# Patient Record
Sex: MALE | Race: BLACK OR AFRICAN AMERICAN | NOT HISPANIC OR LATINO | Employment: FULL TIME | URBAN - METROPOLITAN AREA
[De-identification: names, ages, dates, MRNs, and addresses within clinical notes are randomized per-mention and may not be internally consistent; named-entity substitution may affect disease eponyms.]

---

## 2017-01-10 ENCOUNTER — HOSPITAL ENCOUNTER (OUTPATIENT)
Dept: RADIOLOGY | Facility: OTHER | Age: 52
Discharge: HOME | End: 2017-01-10
Attending: NEUROLOGICAL SURGERY
Payer: COMMERCIAL

## 2017-01-10 DIAGNOSIS — M54.2 NECK PAIN: ICD-10-CM

## 2017-01-10 DIAGNOSIS — M47.812 CERVICAL SPONDYLOSIS WITHOUT MYELOPATHY: ICD-10-CM

## 2017-01-10 DIAGNOSIS — M54.12 BRACHIAL NEURITIS: ICD-10-CM

## 2017-01-10 DIAGNOSIS — M43.10 ACQUIRED SPONDYLOLISTHESIS: ICD-10-CM

## 2017-01-10 DIAGNOSIS — M50.30 DEGENERATION OF CERVICAL INTERVERTEBRAL DISC: ICD-10-CM

## 2017-01-10 DIAGNOSIS — M79.602 LEFT ARM PAIN: ICD-10-CM

## 2017-01-10 PROCEDURE — 72141 MRI NECK SPINE W/O DYE: CPT | Mod: TC

## 2017-01-10 PROCEDURE — 72141 MRI NECK SPINE W/O DYE: CPT | Mod: 26,,, | Performed by: RADIOLOGY

## 2017-01-12 ENCOUNTER — TELEPHONE (OUTPATIENT)
Dept: SPINE | Facility: CLINIC | Age: 52
End: 2017-01-12

## 2017-01-12 NOTE — TELEPHONE ENCOUNTER
----- Message from Payton Whitt sent at 1/12/2017  3:02 PM CST -----  Contact: Self  Good afternoon,     Pt would like a call back regarding rescheduling appt on 1/17/17.    Pt can be reached at [phone]    Thank you!

## 2017-01-13 ENCOUNTER — TELEPHONE (OUTPATIENT)
Dept: HEPATOLOGY | Facility: CLINIC | Age: 52
End: 2017-01-13

## 2017-01-13 NOTE — TELEPHONE ENCOUNTER
----- Message from Payton Whitt sent at 1/12/2017  3:03 PM CST -----  Contact: Self  Good afternoon,     Pt would like a call back regarding rescheduling appt on 01/24/17    Pt can be reached at [phone]    Thank you!

## 2017-01-16 ENCOUNTER — TELEPHONE (OUTPATIENT)
Dept: HEPATOLOGY | Facility: CLINIC | Age: 52
End: 2017-01-16

## 2017-01-16 NOTE — TELEPHONE ENCOUNTER
----- Message from Bertha Cuevas sent at 1/13/2017  5:40 PM CST -----  Contact: Pt can be reached at [phone]  Pt is calling to speak to the nurse concerning possibly requesting to reschedule ultrasound early morning.    Sorry nothing available.    Pt would like to discuss other reschedule options  Thank you!

## 2017-01-17 ENCOUNTER — HOSPITAL ENCOUNTER (OUTPATIENT)
Dept: RADIOLOGY | Facility: HOSPITAL | Age: 52
Discharge: HOME | End: 2017-01-17
Attending: NURSE PRACTITIONER
Payer: COMMERCIAL

## 2017-01-17 DIAGNOSIS — B18.1 CHRONIC HEPATITIS B: ICD-10-CM

## 2017-01-17 PROCEDURE — 76700 US EXAM ABDOM COMPLETE: CPT | Mod: 26,,, | Performed by: RADIOLOGY

## 2017-01-17 PROCEDURE — 76700 US EXAM ABDOM COMPLETE: CPT | Mod: TC

## 2017-01-18 ENCOUNTER — OFFICE VISIT (OUTPATIENT)
Dept: SPINE | Facility: CLINIC | Age: 52
End: 2017-01-18
Payer: COMMERCIAL

## 2017-01-18 VITALS
WEIGHT: 189 LBS | DIASTOLIC BLOOD PRESSURE: 72 MMHG | BODY MASS INDEX: 27.99 KG/M2 | SYSTOLIC BLOOD PRESSURE: 163 MMHG | HEART RATE: 67 BPM | HEIGHT: 69 IN

## 2017-01-18 DIAGNOSIS — M54.2 NECK PAIN: Primary | ICD-10-CM

## 2017-01-18 DIAGNOSIS — M54.12 BRACHIAL NEURITIS: ICD-10-CM

## 2017-01-18 DIAGNOSIS — M47.812 CERVICAL SPONDYLOSIS WITHOUT MYELOPATHY: ICD-10-CM

## 2017-01-18 DIAGNOSIS — M25.522 LEFT ELBOW PAIN: ICD-10-CM

## 2017-01-18 DIAGNOSIS — M48.02 CERVICAL STENOSIS OF SPINAL CANAL: ICD-10-CM

## 2017-01-18 DIAGNOSIS — M50.30 DEGENERATION OF CERVICAL INTERVERTEBRAL DISC: ICD-10-CM

## 2017-01-18 DIAGNOSIS — M43.10 ACQUIRED SPONDYLOLISTHESIS: ICD-10-CM

## 2017-01-18 PROCEDURE — 1160F RVW MEDS BY RX/DR IN RCRD: CPT | Mod: S$GLB,,, | Performed by: PHYSICIAN ASSISTANT

## 2017-01-18 PROCEDURE — 3078F DIAST BP <80 MM HG: CPT | Mod: S$GLB,,, | Performed by: PHYSICIAN ASSISTANT

## 2017-01-18 PROCEDURE — 3077F SYST BP >= 140 MM HG: CPT | Mod: S$GLB,,, | Performed by: PHYSICIAN ASSISTANT

## 2017-01-18 PROCEDURE — 99999 PR PBB SHADOW E&M-EST. PATIENT-LVL IV: CPT | Mod: PBBFAC,,, | Performed by: PHYSICIAN ASSISTANT

## 2017-01-18 PROCEDURE — 99214 OFFICE O/P EST MOD 30 MIN: CPT | Mod: S$GLB,,, | Performed by: PHYSICIAN ASSISTANT

## 2017-01-18 NOTE — MR AVS SNAPSHOT
[facility]  [address]  [address]  [place] [address]  Phone: [phone]  Fax: [phone]                  Ramiro Oneil   2017 7:30 AM   Office Visit    Description:  Male : 1965   Provider:  Marianne Villareal PA-C   Department:  [facility]           Reason for Visit     Neck Pain                To Do List           Future Appointments        Provider Department Dept Phone    2017 3:00 PM Aracelis Frye NP [facility] Hep[facility] [phone]      Goals (5 Years of Data)     None      [facility]     [facility] -  Assistance  Registered nurses in the [facility] provide clinical advisement, health education, appointment booking, and other advisory services.  Call for this free service at [phone].             Medications           Message regarding Medications     Verify the changes and/or additions to your medication regime listed below are the same as discussed with your clinician today.  If any of these changes or additions are incorrect, please notify your healthcare provider.             Verify that the below list of medications is an accurate representation of the medications you are currently taking.  If none reported, the list may be blank. If incorrect, please contact your healthcare provider. Carry this list with you in case of emergency.           Current Medications     aspirin (ECOTRIN) 81 MG EC tablet Take 1 tablet (81 mg total) by mouth once daily.    atorvastatin (LIPITOR) 10 MG tablet Take 1 tablet (10 mg total) by mouth once daily.    clopidogrel (PLAVIX) 75 mg tablet TAKE 1 TABLET BY MOUTH EVERY DAY    cyclobenzaprine (FLEXERIL) 10 MG tablet Take 10 mg by mouth 3 (three) times daily as needed.    econazole nitrate 1 % cream     fluticasone (FLONASE) 50 mcg/actuation nasal spray 2 sprays by Each Nare route once daily.    gabapentin (NEURONTIN) 300 MG capsule Take 1 capsule (300 mg  "total) by mouth as directed. 1 po q hs x 3 days, then 1 po bid x 3 days, then 1 po tid.    hydrocortisone-pramoxine (PROCTOFOAM-HS) rectal foam Place 1 applicator rectally 2 (two) times daily.    methocarbamol (ROBAXIN) 750 MG Tab     nystatin-triamcinolone (MYCOLOG II) cream APPLY TOPICALLY 2 (TWO) TIMES DAILY.    tenofovir (VIREAD) 300 mg Tab TAKE 1 TABLET BY MOUTH    ONCE DAILY           Clinical Reference Information           Vital Signs - Last Recorded  Most recent update: 1/18/2017  8:11 AM by Palak Verde MA    BP Pulse Ht Wt BMI    (!) 163/72 67 5' 9" (1.753 m) 85.7 kg (189 lb) 27.91 kg/m2      Blood Pressure          Most Recent Value    BP  (!)  163/72      Allergies as of 1/18/2017     No Known Allergies      Immunizations Administered on Date of Encounter - 1/18/2017     None      [facility] Sign-Up     Activating your [facility] account is as easy as 1-2-3!     1) Visit [email or web address], select Sign Up Now, enter this activation code and your date of birth, then select Next.  [email or web address]  Expires: 1/20/2017  4:44 PM      2) Create a username and password to use when you visit [facility] in the future and select a security question in case you lose your password and select Next.    3) Enter your e-mail address and click Sign Up!    Additional Information  If you have questions, please e-mail [email or web address] or call [phone] to talk to our [facility] staff. Remember, [facility] is NOT to be used for urgent needs. For medical emergencies, dial 911.         "

## 2017-01-25 ENCOUNTER — OFFICE VISIT (OUTPATIENT)
Dept: HEPATOLOGY | Facility: CLINIC | Age: 52
End: 2017-01-25
Payer: COMMERCIAL

## 2017-01-25 VITALS
HEIGHT: 69 IN | TEMPERATURE: 96 F | SYSTOLIC BLOOD PRESSURE: 140 MMHG | BODY MASS INDEX: 27.36 KG/M2 | HEART RATE: 60 BPM | RESPIRATION RATE: 20 BRPM | DIASTOLIC BLOOD PRESSURE: 82 MMHG | OXYGEN SATURATION: 99 % | WEIGHT: 184.75 LBS

## 2017-01-25 DIAGNOSIS — I10 HYPERTENSION, ESSENTIAL: ICD-10-CM

## 2017-01-25 DIAGNOSIS — K76.0 NAFLD (NONALCOHOLIC FATTY LIVER DISEASE): ICD-10-CM

## 2017-01-25 DIAGNOSIS — E78.5 HYPERLIPIDEMIA, UNSPECIFIED HYPERLIPIDEMIA TYPE: ICD-10-CM

## 2017-01-25 DIAGNOSIS — B18.1 CHRONIC HEPATITIS B: Primary | ICD-10-CM

## 2017-01-25 DIAGNOSIS — E66.3 OVERWEIGHT: ICD-10-CM

## 2017-01-25 PROCEDURE — 3079F DIAST BP 80-89 MM HG: CPT | Mod: S$GLB,,, | Performed by: NURSE PRACTITIONER

## 2017-01-25 PROCEDURE — 99999 PR PBB SHADOW E&M-EST. PATIENT-LVL IV: CPT | Mod: PBBFAC,,, | Performed by: NURSE PRACTITIONER

## 2017-01-25 PROCEDURE — 3077F SYST BP >= 140 MM HG: CPT | Mod: S$GLB,,, | Performed by: NURSE PRACTITIONER

## 2017-01-25 PROCEDURE — 1159F MED LIST DOCD IN RCRD: CPT | Mod: S$GLB,,, | Performed by: NURSE PRACTITIONER

## 2017-01-25 PROCEDURE — 99213 OFFICE O/P EST LOW 20 MIN: CPT | Mod: S$GLB,,, | Performed by: NURSE PRACTITIONER
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
## 2017-02-20 ENCOUNTER — TELEPHONE (OUTPATIENT)
Dept: SPINE | Facility: CLINIC | Age: 52
End: 2017-02-20

## 2017-02-22 DIAGNOSIS — M54.12 BRACHIAL NEURITIS: ICD-10-CM

## 2017-02-22 DIAGNOSIS — M43.10 ACQUIRED SPONDYLOLISTHESIS: ICD-10-CM

## 2017-02-22 DIAGNOSIS — M79.602 LEFT ARM PAIN: ICD-10-CM

## 2017-02-22 DIAGNOSIS — M47.812 CERVICAL SPONDYLOSIS WITHOUT MYELOPATHY: ICD-10-CM

## 2017-02-22 DIAGNOSIS — M54.2 NECK PAIN: ICD-10-CM

## 2017-02-22 DIAGNOSIS — M50.30 DEGENERATION OF CERVICAL INTERVERTEBRAL DISC: ICD-10-CM

## 2017-02-22 RX ORDER — GABAPENTIN 300 MG/1
300 CAPSULE ORAL 3 TIMES DAILY
Qty: 90 CAPSULE | Refills: 2 | Status: SHIPPED | OUTPATIENT
Start: 2017-02-22 | End: 2018-11-14

## 2017-03-29 RX ORDER — CLOPIDOGREL BISULFATE 75 MG/1
TABLET ORAL
Qty: 30 TABLET | Refills: 4 | Status: SHIPPED | OUTPATIENT
Start: 2017-03-29 | End: 2018-10-19 | Stop reason: SDUPTHER

## 2017-03-29 RX ORDER — NYSTATIN AND TRIAMCINOLONE ACETONIDE 100000; 1 [USP'U]/G; MG/G
CREAM TOPICAL 2 TIMES DAILY
Qty: 30 G | Refills: 0 | Status: SHIPPED | OUTPATIENT
Start: 2017-03-29 | End: 2018-12-03 | Stop reason: SDUPTHER

## 2017-03-29 NOTE — TELEPHONE ENCOUNTER
----- Message from Paz Prado sent at 3/29/2017 12:01 PM CDT -----  Contact: [phone] (Phone)  RX request - refill or new RX.  Is this a refill or new RX:  refill  RX name and strength: nystatin-triamcinolone (MYCOLOG II) cream  Directions:   Is this a 30 day or 90 day RX:    Pharmacy name and phone #:  [phone] (Phone)  [phone] (Fax)    Comments:

## 2017-06-27 ENCOUNTER — TELEPHONE (OUTPATIENT)
Dept: HEPATOLOGY | Facility: CLINIC | Age: 52
End: 2017-06-27

## 2017-06-27 NOTE — TELEPHONE ENCOUNTER
----- Message from Fely Perry sent at 6/26/2017  2:01 PM CDT -----  Patient calling to schedule his appt with analia for  & US with visit. Please call  [phone]

## 2017-07-14 ENCOUNTER — TELEPHONE (OUTPATIENT)
Dept: HEPATOLOGY | Facility: CLINIC | Age: 52
End: 2017-07-14

## 2017-07-14 DIAGNOSIS — K76.0 NAFLD (NONALCOHOLIC FATTY LIVER DISEASE): Primary | ICD-10-CM

## 2017-07-14 NOTE — TELEPHONE ENCOUNTER
----- Message from Payton Whitt sent at 2017 10:48 AM CDT -----  Contact: Self  Good morning,     Pt would like a call back regarding rescheduling appt on 17. Pt would like to go on 17 (order will  by then)    Pt can be reached at [phone].    Thank you!

## 2017-07-25 ENCOUNTER — TELEPHONE (OUTPATIENT)
Dept: RADIOLOGY | Facility: HOSPITAL | Age: 52
End: 2017-07-25

## 2017-07-26 ENCOUNTER — LAB VISIT (OUTPATIENT)
Dept: LAB | Facility: HOSPITAL | Age: 52
End: 2017-07-26
Attending: NURSE PRACTITIONER
Payer: COMMERCIAL

## 2017-07-26 ENCOUNTER — HOSPITAL ENCOUNTER (OUTPATIENT)
Dept: RADIOLOGY | Facility: HOSPITAL | Age: 52
Discharge: HOME | End: 2017-07-26
Attending: NURSE PRACTITIONER
Payer: COMMERCIAL

## 2017-07-26 DIAGNOSIS — B18.1 CHRONIC HEPATITIS B: ICD-10-CM

## 2017-07-26 DIAGNOSIS — K76.0 NAFLD (NONALCOHOLIC FATTY LIVER DISEASE): ICD-10-CM

## 2017-07-26 LAB
AFP SERPL-MCNC: 2.9 NG/ML
ALBUMIN SERPL BCP-MCNC: 3.6 G/DL
ALP SERPL-CCNC: 57 U/L
ALT SERPL W/O P-5'-P-CCNC: 31 U/L
ANION GAP SERPL CALC-SCNC: 7 MMOL/L
AST SERPL-CCNC: 24 U/L
BASOPHILS # BLD AUTO: 0.09 K/UL
BASOPHILS NFR BLD: 1.6 %
BILIRUB SERPL-MCNC: 0.6 MG/DL
BUN SERPL-MCNC: 14 MG/DL
CALCIUM SERPL-MCNC: 8.7 MG/DL
CHLORIDE SERPL-SCNC: 107 MMOL/L
CO2 SERPL-SCNC: 25 MMOL/L
CREAT SERPL-MCNC: 1 MG/DL
DIFFERENTIAL METHOD: ABNORMAL
EOSINOPHIL # BLD AUTO: 0.1 K/UL
EOSINOPHIL NFR BLD: 1.9 %
ERYTHROCYTE [DISTWIDTH] IN BLOOD BY AUTOMATED COUNT: 13.9 %
EST. GFR  (AFRICAN AMERICAN): >60 ML/MIN/1.73 M^2
EST. GFR  (NON AFRICAN AMERICAN): >60 ML/MIN/1.73 M^2
GLUCOSE SERPL-MCNC: 112 MG/DL
HCT VFR BLD AUTO: 46 %
HGB BLD-MCNC: 14.9 G/DL
INR PPP: 1
LYMPHOCYTES # BLD AUTO: 2.4 K/UL
LYMPHOCYTES NFR BLD: 43.2 %
MCH RBC QN AUTO: 26.8 PG
MCHC RBC AUTO-ENTMCNC: 32.4 G/DL
MCV RBC AUTO: 83 FL
MONOCYTES # BLD AUTO: 0.4 K/UL
MONOCYTES NFR BLD: 7.1 %
NEUTROPHILS # BLD AUTO: 2.6 K/UL
NEUTROPHILS NFR BLD: 46.2 %
PLATELET # BLD AUTO: 249 K/UL
PMV BLD AUTO: 9.6 FL
POTASSIUM SERPL-SCNC: 3.7 MMOL/L
PROT SERPL-MCNC: 7.3 G/DL
PROTHROMBIN TIME: 10.6 SEC
RBC # BLD AUTO: 5.57 M/UL
SODIUM SERPL-SCNC: 139 MMOL/L
WBC # BLD AUTO: 5.65 K/UL

## 2017-07-26 PROCEDURE — 76700 US EXAM ABDOM COMPLETE: CPT | Mod: TC,PO

## 2017-07-26 PROCEDURE — 36415 COLL VENOUS BLD VENIPUNCTURE: CPT

## 2017-07-26 PROCEDURE — 87350 HEPATITIS BE AG IA: CPT

## 2017-07-26 PROCEDURE — 80053 COMPREHEN METABOLIC PANEL: CPT

## 2017-07-26 PROCEDURE — 76700 US EXAM ABDOM COMPLETE: CPT | Mod: 26,,, | Performed by: RADIOLOGY

## 2017-07-26 PROCEDURE — 85610 PROTHROMBIN TIME: CPT | Mod: PO

## 2017-07-26 PROCEDURE — 87340 HEPATITIS B SURFACE AG IA: CPT

## 2017-07-26 PROCEDURE — 86706 HEP B SURFACE ANTIBODY: CPT

## 2017-07-26 PROCEDURE — 87517 HEPATITIS B DNA QUANT: CPT

## 2017-07-26 PROCEDURE — 86707 HEPATITIS BE ANTIBODY: CPT

## 2017-07-26 PROCEDURE — 85025 COMPLETE CBC W/AUTO DIFF WBC: CPT

## 2017-07-26 PROCEDURE — 82105 ALPHA-FETOPROTEIN SERUM: CPT

## 2017-07-27 LAB
HBV SURFACE AB SER-ACNC: NEGATIVE M[IU]/ML
HBV SURFACE AG SERPL QL IA: POSITIVE

## 2017-07-28 LAB
HBV E AB SER QL: ABNORMAL
HBV E AG SPEC QL: NORMAL
HEPATITIS B VIRAL DNA - QUANTITATIVE: <10 IU/ML
HEPATITIS B VIRUS DNA: DETECTED
LOG HBV IU/ML: <1 LOG (10) IU/ML

## 2017-08-01 ENCOUNTER — OFFICE VISIT (OUTPATIENT)
Dept: HEPATOLOGY | Facility: CLINIC | Age: 52
End: 2017-08-01
Payer: COMMERCIAL

## 2017-08-01 VITALS
HEART RATE: 67 BPM | DIASTOLIC BLOOD PRESSURE: 76 MMHG | WEIGHT: 189.81 LBS | OXYGEN SATURATION: 98 % | RESPIRATION RATE: 18 BRPM | SYSTOLIC BLOOD PRESSURE: 167 MMHG | BODY MASS INDEX: 28.11 KG/M2 | HEIGHT: 69 IN | TEMPERATURE: 97 F

## 2017-08-01 DIAGNOSIS — I10 HYPERTENSION, ESSENTIAL: ICD-10-CM

## 2017-08-01 DIAGNOSIS — E78.5 HYPERLIPIDEMIA, UNSPECIFIED HYPERLIPIDEMIA TYPE: ICD-10-CM

## 2017-08-01 DIAGNOSIS — K76.0 NAFLD (NONALCOHOLIC FATTY LIVER DISEASE): ICD-10-CM

## 2017-08-01 DIAGNOSIS — B18.1 CHRONIC HEPATITIS B: Primary | ICD-10-CM

## 2017-08-01 DIAGNOSIS — E66.3 OVERWEIGHT: ICD-10-CM

## 2017-08-01 PROCEDURE — 99999 PR PBB SHADOW E&M-EST. PATIENT-LVL IV: CPT | Mod: PBBFAC,,, | Performed by: NURSE PRACTITIONER

## 2017-08-01 PROCEDURE — 99213 OFFICE O/P EST LOW 20 MIN: CPT | Mod: S$GLB,,, | Performed by: NURSE PRACTITIONER

## 2017-08-01 RX ORDER — TENOFOVIR DISOPROXIL FUMARATE 300 MG/1
TABLET, FILM COATED ORAL
Qty: 30 TABLET | Refills: 11 | Status: SHIPPED | OUTPATIENT
Start: 2017-08-01 | End: 2018-10-19 | Stop reason: SDUPTHER

## 2017-08-01 NOTE — PROGRESS NOTES
[facility] Established Patient Visit    Reason for Visit:  F/u hepatitis B    PCP: Yves Serrano    HPI:  This is a 51 y.o. male here for f/u of E Ag (-), E Ab (+) chronic hepatitis B. He started Viread on 2/25/14. His transaminases remain normal. His HBV DNA remains undetectable. He is doing well and denies any complaints today. His spleen is normal size, and plts are also normal. Fibrosure showed F1-F2, only mild fibrosis. Elastography scan = F0-F1. Doppler u/s showed no evidence of portal HTN. No masses on recent u/s. AFP remains normal. His synthetic liver functioning is normal.     He denies any symptoms of advanced chronic liver disease including jaundice, dark urine, hematemesis, melena, slowed mentation, abdominal distention.     ROS:   GENERAL: Denies fever, chills, weight loss/gain, fatigue  HEENT: Denies headaches, dizziness, vision/hearing changes  CARDIOVASCULAR: Denies chest pain, palpitations, or edema  RESPIRATORY: Denies dyspnea, cough  GI: Denies abdominal pain, rectal bleeding, nausea, vomiting. No change in bowel pattern or color  : Denies dysuria, hematuria   SKIN: Denies rash, itching   NEURO: Denies confusion, memory loss, or mood changes  PSYCH: Denies depression or anxiety  HEME/LYMPH: Denies easy bruising or bleeding      PMHX:  has a past medical history of Anticoagulant long-term use; Dermatitis; Hepatitis B; Hypertension; and NAFLD (nonalcoholic fatty liver disease).    PSHX:  has a past surgical history that includes Shoulder surgery; Colonoscopy; PTA/stenting of distal right SFA ; stents; and Carpal tunnel release.    The patient's social and family histories were reviewed by me and updated in the appropriate section of the electronic medical record.    Review of patient's allergies indicates:  No Known Allergies    Medications reviewed in Epic      Objective Findings:    PHYSICAL EXAM:   Friendly Black or  male, in no acute distress; alert and  "oriented to person, place and time  VITALS:   BP (!) 167/76 (BP Location: Left arm, Patient Position: Sitting)   Pulse 67   Temp 97.1 °F (36.2 °C) (Oral)   Resp 18   Ht 5' 9" (1.753 m)   Wt 86.1 kg (189 lb 13.1 oz)   SpO2 98%   BMI 28.03 kg/m²    HENT: Normocephalic, without obvious abnormality. Oral mucosa pink and moist. Dentition good.  EYES: Sclerae anicteric.   CARDIOVASCULAR: Regular rate and rhythm. No murmurs.  RESPIRATORY: Normal respiratory effort. BBS CTA. No wheezes or crackles.  GI: Soft, non-tender, non-distended. No hepatosplenomegaly. No masses palpable. No ascites.  EXTREMITIES:  No clubbing, cyanosis or edema.  SKIN: Warm and dry. No jaundice. No rashes noted to exposed skin. No telangectasias noted. No palmar erythema.  NEURO:  Normal gate. No asterixis.  PSYCH:  Memory intact. Thought and speech pattern appropriate. Behavior normal. No depression or anxiety noted.      Labs:  Lab Results   Component Value Date    WBC 5.65 07/26/2017    HGB 14.9 07/26/2017    HCT 46.0 07/26/2017     07/26/2017    CHOL 180 08/03/2016    TRIG 156 (H) 08/03/2016    HDL 32 (L) 08/03/2016     07/26/2017    K 3.7 07/26/2017    CREATININE 1.0 07/26/2017    ALT 31 07/26/2017    AST 24 07/26/2017    ALKPHOS 57 07/26/2017    BILITOT 0.6 07/26/2017    ALBUMIN 3.6 07/26/2017    INR 1.0 07/26/2017    AFP 2.9 07/26/2017         ASSESSMENT:  51 y.o. Black or  male with:  1.  Chronic hepatitis B, E antigen (-), E antibody (+)   -- started on Viread 2/25/14, HBV DNA remains undetectable  -- transaminases minimally elevated prior, suspect due to NAFLD, now normalized with weight loss  -- HCC screening- up to date, next due 1/2018  -- (+) immunity to hepatitis A   -- wife and children are finished vaccinations   2. Fatty liver   -- risk factor includes overweight, HTN, HLD       EDUCATION:   -- Avoid alcohol. Avoid raw seafood.   -- Discussed transmission of HBV, including sexual transmission. " Avoid sharing razors/toothbrushes, etc.   -- Natural history of HBV including progression to cirrhosis reviewed.   -- We discussed the increased risk of hepatocellular carcinoma due to active hepatitis B infection. Continued screening every six months with ultrasound and AFP is recommended.   -- Discussed potential outcomes of cirrhosis, including liver cancer, liver failure, liver transplant, death.   -- Limit acetaminophen to 2000mg daily.  -- Advised immunization of all household members.  -- Discussed importance of compliance with medication regimen and risk of viral mutation if treatment is stopped prematurely.      We discussed the manifestations of NAFLD. At this time there is no FDA approved therapy for NAFLD.   The patient and I discussed the importance of diet, exercise, and weight loss for management of NAFLD. We discussed a low fat, low carb/low sugar, high fiber diet, and a goal of 30 minutes of exercise 5 times per week.   Pt is aware that fatty liver can progress to steatohepatitis and possibly to cirrhosis, putting one at increased risk for liver cancer, liver failure, and death.      Reassured pt that he has no findings suggestive of advanced fibrosis at this time. Continue Viread.    PLAN:  1. Continue Viread, refilled  2. Continue efforts at weight loss  3. Low fat, low cholesterol, low carb, high fiber diet  4. Exercise, 5 days a week, 30 min a day  5. Recommend good control of cholesterol, blood pressure, blood sugar levels  6. HCC screening Q 6 months with AFP and abd. U/S, next due 1/2018  7. Follow up in 1/2018 with labs and u/s 1 week prior to appt        Thank you for allowing me to participate in the care of VIRGIE Good

## 2017-11-16 ENCOUNTER — CLINICAL SUPPORT (OUTPATIENT)
Dept: OTHER | Facility: CLINIC | Age: 52
End: 2017-11-16
Payer: COMMERCIAL

## 2017-11-16 VITALS
SYSTOLIC BLOOD PRESSURE: 136 MMHG | BODY MASS INDEX: 27.43 KG/M2 | HEIGHT: 68 IN | WEIGHT: 181 LBS | DIASTOLIC BLOOD PRESSURE: 82 MMHG

## 2017-11-16 DIAGNOSIS — Z00.8 HEALTH EXAMINATION IN POPULATION SURVEYS: Primary | ICD-10-CM

## 2017-11-16 LAB
GLUCOSE SERPL-MCNC: 88 MG/DL (ref 60–140)
POC CHOLESTEROL, HDL: 30 MG/DL (ref 40–?)
POC CHOLESTEROL, LDL: 108 MG/DL (ref ?–160)
POC CHOLESTEROL, TOTAL: 154 MG/DL (ref ?–240)
POC GLUCOSE FASTING: ABNORMAL MG/DL (ref 60–110)
POC TOTAL CHOLESTEROL / HDL RATIO: 5.13 (ref ?–6)
POC TRIGLYCERIDES: 74 MG/DL (ref ?–160)

## 2017-11-16 PROCEDURE — 99401 PREV MED CNSL INDIV APPRX 15: CPT | Mod: S$GLB,,, | Performed by: INTERNAL MEDICINE

## 2017-11-16 PROCEDURE — 82947 ASSAY GLUCOSE BLOOD QUANT: CPT | Mod: QW,S$GLB,, | Performed by: INTERNAL MEDICINE

## 2017-11-16 PROCEDURE — 80061 LIPID PANEL: CPT | Mod: QW,S$GLB,, | Performed by: INTERNAL MEDICINE

## 2017-11-28 ENCOUNTER — TELEPHONE (OUTPATIENT)
Dept: INTERNAL MEDICINE | Facility: CLINIC | Age: 52
End: 2017-11-28

## 2017-11-28 DIAGNOSIS — I10 HYPERTENSION, ESSENTIAL: ICD-10-CM

## 2017-11-28 DIAGNOSIS — Z12.5 PROSTATE CANCER SCREENING: ICD-10-CM

## 2017-11-28 DIAGNOSIS — E78.5 HYPERLIPIDEMIA, UNSPECIFIED HYPERLIPIDEMIA TYPE: ICD-10-CM

## 2017-11-28 DIAGNOSIS — Z00.00 ANNUAL PHYSICAL EXAM: Primary | ICD-10-CM

## 2017-11-28 NOTE — TELEPHONE ENCOUNTER
----- Message from Rissa Soares sent at 11/28/2017 11:40 AM CST -----  Doctor appointment and lab have been scheduled.  Please link lab orders to the lab appointment.  Date of doctor appointment:  12/21  Physical or EP:  Physical  Date of lab appointment:  12/16  Comments:

## 2017-12-13 ENCOUNTER — TELEPHONE (OUTPATIENT)
Dept: INTERNAL MEDICINE | Facility: CLINIC | Age: 52
End: 2017-12-13

## 2017-12-13 NOTE — TELEPHONE ENCOUNTER
----- Message from Helio Hyde sent at 12/13/2017  2:30 PM CST -----  Contact: self [phone]  Patient is calling to discuss medication he need to take for stomach pains . Please advise, Thanks

## 2017-12-13 NOTE — TELEPHONE ENCOUNTER
For mild cough and cold symptoms, over the counter medicines that help include:  Flonase nasal spray for congestion;  Robitussin DM for cough;  Tylenol is ok with blood thinner for feverishness or aches;  Allegra or Zyrtec for nasal discharge    Warning signs include:  Worsening cough or shortness of breath;  High fever;  Lengthy time to recovery more than 5-7 days

## 2017-12-13 NOTE — TELEPHONE ENCOUNTER
Please advise, patient complain of lower abdominal pain, diarrhea X3days, and leg weakness. Patient states he is on blood thinners so he wasn't sure if should take anything OTC. Offered patient a UC appt, patient states he is in [place]. Patient would like to know if there is anything he can take?

## 2017-12-14 NOTE — TELEPHONE ENCOUNTER
Pt is complaining lower abdominal pain and diarrhea. UC apt offered pt declined. Anything OTC for diarrhea?

## 2017-12-16 ENCOUNTER — LAB VISIT (OUTPATIENT)
Dept: LAB | Facility: HOSPITAL | Age: 52
End: 2017-12-16
Attending: FAMILY MEDICINE
Payer: COMMERCIAL

## 2017-12-16 DIAGNOSIS — Z12.5 PROSTATE CANCER SCREENING: ICD-10-CM

## 2017-12-16 DIAGNOSIS — Z00.00 ANNUAL PHYSICAL EXAM: ICD-10-CM

## 2017-12-16 DIAGNOSIS — I10 HYPERTENSION, ESSENTIAL: ICD-10-CM

## 2017-12-16 DIAGNOSIS — E78.5 HYPERLIPIDEMIA, UNSPECIFIED HYPERLIPIDEMIA TYPE: ICD-10-CM

## 2017-12-16 LAB
ALBUMIN SERPL BCP-MCNC: 3.5 G/DL
ALP SERPL-CCNC: 62 U/L
ALT SERPL W/O P-5'-P-CCNC: 28 U/L
ANION GAP SERPL CALC-SCNC: 6 MMOL/L
AST SERPL-CCNC: 25 U/L
BILIRUB SERPL-MCNC: 0.8 MG/DL
BUN SERPL-MCNC: 13 MG/DL
CALCIUM SERPL-MCNC: 9 MG/DL
CHLORIDE SERPL-SCNC: 107 MMOL/L
CHOLEST SERPL-MCNC: 107 MG/DL
CHOLEST/HDLC SERPL: 3.8 {RATIO}
CO2 SERPL-SCNC: 29 MMOL/L
COMPLEXED PSA SERPL-MCNC: 1.1 NG/ML
CREAT SERPL-MCNC: 0.9 MG/DL
EST. GFR  (AFRICAN AMERICAN): >60 ML/MIN/1.73 M^2
EST. GFR  (NON AFRICAN AMERICAN): >60 ML/MIN/1.73 M^2
GLUCOSE SERPL-MCNC: 102 MG/DL
HDLC SERPL-MCNC: 28 MG/DL
HDLC SERPL: 26.2 %
LDLC SERPL CALC-MCNC: 69.6 MG/DL
NONHDLC SERPL-MCNC: 79 MG/DL
POTASSIUM SERPL-SCNC: 4.3 MMOL/L
PROT SERPL-MCNC: 7.4 G/DL
SODIUM SERPL-SCNC: 142 MMOL/L
TRIGL SERPL-MCNC: 47 MG/DL

## 2017-12-16 PROCEDURE — 84153 ASSAY OF PSA TOTAL: CPT

## 2017-12-16 PROCEDURE — 36415 COLL VENOUS BLD VENIPUNCTURE: CPT

## 2017-12-16 PROCEDURE — 80061 LIPID PANEL: CPT

## 2017-12-16 PROCEDURE — 80053 COMPREHEN METABOLIC PANEL: CPT

## 2017-12-21 ENCOUNTER — TELEPHONE (OUTPATIENT)
Dept: HEPATOLOGY | Facility: CLINIC | Age: 52
End: 2017-12-21

## 2017-12-21 ENCOUNTER — OFFICE VISIT (OUTPATIENT)
Dept: CARDIOLOGY | Facility: CLINIC | Age: 52
End: 2017-12-21
Payer: COMMERCIAL

## 2017-12-21 ENCOUNTER — OFFICE VISIT (OUTPATIENT)
Dept: INTERNAL MEDICINE | Facility: CLINIC | Age: 52
End: 2017-12-21
Attending: FAMILY MEDICINE
Payer: COMMERCIAL

## 2017-12-21 ENCOUNTER — IMMUNIZATION (OUTPATIENT)
Dept: INTERNAL MEDICINE | Facility: CLINIC | Age: 52
End: 2017-12-21
Payer: COMMERCIAL

## 2017-12-21 VITALS
WEIGHT: 182.56 LBS | HEIGHT: 69 IN | SYSTOLIC BLOOD PRESSURE: 145 MMHG | DIASTOLIC BLOOD PRESSURE: 77 MMHG | HEART RATE: 67 BPM | BODY MASS INDEX: 27.04 KG/M2

## 2017-12-21 VITALS
BODY MASS INDEX: 26.66 KG/M2 | HEIGHT: 69 IN | WEIGHT: 180 LBS | DIASTOLIC BLOOD PRESSURE: 80 MMHG | SYSTOLIC BLOOD PRESSURE: 124 MMHG

## 2017-12-21 DIAGNOSIS — I73.9 PAD (PERIPHERAL ARTERY DISEASE): ICD-10-CM

## 2017-12-21 DIAGNOSIS — E78.5 HYPERLIPIDEMIA, UNSPECIFIED HYPERLIPIDEMIA TYPE: ICD-10-CM

## 2017-12-21 DIAGNOSIS — M77.11 LATERAL EPICONDYLITIS OF RIGHT ELBOW: ICD-10-CM

## 2017-12-21 DIAGNOSIS — Z95.9 S/P ARTERIAL STENT: Primary | Chronic | ICD-10-CM

## 2017-12-21 DIAGNOSIS — Z00.00 ANNUAL PHYSICAL EXAM: Primary | ICD-10-CM

## 2017-12-21 DIAGNOSIS — B18.1 CHRONIC HEPATITIS B: ICD-10-CM

## 2017-12-21 DIAGNOSIS — Z95.9 S/P ARTERIAL STENT: Chronic | ICD-10-CM

## 2017-12-21 DIAGNOSIS — K76.0 NAFLD (NONALCOHOLIC FATTY LIVER DISEASE): ICD-10-CM

## 2017-12-21 DIAGNOSIS — Z12.11 COLON CANCER SCREENING: ICD-10-CM

## 2017-12-21 DIAGNOSIS — K63.5 POLYP OF COLON, UNSPECIFIED PART OF COLON, UNSPECIFIED TYPE: ICD-10-CM

## 2017-12-21 PROCEDURE — 99396 PREV VISIT EST AGE 40-64: CPT | Mod: S$GLB,,, | Performed by: FAMILY MEDICINE

## 2017-12-21 PROCEDURE — 99999 PR PBB SHADOW E&M-EST. PATIENT-LVL IV: CPT | Mod: PBBFAC,,, | Performed by: INTERNAL MEDICINE

## 2017-12-21 PROCEDURE — 90686 IIV4 VACC NO PRSV 0.5 ML IM: CPT | Mod: S$GLB,,, | Performed by: FAMILY MEDICINE

## 2017-12-21 PROCEDURE — 99999 PR PBB SHADOW E&M-EST. PATIENT-LVL III: CPT | Mod: PBBFAC,,, | Performed by: FAMILY MEDICINE

## 2017-12-21 PROCEDURE — 99213 OFFICE O/P EST LOW 20 MIN: CPT | Mod: S$GLB,,, | Performed by: INTERNAL MEDICINE

## 2017-12-21 PROCEDURE — 90471 IMMUNIZATION ADMIN: CPT | Mod: S$GLB,,, | Performed by: FAMILY MEDICINE

## 2017-12-21 NOTE — TELEPHONE ENCOUNTER
----- Message from Tiara Champion sent at 12/21/2017  2:16 PM CST -----  Contact: pt  Calling to get labs, US and OV for Bean please call him @ # [phone].

## 2017-12-21 NOTE — PATIENT INSTRUCTIONS
Flu shot today    Understanding Lateral Epicondylitis    Tendons are strong bands of tissue that connect muscles to bones. Lateral epicondylitis affects the tendons that connect muscles in the forearm to the lateral epicondyle. This is the bony knob on the outer side of the elbow. The condition occurs if the extensor tendons of the wrist become red and swollen (irritated). This can cause pain in the elbow, forearm, and wrist. Because the condition is sometimes caused by playing tennis, it is also known as tennis elbow.  How to say it  LA-tuhr-henry [id number]-LY-[id number]   What causes lateral epicondylitis?  The condition most often occurs because of overuse. This can be from any activity that repeatedly puts stress on the forearm extensor muscles or tendons and wrist. For instance, playing tennis, lifting weights, cutting meat, painting, and typing can all cause the condition. Wear and tear of the tendons from aging or an injury to the tendons can also cause the condition.  Symptoms of lateral epicondylitis  The most common symptom is pain. You may feel it on the outer side of the elbow and down the back of the forearm. It may be worse when moving or using the elbow, forearm, or wrist. You may also feel pain when gripping or lifting things.  Treatment for lateral epicondylitis  Treatments may include:  · Resting the elbow, forearm, and wrist. Youll need to avoid movements that can make your symptoms worse. You also may need to avoid certain sports and types of work for a time. This helps relieve symptoms and prevent further damage to the tendons.  · Changing the action that caused the problem. For instance, if the tendons were damaged from playing tennis, it may help to change your playing technique or use different equipment. This helps prevent further damage to the tendons.  · Using cold packs. Putting an ice pack on the injured area can help reduce pain and swelling.  · Taking pain medicines. Taking prescription  or over-the-counter pain medicines may help reduce pain and swelling.    · Wearing a brace. This helps reduce strain on the muscles and tendons in the forearm, which may relieve symptoms. It is very important to wear the brace properly.  · Doing exercises and physical therapy. These help improve strength and range of motion in the elbow, forearm, and wrist.  · Getting shots of medicine into the injured area. These may help relieve symptoms for a time.  · Having surgery. This may be an option if other treatments fail to relieve symptoms. In many cases, the surgeon removes the damaged tissue.  Possible complications of lateral epicondylitis  If the tendons involved dont heal properly, symptoms may return or get worse. To help prevent this, follow your treatment plan as directed.  When to call your healthcare provider  Call your healthcare provider right away if you have any of these:  · Fever of 100.4°F (38°C) or higher, or as directed  · Redness, swelling, or warmth in the elbow or forearm that gets worse  · Symptoms that dont get better with treatment, or get worse  · New symptoms   Date Last Reviewed: 3/10/2016  © 4180-6119 [employer]. [address] [place] [address]. All rights reserved. This information is not intended as a substitute for professional medical care. Always follow your healthcare professional's instructions.        Wrist Extension (Strength)     This exercise is written for your right elbow. Switch sides for your left elbow.  1. Sit in a chair. Hold a hand weight in your right hand. Your healthcare provider will tell you what size of hand weight to use.  2. Lean your forearm on your thigh or a table. Hang your wrist off the end of your knee or edge of the table, palm down.  3. Keep your forearm in place and bend your wrist upward. Lift the hand weight as high as you can.  4. Slowly lower the hand weight back down.  5. Repeat 5 times, or as instructed.  Date Last Reviewed:  3/10/2016  © 5905-4006 [employer]. [address] [place] [address]. All rights reserved. This information is not intended as a substitute for professional medical care. Always follow your healthcare professional's instructions.        Wrist Flexion (Flexibility)    6. Stand or sit and hold your arms straight out in front of you.  7. Dangle your right hand at the wrist. Gently press down on your right hand with your left hand. Feel the stretch in your right wrist and the top of your hand.  8. Hold for 30 to 60 seconds, then relax.  9. Switch arms and repeat 2 times.   Date Last Reviewed: 3/10/2016  © 6417-8578 [employer]. [address] [place] [address]. All rights reserved. This information is not intended as a substitute for professional medical care. Always follow your healthcare professional's instructions.        Wrist Flexion (Strength)     This exercise is written for your right elbow. Switch sides for your left elbow.  10. Sit in a chair next to a table. Rest your right forearm on the table. Hang your right wrist off the edge of the table, palm up. Hold a hand weight in your right hand. Your healthcare provider will tell you what size of hand weight to use.  11. Keep your forearm in place and bend your wrist upward to lift the weight. Hold for 5 seconds.  12. Slowly lower the hand weight back down.  13. Repeat 5 times, or as instructed.  Date Last Reviewed: 3/10/2016  © 3019-2720 [employer]. [address] [place] [address]. All rights reserved. This information is not intended as a substitute for professional medical care. Always follow your healthcare professional's instructions.

## 2017-12-21 NOTE — PROGRESS NOTES
Subjective:       Patient ID: Ramiro Oneil is a 52 y.o. male.    Chief Complaint: Annual Exam    Established patient for an annual wellness check/physical exam. No specific complaints, please see ROS.        Review of Systems   Constitutional: Negative for chills, fatigue and fever.   HENT: Negative for congestion and trouble swallowing.    Eyes: Negative for redness.   Respiratory: Negative for cough, chest tightness and shortness of breath.    Cardiovascular: Negative for chest pain, palpitations and leg swelling.   Gastrointestinal: Negative for abdominal pain and blood in stool.   Genitourinary: Negative for hematuria.   Musculoskeletal: Positive for arthralgias, joint swelling and myalgias. Negative for back pain, gait problem and neck pain.   Skin: Negative for color change and rash.   Neurological: Negative for tremors, speech difficulty, weakness, numbness and headaches.   Hematological: Negative for adenopathy. Does not bruise/bleed easily.   Psychiatric/Behavioral: Negative for behavioral problems, confusion and sleep disturbance. The patient is not nervous/anxious.        Objective:      Physical Exam   Constitutional: He is oriented to person, place, and time. He appears well-developed and well-nourished. No distress.   Eyes: No scleral icterus.   Neck: Normal range of motion. Neck supple. No JVD present. Carotid bruit is not present. No tracheal deviation present. No thyromegaly present.   Cardiovascular: Normal rate, regular rhythm, normal heart sounds and intact distal pulses.  Exam reveals no gallop and no friction rub.    No murmur heard.  Pulmonary/Chest: Effort normal and breath sounds normal. No respiratory distress. He has no wheezes. He has no rales.   Abdominal: Soft. Bowel sounds are normal. He exhibits no distension and no mass. There is no tenderness. There is no rebound and no guarding.   Musculoskeletal: He exhibits no edema.        Right elbow: Tenderness found. Lateral epicondyle  tenderness noted.        Right lower leg: He exhibits no edema.        Left lower leg: He exhibits no edema.   Lymphadenopathy:     He has no cervical adenopathy.   Neurological: He is alert and oriented to person, place, and time. He displays no tremor. No cranial nerve deficit. Coordination and gait normal.   Skin: Skin is warm and dry. No rash noted. He is not diaphoretic. No erythema.   Psychiatric: He has a normal mood and affect. His behavior is normal. Judgment and thought content normal.   Nursing note and vitals reviewed.      Assessment:       1. Annual physical exam    2. Chronic hepatitis B    3. Hyperlipidemia, unspecified hyperlipidemia type    4. NAFLD (nonalcoholic fatty liver disease)    5. PAD (peripheral artery disease)    6. S/P arterial stent    7. Lateral epicondylitis of right elbow    8. Colon cancer screening    9. Polyp of colon, unspecified part of colon, unspecified type        Plan:   Ramiro was seen today for annual exam.    Diagnoses and all orders for this visit:    Annual physical exam    Chronic hepatitis B    Hyperlipidemia, unspecified hyperlipidemia type    NAFLD (nonalcoholic fatty liver disease)    PAD (peripheral artery disease)    S/P arterial stent    Lateral epicondylitis of right elbow    Colon cancer screening  -     Case request GI: COLONOSCOPY    Polyp of colon, unspecified part of colon, unspecified type  -     Case request GI: COLONOSCOPY      See meds, orders, follow up, routing and instructions sections of encounter.  A 52-year-old established male patient for annual physical examination.  No new   complaints with the exception of right elbow pain.    His laboratory consults and imaging were reviewed.  He has no urgent needs at   this time.  Continue current medications, suggested a tennis elbow splint and   stretching.      BERNARDO/HN  dd: 12/21/2017 10:05:00 (CST)  td: 12/22/2017 00:32:07 (CST)  Doc ID   #[id number]  Job ID #[id number]    CC:

## 2017-12-21 NOTE — PROGRESS NOTES
Cardiology Clinic Note  Reason for Visit: follow up for PAD    HPI:     Mr Oneil is a 51yo M with HTN, PAD, HLD, chronic hepatitis B on Viread since 2/25/14, who presents for annual follow up for PAD.    He was previously followed for PAD by Dr Flores, last seen on 12/6/16. He reports that he continues to do well. Prior to PTAS/stent in legs, he had pain in his bilateral calves with exertion. Currently, he does not have any pain in his legs. He reports that his legs get tired with exertion, but no pain. 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 He is very active at work with frequent walking, but he does not have a structured exercise routine.    He reports that he has been working on his diet, and he lost 5-10lb over the past few months. He is able to mow his large lawn without ischemic symptoms. He occasionally has noncardiac chest pain that feels like gas, lasts for seconds, and is worse with food. His BP is elevated on check in clinic, but was 124/80 at PCP earlier today.    ROS:    Constitution: Negative for fever or chills. Negative for weight loss or gain.   HENT: Negative for sore throat or headaches. Negative for rhinorrhea.  Eyes: Negative for blurred or double vision.   Cardiovascular: See above  Pulmonary: Negative for SOB. Negative for cough.   Gastrointestinal: Negative for abdominal pain. Negative for nausea/ vomiting. Negative for diarrhea.   : Negative for dysuria.   Skin: Negative for rashes.  Neurological: Negative for focal weakness or sensory changes.  Psychological: Negative for depression or anxiety.  PMH:     Past Medical History:   Diagnosis Date    Anticoagulant long-term use     Dermatitis     Hepatitis B     Hypertension     NAFLD (nonalcoholic fatty liver disease)      Past Surgical History:   Procedure Laterality Date    CARPAL TUNNEL RELEASE      COLONOSCOPY       PTA/stenting of distal right SFA       SHOULDER SURGERY      stents       Allergies:   Review of patient's allergies indicates:  No Known Allergies  Medications:     Current Outpatient Prescriptions on File Prior to Visit   Medication Sig Dispense Refill    aspirin (ECOTRIN) 81 MG EC tablet Take 1 tablet (81 mg total) by mouth once daily. 30 tablet 11    atorvastatin (LIPITOR) 10 MG tablet Take 1 tablet (10 mg total) by mouth once daily. 90 tablet 3    clopidogrel (PLAVIX) 75 mg tablet TAKE 1 TABLET BY MOUTH EVERY DAY 30 tablet 4    cyclobenzaprine (FLEXERIL) 10 MG tablet Take 10 mg by mouth 3 (three) times daily as needed.  0    econazole nitrate 1 % cream       fluticasone (FLONASE) 50 mcg/actuation nasal spray 2 sprays by Each Nare route once daily. 1 Bottle 5    gabapentin (NEURONTIN) 300 MG capsule Take 1 capsule (300 mg total) by mouth 3 (three) times daily. 90 capsule 2    hydrocortisone-pramoxine (PROCTOFOAM-HS) rectal foam Place 1 applicator rectally 2 (two) times daily. 10 g 1    methocarbamol (ROBAXIN) 750 MG Tab       nystatin-triamcinolone (MYCOLOG II) cream Apply topically 2 (two) times daily. 30 g 0    tenofovir (VIREAD) 300 mg Tab TAKE 1 TABLET BY MOUTH    ONCE DAILY 30 tablet 11     No current facility-administered medications on file prior to visit.      Social History:     Social History   Substance Use Topics    Smoking status: Never Smoker    Smokeless tobacco: Never Used    Alcohol use No      Comment: social drinker     Family History:     Family History   Problem Relation Age of Onset    Diabetes Mother     Hypertension Mother     Cancer Father      lung    Diabetes Sister     Cancer Sister      breast    Hypertension Sister     Cancer Brother      prostate    Cancer Paternal Uncle     Diabetes Paternal Uncle     Stroke Sister     Liver disease Neg Hx      Physical Exam:   BP (!) 145/77 (BP Location: Left arm, Patient Position: Sitting, BP Method: Medium  "(Automatic))   Pulse 67   Ht 5' 9" (1.753 m)   Wt 82.8 kg (182 lb 8.7 oz)   BMI 26.96 kg/m²      Constitutional: No apparent distress, conversant  HEENT: Sclera anicteric, extraocular movements intact  Neck: No jugular venous distension, no carotid bruits  CV: Regular rate and rhythm, no murmurs rubs or gallops, normal S1/S2  Pulm: Clear to auscultation bilaterally, no wheezes, rales, or ronchi  GI: Abdomen soft, nontender, nondistended, normoactive bowel sounds  Extremities: No lower extremity edema, 2+ DP/PT palpable pulses bilaterally  Skin: No ecchymosis, erythema, or ulcers  Psych: Alert and oriented to person place location, appropriate affect  Neuro: No focal deficits    Labs:     Lab Results   Component Value Date     12/16/2017    K 4.3 12/16/2017     12/16/2017    CO2 29 12/16/2017    BUN 13 12/16/2017    CREATININE 0.9 12/16/2017    ANIONGAP 6 (L) 12/16/2017     Lab Results   Component Value Date    AST 25 12/16/2017    ALT 28 12/16/2017    ALKPHOS 62 12/16/2017    BILITOT 0.8 12/16/2017    BILIDIR 0.3 01/15/2014    ALBUMIN 3.5 12/16/2017     Lab Results   Component Value Date    CALCIUM 9.0 12/16/2017     No results found for: BNP, BNPTRIAGEBLO Lab Results   Component Value Date    WBC 5.65 07/26/2017    HGB 14.9 07/26/2017    HCT 46.0 07/26/2017     07/26/2017    GRAN 2.6 07/26/2017    GRAN 46.2 07/26/2017     Lab Results   Component Value Date    INR 1.0 07/26/2017     Lab Results   Component Value Date    CHOL 107 (L) 12/16/2017    CHOL 154 11/16/2017    HDL 28 (L) 12/16/2017    LDLCALC 69.6 12/16/2017    TRIG 47 12/16/2017     Lab Results   Component Value Date    HGBA1C 6.4 (H) 07/12/2011     Lab Results   Component Value Date    TSH 0.910 07/12/2011          Imaging:     Carotid US 8/12/16  There is 20 - 39% right Internal Carotid stenosis.  There is 0 - 19% left Internal Carotid stenosis.    KARMA 8/12/16  1. There is > 50 % right mid SFA stenosis just proximal to the mid SFA " stent.  2. Normal KARMA. L - 0.97, R 0.91  3. SFAm stent velocity origin 118 cm/sec, proximal 151 cm/sec, mid 129 cm/sec, distal 156 cm/sec. The right KARMA is .91.     KARMA 7/22/15  1.  Patent right mid SFA stent.  2.  No hemodynamically significant left lower extremity arterial disease.  3.  SFAm stent velocity origin 111 cm/sec, proximal 171 cm/sec, mid 130 cm/sec, distal 135 cm/sec. The right KARMA is .92     LEORA 7/25/14    1 - Normal left ventricular systolic function (EF 60-65%).     2 - Normal left ventricular diastolic function.     3 - Normal right ventricular systolic function.   No evidence of stress induced myocardial ischemia. Sensitivity is impaired due to failure to reach target heart rate.     [facility] 8/13/13  DIAGNOSTIC:  - Left Superficial Femoral Artery: The mid left SFA has a 90% stenosis.  - Left Profunda Femoral Artery: The left PFA has luminal irregularities.    INTERVENTION:       Mid Left SFA:  The lesion was successfully intervened. Post-stenosis of 0%. The vessel was accessed natively.  The following items were used: [place] 5.0 X 40, 75cm, [place] 6.0 X 40 and 75cm.     EF   Date Value Ref Range Status   07/25/2014 65         EKG: None    Assessment:     Mr Oneil is a 51yo M with HTN, PAD, HLD, chronic hepatitis B on Viread since 2/25/14, who presents for annual follow up for PAD.    Peripheral artery disease s/p PCI  Hypertension  Hyperlipidemia  Hepatits B     Plan:     Peripheral artery disease s/p PCI  - on plavix 75mg daily, atorvastatin 10mg daily  - discontinue ASA (no indication for DAPT with most recent intervention 4 years ago)     Hypertension - not on antihypertensives  Hyperlipidemia - on atorvastatin, as above; lipid panel 12/16/17 with , TG 47, HDL 28, LDL 69  Hepatits B - on antiviral therapy of tenofovir    Signed:  Shayne Yeager MD  Cardiology Fellow, PGY-6  Pager: [phone]  12/21/2017 7:47 AM    Follow-up:   Future Appointments  Date Time Provider Department Center    12/21/2017 8:20 AM Yves Castellon MD [facility] IM [place] PCW   12/21/2017 10:40 AM Juan Yeager MD [facility] CARDIO [place]   1/9/2018 2:30 PM Herb Rich MD [facility] SPORTS [place]   1/10/2018 7:00 AM [facility] US1 [facility] U[facility]   1/10/2018 9:45 AM LABORATORY, [facility] LAB [facility]

## 2017-12-22 ENCOUNTER — TELEPHONE (OUTPATIENT)
Dept: INTERNAL MEDICINE | Facility: CLINIC | Age: 52
End: 2017-12-22

## 2017-12-22 NOTE — TELEPHONE ENCOUNTER
----- Message from Troy Hardin sent at 12/21/2017  4:09 PM CST -----  Contact: self   Patient state insurance company shows he's taken for bp and diabetes medication which is not true.    Please advise

## 2018-01-09 ENCOUNTER — HOSPITAL ENCOUNTER (OUTPATIENT)
Dept: RADIOLOGY | Facility: OTHER | Age: 53
Discharge: HOME | End: 2018-01-09
Attending: NURSE PRACTITIONER
Payer: COMMERCIAL

## 2018-01-09 ENCOUNTER — TELEPHONE (OUTPATIENT)
Dept: ENDOSCOPY | Facility: HOSPITAL | Age: 53
End: 2018-01-09

## 2018-01-09 DIAGNOSIS — B18.1 CHRONIC HEPATITIS B: ICD-10-CM

## 2018-01-09 PROCEDURE — 76700 US EXAM ABDOM COMPLETE: CPT | Mod: TC

## 2018-01-09 PROCEDURE — 76700 US EXAM ABDOM COMPLETE: CPT | Mod: 26,,, | Performed by: RADIOLOGY

## 2018-01-17 ENCOUNTER — TELEPHONE (OUTPATIENT)
Dept: HEPATOLOGY | Facility: CLINIC | Age: 53
End: 2018-01-17

## 2018-01-17 NOTE — TELEPHONE ENCOUNTER
Called pt to review labs and u/s since we expect weather to be bad tomorrow. Advised labs are stable and u/s showed no masses. Everything looks fine. We will have him f/u in 7/2018 with u/s and labs again. He is feeling fine. Denies jaundice, dark urine, hematemesis, melena, slowed mentation, abdominal distention.      Please enter recall for f/u in 7/2018 with u/s and labs one week before appt. appt tomorrow cancelled.

## 2018-01-18 ENCOUNTER — TELEPHONE (OUTPATIENT)
Dept: HEPATOLOGY | Facility: CLINIC | Age: 53
End: 2018-01-18

## 2018-01-18 NOTE — TELEPHONE ENCOUNTER
----- Message from Leigha Gill sent at 1/18/2018  8:54 AM CST -----  Contact: Pt   Pt states medication tenofovir (VIREAD) 300 mg Tab is $108 for eight pills, he would like Aracelis to call him back regarding this matter.     If no answer, please leave VM he will be at work. He also said thanks Aracelis for all the help.     Please call

## 2018-01-19 ENCOUNTER — TELEPHONE (OUTPATIENT)
Dept: HEPATOLOGY | Facility: CLINIC | Age: 53
End: 2018-01-19

## 2018-01-19 NOTE — TELEPHONE ENCOUNTER
----- Message from Urmila Nicholas sent at 1/19/2018  9:14 AM CST -----  Contact: Pt  Soumya    Pt is returning your call from yesterday evening    Pt contact number [phone]  Thanks

## 2018-08-03 ENCOUNTER — TELEPHONE (OUTPATIENT)
Dept: ENDOSCOPY | Facility: HOSPITAL | Age: 53
End: 2018-08-03

## 2018-10-19 DIAGNOSIS — B18.1 CHRONIC HEPATITIS B: ICD-10-CM

## 2018-10-19 NOTE — TELEPHONE ENCOUNTER
----- Message from Etta Perez sent at 10/19/2018  2:47 PM CDT -----  Contact: Patient  Rx Refill/Request     Is this a Refill or New Rx: New    Rx Name and Strength: tenofovir (VIREAD) 300 mg Tab    Preferred Pharmacy with phone number: [facility] PH: [phone] Ext.[phone]    Communication Preference: [phone]    Additional Information: Pt would also like to schedule an appt

## 2018-10-19 NOTE — TELEPHONE ENCOUNTER
Patient called. 6 month labs, U/S and Office Visit scheduled.  Labs and U/S on Sat 10/27/18. Office Visit 11/14/18 at 8 am with Aracelis Frye NP.  Appt letters placed in the mail.  Requesting refill for the tenofovir (Viread).  Call placed to [facility] [phone] , Ext [phone] waited over 15 min for the Pharmacist to . Will send to the Provider to send to [facility].

## 2018-10-22 ENCOUNTER — TELEPHONE (OUTPATIENT)
Dept: HEPATOLOGY | Facility: CLINIC | Age: 53
End: 2018-10-22

## 2018-10-22 RX ORDER — CLOPIDOGREL BISULFATE 75 MG/1
TABLET ORAL
Qty: 30 TABLET | Refills: 3 | Status: SHIPPED | OUTPATIENT
Start: 2018-10-22 | End: 2019-01-11 | Stop reason: SDUPTHER

## 2018-10-22 RX ORDER — TENOFOVIR DISOPROXIL FUMARATE 300 MG/1
300 TABLET, FILM COATED ORAL DAILY
Qty: 30 TABLET | Refills: 0 | Status: SHIPPED | OUTPATIENT
Start: 2018-10-22 | End: 2018-11-14 | Stop reason: SDUPTHER

## 2018-10-22 NOTE — TELEPHONE ENCOUNTER
----- Message from Aracelis Frye NP sent at 10/21/2018  2:04 PM CDT -----  Pt over due for f/u. Requesting refill of Viread. Please call him to schedule appts for lab, u/s, and f/u. Let me know once he's scheduled and I will send in refill.     Thanks,  Aracelis

## 2018-10-22 NOTE — TELEPHONE ENCOUNTER
MA attempted to call patient to schedule his labs, US and follow up visit he is unable to reached left him VM to please give us a callback.

## 2018-10-27 ENCOUNTER — HOSPITAL ENCOUNTER (OUTPATIENT)
Dept: RADIOLOGY | Facility: HOSPITAL | Age: 53
Discharge: HOME | End: 2018-10-27
Attending: NURSE PRACTITIONER
Payer: COMMERCIAL

## 2018-10-27 DIAGNOSIS — B18.1 CHRONIC HEPATITIS B: ICD-10-CM

## 2018-10-27 PROCEDURE — 76700 US EXAM ABDOM COMPLETE: CPT | Mod: 26,,, | Performed by: INTERNAL MEDICINE

## 2018-10-27 PROCEDURE — 76700 US EXAM ABDOM COMPLETE: CPT | Mod: TC

## 2018-11-14 ENCOUNTER — PROCEDURE VISIT (OUTPATIENT)
Dept: HEPATOLOGY | Facility: CLINIC | Age: 53
End: 2018-11-14
Attending: NURSE PRACTITIONER
Payer: COMMERCIAL

## 2018-11-14 ENCOUNTER — TELEPHONE (OUTPATIENT)
Dept: HEPATOLOGY | Facility: CLINIC | Age: 53
End: 2018-11-14

## 2018-11-14 ENCOUNTER — TELEPHONE (OUTPATIENT)
Dept: PHARMACY | Facility: CLINIC | Age: 53
End: 2018-11-14

## 2018-11-14 ENCOUNTER — OFFICE VISIT (OUTPATIENT)
Dept: HEPATOLOGY | Facility: CLINIC | Age: 53
End: 2018-11-14
Payer: COMMERCIAL

## 2018-11-14 VITALS
HEIGHT: 69 IN | WEIGHT: 193.56 LBS | HEART RATE: 64 BPM | DIASTOLIC BLOOD PRESSURE: 86 MMHG | SYSTOLIC BLOOD PRESSURE: 160 MMHG | RESPIRATION RATE: 18 BRPM | TEMPERATURE: 99 F | OXYGEN SATURATION: 98 % | BODY MASS INDEX: 28.67 KG/M2

## 2018-11-14 DIAGNOSIS — B18.1 CHRONIC HEPATITIS B: Primary | ICD-10-CM

## 2018-11-14 DIAGNOSIS — E78.5 HYPERLIPIDEMIA, UNSPECIFIED HYPERLIPIDEMIA TYPE: ICD-10-CM

## 2018-11-14 DIAGNOSIS — B18.1 CHRONIC HEPATITIS B: ICD-10-CM

## 2018-11-14 DIAGNOSIS — K76.0 NAFLD (NONALCOHOLIC FATTY LIVER DISEASE): ICD-10-CM

## 2018-11-14 PROCEDURE — 99214 OFFICE O/P EST MOD 30 MIN: CPT | Mod: S$GLB,,, | Performed by: NURSE PRACTITIONER

## 2018-11-14 PROCEDURE — 99999 PR PBB SHADOW E&M-EST. PATIENT-LVL IV: CPT | Mod: PBBFAC,,, | Performed by: NURSE PRACTITIONER

## 2018-11-14 PROCEDURE — 91200 LIVER ELASTOGRAPHY: CPT | Mod: S$GLB,,, | Performed by: NURSE PRACTITIONER

## 2018-11-14 RX ORDER — TENOFOVIR DISOPROXIL FUMARATE 300 MG/1
300 TABLET, FILM COATED ORAL DAILY
Qty: 30 TABLET | Refills: 0 | Status: SHIPPED | OUTPATIENT
Start: 2018-11-14 | End: 2018-11-15 | Stop reason: CLARIF

## 2018-11-14 NOTE — PROCEDURES
Fibroscan Procedure     Name: Ramiro Oneil  Date of Procedure : 2018   :: Aracelis Frye NP  Diagnosis: NAFLD    Probe: M    Fibroscan readin.4 KPa    Fibrosis:F2     CAP readin dB/m    Steatosis: :S3

## 2018-11-14 NOTE — PROGRESS NOTES
[facility] Established Patient Visit    Reason for Visit:  F/u hepatitis B    PCP: Yves Serrano    HPI:  This is a 53 y.o. male here for f/u of E Ag (-), E Ab (+) chronic hepatitis B. He was last seen 8/2017. He started Viread on 2/25/14. His transaminases remain normal. His HBV DNA had been undetectable but was 13 IU/mL on last labs. He has missed some doses of his Viread because he was running low. He is doing well and denies any complaints today. His spleen is normal size, and plts are also normal. Fibrosure 7/2014 showed F1-F2, only mild fibrosis. Elastography scan 1/2016 = F0-F1. No masses on recent u/s. AFP remains normal. His synthetic liver functioning is normal.     He denies any symptoms of advanced chronic liver disease including jaundice, dark urine, hematemesis, melena, slowed mentation, abdominal distention. He has gained a few lbs.     ROS:   GENERAL: Denies fever, chills, (+) weight gain, no fatigue  HEENT: Denies headaches, dizziness, vision/hearing changes  CARDIOVASCULAR: Denies chest pain, palpitations, or edema  RESPIRATORY: Denies dyspnea, cough  GI: Denies abdominal pain, rectal bleeding, nausea, vomiting. No change in bowel pattern or color  : Denies dysuria, hematuria   SKIN: Denies rash, itching   NEURO: Denies confusion, memory loss, or mood changes  PSYCH: Denies depression or anxiety  HEME/LYMPH: Denies easy bruising or bleeding      PMHX:  has a past medical history of Anticoagulant long-term use, Dermatitis, Hepatitis B, Hypertension, and NAFLD (nonalcoholic fatty liver disease).    PSHX:  has a past surgical history that includes Shoulder surgery; Colonoscopy; PTA/stenting of distal right SFA ; stents; Carpal tunnel release; COLONOSCOPY (N/A, 2/10/2014); AORTOGRAM, WITH RUNOFF (N/A, 8/13/2013); AORTOGRAM, WITH RUNOFF (Bilateral, 6/14/2013); and INJECTION, JOINT (Left, 5/23/2013).    The patient's social and family histories were reviewed by me and updated in the  "appropriate section of the electronic medical record.    Review of patient's allergies indicates:  No Known Allergies    Current Outpatient Medications on File Prior to Visit   Medication Sig Dispense Refill    atorvastatin (LIPITOR) 10 MG tablet Take 1 tablet (10 mg total) by mouth once daily. 90 tablet 3    clopidogrel (PLAVIX) 75 mg tablet TAKE 1 TABLET BY MOUTH EVERY DAY 30 tablet 3    econazole nitrate 1 % cream       fluticasone (FLONASE) 50 mcg/actuation nasal spray 2 sprays by Each Nare route once daily. 1 Bottle 5    nystatin-triamcinolone (MYCOLOG II) cream Apply topically 2 (two) times daily. 30 g 0    tenofovir (VIREAD) 300 mg Tab Take 1 tablet (300 mg total) by mouth once daily. TAKE 1 TABLET BY MOUTH    ONCE DAILY 30 tablet 0    [DISCONTINUED] gabapentin (NEURONTIN) 300 MG capsule Take 1 capsule (300 mg total) by mouth 3 (three) times daily. 90 capsule 2     No current facility-administered medications on file prior to visit.           Objective Findings:    PHYSICAL EXAM:   Friendly Black or  male, in no acute distress; alert and oriented to person, place and time  VITALS:   BP (!) 160/86 (BP Location: Left arm, Patient Position: Sitting)   Pulse 64   Temp 98.6 °F (37 °C) (Oral)   Resp 18   Ht 5' 9" (1.753 m)   Wt 87.8 kg (193 lb 9 oz)   SpO2 98%   BMI 28.58 kg/m²    HENT: Normocephalic, without obvious abnormality. Oral mucosa pink and moist. Dentition good.  EYES: Sclerae anicteric.   CARDIOVASCULAR: Regular rate and rhythm. No murmurs.  RESPIRATORY: Normal respiratory effort. BBS CTA. No wheezes or crackles.  GI: Soft, non-tender, non-distended. No hepatosplenomegaly. No masses palpable. No ascites.  EXTREMITIES:  No clubbing, cyanosis or edema.  SKIN: Warm and dry. No jaundice. No rashes noted to exposed skin. No telangectasias noted. No palmar erythema.  NEURO:  Normal gate. No asterixis.  PSYCH:  Memory intact. Thought and speech pattern appropriate. Behavior " normal. No depression or anxiety noted.      Labs:  Lab Results   Component Value Date    WBC 6.10 10/27/2018    HGB 15.9 10/27/2018    HCT 50.4 10/27/2018     10/27/2018    CHOL 107 (L) 12/16/2017    TRIG 47 12/16/2017    HDL 28 (L) 12/16/2017     10/27/2018    K 3.9 10/27/2018    CREATININE 1.0 10/27/2018    ALT 46 (H) 10/27/2018    AST 32 10/27/2018    ALKPHOS 62 10/27/2018    BILITOT 0.8 10/27/2018    ALBUMIN 4.0 10/27/2018    INR 1.1 10/27/2018    AFP 2.5 10/27/2018         ASSESSMENT:  53 y.o. Black or  male with:  1.  Chronic hepatitis B, E antigen (-), E antibody (+)   -- started on Viread 2/25/14, HBV DNA was undetectable but now 13 IU/mL d/t missed doses  -- transaminases normal to minimally elevated, suspect due to NAFLD, normalized with weight loss in past  -- HCC screening- up to date, next due 4/2019  -- (+) immunity to hepatitis A   2. Fatty liver   -- risk factor includes overweight, HTN, HLD   -- Fibrosure 7/2014 = F1-F2  -- u/s elastography scan 1/2016 = F0-F1, no fibrosis    EDUCATION:   -- Avoid alcohol. Avoid raw seafood.   -- Discussed transmission of HBV, including sexual transmission. Avoid sharing razors/toothbrushes, etc.   -- Natural history of HBV including progression to cirrhosis reviewed.   -- We discussed the increased risk of hepatocellular carcinoma due to active hepatitis B infection. Continued screening every six months with ultrasound and AFP is recommended.   -- Discussed potential outcomes of cirrhosis, including liver cancer, liver failure, liver transplant, death.   -- Limit acetaminophen to 2000mg daily.  -- Advised immunization of all household members.  -- Discussed importance of compliance with medication regimen and risk of viral mutation or flare if treatment is stopped prematurely.      We discussed the manifestations of NAFLD. At this time there is no FDA approved therapy for NAFLD.   The patient and I discussed the importance of diet,  exercise, and weight loss for management of NAFLD. We discussed a low fat, low carb/low sugar, high fiber diet, and a goal of 30 minutes of exercise 5 times per week.   Pt is aware that fatty liver can progress to steatohepatitis and possibly to cirrhosis, putting one at increased risk for liver cancer, liver failure, and death.          PLAN:  1. Will switch to Vemlidy, Rx sent to specialty pharmacy to work on assistance/authorization  2. Fibroscan to reassess fibrosis  3. Continue efforts at weight loss  4. Low fat, low cholesterol, low carb, high fiber diet  5. Exercise, 5 days a week, 30 min a day  6. Recommend good control of cholesterol, blood pressure, blood sugar levels  7. HCC screening Q 6 months with AFP and abd. U/S, next due 4/2019  8. Follow up in 4/2019 with labs and u/s 1 week prior to appt         Thank you for allowing me to participate in the care of Ramiro Thad Frye, NP-C    Addendum: Fibroscan today = kPa 8.4, F2, moderate fibrosis. CAP = 291 - S3 steatosis (>65%). Discussed results with pt. Will monitor with repeat Fibroscan in 1 year. Already on hep B treatment. NAFLD can cause cirrhosis as well despite hep B being controlled. Will repeat Fibroscan in 1 year for monitoring.

## 2018-11-14 NOTE — TELEPHONE ENCOUNTER
"----- Message from Rissa Campos sent at 11/14/2018  3:33 PM CST -----  Regarding: Vemlidy Transfer  Hi Dr. Frye and Staff,    Patient has 2 doses remaining. Please send to [facility] as soon as possible.     Vemlidy does not require a prior authorization.     Insurance requires the patient to fill specialty medications through [facility]. Directions are below.     The patient has been provided with the necessary information to schedule a delivery.          To complete this in EPIC  1. The original order MUST be discontinued and re-typed as a new prescription with the updated pharmacy listed.     2. I have added [facility] to the patient's preferred pharmacies.      3. Uncheck the box that says [facility] AND Check box with [facility] (pharmacy)    #do not  click "reorder" -- as it will continue to route the rx to [facility] even if the pharmacy is changed.     Please opt the patient out of [facility] when the BPA is fired.     Thank You,  Rissa Campos, [facility]  Patient Care Advocate   [facility]   Direct ext: [phone]   Fax: [phone]   [email or web address]    "

## 2018-12-03 ENCOUNTER — OFFICE VISIT (OUTPATIENT)
Dept: INTERNAL MEDICINE | Facility: CLINIC | Age: 53
End: 2018-12-03
Attending: FAMILY MEDICINE
Payer: COMMERCIAL

## 2018-12-03 VITALS
BODY MASS INDEX: 28.88 KG/M2 | WEIGHT: 190.56 LBS | TEMPERATURE: 98 F | DIASTOLIC BLOOD PRESSURE: 78 MMHG | HEIGHT: 68 IN | SYSTOLIC BLOOD PRESSURE: 128 MMHG | OXYGEN SATURATION: 97 % | HEART RATE: 77 BPM

## 2018-12-03 DIAGNOSIS — K63.5 POLYP OF COLON, UNSPECIFIED PART OF COLON, UNSPECIFIED TYPE: ICD-10-CM

## 2018-12-03 DIAGNOSIS — Z12.5 PROSTATE CANCER SCREENING: ICD-10-CM

## 2018-12-03 DIAGNOSIS — Z12.11 COLON CANCER SCREENING: ICD-10-CM

## 2018-12-03 DIAGNOSIS — K76.0 NAFLD (NONALCOHOLIC FATTY LIVER DISEASE): ICD-10-CM

## 2018-12-03 DIAGNOSIS — Z95.9 S/P ARTERIAL STENT: Chronic | ICD-10-CM

## 2018-12-03 DIAGNOSIS — E78.5 HYPERLIPIDEMIA, UNSPECIFIED HYPERLIPIDEMIA TYPE: ICD-10-CM

## 2018-12-03 DIAGNOSIS — B18.1 CHRONIC HEPATITIS B: ICD-10-CM

## 2018-12-03 DIAGNOSIS — I73.9 PAD (PERIPHERAL ARTERY DISEASE): ICD-10-CM

## 2018-12-03 DIAGNOSIS — Z00.00 ANNUAL PHYSICAL EXAM: Primary | ICD-10-CM

## 2018-12-03 PROCEDURE — 99999 PR PBB SHADOW E&M-EST. PATIENT-LVL IV: CPT | Mod: PBBFAC,,, | Performed by: FAMILY MEDICINE

## 2018-12-03 PROCEDURE — 99396 PREV VISIT EST AGE 40-64: CPT | Mod: S$GLB,,, | Performed by: FAMILY MEDICINE

## 2018-12-03 RX ORDER — FLUTICASONE PROPIONATE 50 MCG
2 SPRAY, SUSPENSION (ML) NASAL DAILY
Qty: 1 BOTTLE | Refills: 5 | Status: SHIPPED | OUTPATIENT
Start: 2018-12-03 | End: 2024-03-11

## 2018-12-03 RX ORDER — NYSTATIN AND TRIAMCINOLONE ACETONIDE 100000; 1 [USP'U]/G; MG/G
CREAM TOPICAL 2 TIMES DAILY
Qty: 30 G | Refills: 0 | Status: SHIPPED | OUTPATIENT
Start: 2018-12-03 | End: 2020-04-21 | Stop reason: SDUPTHER

## 2018-12-03 RX ORDER — ATORVASTATIN CALCIUM 10 MG/1
10 TABLET, FILM COATED ORAL DAILY
Qty: 90 TABLET | Refills: 3 | Status: SHIPPED | OUTPATIENT
Start: 2018-12-03 | End: 2018-12-21

## 2018-12-03 NOTE — PATIENT INSTRUCTIONS
mucinex - over the counter expectorant      If not contacted in a couple weeks - Colonoscopy Scheduling Number - [id number]

## 2018-12-03 NOTE — PROGRESS NOTES
Subjective:       Patient ID: Ramiro Oneil is a 53 y.o. male.    Chief Complaint: Follow-up    Established patient for an annual wellness check/physical exam and also chronic disease management. Specific complaints - see dictation and please see ROS.  P, S, Fm, Soc Hx's; Meds, allergies reviewed and reconciled.  Health maintenance file reviewed and addressed items due.          Review of Systems   Constitutional: Positive for fatigue. Negative for chills, fever and unexpected weight change.   HENT: Positive for congestion. Negative for trouble swallowing.    Eyes: Negative for redness and visual disturbance.   Respiratory: Negative for cough, chest tightness and shortness of breath.    Cardiovascular: Negative for chest pain, palpitations and leg swelling.   Gastrointestinal: Negative for abdominal pain and blood in stool.   Genitourinary: Negative for difficulty urinating and hematuria.   Musculoskeletal: Negative for arthralgias, back pain, gait problem, joint swelling, myalgias and neck pain.   Skin: Positive for wound. Negative for color change and rash.   Neurological: Negative for tremors, speech difficulty, weakness, numbness and headaches.   Hematological: Negative for adenopathy. Does not bruise/bleed easily.   Psychiatric/Behavioral: Negative for behavioral problems, confusion and sleep disturbance. The patient is not nervous/anxious.        Objective:      Physical Exam   Constitutional: He is oriented to person, place, and time. He appears well-developed and well-nourished.   Eyes: No scleral icterus.   Neck: Normal range of motion. Neck supple. No JVD present. Carotid bruit is not present. No tracheal deviation present. No thyromegaly present.   Cardiovascular: Normal rate, regular rhythm, normal heart sounds and intact distal pulses. Exam reveals no gallop and no friction rub.   No murmur heard.  Pulmonary/Chest: Effort normal and breath sounds normal. No respiratory distress. He has no wheezes. He  has no rales.   Abdominal: Soft. He exhibits no distension and no mass. There is no tenderness. There is no rebound and no guarding.   Musculoskeletal: He exhibits no edema.   Lymphadenopathy:     He has no cervical adenopathy.   Neurological: He is alert and oriented to person, place, and time. He displays no tremor. No cranial nerve deficit. Coordination and gait normal.   Skin: Skin is warm and dry. No rash noted. He is not diaphoretic. No erythema.   Psychiatric: He has a normal mood and affect. His behavior is normal. Judgment and thought content normal.   Nursing note and vitals reviewed.      Assessment:       1. Annual physical exam    2. Hyperlipidemia, unspecified hyperlipidemia type    3. PAD (peripheral artery disease)    4. S/P arterial stent    5. Chronic hepatitis B    6. NAFLD (nonalcoholic fatty liver disease)    7. Prostate cancer screening    8. Polyp of colon, unspecified part of colon, unspecified type    9. Colon cancer screening        Plan:   Ramiro was seen today for follow-up.    Diagnoses and all orders for this visit:    Annual physical exam    Hyperlipidemia, unspecified hyperlipidemia type  -     Lipid panel; Standing  -     atorvastatin (LIPITOR) 10 MG tablet; Take 1 tablet (10 mg total) by mouth once daily.    PAD (peripheral artery disease)  -     Ambulatory Referral to Vascular Medicine  -     atorvastatin (LIPITOR) 10 MG tablet; Take 1 tablet (10 mg total) by mouth once daily.    S/P arterial stent    Chronic hepatitis B    NAFLD (nonalcoholic fatty liver disease)    Prostate cancer screening  -     PSA, Screening; Standing    Polyp of colon, unspecified part of colon, unspecified type  -     Case request GI: COLONOSCOPY    Colon cancer screening  -     Case request GI: COLONOSCOPY    Other orders  -     fluticasone (FLONASE) 50 mcg/actuation nasal spray; 2 sprays (100 mcg total) by Each Nare route once daily.  -     nystatin-triamcinolone (MYCOLOG II) cream; Apply topically 2  (two) times daily.      See meds, orders, follow up, routing and instructions sections of encounter.  A 53-year-old gentleman for annual physical examination, no complaints.  He has   some congestion, but no cough.  He also would like a refill of his Mycolog   cream.    He is having no worsening claudicated symptoms at this time and no chest pain,   dyspnea, diaphoresis, syncope, near syncope or palpitations.      BERNARDO/MELODY  dd: 12/03/2018 16:18:56 (CST)  td: 12/04/2018 11:47:37 (CST)  Doc ID   #[id number]  Job ID #[id number]    CC:

## 2018-12-17 NOTE — PROGRESS NOTES
Cardiology Clinic Note  Reason for Visit: follow up for PAD    HPI:     Mr Oneil is a 51yo M with HTN, PAD s/p stent to bilateral SFA, HLD, chronic hepatitis B on Viread since 2/25/14, who presents for annual follow up for PAD.    He reports that he has been doing well. He continues to be active working at a [employer]. He does not regularly exercise outside of work. He reports that he does have right > left calf pain with walking or doing strenuous activities, such as mowing his lawn. He rests and stretches for a few minutes then is able to continue.     He also reports having swelling in his legs after a 9hr bus ride to watch the [employer] play the [employer] in [place], [place]. After sleeping, the swelling had resolved. He denies pains in his legs or shortness of breath.     ROS:    Constitution: Negative for fever or chills. Negative for weight loss or gain.   HENT: Negative for sore throat or headaches. Negative for rhinorrhea.  Eyes: Negative for blurred or double vision.   Cardiovascular: See above  Pulmonary: Negative for SOB. Negative for cough.   Gastrointestinal: Negative for abdominal pain. Negative for nausea/ vomiting. Negative for diarrhea.   : Negative for dysuria.   Skin: Negative for rashes.  Neurological: Negative for focal weakness or sensory changes.  Psychological: Negative for depression or anxiety.  PMH:     Past Medical History:   Diagnosis Date    Anticoagulant long-term use     Dermatitis     Hepatitis B     Hypertension     NAFLD (nonalcoholic fatty liver disease)      Past Surgical History:   Procedure Laterality Date    AORTOGRAM, WITH RUNOFF N/A 8/13/2013    Performed [facility] Rodger Blake [facility] at [facility]    AORTOGRAM, WITH RUNOFF Bilateral 6/14/2013    Performed by [facility]ger Blake [facility] at [facility] CATH [facility]    CARPAL TUNNEL RELEASE      COLONOSCOPY      COLONOSCOPY N/A 2/10/2014    Performed by Brent Bowden MD at [facility] ENDO (4TH FLR)    INJECTION, JOINT Left 5/23/2013     "Performed by [facility]andra Lomas [facility] at [facility] PAIN MGT    PTA/stenting of distal right SFA       SHOULDER SURGERY      stents       Allergies:   Review of patient's allergies indicates:  No Known Allergies  Medications:     Current Outpatient Medications on File Prior to Visit   Medication Sig Dispense Refill    atorvastatin (LIPITOR) 10 MG tablet Take 1 tablet (10 mg total) by mouth once daily. 90 tablet 3    clopidogrel (PLAVIX) 75 mg tablet TAKE 1 TABLET BY MOUTH EVERY DAY 30 tablet 3    econazole nitrate 1 % cream       fluticasone (FLONASE) 50 mcg/actuation nasal spray 2 sprays (100 mcg total) by Each Nare route once daily. 1 Bottle 5    nystatin-triamcinolone (MYCOLOG II) cream Apply topically 2 (two) times daily. 30 g 0    tenofovir alafenamide fumarate (VEMLIDY) 25 mg Tab Take 25 mg by mouth once daily. 30 tablet 11    [DISCONTINUED] tenofovir (VIREAD) 300 mg Tab Take 1 tablet (300 mg total) by mouth once daily. TAKE 1 TABLET BY MOUTH    ONCE DAILY 30 tablet 0     No current facility-administered medications on file prior to visit.      Social History:     Social History     Tobacco Use    Smoking status: Never Smoker    Smokeless tobacco: Never Used   Substance Use Topics    Alcohol use: No     Alcohol/week: 7.2 oz     Types: 12 Cans of beer per week     Comment: social drinker     Family History:     Family History   Problem Relation Age of Onset    Diabetes Mother     Hypertension Mother     Cancer Father         lung    Diabetes Sister     Cancer Sister         breast    Hypertension Sister     Cancer Brother         prostate    Cancer Paternal Uncle     Diabetes Paternal Uncle     Stroke Sister     Liver disease Neg Hx      Physical Exam:   BP (!) 128/59 (BP Location: Left arm, Patient Position: Sitting, BP Method: X-Large (Automatic))   Pulse 73   Ht 5' 9" (1.753 m)   Wt 89.7 kg (197 lb 12 oz)   BMI 29.20 kg/m²      Constitutional: No apparent distress, conversant  HEENT: Sclera " anicteric, extraocular movements intact  Neck: No jugular venous distension, no carotid bruits  CV: Regular rate and rhythm, no murmurs rubs or gallops, normal S1/S2  Pulm: Clear to auscultation bilaterally, no wheezes, rales, or ronchi  GI: Abdomen soft, nontender, nondistended, normoactive bowel sounds  Extremities: No lower extremity edema, 2+ pulse in L PT, 1+ pulse in R PT  Skin: No ecchymosis, erythema, or ulcers  Psych: Alert and oriented to person place location, appropriate affect  Neuro: No focal deficits    Labs:     Lab Results   Component Value Date     10/27/2018    K 3.9 10/27/2018     10/27/2018    CO2 24 10/27/2018    BUN 13 10/27/2018    CREATININE 1.0 10/27/2018    ANIONGAP 9 10/27/2018     Lab Results   Component Value Date    AST 32 10/27/2018    ALT 46 (H) 10/27/2018    ALKPHOS 62 10/27/2018    BILITOT 0.8 10/27/2018    BILIDIR 0.3 01/15/2014    ALBUMIN 4.0 10/27/2018     Lab Results   Component Value Date    CALCIUM 9.3 10/27/2018     No results found for: BNP, BNPTRIAGEBLO Lab Results   Component Value Date    WBC 6.10 10/27/2018    HGB 15.9 10/27/2018    HCT 50.4 10/27/2018     10/27/2018    GRAN 2.8 10/27/2018    GRAN 46.1 10/27/2018     Lab Results   Component Value Date    INR 1.1 10/27/2018     Lab Results   Component Value Date    CHOL 153 12/03/2018    CHOL 154 11/16/2017    HDL 27 (L) 12/03/2018    LDLCALC 113.4 12/03/2018    TRIG 63 12/03/2018     Lab Results   Component Value Date    HGBA1C 6.4 (H) 07/12/2011     Lab Results   Component Value Date    TSH 0.910 07/12/2011          Imaging:     Echo  None    Stress testing  LEORA 7/25/14    1 - Normal left ventricular systolic function (EF 60-65%).     2 - Normal left ventricular diastolic function.     3 - Normal right ventricular systolic function.   No evidence of stress induced myocardial ischemia. Sensitivity is impaired due to failure to reach target heart rate.     Cath  [facility] 8/13/13  DIAGNOSTIC:  - Left  Superficial Femoral Artery: The mid left SFA has a 90% stenosis.  - Left Profunda Femoral Artery: The left PFA has luminal irregularities.    INTERVENTION:       Mid Left SFA:  The lesion was successfully intervened. Post-stenosis of 0%. The vessel was accessed natively.  The following items were used: [place] 5.0 X 40, 75cm, [place] 6.0 X 40 and 75cm.     Other  Carotid US 16  There is 20 - 39% right Internal Carotid stenosis.  There is 0 - 19% left Internal Carotid stenosis.    KARMA 16  1. There is > 50 % right mid SFA stenosis just proximal to the mid SFA stent.  2. Normal KARMA. L - 0.97, R 0.91  3. SFAm stent velocity origin 118 cm/sec, proximal 151 cm/sec, mid 129 cm/sec, distal 156 cm/sec. The right KARMA is .91.     KARMA 7/22/15  1.  Patent right mid SFA stent.  2.  No hemodynamically significant left lower extremity arterial disease.  3.  SFAm stent velocity origin 111 cm/sec, proximal 171 cm/sec, mid 130 cm/sec, distal 135 cm/sec. The right KARMA is .92     EK14  Normal sinus rhythm  Normal ECG    Assessment:     Peripheral artery disease s/p PCI  Hypertension  Hyperlipidemia  Venous insufficiency  Hepatits B     Plan:     Peripheral artery disease s/p PCI  - encouraged regular aerobic exercise  - obtain arterial US of lower extremities  - on plavix 75mg daily  - discussed starting pletal; however, he wishes to try increasing his exercise for several months prior to another medication  - increase to atorvastatin 20mg daily (slowly up-titrating due to chronic hepatitis)  - check lipids and LFTs in 3 months     Venous insufficiency  - encouraged leg elevation while sitting, regular ambulation, and low salt diet    Hypertension   - BP at goal while not on antihypertensives    Hyperlipidemia   - on atorvastatin, as above  - lipid panel 2018 with , TG 63, HDL 27,   - discussed heart healthy diet and regular aerobic exercise    Hepatits B - on antiviral therapy of  tenofovir    Signed:  Shayne Yeager MD  Cardiology    12/21/2018 10:35 AM    Follow-up:     Future Appointments   Date Time Provider Department Center   12/21/2018  9:30 AM Juan Yeager III, MD [facility] CARDIO [facility]

## 2018-12-21 ENCOUNTER — OFFICE VISIT (OUTPATIENT)
Dept: CARDIOLOGY | Facility: CLINIC | Age: 53
End: 2018-12-21
Payer: COMMERCIAL

## 2018-12-21 VITALS
HEIGHT: 69 IN | DIASTOLIC BLOOD PRESSURE: 59 MMHG | HEART RATE: 73 BPM | BODY MASS INDEX: 29.29 KG/M2 | SYSTOLIC BLOOD PRESSURE: 128 MMHG | WEIGHT: 197.75 LBS

## 2018-12-21 DIAGNOSIS — Z95.9 S/P ARTERIAL STENT: Primary | Chronic | ICD-10-CM

## 2018-12-21 DIAGNOSIS — I73.9 PAD (PERIPHERAL ARTERY DISEASE): ICD-10-CM

## 2018-12-21 DIAGNOSIS — E78.5 HYPERLIPIDEMIA, UNSPECIFIED HYPERLIPIDEMIA TYPE: ICD-10-CM

## 2018-12-21 PROCEDURE — 99213 OFFICE O/P EST LOW 20 MIN: CPT | Mod: S$GLB,,, | Performed by: INTERNAL MEDICINE

## 2018-12-21 PROCEDURE — 99999 PR PBB SHADOW E&M-EST. PATIENT-LVL III: CPT | Mod: PBBFAC,,, | Performed by: INTERNAL MEDICINE

## 2018-12-21 RX ORDER — ATORVASTATIN CALCIUM 20 MG/1
20 TABLET, FILM COATED ORAL DAILY
Qty: 90 TABLET | Refills: 3 | Status: SHIPPED | OUTPATIENT
Start: 2018-12-21 | End: 2019-07-25

## 2019-01-11 RX ORDER — CLOPIDOGREL BISULFATE 75 MG/1
75 TABLET ORAL DAILY
Qty: 30 TABLET | Refills: 3 | Status: SHIPPED | OUTPATIENT
Start: 2019-01-11 | End: 2019-09-19 | Stop reason: SDUPTHER

## 2019-02-20 ENCOUNTER — TELEPHONE (OUTPATIENT)
Dept: HEPATOLOGY | Facility: CLINIC | Age: 54
End: 2019-02-20

## 2019-02-20 DIAGNOSIS — B18.1 CHRONIC HEPATITIS B: ICD-10-CM

## 2019-02-20 NOTE — TELEPHONE ENCOUNTER
----- Message from Maggy Zuñiga, PharmINDIRA sent at 2/20/2019  7:51 AM CST -----  Regarding: Vemlichidi Hsu,    Looks like Mr. Oneil had an insurance change. Could you send the Vemlidy prescription to [facility] and we'll fully work up his new insurance.     Thanks,  Maggy

## 2019-02-22 ENCOUNTER — TELEPHONE (OUTPATIENT)
Dept: PHARMACY | Facility: CLINIC | Age: 54
End: 2019-02-22

## 2019-02-22 NOTE — TELEPHONE ENCOUNTER
Documentation Only:    Prior Authorization for Vemlidy IS NOT required    Patient co-pay: $106.65    Patient Assistance IS required.    Patient has been enrolled in Vemlidy copay card program which will reduce patient copay to $0. Copay card info is listed below.    Vemlidy Copay Card  RxBIN: [id number]  RxPCN: [id number]  RxGRP: [id number]  ID: [id number]    Patient is RTS until 02/28 due to 02/06 fill at [facility] -- will notify [facility] so we can recapture patient at refill.    AKF 12:42pm

## 2019-03-04 ENCOUNTER — TELEPHONE (OUTPATIENT)
Dept: PHARMACY | Facility: CLINIC | Age: 54
End: 2019-03-04

## 2019-03-07 ENCOUNTER — TELEPHONE (OUTPATIENT)
Dept: INTERNAL MEDICINE | Facility: CLINIC | Age: 54
End: 2019-03-07

## 2019-03-07 NOTE — TELEPHONE ENCOUNTER
Please advise -     For mild cough and cold symptoms, over the counter medicines that help include:    Flonase nasal spray for congestion;  Robitussin DM for cough;  Ibuprofen or tylenol for feverishness or aches;  Allegra or Zyrtec for nasal discharge.  Please note some patients cannot take ibuprofen due to kidney disease, gastrointestinal problem or other drug interactions.    Warning signs include:  Worsening cough or shortness of breath;  High fever;  Lengthy time to recovery more than 5-7 days    Schedule urgent care if not improving or for other concerns.    Thank you.

## 2019-03-07 NOTE — TELEPHONE ENCOUNTER
----- Message from Ismael Bueno sent at 3/7/2019  2:55 PM CST -----  Contact: Patient [phone]  Patient calling stating is having a sore throat, wants to know what can be taken also to discus options for patient.    [id number] - [place] - [address] AT AT [facility] [phone] (Phone) [phone] (Fax    Please call an advise  Thank you

## 2019-03-08 ENCOUNTER — OFFICE VISIT (OUTPATIENT)
Dept: INTERNAL MEDICINE | Facility: CLINIC | Age: 54
End: 2019-03-08
Payer: COMMERCIAL

## 2019-03-08 VITALS
BODY MASS INDEX: 28.28 KG/M2 | HEART RATE: 91 BPM | HEIGHT: 69 IN | SYSTOLIC BLOOD PRESSURE: 164 MMHG | DIASTOLIC BLOOD PRESSURE: 92 MMHG | WEIGHT: 190.94 LBS

## 2019-03-08 DIAGNOSIS — J02.9 PHARYNGITIS, UNSPECIFIED ETIOLOGY: Primary | ICD-10-CM

## 2019-03-08 DIAGNOSIS — R59.0 CERVICAL LYMPHADENOPATHY: ICD-10-CM

## 2019-03-08 PROCEDURE — 96372 THER/PROPH/DIAG INJ SC/IM: CPT | Mod: 59,S$GLB,, | Performed by: INTERNAL MEDICINE

## 2019-03-08 PROCEDURE — 3080F PR MOST RECENT DIASTOLIC BLOOD PRESSURE >= 90 MM HG: ICD-10-PCS | Mod: CPTII,S$GLB,, | Performed by: INTERNAL MEDICINE

## 2019-03-08 PROCEDURE — 99999 PR PBB SHADOW E&M-EST. PATIENT-LVL III: CPT | Mod: PBBFAC,,, | Performed by: INTERNAL MEDICINE

## 2019-03-08 PROCEDURE — 96372 PR INJECTION,THERAP/PROPH/DIAG2ST, IM OR SUBCUT: ICD-10-PCS | Mod: 59,S$GLB,, | Performed by: INTERNAL MEDICINE

## 2019-03-08 PROCEDURE — 3080F DIAST BP >= 90 MM HG: CPT | Mod: CPTII,S$GLB,, | Performed by: INTERNAL MEDICINE

## 2019-03-08 PROCEDURE — 3008F PR BODY MASS INDEX (BMI) DOCUMENTED: ICD-10-PCS | Mod: CPTII,S$GLB,, | Performed by: INTERNAL MEDICINE

## 2019-03-08 PROCEDURE — 3077F SYST BP >= 140 MM HG: CPT | Mod: CPTII,S$GLB,, | Performed by: INTERNAL MEDICINE

## 2019-03-08 PROCEDURE — 3077F PR MOST RECENT SYSTOLIC BLOOD PRESSURE >= 140 MM HG: ICD-10-PCS | Mod: CPTII,S$GLB,, | Performed by: INTERNAL MEDICINE

## 2019-03-08 PROCEDURE — 99213 PR OFFICE/OUTPT VISIT, EST, LEVL III, 20-29 MIN: ICD-10-PCS | Mod: 25,S$GLB,, | Performed by: INTERNAL MEDICINE

## 2019-03-08 PROCEDURE — 3008F BODY MASS INDEX DOCD: CPT | Mod: CPTII,S$GLB,, | Performed by: INTERNAL MEDICINE

## 2019-03-08 PROCEDURE — 99213 OFFICE O/P EST LOW 20 MIN: CPT | Mod: 25,S$GLB,, | Performed by: INTERNAL MEDICINE

## 2019-03-08 PROCEDURE — 99999 PR PBB SHADOW E&M-EST. PATIENT-LVL III: ICD-10-PCS | Mod: PBBFAC,,, | Performed by: INTERNAL MEDICINE

## 2019-03-08 RX ORDER — AMOXICILLIN 875 MG/1
875 TABLET, FILM COATED ORAL EVERY 12 HOURS
Qty: 20 TABLET | Refills: 0 | Status: SHIPPED | OUTPATIENT
Start: 2019-03-08 | End: 2019-06-24

## 2019-03-08 RX ORDER — METHYLPREDNISOLONE 4 MG/1
TABLET ORAL
Qty: 1 PACKAGE | Refills: 0 | Status: SHIPPED | OUTPATIENT
Start: 2019-03-08 | End: 2019-03-29

## 2019-03-09 NOTE — PROGRESS NOTES
Subjective:       Patient ID: Ramiro Oneil is a 53 y.o. male.    Chief Complaint: Sore Throat    Complains of bad sore throat and swelling in neck.   Has felt feverish but has not taken temp      Review of Systems   Constitutional: Negative for activity change, appetite change and fever.   HENT: Negative for congestion, postnasal drip and sore throat.    Respiratory: Negative for cough, shortness of breath and wheezing.    Cardiovascular: Negative for chest pain and palpitations.   Gastrointestinal: Negative for abdominal pain, blood in stool, constipation, diarrhea, nausea and vomiting.   Genitourinary: Negative for decreased urine volume, difficulty urinating, flank pain and frequency.   Musculoskeletal: Negative for arthralgias.   Neurological: Negative for dizziness, weakness and headaches.       Objective:      Physical Exam   Constitutional: He is oriented to person, place, and time. He appears well-developed and well-nourished. No distress.   HENT:   Head: Normocephalic and atraumatic.   Right Ear: External ear normal.   Left Ear: External ear normal.   Mouth/Throat: Oropharyngeal exudate, posterior oropharyngeal edema and posterior oropharyngeal erythema present.   Eyes: Conjunctivae and EOM are normal. Pupils are equal, round, and reactive to light.   Neck: Normal range of motion. Neck supple. No thyromegaly present.   Cardiovascular: Normal rate and regular rhythm.   Pulmonary/Chest: Effort normal and breath sounds normal.   Abdominal: Soft. Bowel sounds are normal. He exhibits no mass. There is no tenderness. There is no rebound and no guarding.   Musculoskeletal: Normal range of motion.   Lymphadenopathy:     He has cervical adenopathy.        Right cervical: Superficial cervical adenopathy present.        Left cervical: Superficial cervical adenopathy present.   Neurological: He is alert and oriented to person, place, and time. He has normal reflexes. He displays normal reflexes. No cranial nerve  deficit. He exhibits normal muscle tone. Coordination normal.   Skin: Skin is warm and dry.       Assessment:       No diagnosis found.    Plan:   There are no diagnoses linked to this encounter.

## 2019-03-12 NOTE — TELEPHONE ENCOUNTER
Vemlidy refill and f/u attempted (1st fill at [facility]). No answer. LVM for call back.   - Previous getting Vemlidy for [facility] but with insurance change is no longer locked in and can fill with [facility].

## 2019-03-18 ENCOUNTER — TELEPHONE (OUTPATIENT)
Dept: PHARMACY | Facility: CLINIC | Age: 54
End: 2019-03-18

## 2019-03-18 NOTE — TELEPHONE ENCOUNTER
Patient declines full consult at time.  Patient reports he has been tolerating Vemlidy well with no missed doses.  He denies any side effects at this time. Patient aware to call [facility] if he has any questions/concerns as well as to ask for pharmacist at refill call.  Please attempt follow up in 3 months.

## 2019-04-08 ENCOUNTER — TELEPHONE (OUTPATIENT)
Dept: PHARMACY | Facility: CLINIC | Age: 54
End: 2019-04-08

## 2019-04-15 ENCOUNTER — TELEPHONE (OUTPATIENT)
Dept: HEPATOLOGY | Facility: CLINIC | Age: 54
End: 2019-04-15

## 2019-04-15 DIAGNOSIS — B18.1 CHRONIC HEPATITIS B: Primary | ICD-10-CM

## 2019-04-17 DIAGNOSIS — B18.1 CHRONIC HEPATITIS B: Primary | ICD-10-CM

## 2019-04-29 ENCOUNTER — TELEPHONE (OUTPATIENT)
Dept: PHARMACY | Facility: CLINIC | Age: 54
End: 2019-04-29

## 2019-05-01 ENCOUNTER — TELEPHONE (OUTPATIENT)
Dept: PHARMACY | Facility: CLINIC | Age: 54
End: 2019-05-01

## 2019-05-03 NOTE — TELEPHONE ENCOUNTER
Patient called to refill Vemlidy.  Confirmed patient has 4 doses left.  The patient will  the medication on 5/6.    Copay $0.00 in 004.  Patient confirmed no new medications, health conditions, or allergies.  Declined [facility] . ROLY

## 2019-05-18 ENCOUNTER — LAB VISIT (OUTPATIENT)
Dept: LAB | Facility: HOSPITAL | Age: 54
End: 2019-05-18
Attending: INTERNAL MEDICINE
Payer: COMMERCIAL

## 2019-05-18 DIAGNOSIS — I73.9 PAD (PERIPHERAL ARTERY DISEASE): ICD-10-CM

## 2019-05-18 DIAGNOSIS — E78.5 HYPERLIPIDEMIA, UNSPECIFIED HYPERLIPIDEMIA TYPE: ICD-10-CM

## 2019-05-18 LAB
ALBUMIN SERPL BCP-MCNC: 3.8 G/DL (ref 3.5–5.2)
ALP SERPL-CCNC: 55 U/L (ref 55–135)
ALT SERPL W/O P-5'-P-CCNC: 39 U/L (ref 10–44)
ANION GAP SERPL CALC-SCNC: 7 MMOL/L (ref 8–16)
AST SERPL-CCNC: 25 U/L (ref 10–40)
BILIRUB SERPL-MCNC: 1.2 MG/DL (ref 0.1–1)
BUN SERPL-MCNC: 14 MG/DL (ref 6–20)
CALCIUM SERPL-MCNC: 9.2 MG/DL (ref 8.7–10.5)
CHLORIDE SERPL-SCNC: 107 MMOL/L (ref 95–110)
CHOLEST SERPL-MCNC: 160 MG/DL (ref 120–199)
CHOLEST/HDLC SERPL: 4.7 {RATIO} (ref 2–5)
CO2 SERPL-SCNC: 27 MMOL/L (ref 23–29)
CREAT SERPL-MCNC: 1 MG/DL (ref 0.5–1.4)
EST. GFR  (AFRICAN AMERICAN): >60 ML/MIN/1.73 M^2
EST. GFR  (NON AFRICAN AMERICAN): >60 ML/MIN/1.73 M^2
GLUCOSE SERPL-MCNC: 104 MG/DL (ref 70–110)
HDLC SERPL-MCNC: 34 MG/DL (ref 40–75)
HDLC SERPL: 21.3 % (ref 20–50)
LDLC SERPL CALC-MCNC: 109.4 MG/DL (ref 63–159)
NONHDLC SERPL-MCNC: 126 MG/DL
POTASSIUM SERPL-SCNC: 3.8 MMOL/L (ref 3.5–5.1)
PROT SERPL-MCNC: 7.5 G/DL (ref 6–8.4)
SODIUM SERPL-SCNC: 141 MMOL/L (ref 136–145)
TRIGL SERPL-MCNC: 83 MG/DL (ref 30–150)

## 2019-05-18 PROCEDURE — 36415 COLL VENOUS BLD VENIPUNCTURE: CPT

## 2019-05-18 PROCEDURE — 80053 COMPREHEN METABOLIC PANEL: CPT

## 2019-05-18 PROCEDURE — 80061 LIPID PANEL: CPT

## 2019-05-28 ENCOUNTER — TELEPHONE (OUTPATIENT)
Dept: PHARMACY | Facility: CLINIC | Age: 54
End: 2019-05-28

## 2019-06-05 ENCOUNTER — HOSPITAL ENCOUNTER (OUTPATIENT)
Dept: RADIOLOGY | Facility: HOSPITAL | Age: 54
Discharge: HOME | End: 2019-06-05
Attending: NURSE PRACTITIONER
Payer: COMMERCIAL

## 2019-06-05 DIAGNOSIS — B18.1 CHRONIC HEPATITIS B: ICD-10-CM

## 2019-06-05 PROCEDURE — 76700 US EXAM ABDOM COMPLETE: CPT | Mod: TC

## 2019-06-24 ENCOUNTER — OFFICE VISIT (OUTPATIENT)
Dept: ORTHOPEDICS | Facility: CLINIC | Age: 54
End: 2019-06-24
Payer: COMMERCIAL

## 2019-06-24 ENCOUNTER — HOSPITAL ENCOUNTER (OUTPATIENT)
Dept: RADIOLOGY | Facility: HOSPITAL | Age: 54
Discharge: HOME | End: 2019-06-24
Attending: PHYSICIAN ASSISTANT
Payer: COMMERCIAL

## 2019-06-24 VITALS — WEIGHT: 190 LBS | HEIGHT: 69 IN | BODY MASS INDEX: 28.14 KG/M2

## 2019-06-24 DIAGNOSIS — M25.569 KNEE PAIN, UNSPECIFIED CHRONICITY, UNSPECIFIED LATERALITY: Primary | ICD-10-CM

## 2019-06-24 DIAGNOSIS — Z79.01 CURRENT USE OF LONG TERM ANTICOAGULATION: ICD-10-CM

## 2019-06-24 DIAGNOSIS — M17.11 PRIMARY OSTEOARTHRITIS OF RIGHT KNEE: Primary | ICD-10-CM

## 2019-06-24 DIAGNOSIS — M25.569 KNEE PAIN, UNSPECIFIED CHRONICITY, UNSPECIFIED LATERALITY: ICD-10-CM

## 2019-06-24 DIAGNOSIS — M25.561 ACUTE PAIN OF RIGHT KNEE: ICD-10-CM

## 2019-06-24 PROCEDURE — 73564 PR  X-RAY KNEE 4+ VIEW: ICD-10-PCS | Mod: 26,LT,, | Performed by: RADIOLOGY

## 2019-06-24 PROCEDURE — 73564 X-RAY EXAM KNEE 4 OR MORE: CPT | Mod: TC,50

## 2019-06-24 PROCEDURE — 99999 PR PBB SHADOW E&M-EST. PATIENT-LVL III: ICD-10-PCS | Mod: PBBFAC,,, | Performed by: PHYSICIAN ASSISTANT

## 2019-06-24 PROCEDURE — 99203 OFFICE O/P NEW LOW 30 MIN: CPT | Mod: S$GLB,,, | Performed by: PHYSICIAN ASSISTANT

## 2019-06-24 PROCEDURE — 73564 X-RAY EXAM KNEE 4 OR MORE: CPT | Mod: 26,LT,, | Performed by: RADIOLOGY

## 2019-06-24 PROCEDURE — 99203 PR OFFICE/OUTPT VISIT, NEW, LEVL III, 30-44 MIN: ICD-10-PCS | Mod: S$GLB,,, | Performed by: PHYSICIAN ASSISTANT

## 2019-06-24 PROCEDURE — 73564 X-RAY EXAM KNEE 4 OR MORE: CPT | Mod: 26,RT,, | Performed by: RADIOLOGY

## 2019-06-24 PROCEDURE — 99999 PR PBB SHADOW E&M-EST. PATIENT-LVL III: CPT | Mod: PBBFAC,,, | Performed by: PHYSICIAN ASSISTANT

## 2019-06-24 PROCEDURE — 3008F BODY MASS INDEX DOCD: CPT | Mod: CPTII,S$GLB,, | Performed by: PHYSICIAN ASSISTANT

## 2019-06-24 PROCEDURE — 3008F PR BODY MASS INDEX (BMI) DOCUMENTED: ICD-10-PCS | Mod: CPTII,S$GLB,, | Performed by: PHYSICIAN ASSISTANT

## 2019-06-24 RX ORDER — DICLOFENAC SODIUM 10 MG/G
2 GEL TOPICAL 3 TIMES DAILY PRN
Qty: 2 TUBE | Refills: 6 | Status: SHIPPED | OUTPATIENT
Start: 2019-06-24 | End: 2022-08-29

## 2019-06-24 NOTE — PROGRESS NOTES
Subjective:      Patient ID: Ramiro Oneil is a 53 y.o. male.    Chief Complaint: Pain of the Left Knee and Pain of the Right Knee      HPI: Ramiro Oneil  is a 53 y.o. male who c/o Pain of the Left Knee and Pain of the Right Knee   for duration of 1 week.  He ran a race on the [employer] about 3 weeks ago.  He had some soreness after that.  While working in his garden last week, he had increase in symptoms in the right knee. The left knee is doing okay.  Pain level 7/10 in severity.  Quality is a constant ache.  Alleviating factors include rest.  Aggravating factors deep flexion and prolonged weight-bearing.    Past Medical History:   Diagnosis Date    Anticoagulant long-term use     Dermatitis     Hepatitis B     Hypertension     NAFLD (nonalcoholic fatty liver disease)      Past Surgical History:   Procedure Laterality Date    AORTOGRAM, WITH RUNOFF N/A 8/13/2013    [facility]formed by [facility] at [facility] CATH LAB    AORTOGRAM, WITH RUNOFF Bilateral 6/14/2013    Performed by [facility] at [facility] CATH LAB    CARPAL TUNNEL RELEASE      COLONOSCOPY      COLONOSCOPY N/A 2/10/2014    Performed by Brent Bowden MD at [facility] ENDO (4TH FLR)    INJECTION, JOINT Left 5/23/2013    Per[facility] at [facility] PAIN MGT    PTA/stenting of distal right SFA       SHOULDER SURGERY      stents       Family History   Problem Relation Age of Onset    Diabetes Mother     Hypertension Mother     Cancer Father         lung    Diabetes Sister     Cancer Sister         breast    Hypertension Sister     Cancer Brother         prostate    Cancer Paternal Uncle     Diabetes Paternal Uncle     Stroke Sister     Liver disease Neg Hx      Social History     Socioeconomic History    Marital status:      Spouse name: Roro    Number of children: 2    Years of education: Not on file    Highest education level: Not on file   Occupational History    Occupation: Machine       Employer: [employer]   Social Needs    Financial resource strain: Not on file    Food insecurity:     Worry: Not on file     Inability: Not on file    Transportation needs:     Medical: Not on file     Non-medical: Not on file   Tobacco Use    Smoking status: Never Smoker    Smokeless tobacco: Never Used   Substance and Sexual Activity    Alcohol use: No     Alcohol/week: 7.2 oz     Types: 12 Cans of beer per week     Comment: social drinker    Drug use: No    Sexual activity: Yes   Lifestyle    Physical activity:     Days per week: Not on file     Minutes per session: Not on file    Stress: Not on file   Relationships    Social connections:     Talks on phone: Not on file     Gets together: Not on file     Attends Baptism service: Not on file     Active member of club or organization: Not on file     Attends meetings of clubs or organizations: Not on file     Relationship status: Not on file   Other Topics Concern    Not on file   Social History Narrative     [employer], , two children.        Medication List with Changes/Refills   New Medications    DICLOFENAC SODIUM (VOLTAREN) 1 % GEL    Apply 2 g topically 3 (three) times daily as needed.   Current Medications    ATORVASTATIN (LIPITOR) 20 MG TABLET    Take 1 tablet (20 mg total) by mouth once daily.    CLOPIDOGREL (PLAVIX) 75 MG TABLET    Take 1 tablet (75 mg total) by mouth once daily.    ECONAZOLE NITRATE 1 % CREAM        FLUTICASONE (FLONASE) 50 MCG/ACTUATION NASAL SPRAY    2 sprays (100 mcg total) by Each Nare route once daily.    NYSTATIN-TRIAMCINOLONE (MYCOLOG II) CREAM    Apply topically 2 (two) times daily.    TENOFOVIR ALAFENAMIDE (VEMLIDY) 25 MG TAB    Take 1 tablet (25 mg total) by mouth once daily.   Discontinued Medications    AMOXICILLIN (AMOXIL) 875 MG TABLET    Take 1 tablet (875 mg total) by mouth every 12 (twelve) hours.     Review of patient's allergies indicates:  No Known  Allergies    Review of Systems   Constitution: Negative for fever.   Cardiovascular: Negative for chest pain.   Respiratory: Negative for cough and shortness of breath.    Skin: Negative for rash.   Musculoskeletal: Positive for joint pain. Negative for joint swelling and stiffness.   Gastrointestinal: Negative for heartburn.   Neurological: Negative for headaches and numbness.         Objective:        General    Nursing note and vitals reviewed.  Constitutional: He is oriented to person, place, and time. He appears well-developed and well-nourished.   HENT:   Head: Normocephalic and atraumatic.   Eyes: EOM are normal.   Cardiovascular: Normal rate and regular rhythm.    Pulmonary/Chest: Effort normal.   Abdominal: Soft.   Neurological: He is alert and oriented to person, place, and time.   Psychiatric: He has a normal mood and affect. His behavior is normal.           Right Knee Exam     Inspection   Erythema: absent  Swelling: absent  Effusion: absent  Deformity: absent  Bruising: absent    Tenderness   The patient is tender to palpation of the medial joint line and condyle.    Range of Motion   Extension: normal   Flexion: normal     Tests   Meniscus   Amber:  Medial - negative Lateral - negative  Ligament Examination Lachman: normal (-1 to 2mm) PCL-Posterior Drawer: normal (0 to 2mm)     MCL - Valgus: normal (0 to 2mm)  LCL - Varus: normal  Patella   Patellar apprehension: negative  Passive Patellar Tilt: neutral  Patellar Grind: negative    Other   Meniscal Cyst: absent  Popliteal (Baker's) Cyst: absent  Sensation: normal    Left Knee Exam     Inspection   Erythema: absent  Swelling: absent  Effusion: absent  Deformity: absent  Bruising: absent    Tenderness   The patient is experiencing no tenderness.     Range of Motion   Extension: normal   Flexion: normal     Tests   Meniscus   Amber:  Medial - negative Lateral - negative  Stability Lachman: normal (-1 to 2mm) PCL-Posterior Drawer: normal (0 to  2mm)  MCL - Valgus: normal (0 to 2mm)  LCL - Varus: normal (0 to 2mm)  Patella   Patellar apprehension: negative  Passive Patellar Tilt: neutral  Patellar Grind: negative    Other   Meniscal Cyst: absent  Popliteal (Baker's) Cyst: absent  Sensation: normal    Right Hip Exam     Tests   Elizabeth: negative  Left Hip Exam     Tests   Elizabeth: negative          Muscle Strength   Right Lower Extremity   Quadriceps:  5/5   Hamstrin/5   Left Lower Extremity   Quadriceps:  5/5   Hamstrin/5     Vascular Exam       Edema  Right Lower Leg: absent  Left Lower Leg: absent              Xray images and report were reviewed today.  I agree with the radiologist's interpretation.    X-ray Knee Ortho Bilateral with Flexion  Narrative: EXAMINATION:  XR KNEE ORTHO BILAT WITH FLEXION    CLINICAL HISTORY:  Pain in unspecified knee    TECHNIQUE:  AP standing of both knees, PA flexion standing views of both knees, and Merchant views of both knees were performed.  Lateral views of both knees were also performed.    COMPARISON:  None    FINDINGS:  Right knee: There is a small suprapatellar joint effusion present.  No acute fractures or dislocations visualized.  Small tricompartmental marginal osteophytes are present with mild joint space loss medially.    Left knee:  There is no radiographic evidence of acute osseous, articular, or soft tissue abnormality. Small tricompartmental marginal osteophytes are present with slight joint space narrowing medially.  Impression: As above.    Electronically signed by: Yves Hernandez MD  Date:    2019  Time:    16:06        Assessment:       Encounter Diagnoses   Name Primary?    Primary osteoarthritis of right knee Yes    Acute pain of right knee     Current use of long term anticoagulation           Plan:       Ramiro was seen today for pain and pain.    Diagnoses and all orders for this visit:    Primary osteoarthritis of right knee  -     diclofenac sodium (VOLTAREN) 1 % Gel; Apply 2 g  topically 3 (three) times daily as needed.    Acute pain of right knee  -     diclofenac sodium (VOLTAREN) 1 % Gel; Apply 2 g topically 3 (three) times daily as needed.    Current use of long term anticoagulation  -     diclofenac sodium (VOLTAREN) 1 % Gel; Apply 2 g topically 3 (three) times daily as needed.        Ramiro Oneil is a new pt who comes in today for the above problems.  He has mild arthritis in the right knee. He has left-sided knee pain.  At this point, recommend optimizing conservative treatment. I have given him a home exercise program.  He is not a candidate for oral anti-inflammatories due to his history of long-term anticoagulation.  I have submitted a request for Voltaren gel instead.  We will also get him fitted with a neoprene hinged knee brace.  I will see him back in the office in 1 month.  If he is not doing any better by then, we may consider injections. He verbalizes understanding and agrees.    Follow up in about 1 month (around 7/24/2019).          The patient understands, chooses and consents to this plan and accepts all   the risks which include but are not limited to the risks mentioned above.     Disclaimer: This note was prepared using a voice recognition system and is likely to have sound alike errors within the text.

## 2019-06-26 ENCOUNTER — OFFICE VISIT (OUTPATIENT)
Dept: HEPATOLOGY | Facility: CLINIC | Age: 54
End: 2019-06-26
Payer: COMMERCIAL

## 2019-06-26 VITALS
HEIGHT: 69 IN | HEART RATE: 67 BPM | OXYGEN SATURATION: 96 % | BODY MASS INDEX: 28.44 KG/M2 | SYSTOLIC BLOOD PRESSURE: 167 MMHG | DIASTOLIC BLOOD PRESSURE: 88 MMHG | WEIGHT: 192 LBS

## 2019-06-26 DIAGNOSIS — E78.5 HYPERLIPIDEMIA, UNSPECIFIED HYPERLIPIDEMIA TYPE: ICD-10-CM

## 2019-06-26 DIAGNOSIS — B18.1 CHRONIC HEPATITIS B: Primary | ICD-10-CM

## 2019-06-26 DIAGNOSIS — E66.3 OVERWEIGHT: ICD-10-CM

## 2019-06-26 DIAGNOSIS — K76.0 NAFLD (NONALCOHOLIC FATTY LIVER DISEASE): ICD-10-CM

## 2019-06-26 PROCEDURE — 3008F BODY MASS INDEX DOCD: CPT | Mod: CPTII,S$GLB,, | Performed by: NURSE PRACTITIONER

## 2019-06-26 PROCEDURE — 3008F PR BODY MASS INDEX (BMI) DOCUMENTED: ICD-10-PCS | Mod: CPTII,S$GLB,, | Performed by: NURSE PRACTITIONER

## 2019-06-26 PROCEDURE — 99214 PR OFFICE/OUTPT VISIT, EST, LEVL IV, 30-39 MIN: ICD-10-PCS | Mod: S$GLB,,, | Performed by: NURSE PRACTITIONER

## 2019-06-26 PROCEDURE — 3077F SYST BP >= 140 MM HG: CPT | Mod: CPTII,S$GLB,, | Performed by: NURSE PRACTITIONER

## 2019-06-26 PROCEDURE — 99999 PR PBB SHADOW E&M-EST. PATIENT-LVL IV: ICD-10-PCS | Mod: PBBFAC,,, | Performed by: NURSE PRACTITIONER

## 2019-06-26 PROCEDURE — 99214 OFFICE O/P EST MOD 30 MIN: CPT | Mod: S$GLB,,, | Performed by: NURSE PRACTITIONER

## 2019-06-26 PROCEDURE — 99999 PR PBB SHADOW E&M-EST. PATIENT-LVL IV: CPT | Mod: PBBFAC,,, | Performed by: NURSE PRACTITIONER

## 2019-06-26 PROCEDURE — 3079F PR MOST RECENT DIASTOLIC BLOOD PRESSURE 80-89 MM HG: ICD-10-PCS | Mod: CPTII,S$GLB,, | Performed by: NURSE PRACTITIONER

## 2019-06-26 PROCEDURE — 3079F DIAST BP 80-89 MM HG: CPT | Mod: CPTII,S$GLB,, | Performed by: NURSE PRACTITIONER

## 2019-06-26 PROCEDURE — 3077F PR MOST RECENT SYSTOLIC BLOOD PRESSURE >= 140 MM HG: ICD-10-PCS | Mod: CPTII,S$GLB,, | Performed by: NURSE PRACTITIONER

## 2019-07-03 ENCOUNTER — TELEPHONE (OUTPATIENT)
Dept: PHARMACY | Facility: CLINIC | Age: 54
End: 2019-07-03

## 2019-07-25 ENCOUNTER — OFFICE VISIT (OUTPATIENT)
Dept: CARDIOLOGY | Facility: CLINIC | Age: 54
End: 2019-07-25
Payer: COMMERCIAL

## 2019-07-25 VITALS
SYSTOLIC BLOOD PRESSURE: 142 MMHG | HEIGHT: 69 IN | DIASTOLIC BLOOD PRESSURE: 70 MMHG | HEART RATE: 77 BPM | WEIGHT: 197.31 LBS | BODY MASS INDEX: 29.22 KG/M2

## 2019-07-25 DIAGNOSIS — E78.5 HYPERLIPIDEMIA, UNSPECIFIED HYPERLIPIDEMIA TYPE: Primary | ICD-10-CM

## 2019-07-25 DIAGNOSIS — I73.9 PAD (PERIPHERAL ARTERY DISEASE): ICD-10-CM

## 2019-07-25 PROCEDURE — 3078F PR MOST RECENT DIASTOLIC BLOOD PRESSURE < 80 MM HG: ICD-10-PCS | Mod: CPTII,S$GLB,, | Performed by: INTERNAL MEDICINE

## 2019-07-25 PROCEDURE — 99999 PR PBB SHADOW E&M-EST. PATIENT-LVL III: ICD-10-PCS | Mod: PBBFAC,,, | Performed by: INTERNAL MEDICINE

## 2019-07-25 PROCEDURE — 3008F PR BODY MASS INDEX (BMI) DOCUMENTED: ICD-10-PCS | Mod: CPTII,S$GLB,, | Performed by: INTERNAL MEDICINE

## 2019-07-25 PROCEDURE — 3077F SYST BP >= 140 MM HG: CPT | Mod: CPTII,S$GLB,, | Performed by: INTERNAL MEDICINE

## 2019-07-25 PROCEDURE — 3077F PR MOST RECENT SYSTOLIC BLOOD PRESSURE >= 140 MM HG: ICD-10-PCS | Mod: CPTII,S$GLB,, | Performed by: INTERNAL MEDICINE

## 2019-07-25 PROCEDURE — 99999 PR PBB SHADOW E&M-EST. PATIENT-LVL III: CPT | Mod: PBBFAC,,, | Performed by: INTERNAL MEDICINE

## 2019-07-25 PROCEDURE — 99214 PR OFFICE/OUTPT VISIT, EST, LEVL IV, 30-39 MIN: ICD-10-PCS | Mod: S$GLB,,, | Performed by: INTERNAL MEDICINE

## 2019-07-25 PROCEDURE — 3008F BODY MASS INDEX DOCD: CPT | Mod: CPTII,S$GLB,, | Performed by: INTERNAL MEDICINE

## 2019-07-25 PROCEDURE — 3078F DIAST BP <80 MM HG: CPT | Mod: CPTII,S$GLB,, | Performed by: INTERNAL MEDICINE

## 2019-07-25 PROCEDURE — 99214 OFFICE O/P EST MOD 30 MIN: CPT | Mod: S$GLB,,, | Performed by: INTERNAL MEDICINE

## 2019-07-25 RX ORDER — ATORVASTATIN CALCIUM 40 MG/1
40 TABLET, FILM COATED ORAL DAILY
Qty: 90 TABLET | Refills: 3 | Status: SHIPPED | OUTPATIENT
Start: 2019-07-25 | End: 2019-08-30 | Stop reason: SDUPTHER

## 2019-07-31 ENCOUNTER — OFFICE VISIT (OUTPATIENT)
Dept: INTERNAL MEDICINE | Facility: CLINIC | Age: 54
End: 2019-07-31
Attending: FAMILY MEDICINE
Payer: COMMERCIAL

## 2019-07-31 VITALS
OXYGEN SATURATION: 97 % | TEMPERATURE: 98 F | HEART RATE: 67 BPM | HEIGHT: 69 IN | WEIGHT: 196.31 LBS | DIASTOLIC BLOOD PRESSURE: 80 MMHG | SYSTOLIC BLOOD PRESSURE: 150 MMHG | BODY MASS INDEX: 29.07 KG/M2

## 2019-07-31 DIAGNOSIS — I73.9 PAD (PERIPHERAL ARTERY DISEASE): ICD-10-CM

## 2019-07-31 DIAGNOSIS — M50.30 DDD (DEGENERATIVE DISC DISEASE), CERVICAL: Primary | ICD-10-CM

## 2019-07-31 DIAGNOSIS — M54.2 NECK PAIN: ICD-10-CM

## 2019-07-31 DIAGNOSIS — I10 HYPERTENSION, ESSENTIAL: ICD-10-CM

## 2019-07-31 DIAGNOSIS — Z95.9 S/P ARTERIAL STENT: Chronic | ICD-10-CM

## 2019-07-31 PROCEDURE — 3079F DIAST BP 80-89 MM HG: CPT | Mod: CPTII,S$GLB,, | Performed by: FAMILY MEDICINE

## 2019-07-31 PROCEDURE — 3079F PR MOST RECENT DIASTOLIC BLOOD PRESSURE 80-89 MM HG: ICD-10-PCS | Mod: CPTII,S$GLB,, | Performed by: FAMILY MEDICINE

## 2019-07-31 PROCEDURE — 3008F PR BODY MASS INDEX (BMI) DOCUMENTED: ICD-10-PCS | Mod: CPTII,S$GLB,, | Performed by: FAMILY MEDICINE

## 2019-07-31 PROCEDURE — 3077F PR MOST RECENT SYSTOLIC BLOOD PRESSURE >= 140 MM HG: ICD-10-PCS | Mod: CPTII,S$GLB,, | Performed by: FAMILY MEDICINE

## 2019-07-31 PROCEDURE — 3077F SYST BP >= 140 MM HG: CPT | Mod: CPTII,S$GLB,, | Performed by: FAMILY MEDICINE

## 2019-07-31 PROCEDURE — 99214 PR OFFICE/OUTPT VISIT, EST, LEVL IV, 30-39 MIN: ICD-10-PCS | Mod: S$GLB,,, | Performed by: FAMILY MEDICINE

## 2019-07-31 PROCEDURE — 3008F BODY MASS INDEX DOCD: CPT | Mod: CPTII,S$GLB,, | Performed by: FAMILY MEDICINE

## 2019-07-31 PROCEDURE — 99214 OFFICE O/P EST MOD 30 MIN: CPT | Mod: S$GLB,,, | Performed by: FAMILY MEDICINE

## 2019-07-31 PROCEDURE — 99999 PR PBB SHADOW E&M-EST. PATIENT-LVL III: ICD-10-PCS | Mod: PBBFAC,,, | Performed by: FAMILY MEDICINE

## 2019-07-31 PROCEDURE — 99999 PR PBB SHADOW E&M-EST. PATIENT-LVL III: CPT | Mod: PBBFAC,,, | Performed by: FAMILY MEDICINE

## 2019-07-31 RX ORDER — HYDROCHLOROTHIAZIDE 25 MG/1
25 TABLET ORAL DAILY
Qty: 30 TABLET | Refills: 5 | Status: SHIPPED | OUTPATIENT
Start: 2019-07-31 | End: 2020-12-01 | Stop reason: SDUPTHER

## 2019-07-31 RX ORDER — CYCLOBENZAPRINE HCL 5 MG
5 TABLET ORAL 3 TIMES DAILY PRN
Qty: 45 TABLET | Refills: 0 | Status: SHIPPED | OUTPATIENT
Start: 2019-07-31 | End: 2019-08-30 | Stop reason: SDUPTHER

## 2019-07-31 NOTE — PROGRESS NOTES
Subjective:       Patient ID: Ramiro Oneil is a 53 y.o. male.    Chief Complaint: Headache and Neck Pain (6/10)    HPI  Review of Systems   Constitutional: Negative for chills, fatigue, fever and unexpected weight change.   HENT: Negative for congestion and trouble swallowing.    Eyes: Negative for redness and visual disturbance.   Respiratory: Negative for cough, chest tightness and shortness of breath.    Cardiovascular: Negative for chest pain, palpitations and leg swelling.   Gastrointestinal: Negative for abdominal pain and blood in stool.   Genitourinary: Negative for difficulty urinating and hematuria.   Musculoskeletal: Positive for arthralgias, gait problem, neck pain and neck stiffness. Negative for back pain, joint swelling and myalgias.   Skin: Negative for color change and rash.   Neurological: Negative for tremors, speech difficulty, weakness, numbness and headaches.   Hematological: Negative for adenopathy. Does not bruise/bleed easily.   Psychiatric/Behavioral: Negative for behavioral problems, confusion and sleep disturbance. The patient is not nervous/anxious.        Objective:      Physical Exam   Constitutional: He is oriented to person, place, and time. He appears well-developed and well-nourished. No distress.   Neck: Trachea normal, normal range of motion and full passive range of motion without pain. Neck supple. No thyroid mass present.   Pulmonary/Chest: Effort normal.   Musculoskeletal: He exhibits no edema.        Right shoulder: He exhibits normal range of motion and no tenderness.        Left shoulder: He exhibits normal range of motion and no tenderness.        Cervical back: He exhibits normal range of motion and no tenderness.        Right hand: Normal sensation noted. Decreased sensation is not present in the ulnar distribution, is not present in the medial distribution and is not present in the radial distribution. Normal strength noted. He exhibits no finger abduction, no  thumb/finger opposition and no wrist extension trouble.        Left hand: Normal sensation noted. Decreased sensation is not present in the ulnar distribution, is not present in the medial redistribution and is not present in the radial distribution. Normal strength noted. He exhibits no finger abduction, no thumb/finger opposition and no wrist extension trouble.   Lymphadenopathy:     He has no cervical adenopathy.   Neurological: He is alert and oriented to person, place, and time. He has normal strength. No cranial nerve deficit or sensory deficit. Coordination and gait normal.   Reflex Scores:       Tricep reflexes are 0 on the right side and 0 on the left side.       Bicep reflexes are 0 on the right side and 0 on the left side.       Brachioradialis reflexes are 0 on the right side and 0 on the left side.  Skin: Skin is warm and dry. No rash noted.   Psychiatric: He has a normal mood and affect. His behavior is normal. Judgment and thought content normal.   Nursing note and vitals reviewed.      Assessment:       1. DDD (degenerative disc disease), cervical    2. Neck pain    3. Hypertension, essential    4. PAD (peripheral artery disease)    5. S/P arterial stent        Plan:     Medication List with Changes/Refills   New Medications    CYCLOBENZAPRINE (FLEXERIL) 5 MG TABLET    Take 1 tablet (5 mg total) by mouth 3 (three) times daily as needed for Muscle spasms.   Current Medications    ATORVASTATIN (LIPITOR) 40 MG TABLET    Take 1 tablet (40 mg total) by mouth once daily.    CLOPIDOGREL (PLAVIX) 75 MG TABLET    Take 1 tablet (75 mg total) by mouth once daily.    DICLOFENAC SODIUM (VOLTAREN) 1 % GEL    Apply 2 grams topically 3 (three) times daily as needed.    ECONAZOLE NITRATE 1 % CREAM        FLUTICASONE (FLONASE) 50 MCG/ACTUATION NASAL SPRAY    2 sprays (100 mcg total) by Each Nare route once daily.    NYSTATIN-TRIAMCINOLONE (MYCOLOG II) CREAM    Apply topically 2 (two) times daily.    TENOFOVIR  ALAFENAMIDE (VEMLIDY) 25 MG TAB    Take 1 tablet (25 mg total) by mouth once daily.   Changed and/or Refilled Medications    Modified Medication Previous Medication    HYDROCHLOROTHIAZIDE (HYDRODIURIL) 25 MG TABLET hydrochlorothiazide (HYDRODIURIL) 25 MG tablet       Take 1 tablet (25 mg total) by mouth once daily.    Take 1 tablet (25 mg total) by mouth once daily.     Ramiro was seen today for headache and neck pain.    Diagnoses and all orders for this visit:    DDD (degenerative disc disease), cervical    Neck pain    Hypertension, essential    PAD (peripheral artery disease)    S/P arterial stent    Other orders  -     hydroCHLOROthiazide (HYDRODIURIL) 25 MG tablet; Take 1 tablet (25 mg total) by mouth once daily.  -     cyclobenzaprine (FLEXERIL) 5 MG tablet; Take 1 tablet (5 mg total) by mouth 3 (three) times daily as needed for Muscle spasms.      See meds, orders, follow up, routing and instructions sections of encounter.  A 53-year-old established male patient.  He is in for knee pain right, foot pain   bilaterally, foot swelling, difficulty standing on his feet all day and   predominantly neck pain.  He reports no distal pain radiation in the upper   extremities.  No numbness, tingling or weakness.  Symptoms for a few weeks.  He   [facility] and   cervical x-[facility], [facility].    Examination was nonfocal today.  He had appropriate shoulder and neck range of   motion.  No focal neurologic deficits noted.  He had mild indentation   bilaterally in lower extremity.  No other acute findings at this time.    Blood pressures in his last two visits were elevated.    PLAN:  1.  Resume HCTZ, one-month blood pressure followup.  2.  Follow up with Orthopedics at patient discretion.  3.  Flexeril with sedation warnings.  4.  Letter for ergonomic footwear.      BERNARDO/HN  dd: 07/31/2019 16:34:31 (CDT)  td: 08/01/2019 08:48:41 (CDT)   Doc ID   #[id number]  Job ID #[id number]    CC:

## 2019-08-09 ENCOUNTER — TELEPHONE (OUTPATIENT)
Dept: PHARMACY | Facility: CLINIC | Age: 54
End: 2019-08-09

## 2019-08-22 ENCOUNTER — TELEPHONE (OUTPATIENT)
Dept: PHARMACY | Facility: CLINIC | Age: 54
End: 2019-08-22

## 2019-08-27 NOTE — TELEPHONE ENCOUNTER
Vemlidy  refill confirmed. Patient will pick-up Vemlidy refill on  from [facility]. $0.00 copay- 004. Confirmed 2 patient identifiers - name and .     Patient has 10 doses of Vemlidy remaining and takes it first thing in the morning with breakfast daily.  Patient reports they are not having any side effects so far. No missed doses, new medications - hydrochlorothiazide and flexeril ( no drug interactions noted) , no new allergies or health conditions reported at this time. All questions answered and addressed to patients satisfaction. Reinforced importance of taking medication at the same time everyday with food, and staying hydrated. Patient verbalized understanding.

## 2019-08-30 ENCOUNTER — OFFICE VISIT (OUTPATIENT)
Dept: INTERNAL MEDICINE | Facility: CLINIC | Age: 54
End: 2019-08-30
Attending: FAMILY MEDICINE
Payer: COMMERCIAL

## 2019-08-30 VITALS
WEIGHT: 192.88 LBS | BODY MASS INDEX: 28.57 KG/M2 | TEMPERATURE: 98 F | HEIGHT: 69 IN | HEART RATE: 66 BPM | OXYGEN SATURATION: 96 % | SYSTOLIC BLOOD PRESSURE: 130 MMHG | DIASTOLIC BLOOD PRESSURE: 80 MMHG

## 2019-08-30 DIAGNOSIS — I10 HYPERTENSION, ESSENTIAL: Primary | ICD-10-CM

## 2019-08-30 DIAGNOSIS — Z00.00 ANNUAL PHYSICAL EXAM: ICD-10-CM

## 2019-08-30 DIAGNOSIS — I73.9 PAD (PERIPHERAL ARTERY DISEASE): ICD-10-CM

## 2019-08-30 DIAGNOSIS — E78.5 HYPERLIPIDEMIA, UNSPECIFIED HYPERLIPIDEMIA TYPE: ICD-10-CM

## 2019-08-30 DIAGNOSIS — Z12.5 PROSTATE CANCER SCREENING: ICD-10-CM

## 2019-08-30 PROCEDURE — 3079F DIAST BP 80-89 MM HG: CPT | Mod: CPTII,S$GLB,, | Performed by: FAMILY MEDICINE

## 2019-08-30 PROCEDURE — 99213 PR OFFICE/OUTPT VISIT, EST, LEVL III, 20-29 MIN: ICD-10-PCS | Mod: S$GLB,,, | Performed by: FAMILY MEDICINE

## 2019-08-30 PROCEDURE — 3075F SYST BP GE 130 - 139MM HG: CPT | Mod: CPTII,S$GLB,, | Performed by: FAMILY MEDICINE

## 2019-08-30 PROCEDURE — 3079F PR MOST RECENT DIASTOLIC BLOOD PRESSURE 80-89 MM HG: ICD-10-PCS | Mod: CPTII,S$GLB,, | Performed by: FAMILY MEDICINE

## 2019-08-30 PROCEDURE — 99213 OFFICE O/P EST LOW 20 MIN: CPT | Mod: S$GLB,,, | Performed by: FAMILY MEDICINE

## 2019-08-30 PROCEDURE — 99999 PR PBB SHADOW E&M-EST. PATIENT-LVL III: CPT | Mod: PBBFAC,,, | Performed by: FAMILY MEDICINE

## 2019-08-30 PROCEDURE — 3008F BODY MASS INDEX DOCD: CPT | Mod: CPTII,S$GLB,, | Performed by: FAMILY MEDICINE

## 2019-08-30 PROCEDURE — 3075F PR MOST RECENT SYSTOLIC BLOOD PRESS GE 130-139MM HG: ICD-10-PCS | Mod: CPTII,S$GLB,, | Performed by: FAMILY MEDICINE

## 2019-08-30 PROCEDURE — 3008F PR BODY MASS INDEX (BMI) DOCUMENTED: ICD-10-PCS | Mod: CPTII,S$GLB,, | Performed by: FAMILY MEDICINE

## 2019-08-30 PROCEDURE — 99999 PR PBB SHADOW E&M-EST. PATIENT-LVL III: ICD-10-PCS | Mod: PBBFAC,,, | Performed by: FAMILY MEDICINE

## 2019-08-30 RX ORDER — ATORVASTATIN CALCIUM 40 MG/1
40 TABLET, FILM COATED ORAL DAILY
Qty: 90 TABLET | Refills: 3 | Status: SHIPPED | OUTPATIENT
Start: 2019-08-30 | End: 2020-07-14

## 2019-08-30 RX ORDER — CYCLOBENZAPRINE HCL 5 MG
5 TABLET ORAL 3 TIMES DAILY PRN
Qty: 45 TABLET | Refills: 0 | Status: SHIPPED | OUTPATIENT
Start: 2019-08-30 | End: 2021-01-05 | Stop reason: SDUPTHER

## 2019-08-30 NOTE — PROGRESS NOTES
Subjective:       Patient ID: Ramiro Oneil is a 54 y.o. male.    Chief Complaint: Follow-up    HPI  Review of Systems   Constitutional: Negative for chills, fatigue, fever and unexpected weight change.   HENT: Negative for congestion and trouble swallowing.    Eyes: Negative for redness and visual disturbance.   Respiratory: Negative for cough, chest tightness and shortness of breath.    Cardiovascular: Negative for chest pain, palpitations and leg swelling.   Gastrointestinal: Negative for abdominal pain and blood in stool.   Genitourinary: Negative for difficulty urinating and hematuria.   Musculoskeletal: Positive for neck pain and neck stiffness. Negative for arthralgias, back pain, gait problem, joint swelling and myalgias.   Skin: Negative for color change and rash.   Neurological: Negative for tremors, speech difficulty, weakness, numbness and headaches.   Hematological: Negative for adenopathy. Does not bruise/bleed easily.   Psychiatric/Behavioral: Negative for behavioral problems, confusion and sleep disturbance. The patient is not nervous/anxious.        Objective:      Physical Exam   Constitutional: He is oriented to person, place, and time. He appears well-developed and well-nourished. No distress.   Neck: Neck supple.   Pulmonary/Chest: Effort normal.   Musculoskeletal: He exhibits no edema.        Right lower leg: He exhibits no edema.        Left lower leg: He exhibits no edema.   Neurological: He is alert and oriented to person, place, and time.   Skin: Skin is warm and dry. No rash noted.   Psychiatric: He has a normal mood and affect. His behavior is normal. Judgment and thought content normal.   Nursing note and vitals reviewed.      Assessment:       1. Hypertension, essential    2. Hyperlipidemia, unspecified hyperlipidemia type    3. Prostate cancer screening    4. Annual physical exam        Plan:     Medication List with Changes/Refills   Current Medications    ATORVASTATIN (LIPITOR) 40  MG TABLET    Take 1 tablet (40 mg total) by mouth once daily.    CLOPIDOGREL (PLAVIX) 75 MG TABLET    Take 1 tablet (75 mg total) by mouth once daily.    DICLOFENAC SODIUM (VOLTAREN) 1 % GEL    Apply 2 grams topically 3 (three) times daily as needed.    ECONAZOLE NITRATE 1 % CREAM        FLUTICASONE (FLONASE) 50 MCG/ACTUATION NASAL SPRAY    2 sprays (100 mcg total) by Each Nare route once daily.    HYDROCHLOROTHIAZIDE (HYDRODIURIL) 25 MG TABLET    Take 1 tablet (25 mg total) by mouth once daily.    NYSTATIN-TRIAMCINOLONE (MYCOLOG II) CREAM    Apply topically 2 (two) times daily.    TENOFOVIR ALAFENAMIDE (VEMLIDY) 25 MG TAB    Take 1 tablet (25 mg total) by mouth once daily.   Changed and/or Refilled Medications    Modified Medication Previous Medication    CYCLOBENZAPRINE (FLEXERIL) 5 MG TABLET cyclobenzaprine (FLEXERIL) 5 MG tablet       Take 1 tablet (5 mg total) by mouth 3 (three) times daily as needed for Muscle spasms.    Take 1 tablet (5 mg total) by mouth 3 (three) times daily as needed for Muscle spasms.     Ramiro was seen today for follow-up.    Diagnoses and all orders for this visit:    Hypertension, essential    Hyperlipidemia, unspecified hyperlipidemia type    Prostate cancer screening  -     PSA, Screening; Future    Annual physical exam  -     PSA, Screening; Future    Other orders  -     cyclobenzaprine (FLEXERIL) 5 MG tablet; Take 1 tablet (5 mg total) by mouth 3 (three) times daily as needed for Muscle spasms.      See meds, orders, follow up, routing and instructions sections of encounter.  A 54-year-old patient, blood pressure followup.  No particular complaints.  He   had not made his followup spine appointment.  He did get some relief from his   Flexeril.  He states compliance with his blood pressure medication.  He will be   due to see me in a couple of months for a physical examination.  Please continue   current medications.      BERNARDO/IN  dd: 08/30/2019 17:26:50 (CDT)  td: 08/31/2019  14:12:07 (CDT)  Doc ID   #[id number]  Job ID #[id number]    CC:

## 2019-09-13 ENCOUNTER — CLINICAL SUPPORT (OUTPATIENT)
Dept: CARDIOLOGY | Facility: CLINIC | Age: 54
End: 2019-09-13
Attending: INTERNAL MEDICINE
Payer: COMMERCIAL

## 2019-09-13 DIAGNOSIS — I73.9 PAD (PERIPHERAL ARTERY DISEASE): ICD-10-CM

## 2019-09-13 DIAGNOSIS — E78.5 HYPERLIPIDEMIA, UNSPECIFIED HYPERLIPIDEMIA TYPE: ICD-10-CM

## 2019-09-13 LAB
LEFT ANT TIBIAL SYS PSV: 70 CM/S
LEFT CFA PSV: 96 CM/S
LEFT EXTERNAL ILIAC PSV: 158 CM/S
LEFT PERONEAL SYS PSV: 49 CM/S
LEFT POPLITEAL PSV: 86 CM/S
LEFT POST TIBIAL SYS PSV: 72 CM/S
LEFT PROFUNDA SYS PSV: 129 CM/S
LEFT SUPER FEMORAL DIST SYS PSV: 73 CM/S
LEFT SUPER FEMORAL MID SYS PSV: 577 CM/S
LEFT SUPER FEMORAL OSTIAL SYS PSV: 103 CM/S
LEFT SUPER FEMORAL PROX SYS PSV: 67 CM/S
LEFT TIB/PER TRUNK SYS PSV: 91 CM/S
OHS CV LEFT LOWER EXTREMITY ABI (NO CALC): 0.91
OHS CV LOWER EXTREMITY STENT MEASUREMENTS - RIGHT - MSFA S1 - DIST: 166
OHS CV LOWER EXTREMITY STENT MEASUREMENTS - RIGHT - MSFA S1 - MID: 178
OHS CV LOWER EXTREMITY STENT MEASUREMENTS - RIGHT - MSFA S1 - OST: 180
OHS CV LOWER EXTREMITY STENT MEASUREMENTS - RIGHT - MSFA S1 - PROX: 177
OHS CV RIGHT ABI LOWER EXTREMITY (NO CALC): 0.99
RIGHT ANT TIBIAL SYS PSV: 61 CM/S
RIGHT CFA PSV: 134 CM/S
RIGHT EXTERNAL ILLIAC PSV: 124 CM/S
RIGHT PERONEAL SYS PSV: 29 CM/S
RIGHT POPLITEAL PSV: 180 CM/S
RIGHT POST TIBIAL SYS PSV: 108 CM/S
RIGHT PROFUNDA SYS PSV: 164 CM/S
RIGHT SUPER FEMORAL DIST SYS PSV: 138 CM/S
RIGHT SUPER FEMORAL OSTIAL SYS PSV: 120 CM/S
RIGHT SUPER FEMORAL PROX SYS PSV: 96 CM/S
RIGHT TIB/PER TRUNK SYS PSV: 117 CM/S

## 2019-09-13 PROCEDURE — 93925 LOWER EXTREMITY STUDY: CPT | Mod: S$GLB,,, | Performed by: INTERNAL MEDICINE

## 2019-09-13 PROCEDURE — 93925 CV US DOPPLER ARTERIAL LEGS BILATERAL (CUPID ONLY): ICD-10-PCS | Mod: S$GLB,,, | Performed by: INTERNAL MEDICINE

## 2019-09-19 ENCOUNTER — TELEPHONE (OUTPATIENT)
Dept: PHARMACY | Facility: CLINIC | Age: 54
End: 2019-09-19

## 2019-09-19 ENCOUNTER — TELEPHONE (OUTPATIENT)
Dept: CARDIOLOGY | Facility: CLINIC | Age: 54
End: 2019-09-19

## 2019-09-19 RX ORDER — CLOPIDOGREL BISULFATE 75 MG/1
75 TABLET ORAL DAILY
Qty: 90 TABLET | Refills: 3 | Status: SHIPPED | OUTPATIENT
Start: 2019-09-19 | End: 2020-09-28

## 2019-09-19 NOTE — TELEPHONE ENCOUNTER
Discussed results of arterial US with Mr Oneil. He is still able to walk >1 mile before leg tightness occurs. Discussed medical management vs invasive, but will continue medical management given this is not affecting his lifestyle significantly. He will notify me of any change.

## 2019-10-31 ENCOUNTER — TELEPHONE (OUTPATIENT)
Dept: PHARMACY | Facility: CLINIC | Age: 54
End: 2019-10-31

## 2019-11-12 NOTE — TELEPHONE ENCOUNTER
Vemlidy refill attempted. No answer. LVM for call back. Will f/u tomorrow (11/12).   - [facility] got in touch with patient's spouse. LM with her for patient to call [facility] for refill and shipment set up of Vemlidy.

## 2019-11-12 NOTE — TELEPHONE ENCOUNTER
Patient returned refill call to [facility]. Stated that he had 8 doses on-hand. He will pickup refill at [facility] on Friday 11/15. $0.00 copay at 004.     Ariel Plaza, PharmD  Clinical Pharmacist  [facility]  P: [phone]

## 2019-11-18 ENCOUNTER — PATIENT OUTREACH (OUTPATIENT)
Dept: ADMINISTRATIVE | Facility: HOSPITAL | Age: 54
End: 2019-11-18

## 2019-11-18 NOTE — LETTER
November 18, 2019    Ramiro Oneil  [address]  [place] [address]             [facility]  Yves Serrano MD   [address]  Phone: [phone]  Fax: [phone]   Dear Mr. Oneil:     I am contacting you because you are currently due for colon cancer screening. As you may know, your primary care provider has placed an order for a colonoscopy. However, you have not yet scheduled the procedure. Please call [phone] to schedule.        Thank you for choosing [facility]!    GARY Wang  Clinical Care Coordinator

## 2019-11-20 ENCOUNTER — TELEPHONE (OUTPATIENT)
Dept: INTERNAL MEDICINE | Facility: CLINIC | Age: 54
End: 2019-11-20

## 2019-11-20 DIAGNOSIS — K63.5 POLYP OF COLON, UNSPECIFIED PART OF COLON, UNSPECIFIED TYPE: ICD-10-CM

## 2019-11-20 DIAGNOSIS — Z12.11 COLON CANCER SCREENING: Primary | ICD-10-CM

## 2019-11-20 NOTE — TELEPHONE ENCOUNTER
----- Message from She Rice sent at 11/20/2019  3:17 PM CST -----  Contact: Patient [phone]  Requesting to speak with you regarding a colonoscopy.    Please call and advise.    Thank You

## 2019-11-21 ENCOUNTER — TELEPHONE (OUTPATIENT)
Dept: ENDOSCOPY | Facility: HOSPITAL | Age: 54
End: 2019-11-21

## 2019-11-21 NOTE — TELEPHONE ENCOUNTER
Endoscopy Scheduling Questionnaire:    1. Have you been admitted overnight to the hospital in the past 3 months? no  2. Have you had a cardiac stent placed in the past 12 months? no  3. Have you had a stroke or heart attack in the past 6 months? no  4. Have you had any chest pain in the past 3 months? no      If so, have you been evaluated by your PCP or Cardiologist? no  5. Do you take prescription weight loss medication? no  6. Have you been diagnosed with Diverticulitis within the past 3 months? no  7. Are you on dialysis? no  8. Are you diabetic? no Do you have an insulin pump? no  9. Do you have any other health issues that you feel might limit your ability to safely have the procedure and/or sedation? no  10. Is the patient over 81 yo? no        If so, has the patient been seen by their PCP or GI in the last 6 months? N/A       -I have reviewed the last colonoscopy for recommendations regarding surveillance and bowel prep  Yes  -I have reviewed the patient's medications and allergies. He is on high risk medication, Plavix. A clearance request was sent to Dr. Juan Yeager  -I have verified the pharmacy information. The prep being used is Suprep. The patient's prep instructions were sent via mail..    Date Endoscopy Scheduled: He decided to schedule in [place] due to not having a ride in [place].

## 2019-11-29 ENCOUNTER — LAB VISIT (OUTPATIENT)
Dept: LAB | Facility: HOSPITAL | Age: 54
End: 2019-11-29
Attending: FAMILY MEDICINE
Payer: COMMERCIAL

## 2019-11-29 DIAGNOSIS — Z12.5 PROSTATE CANCER SCREENING: ICD-10-CM

## 2019-11-29 DIAGNOSIS — Z00.00 ANNUAL PHYSICAL EXAM: ICD-10-CM

## 2019-11-29 LAB — COMPLEXED PSA SERPL-MCNC: 1.3 NG/ML (ref 0–4)

## 2019-11-29 PROCEDURE — 36415 COLL VENOUS BLD VENIPUNCTURE: CPT

## 2019-11-29 PROCEDURE — 84153 ASSAY OF PSA TOTAL: CPT

## 2019-12-03 ENCOUNTER — TELEPHONE (OUTPATIENT)
Dept: RADIOLOGY | Facility: HOSPITAL | Age: 54
End: 2019-12-03

## 2019-12-09 ENCOUNTER — TELEPHONE (OUTPATIENT)
Dept: PHARMACY | Facility: CLINIC | Age: 54
End: 2019-12-09

## 2019-12-09 DIAGNOSIS — K76.0 NAFLD (NONALCOHOLIC FATTY LIVER DISEASE): Primary | ICD-10-CM

## 2019-12-09 DIAGNOSIS — B18.1 CHRONIC HEPATITIS B: ICD-10-CM

## 2019-12-10 ENCOUNTER — TELEPHONE (OUTPATIENT)
Dept: RADIOLOGY | Facility: HOSPITAL | Age: 54
End: 2019-12-10

## 2019-12-11 ENCOUNTER — HOSPITAL ENCOUNTER (OUTPATIENT)
Dept: RADIOLOGY | Facility: HOSPITAL | Age: 54
Discharge: HOME | End: 2019-12-11
Attending: NURSE PRACTITIONER
Payer: COMMERCIAL

## 2019-12-11 ENCOUNTER — PATIENT OUTREACH (OUTPATIENT)
Dept: ADMINISTRATIVE | Facility: OTHER | Age: 54
End: 2019-12-11

## 2019-12-11 DIAGNOSIS — K76.0 NAFLD (NONALCOHOLIC FATTY LIVER DISEASE): ICD-10-CM

## 2019-12-11 DIAGNOSIS — B18.1 CHRONIC HEPATITIS B: ICD-10-CM

## 2019-12-11 PROCEDURE — 76700 US ABDOMEN COMPLETE: ICD-10-PCS | Mod: 26,,, | Performed by: RADIOLOGY

## 2019-12-11 PROCEDURE — 76700 US EXAM ABDOM COMPLETE: CPT | Mod: TC

## 2019-12-11 PROCEDURE — 76700 US EXAM ABDOM COMPLETE: CPT | Mod: 26,,, | Performed by: RADIOLOGY

## 2019-12-12 ENCOUNTER — OFFICE VISIT (OUTPATIENT)
Dept: HEPATOLOGY | Facility: CLINIC | Age: 54
End: 2019-12-12
Payer: COMMERCIAL

## 2019-12-12 VITALS
WEIGHT: 198.44 LBS | DIASTOLIC BLOOD PRESSURE: 80 MMHG | SYSTOLIC BLOOD PRESSURE: 142 MMHG | BODY MASS INDEX: 29.39 KG/M2 | HEIGHT: 69 IN | OXYGEN SATURATION: 98 % | RESPIRATION RATE: 18 BRPM | HEART RATE: 81 BPM

## 2019-12-12 DIAGNOSIS — K76.0 NAFLD (NONALCOHOLIC FATTY LIVER DISEASE): ICD-10-CM

## 2019-12-12 DIAGNOSIS — E78.5 HYPERLIPIDEMIA, UNSPECIFIED HYPERLIPIDEMIA TYPE: ICD-10-CM

## 2019-12-12 DIAGNOSIS — B18.1 CHRONIC HEPATITIS B: Primary | ICD-10-CM

## 2019-12-12 DIAGNOSIS — I10 HYPERTENSION, ESSENTIAL: ICD-10-CM

## 2019-12-12 DIAGNOSIS — K74.00 LIVER FIBROSIS: ICD-10-CM

## 2019-12-12 PROCEDURE — 99214 PR OFFICE/OUTPT VISIT, EST, LEVL IV, 30-39 MIN: ICD-10-PCS | Mod: S$GLB,,, | Performed by: NURSE PRACTITIONER

## 2019-12-12 PROCEDURE — 3079F PR MOST RECENT DIASTOLIC BLOOD PRESSURE 80-89 MM HG: ICD-10-PCS | Mod: CPTII,S$GLB,, | Performed by: NURSE PRACTITIONER

## 2019-12-12 PROCEDURE — 3008F PR BODY MASS INDEX (BMI) DOCUMENTED: ICD-10-PCS | Mod: CPTII,S$GLB,, | Performed by: NURSE PRACTITIONER

## 2019-12-12 PROCEDURE — 3079F DIAST BP 80-89 MM HG: CPT | Mod: CPTII,S$GLB,, | Performed by: NURSE PRACTITIONER

## 2019-12-12 PROCEDURE — 3077F SYST BP >= 140 MM HG: CPT | Mod: CPTII,S$GLB,, | Performed by: NURSE PRACTITIONER

## 2019-12-12 PROCEDURE — 99214 OFFICE O/P EST MOD 30 MIN: CPT | Mod: S$GLB,,, | Performed by: NURSE PRACTITIONER

## 2019-12-12 PROCEDURE — 3008F BODY MASS INDEX DOCD: CPT | Mod: CPTII,S$GLB,, | Performed by: NURSE PRACTITIONER

## 2019-12-12 PROCEDURE — 99999 PR PBB SHADOW E&M-EST. PATIENT-LVL IV: ICD-10-PCS | Mod: PBBFAC,,, | Performed by: NURSE PRACTITIONER

## 2019-12-12 PROCEDURE — 99999 PR PBB SHADOW E&M-EST. PATIENT-LVL IV: CPT | Mod: PBBFAC,,, | Performed by: NURSE PRACTITIONER

## 2019-12-12 PROCEDURE — 3077F PR MOST RECENT SYSTOLIC BLOOD PRESSURE >= 140 MM HG: ICD-10-PCS | Mod: CPTII,S$GLB,, | Performed by: NURSE PRACTITIONER

## 2019-12-12 NOTE — PROGRESS NOTES
[facility] Established Patient Visit    Reason for Visit:  Chronic Hep B    PCP: Yves Serrano    HPI:  This is a 54 y.o. male with PMH noted below, here for follow up of chronic Hepatitis B      He started Viread on 2/25/14,was switched to Vemlidy 11/2018.    Also + NAFLD. Risk factors for NAFLD include overweight, HTN, HLD    Liver fibrosis staging:  - Fibrosure 7/2014 - F1-F2, only mild fibrosis  - Elastography scan 1/2016 = F0-F1  - Fibroscan 11/2018 - F2, S3      Interval HPI: Presents today alone. Hep B labs in 6/2019 showed DNA <10, + sAg. eAB +. Waiting on labs from 12/2019 to result  Is drinking beer intermittently     AST, ALT, platelets, synthetic liver functioning - WNL    Lab Results   Component Value Date    ALT 40 12/11/2019    AST 30 12/11/2019    ALKPHOS 63 12/11/2019    BILITOT 0.9 12/11/2019    ALBUMIN 3.8 12/11/2019    INR 1.0 12/11/2019     12/11/2019       HCC screening:  -- AFP WNL 12/2019, next due 6/2020  -- US without lesions 12/2019, next due 6/2020  HIV testing and Hep C testing negative    Previous serologic w/u negative for Juan's,  hemochromatosis, autoimmune etiology, and viral hepatitis C.     Previous EGD : not indicated currently     Denies family history of liver disease . +alcohol consumption   Social History     Substance and Sexual Activity   Alcohol Use Yes    Comment: 2 beers per week      +Immunity to Hep A    Denies jaundice, dark urine, abdominal distention, hematemesis, melena, slowed mentation. No abnormal skin rashes. No generalized joint or muscle pain.     PMHX:  has a past medical history of Anticoagulant long-term use, Dermatitis, Hepatitis B, Hypertension, and NAFLD (nonalcoholic fatty liver disease).    PSHX:  has a past surgical history that includes Shoulder surgery; Colonoscopy; PTA/stenting of distal right SFA ; stents; and Carpal tunnel release.    The patient's social and family histories were reviewed by me and updated in the  appropriate section of the electronic medical record.    Review of patient's allergies indicates:  No Known Allergies    Current Outpatient Medications on File Prior to Visit   Medication Sig Dispense Refill    atorvastatin (LIPITOR) 40 MG tablet Take 1 tablet (40 mg total) by mouth once daily. 90 tablet 3    clopidogrel (PLAVIX) 75 mg tablet Take 1 tablet (75 mg total) by mouth once daily. 90 tablet 3    cyclobenzaprine (FLEXERIL) 5 MG tablet Take 1 tablet (5 mg total) by mouth 3 (three) times daily as needed for Muscle spasms. 45 tablet 0    diclofenac sodium (VOLTAREN) 1 % Gel Apply 2 grams topically 3 (three) times daily as needed. 2 Tube 6    econazole nitrate 1 % cream       fluticasone (FLONASE) 50 mcg/actuation nasal spray 2 sprays (100 mcg total) by Each Nare route once daily. 1 Bottle 5    hydroCHLOROthiazide (HYDRODIURIL) 25 MG tablet Take 1 tablet (25 mg total) by mouth once daily. 30 tablet 5    nystatin-triamcinolone (MYCOLOG II) cream Apply topically 2 (two) times daily. 30 g 0    tenofovir alafenamide (VEMLIDY) 25 mg Tab Take 1 tablet (25 mg total) by mouth once daily. 30 tablet 11    [DISCONTINUED] tenofovir (VIREAD) 300 mg Tab Take 1 tablet (300 mg total) by mouth once daily. TAKE 1 TABLET BY MOUTH    ONCE DAILY 30 tablet 0     No current facility-administered medications on file prior to visit.        SOCIAL HISTORY:   Social History     Tobacco Use   Smoking Status Never Smoker   Smokeless Tobacco Never Used       Social History     Substance and Sexual Activity   Alcohol Use Yes    Comment: 2 beers per week       Social History     Substance and Sexual Activity   Drug Use No       ROS:   GENERAL: Denies fever, chills, weight loss/gain, fatigue  HEENT: Denies headaches, dizziness, vision/hearing changes  CARDIOVASCULAR: Denies chest pain, palpitations, or edema  RESPIRATORY: Denies dyspnea, cough  GI: Denies abdominal pain, rectal bleeding, nausea, vomiting. No change in bowel pattern  "or color  : Denies dysuria, hematuria   SKIN: Denies rash, itching   NEURO: Denies confusion, memory loss, or mood changes  PSYCH: Denies depression or anxiety  HEME/LYMPH: Denies easy bruising or bleeding    Objective Findings:    PHYSICAL EXAM:   Friendly Black or  male, in no acute distress; alert and oriented to person, place and time  VITALS: BP (!) 142/80 (BP Location: Left arm, Patient Position: Sitting, BP Method: Medium (Automatic))   Pulse 81   Resp 18   Ht 5' 9" (1.753 m)   Wt 90 kg (198 lb 6.6 oz)   SpO2 98%   BMI 29.30 kg/m²   HENT: Normocephalic, without obvious abnormality. Oral mucosa pink and moist. Dentition good.  EYES: Sclerae anicteric. No conjunctival pallor.   NECK: Supple. No masses or cervical adenopathy.  CARDIOVASCULAR: Regular rate and rhythm. No murmurs.  RESPIRATORY: Normal respiratory effort. BBS CTA. No wheezes or crackles.  GI: Soft, non-tender, non-distended. No hepatosplenomegaly. No masses palpable. No ascites.  EXTREMITIES:  No clubbing, cyanosis or edema.  SKIN: Warm and dry. No jaundice. No rashes noted to exposed skin. No telangectasias noted. No palmar erythema.  NEURO:  Normal gait. No asterixis.  PSYCH:  Memory intact. Thought and speech pattern appropriate. Behavior normal. No depression or anxiety noted.    DIAGNOSTIC STUDIES:    ABD. U/S-    Done 12/2019  FINDINGS:  Pancreas: The visualized portions of pancreas appear normal.    Aorta: No aneurysm.    Liver: 16 cm, Diffusely increased parenchymal echogenicity consistent with steatosis. No focal lesions.    Gallbladder: Multiple gallstones present without wall thickening or pericholecystic fluid.  Negative sonographic Rodriguez sign.    Biliary system: 3 mm common bile duct.  No intrahepatic ductal dilatation.    Inferior vena cava: Normal in appearance.    Right kidney: 10.4 cm. No hydronephrosis.    Left kidney: 10.5 cm. No hydronephrosis.    Spleen: 7.3 cm.  Normal in size with homogeneous " echotexture.    Miscellaneous: No ascites.      Impression       Cholelithiasis without cholecystitis or biliary dilatation.    Hepatic steatosis suspected.       LIVER BIOPSY-    FIBROSCAN - done 2018  Fibroscan readin.4 KPa   Fibrosis:F2    CAP readin dB/m   Steatosis: :S3     EDUCATION:    -- Avoid alcohol. Avoid raw seafood.   -- Discussed transmission of HBV, including sexual transmission. Avoid sharing razors/toothbrushes, etc.   -- Discussed importance of Hep B screening for all current and previous sexual partners, as well as complete Hep B vaccination for all current sexual partners. For sexual partners who have not completed the Hep B vaccine series, barrier sexual protection is recommended to prevent spread of the virus.  -- Natural history of HBV including progression to cirrhosis reviewed.   -- We discussed the increased risk of hepatocellular carcinoma due to active hepatitis B infection. Continued screening every six months with ultrasound and AFP is recommended.   -- Discussed potential outcomes of cirrhosis, including liver cancer, liver failure, liver transplant, death.   -- Limit acetaminophen to 2000mg daily.  -- Advised immunization of all household members.  -- Discussed importance of compliance with medication regimen and risk of viral mutation if treatment is stopped prematurely.       ASSESSMENT & PLAN:  54 y.o. Black or  male with:  1. Chronic hepatitis B, E Ab pos   -- On Vemlidy 25 mg daily , refilled today   -- transaminases essentially WNL  -- awaiting repeat Hep B DNA lab  -- synthetic liver function WNL  -- immunity to Hep A per labs   -- Fibroscan in 2018 F2, will repeat with RTC  -- screening for Hep C and HIV negative  -- HCC screening Q 6 months with AFP and abd. U/S - both next due 2020  -- Last EGD : no indication from Hep B standpoint currently     2. Liver fibrosis per fibroscn  -- F2 on fibroscan in 2018, will repeat with RTC in July     3.  NAFLD  -- transaminase WNL  -- US showed fatty liver  -- synthetic liver function WNL   -- risk factors for FLD include overweight, HTN, HLD    4. Alcohol use  - recommended alcohol abstinence given chronic Hep B    Social History     Substance and Sexual Activity   Alcohol Use Yes    Comment: 2 beers per week       Follow up in about 7 months (around 7/1/2020). with labs, US 1 week before, fibroscan same day       Thank you for allowing me to participate in the care of Ramiro JonesDELLA MadridC    CC'ed note to:   Yves Serrano MD

## 2019-12-12 NOTE — PATIENT INSTRUCTIONS
1. Fibroscan today to look for fat or scar tissue in the liver - will do with next visit in July   2.  Follow up in 6 months with labs and US - recommend doing labs and US at [facility] in [place] at least a week before the visit so I have all of the results     -- Avoid alcohol. Avoid raw seafood.   -- Hepatitis B is spread through blood and bodily fluids. Do not share  razors/toothbrushes, etc, with others   -- it is possible that Hepatitis B can cause scar tissue in the liver, which can progress to cirrhosis.   -- Hepatitis B, in some cases, has a higher risk of liver cancer (hepatocellular carcinoma), especially with active hepatitis B infection. We will perform liver cancer screening every six months with ultrasound and AFP along with a clinic visit every 6 months  -- If you develop cirrhosis, it is important that we monitor you closely, as cirrhosis can cause liver cancer, liver failure, liver transplant, death.   -- Limit acetaminophen to 2000mg daily.  -- Recommended that all 1st degree relatives, household members and sexual contacts are screened for Hepatitis B. If they are negative for Hepatitis B, they should be vaccinated against Hepatitis B to protect themselves from gabe the virus  -- It is important for all current and previous sexual partners to be tested for Hepatitis B as well as complete Hep B vaccination. For sexual partners who have not completed the Hep B vaccine series, barrier sexual protection is recommended to prevent spread of the virus.  -- It is important that you take your Hepatitis B medication as prescribed without any missed doses, as missing doses or stopping the medication can cause the Hepatitis B virus to change and make it difficult to treat       There is no FDA approved therapy for non-alcoholic fatty liver disease. Therefore, these things are important:  1. Weight loss goal of 10-15 lbs  2. Low carb/sugar, high fiber and protein diet*. Try to limit your carb intake  to LESS than 30-45 grams of carbs with a meal or LESS than 5-10 grams with any snack (total of any snack foods eaten during that time). Use MyFitness Pal adam to add up your carbs through the day. Do NOT drink any beverages with calories or carbs (this can lead to high blood sugar and weight gain). Also, some of our patients have been very successful with weight loss using the pre made/planned meal planning services that limit calories and portion size (one example is Sensible Meals but there are many out there)  3. Exercise, 5 days per week, 30 minutes per day, as tolerated  4. Recommend good cholesterol, blood pressure, blood sugar levels     In some people, fatty liver can progress to steatohepatitis (inflamatory fatty liver) and possibly to cirrhosis, putting one at increased risk for liver cancer, liver failure, and death. Therefore, the lifestyle changes are very important to decrease this risk.     Website with information about fatty liver and inflammation related to fatty liver (AMBROSE) = [email or web address]

## 2019-12-19 ENCOUNTER — TELEPHONE (OUTPATIENT)
Dept: PHARMACY | Facility: CLINIC | Age: 54
End: 2019-12-19

## 2019-12-20 ENCOUNTER — TELEPHONE (OUTPATIENT)
Dept: HEPATOLOGY | Facility: CLINIC | Age: 54
End: 2019-12-20

## 2019-12-20 NOTE — TELEPHONE ENCOUNTER
----- Message from Esthela Leger NP sent at 12/20/2019 11:25 AM CST -----  Please mail to patient: Hep B labs stable. Hep B virus positive but low level. Continue Vemlidy Hep B medication. AFP (liver tumor marker) normal. Repeat labs, US and visit with me in July as scheduled

## 2019-12-27 ENCOUNTER — IMMUNIZATION (OUTPATIENT)
Dept: PHARMACY | Facility: CLINIC | Age: 54
End: 2019-12-27
Payer: COMMERCIAL

## 2019-12-27 ENCOUNTER — OFFICE VISIT (OUTPATIENT)
Dept: INTERNAL MEDICINE | Facility: CLINIC | Age: 54
End: 2019-12-27
Attending: FAMILY MEDICINE
Payer: COMMERCIAL

## 2019-12-27 VITALS
BODY MASS INDEX: 28.87 KG/M2 | HEART RATE: 76 BPM | SYSTOLIC BLOOD PRESSURE: 122 MMHG | DIASTOLIC BLOOD PRESSURE: 78 MMHG | WEIGHT: 194.88 LBS | HEIGHT: 69 IN | OXYGEN SATURATION: 98 %

## 2019-12-27 DIAGNOSIS — M75.100 ROTATOR CUFF SYNDROME, UNSPECIFIED LATERALITY: ICD-10-CM

## 2019-12-27 DIAGNOSIS — B18.1 CHRONIC HEPATITIS B: ICD-10-CM

## 2019-12-27 DIAGNOSIS — E78.5 HYPERLIPIDEMIA, UNSPECIFIED HYPERLIPIDEMIA TYPE: ICD-10-CM

## 2019-12-27 DIAGNOSIS — I10 HYPERTENSION, ESSENTIAL: ICD-10-CM

## 2019-12-27 DIAGNOSIS — Z98.890 S/P BILATERAL CARPAL TUNNEL RELEASE: ICD-10-CM

## 2019-12-27 DIAGNOSIS — Z00.00 ANNUAL PHYSICAL EXAM: Primary | ICD-10-CM

## 2019-12-27 DIAGNOSIS — K63.5 POLYP OF COLON, UNSPECIFIED PART OF COLON, UNSPECIFIED TYPE: ICD-10-CM

## 2019-12-27 DIAGNOSIS — K74.00 LIVER FIBROSIS: ICD-10-CM

## 2019-12-27 DIAGNOSIS — Z95.9 S/P ARTERIAL STENT: Chronic | ICD-10-CM

## 2019-12-27 DIAGNOSIS — I73.9 PAD (PERIPHERAL ARTERY DISEASE): ICD-10-CM

## 2019-12-27 PROCEDURE — 99396 PREV VISIT EST AGE 40-64: CPT | Mod: S$GLB,,, | Performed by: FAMILY MEDICINE

## 2019-12-27 PROCEDURE — 3078F DIAST BP <80 MM HG: CPT | Mod: CPTII,S$GLB,, | Performed by: FAMILY MEDICINE

## 2019-12-27 PROCEDURE — 99999 PR PBB SHADOW E&M-EST. PATIENT-LVL IV: CPT | Mod: PBBFAC,,, | Performed by: FAMILY MEDICINE

## 2019-12-27 PROCEDURE — 3074F PR MOST RECENT SYSTOLIC BLOOD PRESSURE < 130 MM HG: ICD-10-PCS | Mod: CPTII,S$GLB,, | Performed by: FAMILY MEDICINE

## 2019-12-27 PROCEDURE — 99999 PR PBB SHADOW E&M-EST. PATIENT-LVL IV: ICD-10-PCS | Mod: PBBFAC,,, | Performed by: FAMILY MEDICINE

## 2019-12-27 PROCEDURE — 3074F SYST BP LT 130 MM HG: CPT | Mod: CPTII,S$GLB,, | Performed by: FAMILY MEDICINE

## 2019-12-27 PROCEDURE — 99396 PR PREVENTIVE VISIT,EST,40-64: ICD-10-PCS | Mod: S$GLB,,, | Performed by: FAMILY MEDICINE

## 2019-12-27 PROCEDURE — 3078F PR MOST RECENT DIASTOLIC BLOOD PRESSURE < 80 MM HG: ICD-10-PCS | Mod: CPTII,S$GLB,, | Performed by: FAMILY MEDICINE

## 2019-12-27 NOTE — PATIENT INSTRUCTIONS
Flu shot today    If not contacted in a couple weeks by colonoscopy scheduling department - call Colonoscopy Scheduling Number - [phone].      Information about cholesterol, high blood pressure and healthy diet and activity recommendations can be found at the following links on the Internet:    http://www.nhlbi.nih.gov/health/health-topics/topics/hbc  http://www.nhlbi.nih.gov/health/educational/lose_wt/index.htm  Http://www.nhlbi.nih.gov/files/docs/public/heart/hbp_low.pdf  http://www.heart.org/HEARTORG/  http://diabetes.org/  https://www.cdc.gov/  Https://healthfinder.gov/  https://health.gov/dietaryguidelines/2015/guidelines/  https://health.gov/paguidelines/second-edition/pdf/Physical_Activity_Guidelines_2nd_edition.pdf

## 2019-12-27 NOTE — PROGRESS NOTES
Subjective:       Patient ID: Ramiro Oneil is a 54 y.o. male.    Chief Complaint: Follow-up    Established patient for an annual wellness check/physical exam and also chronic disease management. Specific complaints - see dictation and please see ROS.  P, S, Fm, Soc Hx's; Meds, allergies reviewed and reconciled.  Health maintenance file reviewed and addressed items due.        Review of Systems   Constitutional: Positive for fatigue. Negative for chills, fever and unexpected weight change.   HENT: Negative for congestion and trouble swallowing.    Eyes: Negative for redness and visual disturbance.   Respiratory: Negative for cough, chest tightness and shortness of breath.    Cardiovascular: Negative for chest pain, palpitations and leg swelling.   Gastrointestinal: Negative for abdominal pain and blood in stool.   Genitourinary: Negative for difficulty urinating and hematuria.   Musculoskeletal: Positive for arthralgias, myalgias, neck pain and neck stiffness. Negative for back pain, gait problem and joint swelling.   Skin: Negative for color change and rash.   Neurological: Positive for numbness. Negative for tremors, speech difficulty, weakness and headaches.   Hematological: Negative for adenopathy. Does not bruise/bleed easily.   Psychiatric/Behavioral: Negative for behavioral problems, confusion and sleep disturbance. The patient is not nervous/anxious.        Objective:      Physical Exam   Constitutional: He is oriented to person, place, and time. He appears well-developed and well-nourished. No distress.   Neck: Normal range of motion and full passive range of motion without pain. Neck supple. No thyroid mass present.   Pulmonary/Chest: Effort normal.   Musculoskeletal: He exhibits no edema.        Right shoulder: He exhibits pain. He exhibits normal range of motion, no tenderness, no bony tenderness, no swelling, no effusion, no crepitus, no deformity, no laceration, no spasm, normal pulse and normal  strength.        Left shoulder: He exhibits normal range of motion, no tenderness, no bony tenderness, no swelling, no effusion, no crepitus, no deformity, no laceration, no pain, no spasm, normal pulse and normal strength.        Cervical back: He exhibits decreased range of motion. He exhibits no tenderness and no spasm.        Right hand: He exhibits normal range of motion, no tenderness, normal capillary refill and no swelling. Normal sensation noted. Decreased sensation is not present in the ulnar distribution, is not present in the medial distribution and is not present in the radial distribution. Normal strength noted. He exhibits no finger abduction, no thumb/finger opposition and no wrist extension trouble.        Left hand: He exhibits normal range of motion, no tenderness, normal capillary refill and no swelling. Normal sensation noted. Decreased sensation is not present in the ulnar distribution, is not present in the medial redistribution and is not present in the radial distribution. Normal strength noted. He exhibits no finger abduction, no thumb/finger opposition and no wrist extension trouble.        Right lower leg: He exhibits no edema.        Left lower leg: He exhibits no edema.   Neurological: He is alert and oriented to person, place, and time. He has normal strength. No cranial nerve deficit or sensory deficit. Coordination and gait normal.   Skin: Skin is warm and dry. No rash noted.   Psychiatric: He has a normal mood and affect. His behavior is normal. Judgment and thought content normal.   Nursing note and vitals reviewed.      Assessment:       1. Annual physical exam    2. Hypertension, essential    3. Hyperlipidemia, unspecified hyperlipidemia type    4. PAD (peripheral artery disease)    5. S/P arterial stent    6. Polyp of colon, unspecified part of colon, unspecified type    7. Chronic hepatitis B    8. Liver fibrosis    9. S/p bilateral carpal tunnel release    10. Rotator cuff  syndrome, unspecified laterality        Plan:     Medication List with Changes/Refills   Current Medications    ATORVASTATIN (LIPITOR) 40 MG TABLET    Take 1 tablet (40 mg total) by mouth once daily.    CLOPIDOGREL (PLAVIX) 75 MG TABLET    Take 1 tablet (75 mg total) by mouth once daily.    CYCLOBENZAPRINE (FLEXERIL) 5 MG TABLET    Take 1 tablet (5 mg total) by mouth 3 (three) times daily as needed for Muscle spasms.    DICLOFENAC SODIUM (VOLTAREN) 1 % GEL    Apply 2 grams topically 3 (three) times daily as needed.    ECONAZOLE NITRATE 1 % CREAM        FLUTICASONE (FLONASE) 50 MCG/ACTUATION NASAL SPRAY    2 sprays (100 mcg total) by Each Nare route once daily.    HYDROCHLOROTHIAZIDE (HYDRODIURIL) 25 MG TABLET    Take 1 tablet (25 mg total) by mouth once daily.    NYSTATIN-TRIAMCINOLONE (MYCOLOG II) CREAM    Apply topically 2 (two) times daily.    TENOFOVIR ALAFENAMIDE (VEMLIDY) 25 MG TAB    Take 1 tablet (25 mg total) by mouth once daily.     Ramiro was seen today for follow-up.    Diagnoses and all orders for this visit:    Annual physical exam    Hypertension, essential    Hyperlipidemia, unspecified hyperlipidemia type    PAD (peripheral artery disease)    S/P arterial stent    Polyp of colon, unspecified part of colon, unspecified type    Chronic hepatitis B    Liver fibrosis    S/p bilateral carpal tunnel release    Rotator cuff syndrome, unspecified laterality  -     Ambulatory referral/consult to Sports Medicine; Future      See meds, orders, follow up, routing and instructions sections of encounter and AVS. Discussed with patient and provided on AVS.    Reviewed labs from hepatology clinic.    Due for colonoscopy, orders were placed. Patient to call to sched.

## 2019-12-30 NOTE — TELEPHONE ENCOUNTER
Vemlidy refill attempted. No answer. LVM for call back (5th call attempted)  - Call attempts made on 12/9, 12/13,12/19, 12/24 and 12/30  - No MyChart  - Postcard sent to home  - Will message provider due to lack of contact

## 2020-01-14 ENCOUNTER — PATIENT OUTREACH (OUTPATIENT)
Dept: ADMINISTRATIVE | Facility: OTHER | Age: 55
End: 2020-01-14

## 2020-01-16 ENCOUNTER — OFFICE VISIT (OUTPATIENT)
Dept: SPORTS MEDICINE | Facility: CLINIC | Age: 55
End: 2020-01-16
Attending: FAMILY MEDICINE
Payer: COMMERCIAL

## 2020-01-16 ENCOUNTER — HOSPITAL ENCOUNTER (OUTPATIENT)
Dept: RADIOLOGY | Facility: HOSPITAL | Age: 55
Discharge: HOME | End: 2020-01-16
Attending: ORTHOPAEDIC SURGERY
Payer: COMMERCIAL

## 2020-01-16 VITALS — BODY MASS INDEX: 28.73 KG/M2 | WEIGHT: 194 LBS | HEIGHT: 69 IN

## 2020-01-16 DIAGNOSIS — M25.512 BILATERAL SHOULDER PAIN, UNSPECIFIED CHRONICITY: ICD-10-CM

## 2020-01-16 DIAGNOSIS — M25.512 BILATERAL SHOULDER PAIN, UNSPECIFIED CHRONICITY: Primary | ICD-10-CM

## 2020-01-16 DIAGNOSIS — M25.511 BILATERAL SHOULDER PAIN, UNSPECIFIED CHRONICITY: ICD-10-CM

## 2020-01-16 DIAGNOSIS — M25.511 BILATERAL SHOULDER PAIN, UNSPECIFIED CHRONICITY: Primary | ICD-10-CM

## 2020-01-16 DIAGNOSIS — M75.100 ROTATOR CUFF SYNDROME, UNSPECIFIED LATERALITY: ICD-10-CM

## 2020-01-16 PROCEDURE — 3008F BODY MASS INDEX DOCD: CPT | Mod: CPTII,S$GLB,, | Performed by: ORTHOPAEDIC SURGERY

## 2020-01-16 PROCEDURE — 99999 PR PBB SHADOW E&M-EST. PATIENT-LVL III: ICD-10-PCS | Mod: PBBFAC,,, | Performed by: ORTHOPAEDIC SURGERY

## 2020-01-16 PROCEDURE — 99203 OFFICE O/P NEW LOW 30 MIN: CPT | Mod: S$GLB,,, | Performed by: ORTHOPAEDIC SURGERY

## 2020-01-16 PROCEDURE — 99203 PR OFFICE/OUTPT VISIT, NEW, LEVL III, 30-44 MIN: ICD-10-PCS | Mod: S$GLB,,, | Performed by: ORTHOPAEDIC SURGERY

## 2020-01-16 PROCEDURE — 3008F PR BODY MASS INDEX (BMI) DOCUMENTED: ICD-10-PCS | Mod: CPTII,S$GLB,, | Performed by: ORTHOPAEDIC SURGERY

## 2020-01-16 PROCEDURE — 73030 XR SHOULDER COMPLETE 2 OR MORE VIEWS BILATERAL: ICD-10-PCS | Mod: 26,,, | Performed by: RADIOLOGY

## 2020-01-16 PROCEDURE — 73030 X-RAY EXAM OF SHOULDER: CPT | Mod: 26,,, | Performed by: RADIOLOGY

## 2020-01-16 PROCEDURE — 73030 X-RAY EXAM OF SHOULDER: CPT | Mod: TC,50

## 2020-01-16 PROCEDURE — 99999 PR PBB SHADOW E&M-EST. PATIENT-LVL III: CPT | Mod: PBBFAC,,, | Performed by: ORTHOPAEDIC SURGERY

## 2020-01-16 NOTE — LETTER
January 16, 2020      Yves Castellon MD  [address]  [facility] [address]           [facility]  [address]  [facility] [address]  Phone: [phone]          Patient: Ramiro Oneil   MR Number: [id number]   YOB: 1965   Date of Visit: 1/16/2020       Dear Dr. Yves Castellon:    Thank you for referring Ramiro Oneil to me for evaluation. Attached you will find relevant portions of my assessment and plan of care.    If you have questions, please do not hesitate to call me. I look forward to following Ramiro Oneil along with you.    Sincerely,    Herb Rich MD    Enclosure  CC:  No Recipients    If you would like to receive this communication electronically, please contact [email or web address] or [phone] to request more information on [employer] access.    For providers and/or their staff who would like to refer a patient to [facility], please contact us through our one-stop-shop provider referral line, [facility], at [phone].    If you feel you have received this communication in error or would no longer like to receive these types of communications, please e-mail [email or web address]

## 2020-01-16 NOTE — PROGRESS NOTES
CC: Bilateral shoulder pain     54 y.o. Male with a history of bilateral shoulder pain who presents for evaluation.  He states that the pain is severe and not responding to any conservative care.  He reports having bilateral rotator cuff repair surgery with Dr. Rich in the past.  Reports a developer surgery, however the past 6 months, he reports increasing pain in bilateral shoulders.  Denies any inciting traumatic events, falls, twist or injuries.  Reports pain at night pain with overhead activities.  He reports having bilateral shoulder injections with his primary care doctor about 6 weeks ago, which did not provide any help.  He has tried over-the-counter anti-inflammatories with mild relief.  No physical therapy.  He has been doing home exercise stretching programs for the past 6 weeks with no improvement.  Denies any neck pain or radiculopathy.    He reports that the pain and weakness is worse with overhead activity. It also bothers him at night.    Is affecting ADLs.  Pain is 7/10 at it's worst.      Past Medical History:   Diagnosis Date    Anticoagulant long-term use     Dermatitis     Hepatitis B     Hypertension     Liver fibrosis 12/12/2019    F2 on fibroscan 11/2018    NAFLD (nonalcoholic fatty liver disease)        Past Surgical History:   Procedure Laterality Date    CARPAL TUNNEL RELEASE      COLONOSCOPY      PTA/stenting of distal right SFA       SHOULDER SURGERY      stents         Family History   Problem Relation Age of Onset    Diabetes Mother     Hypertension Mother     Cancer Father         lung    Diabetes Sister     Cancer Sister         breast    Hypertension Sister     Cancer Brother         prostate    Cancer Paternal Uncle     Diabetes Paternal Uncle     Stroke Sister     Liver disease Neg Hx     Cirrhosis Neg Hx          Current Outpatient Medications:     atorvastatin (LIPITOR) 40 MG tablet, Take 1 tablet (40 mg total) by mouth once daily., Disp: 90 tablet,  Rfl: 3    clopidogrel (PLAVIX) 75 mg tablet, Take 1 tablet (75 mg total) by mouth once daily., Disp: 90 tablet, Rfl: 3    cyclobenzaprine (FLEXERIL) 5 MG tablet, Take 1 tablet (5 mg total) by mouth 3 (three) times daily as needed for Muscle spasms., Disp: 45 tablet, Rfl: 0    diclofenac sodium (VOLTAREN) 1 % Gel, Apply 2 grams topically 3 (three) times daily as needed., Disp: 2 Tube, Rfl: 6    econazole nitrate 1 % cream, , Disp: , Rfl:     fluticasone (FLONASE) 50 mcg/actuation nasal spray, 2 sprays (100 mcg total) by Each Nare route once daily., Disp: 1 Bottle, Rfl: 5    hydroCHLOROthiazide (HYDRODIURIL) 25 MG tablet, Take 1 tablet (25 mg total) by mouth once daily., Disp: 30 tablet, Rfl: 5    nystatin-triamcinolone (MYCOLOG II) cream, Apply topically 2 (two) times daily., Disp: 30 g, Rfl: 0    tenofovir alafenamide (VEMLIDY) 25 mg Tab, Take 1 tablet (25 mg total) by mouth once daily., Disp: 30 tablet, Rfl: 7    Review of patient's allergies indicates:  No Known Allergies       REVIEW OF SYSTEMS:  Constitution: Negative. Negative for chills, fever and night sweats.   HENT: Negative for congestion and headaches.    Eyes: Negative for blurred vision, left vision loss and right vision loss.   Cardiovascular: Negative for chest pain and syncope.   Respiratory: Negative for cough and shortness of breath.    Endocrine: Negative for polydipsia, polyphagia and polyuria.   Hematologic/Lymphatic: Negative for bleeding problem. Does not bruise/bleed easily.   Skin: Negative for dry skin, itching and rash.   Musculoskeletal: Negative for falls.  Positive for right shoulder pain and muscle weakness.   Gastrointestinal: Negative for abdominal pain and bowel incontinence.   Genitourinary: Negative for bladder incontinence and nocturia.   Neurological: Negative for disturbances in coordination, loss of balance and seizures.   Psychiatric/Behavioral: Negative for depression. The patient does not have insomnia.   "  Allergic/Immunologic: Negative for hives and persistent infections.      PHYSICAL EXAMINATION:  Vitals:  Ht 5' 9" (1.753 m)   Wt 88 kg (194 lb)   BMI 28.65 kg/m²    General: The patient is alert and oriented x 3.  Mood is pleasant.  Observation of ears, eyes and nose reveal no gross abnormalities.  No labored breathing observed.  Gait is coordinated. Patient can toe walk and heel walk without difficulty.      RIGHT SHOULDER / UPPER EXTREMITY EXAM    OBSERVATION:     Swelling  none  Deformity  none   Discoloration  none   Scapular winging none   Scars   none  Atrophy  none    TENDERNESS / CREPITUS (T/C):          T/C      T/C   Clavicle   -/-  SUPRAspinatus    +/-     AC Jt.    +/-  INFRAspinatus  -/-    SC Jt.    -/-  Deltoid    -/-      G. Tuberosity  -/-  LH BICEP groove  -/-   Acromion:  -/-  Midline Neck   -/-     Scapular Spine -/-  Trapezium   -/-   SMA Scapula  -/-  GH jt. line - post  -/-     Scapulothoracic  -/-         ROM: (* = with pain)  Left shoulder   Right shoulder        AROM (PROM)   AROM (PROM)   FE    170° (175°)     170° (175°)     ER at 0°    60°  (65°)    60°  (65°)   ER at 90° ABD  90°  (90°)    90°  (90°)   IR at 90°  ABD   NA  (40°)     NA  (40°)      IR (spine level)   T10     T10    STRENGTH: (* = with pain) Left shoulder   Right shoulder   SCAPTION   5/5    4+/5    IR    5/5    5/5   ER    5/5    5/5   BICEPS   5/5    4+/5   Deltoid    5/5    5/5     SIGNS:  Painful side       NEER   +    OCARLITOSS  neg    SHEEHAN   +    SPEEDS  pos     DROP ARM   -   BELLY PRESS neg   Superior escape none    LIFT-OFF  neg   X-Body ADD    neg    MOVING VALGUS neg        STABILITY TESTING    Left shoulder   Right shoulder    Translation     Anterior  up face     up face    Posterior  up face    up face    Sulcus   < 10mm    < 10 mm     Signs   Apprehension   neg      neg       Relocation   no change     no change      Jerk test  neg     neg    EXTREMITY NEURO-VASCULAR EXAM:    Sensation grossly " intact to light touch all dermatomal regions.    DTR 2+ Biceps, Triceps, BR and Negative Krystles sign   Grossly intact motor function at Elbow, Wrist and Hand   Distal pulses radial and ulnar 2+, brisk cap refill, symmetric.      NECK:  Painless FROM and spinous processes non-tender. Negative Spurlings sign.      OTHER FINDINGS:  moderate scapular dyskinesia, bilateral    XRAYS:  Three-view x-rays of the bilateral shoulders today was personally reviewed and reveals:   No fracture dislocation or other pathology   Acromion type 2, bilateral   Proximal migration of humeral head: None   GH arthritis:  Preserved joint space, minimal osteophyte formation, bilateral          ASSESSMENT:   Bilateral shoulder pain:  1. SLAP   2.  Subacromial impingement  3.    PLAN:      1.  Physical therapy bilateral shoulders for cuff strengthening and scapular stabilizing exercises.  2.  Follow-up 2 months for interval exam.  Will obtain MRI at that point time symptoms have improved physical therapy.    All questions were answered, patient will contact us for questions or concerns in the interim.

## 2020-01-30 ENCOUNTER — TELEPHONE (OUTPATIENT)
Dept: INTERNAL MEDICINE | Facility: CLINIC | Age: 55
End: 2020-01-30

## 2020-01-30 NOTE — TELEPHONE ENCOUNTER
----- Message from Fatou Duran sent at 1/30/2020 11:59 AM CST -----  Contact: self   Patient would like to get medical advice.  Symptoms (please be specific):  Cold in throat, spitting up green mucus, running nose, cough  How long has patient had these symptoms:  2 weeks   Pharmacy name and phone # (copy/paste from chart):  [facility] [phone] (Phone)  [phone] (Fax)  Any drug allergies (copy/paste from chart):      Would the patient rather a call back or a response via [facility]?:  Call back  Comments:

## 2020-02-24 ENCOUNTER — TELEPHONE (OUTPATIENT)
Dept: PHARMACY | Facility: CLINIC | Age: 55
End: 2020-02-24

## 2020-03-15 ENCOUNTER — PATIENT OUTREACH (OUTPATIENT)
Dept: ADMINISTRATIVE | Facility: OTHER | Age: 55
End: 2020-03-15

## 2020-03-16 ENCOUNTER — TELEPHONE (OUTPATIENT)
Dept: SPORTS MEDICINE | Facility: CLINIC | Age: 55
End: 2020-03-16

## 2020-03-16 NOTE — TELEPHONE ENCOUNTER
Reaching out to patient regarding appointment with Dr. Rich for 3/17.  Calling to discuss if patient feels they need to be seen.    Patient reports bilateral shoulder pain has improved.  Reports never receiving a call to schedule PT.  He has been doing HEP.    PLAN:  1. Okay to cancel tomorrow's appointment  2. Will continue HEP.  Declined having a new PT script placed for him.  3. Follow up as needed

## 2020-03-16 NOTE — PROGRESS NOTES
Care Everywhere: n/a  Immunization: updated  Health Maintenance: updated  Media Review: reviewed for possible outside colon cancer report   Legacy Review: reviewed 2008 colonoscopy report  Order placed: n/a  Upcoming appts:n/a  Colonoscopy case request 11.20.2019

## 2020-03-27 ENCOUNTER — TELEPHONE (OUTPATIENT)
Dept: PHARMACY | Facility: CLINIC | Age: 55
End: 2020-03-27

## 2020-04-14 NOTE — TELEPHONE ENCOUNTER
Vemlidy refill confirmed.  We will ship Vemlidy refill on  via fedex to arrive on . $0 copay- 004. Confirmed 2 patient identifiers - name and . Therapy appropriate.     Patient has 10 doses of Vemlidy remaining and takes it around breakfast daily. He had 15 extra tablets on hand at last refill due to early fills.  Pt reports they are not having any side effects so far. No missed doses, no new medications, no new allergies or health conditions reported at this time. All questions answered and addressed to patients satisfaction. Advised to call [facility] and provider if any issues arise.  Pt verbalized understanding.

## 2020-04-14 NOTE — TELEPHONE ENCOUNTER
Vemlidy refill attempted. No answer. LVM for call back.  - 4th attempt (Previous attempts on 3/27,4/2,4/8)  - No MyChart available  - Post card sent to home  - If no call back within 1-week, will contact [facility] due to lack of contact

## 2020-04-21 ENCOUNTER — OFFICE VISIT (OUTPATIENT)
Dept: INTERNAL MEDICINE | Facility: CLINIC | Age: 55
End: 2020-04-21
Attending: FAMILY MEDICINE
Payer: COMMERCIAL

## 2020-04-21 VITALS
DIASTOLIC BLOOD PRESSURE: 68 MMHG | SYSTOLIC BLOOD PRESSURE: 130 MMHG | WEIGHT: 199.06 LBS | OXYGEN SATURATION: 97 % | HEART RATE: 76 BPM | HEIGHT: 69 IN | BODY MASS INDEX: 29.48 KG/M2

## 2020-04-21 DIAGNOSIS — L30.9 DERMATITIS: Primary | ICD-10-CM

## 2020-04-21 DIAGNOSIS — I10 HYPERTENSION, ESSENTIAL: ICD-10-CM

## 2020-04-21 DIAGNOSIS — B18.1 CHRONIC HEPATITIS B: ICD-10-CM

## 2020-04-21 DIAGNOSIS — E78.5 HYPERLIPIDEMIA, UNSPECIFIED HYPERLIPIDEMIA TYPE: ICD-10-CM

## 2020-04-21 DIAGNOSIS — B35.6 TINEA CRURIS: ICD-10-CM

## 2020-04-21 PROCEDURE — 3075F SYST BP GE 130 - 139MM HG: CPT | Mod: CPTII,S$GLB,, | Performed by: FAMILY MEDICINE

## 2020-04-21 PROCEDURE — 99213 OFFICE O/P EST LOW 20 MIN: CPT | Mod: S$GLB,,, | Performed by: FAMILY MEDICINE

## 2020-04-21 PROCEDURE — 3078F PR MOST RECENT DIASTOLIC BLOOD PRESSURE < 80 MM HG: ICD-10-PCS | Mod: CPTII,S$GLB,, | Performed by: FAMILY MEDICINE

## 2020-04-21 PROCEDURE — 99999 PR PBB SHADOW E&M-EST. PATIENT-LVL III: ICD-10-PCS | Mod: PBBFAC,,, | Performed by: FAMILY MEDICINE

## 2020-04-21 PROCEDURE — 3008F PR BODY MASS INDEX (BMI) DOCUMENTED: ICD-10-PCS | Mod: CPTII,S$GLB,, | Performed by: FAMILY MEDICINE

## 2020-04-21 PROCEDURE — 99999 PR PBB SHADOW E&M-EST. PATIENT-LVL III: CPT | Mod: PBBFAC,,, | Performed by: FAMILY MEDICINE

## 2020-04-21 PROCEDURE — 99213 PR OFFICE/OUTPT VISIT, EST, LEVL III, 20-29 MIN: ICD-10-PCS | Mod: S$GLB,,, | Performed by: FAMILY MEDICINE

## 2020-04-21 PROCEDURE — 3078F DIAST BP <80 MM HG: CPT | Mod: CPTII,S$GLB,, | Performed by: FAMILY MEDICINE

## 2020-04-21 PROCEDURE — 3008F BODY MASS INDEX DOCD: CPT | Mod: CPTII,S$GLB,, | Performed by: FAMILY MEDICINE

## 2020-04-21 PROCEDURE — 3075F PR MOST RECENT SYSTOLIC BLOOD PRESS GE 130-139MM HG: ICD-10-PCS | Mod: CPTII,S$GLB,, | Performed by: FAMILY MEDICINE

## 2020-04-21 RX ORDER — NYSTATIN AND TRIAMCINOLONE ACETONIDE 100000; 1 [USP'U]/G; MG/G
CREAM TOPICAL 2 TIMES DAILY
Qty: 30 G | Refills: 0 | Status: SHIPPED | OUTPATIENT
Start: 2020-04-21 | End: 2021-01-20 | Stop reason: SDUPTHER

## 2020-04-21 RX ORDER — MINERAL OIL
180 ENEMA (ML) RECTAL DAILY
Qty: 30 TABLET | Refills: 1 | Status: SHIPPED | OUTPATIENT
Start: 2020-04-21

## 2020-04-21 RX ORDER — TRIAMCINOLONE ACETONIDE 1 MG/G
CREAM TOPICAL 2 TIMES DAILY PRN
Qty: 80 G | Refills: 1 | Status: SHIPPED | OUTPATIENT
Start: 2020-04-21 | End: 2024-02-10 | Stop reason: SDUPTHER

## 2020-04-21 NOTE — PROGRESS NOTES
Subjective:       Patient ID: Ramiro Oneil is a 54 y.o. male.    Chief Complaint: Rash (right side of neck, since Sunday 4-) and Groin Pain (skin irratation )    Patient scheduled an urgent care visit for rash to his neck.  Itching.  No difficulty with breathing.  3-4 days.  Also extensor arms.    Your taken in the groin area.  No difficulty with urination.    No fever chills, cough, congestion or other infectious disease complaints at this time.    Review of Systems   Constitutional: Negative for appetite change, chills, diaphoresis, fatigue and fever.   HENT: Negative for congestion, postnasal drip, rhinorrhea, sore throat and trouble swallowing.    Eyes: Negative for visual disturbance.   Respiratory: Negative for cough, choking, chest tightness, shortness of breath and wheezing.    Cardiovascular: Negative for chest pain and leg swelling.   Gastrointestinal: Negative for abdominal distention, abdominal pain, diarrhea, nausea and vomiting.   Genitourinary: Negative for difficulty urinating.   Musculoskeletal: Negative for arthralgias and myalgias.   Skin: Positive for color change and rash.   Neurological: Negative for weakness, light-headedness and headaches.       Objective:      Physical Exam   Constitutional: He is oriented to person, place, and time. He appears well-developed and well-nourished. No distress.   Neck: Neck supple.   Pulmonary/Chest: Effort normal.   Musculoskeletal: He exhibits no edema.        Right lower leg: He exhibits no edema.        Left lower leg: He exhibits no edema.   Neurological: He is alert and oriented to person, place, and time.   Skin: Skin is warm and dry. No rash noted.        Psychiatric: He has a normal mood and affect. His behavior is normal. Judgment and thought content normal.   Nursing note and vitals reviewed.      Assessment:       1. Dermatitis    2. Tinea cruris    3. Hypertension, essential    4. Hyperlipidemia, unspecified hyperlipidemia type    5.  Chronic hepatitis B        Plan:     Medication List with Changes/Refills   New Medications    FEXOFENADINE (ALLEGRA) 180 MG TABLET    Take 1 tablet (180 mg total) by mouth once daily.    TRIAMCINOLONE ACETONIDE 0.1% (KENALOG) 0.1 % CREAM    Apply topically 2 (two) times daily as needed. Neck and body, not groin   Current Medications    ATORVASTATIN (LIPITOR) 40 MG TABLET    Take 1 tablet (40 mg total) by mouth once daily.    CLOPIDOGREL (PLAVIX) 75 MG TABLET    Take 1 tablet (75 mg total) by mouth once daily.    CYCLOBENZAPRINE (FLEXERIL) 5 MG TABLET    Take 1 tablet (5 mg total) by mouth 3 (three) times daily as needed for Muscle spasms.    DICLOFENAC SODIUM (VOLTAREN) 1 % GEL    Apply 2 grams topically 3 (three) times daily as needed.    ECONAZOLE NITRATE 1 % CREAM        FLUTICASONE (FLONASE) 50 MCG/ACTUATION NASAL SPRAY    2 sprays (100 mcg total) by Each Nare route once daily.    HYDROCHLOROTHIAZIDE (HYDRODIURIL) 25 MG TABLET    Take 1 tablet (25 mg total) by mouth once daily.    TENOFOVIR ALAFENAMIDE (VEMLIDY) 25 MG TAB    Take 1 tablet (25 mg total) by mouth once daily.   Changed and/or Refilled Medications    Modified Medication Previous Medication    NYSTATIN-TRIAMCINOLONE (MYCOLOG II) CREAM nystatin-triamcinolone (MYCOLOG II) cream       Apply topically 2 (two) times daily. Groin area    Apply topically 2 (two) times daily.     Ramiro was seen today for rash and groin pain.    Diagnoses and all orders for this visit:    Dermatitis    Tinea cruris    Hypertension, essential    Hyperlipidemia, unspecified hyperlipidemia type    Chronic hepatitis B    Other orders  -     fexofenadine (ALLEGRA) 180 MG tablet; Take 1 tablet (180 mg total) by mouth once daily.  -     nystatin-triamcinolone (MYCOLOG II) cream; Apply topically 2 (two) times daily. Groin area  -     triamcinolone acetonide 0.1% (KENALOG) 0.1 % cream; Apply topically 2 (two) times daily as needed. Neck and body, not groin      See meds, orders,  follow up, routing and instructions sections of encounter and AVS. Discussed with patient and provided on AVS.    Possible urticaria possible dermatitis neck.  Possible tinea scrotal area.  Two different creams were prescribed.  They were labeled in the p.r.n. section.  I went over these instructions with the patient.  Do not recommend steroids at this time.  Follow-up p.r.n..    No indication of decompensation of other metabolic problems at this time.  Follow-up per routine.

## 2020-05-12 ENCOUNTER — TELEPHONE (OUTPATIENT)
Dept: PHARMACY | Facility: CLINIC | Age: 55
End: 2020-05-12

## 2020-05-20 ENCOUNTER — TELEPHONE (OUTPATIENT)
Dept: PHARMACY | Facility: CLINIC | Age: 55
End: 2020-05-20

## 2020-05-26 NOTE — TELEPHONE ENCOUNTER
Vemlidy refill confirmed. We will ship Vemlidy refill on  via fedex to arrive on 6/3. $0.00 copay- 004. Confirmed 2 patient identifiers - name and . Therapy appropriate.     Patient has 10 doses of Vemlidy remaining and takes it daily WITH food. Patient confirms that he has NOT missed any doses within the past month due to having 15 extra tablets because of early fills. Patient advised of medication adherence importance and decreased effectiveness of medication with missed doses. Patient advised to use phone alarm to aid in medication adherence and to continue using pill box. Patient verbalized understanding and agreed to set a phone alarm for 8:00 am daily in order to take medication WITH breakfast. Pt reports they are not having any side effects. No new allergies or health conditions reported at this time. Patient states that he began a multivitamin and Allegra. Patient advised there are no DDIs with Vemildy. Patient verbalized understanding. All questions answered and addressed to patients satisfaction. Advised to call [facility] and provider if any issues arise. Pt verbalized understanding.

## 2020-05-26 NOTE — TELEPHONE ENCOUNTER
Vemlidy refill attempted (attempt 4). NA, LVM for call back on [phone] and [phone]. Will f/u in 1 week if no call back. $0 copay.   - If no contact or call back in 1 week, will contact [facility].   - No [facility] portal. Postcard sent to address on file.  - Should have run out on 5/23 based on dose count of 10 on 4/14.

## 2020-05-31 ENCOUNTER — TELEPHONE (OUTPATIENT)
Dept: INTERNAL MEDICINE | Facility: CLINIC | Age: 55
End: 2020-05-31

## 2020-05-31 DIAGNOSIS — K63.5 POLYP OF COLON, UNSPECIFIED PART OF COLON, UNSPECIFIED TYPE: ICD-10-CM

## 2020-05-31 DIAGNOSIS — Z12.11 COLON CANCER SCREENING: Primary | ICD-10-CM

## 2020-05-31 NOTE — TELEPHONE ENCOUNTER
"----- Message from Pedro Mayer LPN sent at 5/23/2020 12:20 PM CDT -----  Regarding: Cancellation of Order # [id number]  Order number [id number] for the procedure CASE REQUEST GI [GI5]   has been canceled by Pdero Mayer LPN [[id number]]. This procedure   was ordered by Yves Castellon MD [[id number]] on Nov 20, 2019   for the patient Ramiro Oneil [[id number]]. The reason for   cancellation was "None".  "

## 2020-05-31 NOTE — TELEPHONE ENCOUNTER
Patient is due for a colonoscopy. The order we previously placed  since patient never scheduled. I placed another order.    Please call and notify patient. The colonoscopy scheduling number is [phone].    Thank you.

## 2020-06-08 ENCOUNTER — TELEPHONE (OUTPATIENT)
Dept: PHARMACY | Facility: CLINIC | Age: 55
End: 2020-06-08

## 2020-07-02 ENCOUNTER — TELEPHONE (OUTPATIENT)
Dept: RADIOLOGY | Facility: HOSPITAL | Age: 55
End: 2020-07-02

## 2020-07-02 ENCOUNTER — TELEPHONE (OUTPATIENT)
Dept: PHARMACY | Facility: CLINIC | Age: 55
End: 2020-07-02

## 2020-07-06 ENCOUNTER — TELEPHONE (OUTPATIENT)
Dept: INTERNAL MEDICINE | Facility: CLINIC | Age: 55
End: 2020-07-06

## 2020-07-06 DIAGNOSIS — Z01.810 PREOP CARDIOVASCULAR EXAM: Primary | ICD-10-CM

## 2020-07-07 ENCOUNTER — HOSPITAL ENCOUNTER (OUTPATIENT)
Dept: CARDIOLOGY | Facility: CLINIC | Age: 55
Discharge: HOME | End: 2020-07-07
Payer: COMMERCIAL

## 2020-07-07 ENCOUNTER — OFFICE VISIT (OUTPATIENT)
Dept: CARDIOLOGY | Facility: CLINIC | Age: 55
End: 2020-07-07
Payer: COMMERCIAL

## 2020-07-07 VITALS
HEART RATE: 85 BPM | HEIGHT: 69 IN | WEIGHT: 195.75 LBS | BODY MASS INDEX: 28.99 KG/M2 | SYSTOLIC BLOOD PRESSURE: 140 MMHG | DIASTOLIC BLOOD PRESSURE: 80 MMHG

## 2020-07-07 DIAGNOSIS — Z01.818 PRE-OP EVALUATION: ICD-10-CM

## 2020-07-07 DIAGNOSIS — E78.5 HYPERLIPIDEMIA, UNSPECIFIED HYPERLIPIDEMIA TYPE: Primary | ICD-10-CM

## 2020-07-07 DIAGNOSIS — I73.9 PAD (PERIPHERAL ARTERY DISEASE): ICD-10-CM

## 2020-07-07 DIAGNOSIS — Z01.810 PREOP CARDIOVASCULAR EXAM: ICD-10-CM

## 2020-07-07 DIAGNOSIS — I10 HYPERTENSION, ESSENTIAL: ICD-10-CM

## 2020-07-07 PROCEDURE — 93000 ELECTROCARDIOGRAM COMPLETE: CPT | Mod: S$GLB,,, | Performed by: INTERNAL MEDICINE

## 2020-07-07 PROCEDURE — 3077F PR MOST RECENT SYSTOLIC BLOOD PRESSURE >= 140 MM HG: ICD-10-PCS | Mod: CPTII,S$GLB,, | Performed by: STUDENT IN AN ORGANIZED HEALTH CARE EDUCATION/TRAINING PROGRAM

## 2020-07-07 PROCEDURE — 99213 OFFICE O/P EST LOW 20 MIN: CPT | Mod: 25,S$GLB,, | Performed by: STUDENT IN AN ORGANIZED HEALTH CARE EDUCATION/TRAINING PROGRAM

## 2020-07-07 PROCEDURE — 3008F PR BODY MASS INDEX (BMI) DOCUMENTED: ICD-10-PCS | Mod: CPTII,S$GLB,, | Performed by: STUDENT IN AN ORGANIZED HEALTH CARE EDUCATION/TRAINING PROGRAM

## 2020-07-07 PROCEDURE — 99999 PR PBB SHADOW E&M-EST. PATIENT-LVL IV: CPT | Mod: PBBFAC,,, | Performed by: STUDENT IN AN ORGANIZED HEALTH CARE EDUCATION/TRAINING PROGRAM

## 2020-07-07 PROCEDURE — 93000 EKG 12-LEAD: ICD-10-PCS | Mod: S$GLB,,, | Performed by: INTERNAL MEDICINE

## 2020-07-07 PROCEDURE — 99999 PR PBB SHADOW E&M-EST. PATIENT-LVL IV: ICD-10-PCS | Mod: PBBFAC,,, | Performed by: STUDENT IN AN ORGANIZED HEALTH CARE EDUCATION/TRAINING PROGRAM

## 2020-07-07 PROCEDURE — 3079F PR MOST RECENT DIASTOLIC BLOOD PRESSURE 80-89 MM HG: ICD-10-PCS | Mod: CPTII,S$GLB,, | Performed by: STUDENT IN AN ORGANIZED HEALTH CARE EDUCATION/TRAINING PROGRAM

## 2020-07-07 PROCEDURE — 3077F SYST BP >= 140 MM HG: CPT | Mod: CPTII,S$GLB,, | Performed by: STUDENT IN AN ORGANIZED HEALTH CARE EDUCATION/TRAINING PROGRAM

## 2020-07-07 PROCEDURE — 3008F BODY MASS INDEX DOCD: CPT | Mod: CPTII,S$GLB,, | Performed by: STUDENT IN AN ORGANIZED HEALTH CARE EDUCATION/TRAINING PROGRAM

## 2020-07-07 PROCEDURE — 3079F DIAST BP 80-89 MM HG: CPT | Mod: CPTII,S$GLB,, | Performed by: STUDENT IN AN ORGANIZED HEALTH CARE EDUCATION/TRAINING PROGRAM

## 2020-07-07 PROCEDURE — 99213 PR OFFICE/OUTPT VISIT, EST, LEVL III, 20-29 MIN: ICD-10-PCS | Mod: 25,S$GLB,, | Performed by: STUDENT IN AN ORGANIZED HEALTH CARE EDUCATION/TRAINING PROGRAM

## 2020-07-07 NOTE — PROGRESS NOTES
Cardiology Clinic Note  Reason for Visit: Pre-op  Referring MD: Dr. Castellon  07/07/2020     HPI:     Ramiro Oneil is a 54 y.o. M with HTN, HLD, chronic Hepatitis B, PAD s/p R SFA stent (2013, Dr. Blake) who presents for pre-op eval. Pt reports mechanical fall yesterday which resulted in right arm fracture. He is scheduled to undergo ORIF of Rt arm and BR & DR tenotomy under general anesthesia on 7/13/20.   He was last seen by Dr. Yeager 2018 at which time he was was doing well,  Reported stable R > L calf pain that onsets with exertion and improves with rest. Since then, he denies any progression in symptoms. He had LEORA 2014 which noted EF 65%, no evidence of stress induced myocardial ischemia. He denies any exertional chest pain or dyspnea and was able to climb 2 flights of stairs to clinic without symptoms. He also denies any palpitations or problems with anesthesia before.     ROS:    Constitution: Negative for fever or chills. Negative for weight loss or gain.   HENT: Negative for sore throat or headaches. Negative for rhinorrhea.  Eyes: Negative for blurred or double vision.   Cardiovascular: See above  Pulmonary: Negative for SOB. Negative for cough.   Gastrointestinal: Negative for abdominal pain. Negative for nausea/ vomiting. Negative for diarrhea.   : Negative for dysuria.   Neurological: Negative for focal weakness or sensory changes.  PMH:     Past Medical History:   Diagnosis Date    Anticoagulant long-term use     Dermatitis     Hepatitis B     Hypertension     Liver fibrosis 12/12/2019    F2 on fibroscan 11/2018    NAFLD (nonalcoholic fatty liver disease)      Past Surgical History:   Procedure Laterality Date    CARPAL TUNNEL RELEASE      COLONOSCOPY      PTA/stenting of distal right SFA       SHOULDER SURGERY      stents       Allergies:   Review of patient's allergies indicates:  No Known Allergies  Medications:     Current Outpatient Medications on File Prior to Visit    Medication Sig Dispense Refill    atorvastatin (LIPITOR) 40 MG tablet Take 1 tablet (40 mg total) by mouth once daily. 90 tablet 3    clopidogrel (PLAVIX) 75 mg tablet Take 1 tablet (75 mg total) by mouth once daily. 90 tablet 3    cyclobenzaprine (FLEXERIL) 5 MG tablet Take 1 tablet (5 mg total) by mouth 3 (three) times daily as needed for Muscle spasms. 45 tablet 0    diclofenac sodium (VOLTAREN) 1 % Gel Apply 2 grams topically 3 (three) times daily as needed. 2 Tube 6    econazole nitrate 1 % cream       fexofenadine (ALLEGRA) 180 MG tablet Take 1 tablet (180 mg total) by mouth once daily. 30 tablet 1    fluticasone (FLONASE) 50 mcg/actuation nasal spray 2 sprays (100 mcg total) by Each Nare route once daily. 1 Bottle 5    hydroCHLOROthiazide (HYDRODIURIL) 25 MG tablet Take 1 tablet (25 mg total) by mouth once daily. 30 tablet 5    nystatin-triamcinolone (MYCOLOG II) cream Apply topically 2 (two) times daily. Groin area 30 g 0    tenofovir alafenamide (VEMLIDY) 25 mg Tab Take 1 tablet (25 mg total) by mouth once daily. 30 tablet 7    triamcinolone acetonide 0.1% (KENALOG) 0.1 % cream Apply topically 2 (two) times daily as needed. Neck and body, not groin 80 g 1    [DISCONTINUED] tenofovir (VIREAD) 300 mg Tab Take 1 tablet (300 mg total) by mouth once daily. TAKE 1 TABLET BY MOUTH    ONCE DAILY 30 tablet 0     No current facility-administered medications on file prior to visit.      Social History:     Social History     Tobacco Use    Smoking status: Never Smoker    Smokeless tobacco: Never Used   Substance Use Topics    Alcohol use: Yes     Comment: 2 beers per week     Family History:     Family History   Problem Relation Age of Onset    Diabetes Mother     Hypertension Mother     Cancer Father         lung    Diabetes Sister     Cancer Sister         breast    Hypertension Sister     Cancer Brother         prostate    Cancer Paternal Uncle     Diabetes Paternal Uncle     Stroke  "Sister     Liver disease Neg Hx     Cirrhosis Neg Hx      Physical Exam:   BP (!) 140/80   Pulse 85   Ht 5' 9" (1.753 m)   Wt 88.8 kg (195 lb 12.3 oz)   BMI 28.91 kg/m²      Constitutional: No acute distress, conversant  HEENT: Sclera anicteric, extraocular motions intact, Oropharynx clear  Neck: No JVD, no carotid bruits  Cardiovascular: regular rate and rhythm, no murmur, rubs or gallops, normal S1/S2  Pulmonary: Clear to auscultation bilaterally  Abdominal: Abdomen soft, nontender, nondistended, positive bowel sounds  Extremities: No lower extremity edema,   Pulses: 2+ BL Radial, 2+ BL carotid, 2+ BL DP, 2+ BL PT  Skin: No ecchymosis, erythema, or ulcers  Psych: Alert and oriented x 3, appropriate affect  Neuro: CNII-XII intact, no focal deficits    Labs:     Lab Results   Component Value Date     12/11/2019    K 3.7 12/11/2019     12/11/2019    CO2 26 12/11/2019    BUN 12 12/11/2019    CREATININE 0.9 12/11/2019    ANIONGAP 8 12/11/2019     Lab Results   Component Value Date    AST 30 12/11/2019    ALT 40 12/11/2019    ALKPHOS 63 12/11/2019    BILITOT 0.9 12/11/2019    BILIDIR 0.3 01/15/2014    ALBUMIN 3.8 12/11/2019     Lab Results   Component Value Date    CALCIUM 8.9 12/11/2019     No results found for: BNP, BNPTRIAGEBLO Lab Results   Component Value Date    WBC 5.65 12/11/2019    HGB 14.1 12/11/2019    HCT 47.2 12/11/2019     12/11/2019    GRAN 2.3 12/11/2019    GRAN 41.3 12/11/2019     Lab Results   Component Value Date    INR 1.0 12/11/2019     Lab Results   Component Value Date    CHOL 160 05/18/2019    CHOL 154 11/16/2017    HDL 34 (L) 05/18/2019    LDLCALC 109.4 05/18/2019    TRIG 83 05/18/2019     Lab Results   Component Value Date    HGBA1C 6.4 (H) 07/12/2011     Lab Results   Component Value Date    TSH 0.910 07/12/2011          Imaging:   EKG 7/7/20: normal sinus rhythm    LEORA 2014: EF 65%, no evidence of stress induced myocardial ischemia.     Carotid US 8/12/16: 20-39% " TK, 0-19% LICA    US Legs 9/13/19: Patent R SFA stent, KARMA 0.99 (normal), > 75% mid L SFA stenosis. KARMA 0.91 (borderline)      Assessment:     1. Hyperlipidemia, unspecified hyperlipidemia type  Lipid Panel   2. Hypertension, essential     3. Pre-op evaluation     4. PAD (peripheral artery disease)         Plan:     1. Hyperlipidemia   - reordered lipid panel test today  - continue atorvastatin 40mg daily for now; will readjust to optimize LDL goal due to PAD    2. Hypertension  - elevated initially in clinic due to pain in right write and anxiety of getting here on time.   - on repeat 140/80s which is more consistent with previously noted -130/70-80s.   - encouraged pt to get BP cuff and record at home  - continue hydrochlorothiazide 25mg daily for now    3. Pre-op  - pt had RCRI of 0 suggesting low-risk for MACE.    4. PAD  - continue clopidogrel 75mg daily. If surgeon requires pt to hold pre-op that's fine as long as its restarted post-operatively  - recommended daily exercise therapy     Patient seen and plan of care discussed with Dr. Chavez.    Signed:  Akiko Jacobson MD  Cardiology Fellow PGY 4  Beeper:  [phone]  7/7/2020 9:42 AM    Follow-up:     Future Appointments   Date Time Provider Department Center   7/8/2020  1:00 PM [facility]   7/8/2020  1:30 PM Rebeka Mandel NP [facility]   7/13/2020  3:15 PM [facility] HEPATOLOGY, F[facility] HEPAT [facility]   7/13/2020  3:40 PM Esthela Leger NP [facility] HEPAT [place]

## 2020-07-08 ENCOUNTER — HOSPITAL ENCOUNTER (OUTPATIENT)
Dept: RADIOLOGY | Facility: HOSPITAL | Age: 55
Discharge: HOME | End: 2020-07-08
Attending: NURSE PRACTITIONER
Payer: COMMERCIAL

## 2020-07-08 ENCOUNTER — OFFICE VISIT (OUTPATIENT)
Dept: INTERNAL MEDICINE | Facility: CLINIC | Age: 55
End: 2020-07-08
Payer: COMMERCIAL

## 2020-07-08 VITALS
OXYGEN SATURATION: 97 % | HEART RATE: 73 BPM | DIASTOLIC BLOOD PRESSURE: 82 MMHG | BODY MASS INDEX: 28.5 KG/M2 | WEIGHT: 192.44 LBS | SYSTOLIC BLOOD PRESSURE: 126 MMHG | HEIGHT: 69 IN

## 2020-07-08 DIAGNOSIS — I73.9 PAD (PERIPHERAL ARTERY DISEASE): ICD-10-CM

## 2020-07-08 DIAGNOSIS — Z01.818 PREOPERATIVE CLEARANCE: ICD-10-CM

## 2020-07-08 DIAGNOSIS — K74.00 LIVER FIBROSIS: ICD-10-CM

## 2020-07-08 DIAGNOSIS — B18.1 CHRONIC HEPATITIS B: ICD-10-CM

## 2020-07-08 DIAGNOSIS — Z01.818 PREOPERATIVE CLEARANCE: Primary | ICD-10-CM

## 2020-07-08 DIAGNOSIS — I10 HYPERTENSION, ESSENTIAL: ICD-10-CM

## 2020-07-08 PROCEDURE — 3079F PR MOST RECENT DIASTOLIC BLOOD PRESSURE 80-89 MM HG: ICD-10-PCS | Mod: CPTII,S$GLB,, | Performed by: NURSE PRACTITIONER

## 2020-07-08 PROCEDURE — 3074F SYST BP LT 130 MM HG: CPT | Mod: CPTII,S$GLB,, | Performed by: NURSE PRACTITIONER

## 2020-07-08 PROCEDURE — 99999 PR PBB SHADOW E&M-EST. PATIENT-LVL IV: CPT | Mod: PBBFAC,,, | Performed by: NURSE PRACTITIONER

## 2020-07-08 PROCEDURE — 71046 X-RAY EXAM CHEST 2 VIEWS: CPT | Mod: 26,,, | Performed by: RADIOLOGY

## 2020-07-08 PROCEDURE — 3079F DIAST BP 80-89 MM HG: CPT | Mod: CPTII,S$GLB,, | Performed by: NURSE PRACTITIONER

## 2020-07-08 PROCEDURE — 99214 PR OFFICE/OUTPT VISIT, EST, LEVL IV, 30-39 MIN: ICD-10-PCS | Mod: S$GLB,,, | Performed by: NURSE PRACTITIONER

## 2020-07-08 PROCEDURE — 3074F PR MOST RECENT SYSTOLIC BLOOD PRESSURE < 130 MM HG: ICD-10-PCS | Mod: CPTII,S$GLB,, | Performed by: NURSE PRACTITIONER

## 2020-07-08 PROCEDURE — 71046 X-RAY EXAM CHEST 2 VIEWS: CPT | Mod: TC

## 2020-07-08 PROCEDURE — 99999 PR PBB SHADOW E&M-EST. PATIENT-LVL IV: ICD-10-PCS | Mod: PBBFAC,,, | Performed by: NURSE PRACTITIONER

## 2020-07-08 PROCEDURE — 3008F PR BODY MASS INDEX (BMI) DOCUMENTED: ICD-10-PCS | Mod: CPTII,S$GLB,, | Performed by: NURSE PRACTITIONER

## 2020-07-08 PROCEDURE — 3008F BODY MASS INDEX DOCD: CPT | Mod: CPTII,S$GLB,, | Performed by: NURSE PRACTITIONER

## 2020-07-08 PROCEDURE — 71046 XR CHEST PA AND LATERAL: ICD-10-PCS | Mod: 26,,, | Performed by: RADIOLOGY

## 2020-07-08 PROCEDURE — 99214 OFFICE O/P EST MOD 30 MIN: CPT | Mod: S$GLB,,, | Performed by: NURSE PRACTITIONER

## 2020-07-08 NOTE — PATIENT INSTRUCTIONS
Lab work today for CBC and CMP    Chest xray today.    Will call you once labs and xrays resulted and you have been cleared for surgery.  You can come  the papers after cleared.    Call or return to clinic if symptoms worsen or do not improve with discussed treatment plan.

## 2020-07-08 NOTE — PROGRESS NOTES
I have reviewed the chart and the Advanced Practice Nurse Practitioner (APRN) or Physician Assistant (PA) history and physical, assessment and plan. I have personally discussed the case with NP/PA and agree with the findings, assessment and plan.    Patient is able to perform 4 Mets of exercise without difficulty.  RCRI score 0 and cleared by cardiologist already.  No further recommendations.

## 2020-07-08 NOTE — PROGRESS NOTES
Subjective:      Patient ID: Ramiro Oneil is a 54 y.o. male.    Chief Complaint: Pre-op Exam (surgery date 7-) and Hand Injury (broken right hand,, pain rate 8)    Mr. Oneil is a 54 y.o. male patient of Yves Serrano MD who comes in today for a pre-op visit prior to hand surgery.  Patient sustained a right arm fracture on 07/06/20 after a slip at work.  He is schedule for surgery ORIF to right arm and BR an DR tenotomy under general anesthesia on 07/13/20.  He comes with preop paperwork.  Surgery will be done by [facility].  He saw cardiology yesterday for a pre op exam as well.  EKG was performed.  EKG showed NSR, Normal ekg. Needs a chest xray and lab work.  Patient hx includes HTN, HLD, chronic Hepatitis B, PAD s/p R SFA stent (2013, Dr. Blake).  He denies any exertional chest pain or dyspnea and was able to climb 2 flights of stairs to clinic without symptoms. He also denies any palpitations or problems with anesthesia before    Preop examination  New complaints       NO        He denies cp, or sob  No hx of diabetes or heart disease,  Does have have a hx pad with stents (saw cardiology for clearance)  Completing all house chores/cleaing without difficulty : Yes  Able to walk a flight of stairs without cp or sob:  Yes   Hx of anesthesia complications:  No  On plavix: told by cardiology ok to hold prior to surgery if requested by surgeon, but must continue after surgery  Smoker:  NO        Review of Systems   Constitutional: Negative for chills, fatigue and fever.   HENT: Negative for congestion, ear pain, sinus pain, sore throat and trouble swallowing.    Eyes: Negative for pain.   Respiratory: Negative for cough, chest tightness, shortness of breath and wheezing.    Cardiovascular: Negative for chest pain, palpitations and leg swelling.   Gastrointestinal: Negative for abdominal pain, constipation, diarrhea, nausea and vomiting.   Genitourinary: Negative for difficulty  urinating.   Musculoskeletal:        Right hand pain, Right hand in soft cast   Skin: Negative for rash.   Neurological: Negative for dizziness, light-headedness and headaches.       Review of patient's allergies indicates:  No Known Allergies      Current Outpatient Medications:     atorvastatin (LIPITOR) 40 MG tablet, Take 1 tablet (40 mg total) by mouth once daily., Disp: 90 tablet, Rfl: 3    clopidogrel (PLAVIX) 75 mg tablet, Take 1 tablet (75 mg total) by mouth once daily., Disp: 90 tablet, Rfl: 3    cyclobenzaprine (FLEXERIL) 5 MG tablet, Take 1 tablet (5 mg total) by mouth 3 (three) times daily as needed for Muscle spasms., Disp: 45 tablet, Rfl: 0    diclofenac sodium (VOLTAREN) 1 % Gel, Apply 2 grams topically 3 (three) times daily as needed., Disp: 2 Tube, Rfl: 6    econazole nitrate 1 % cream, , Disp: , Rfl:     fexofenadine (ALLEGRA) 180 MG tablet, Take 1 tablet (180 mg total) by mouth once daily., Disp: 30 tablet, Rfl: 1    fluticasone (FLONASE) 50 mcg/actuation nasal spray, 2 sprays (100 mcg total) by Each Nare route once daily., Disp: 1 Bottle, Rfl: 5    hydroCHLOROthiazide (HYDRODIURIL) 25 MG tablet, Take 1 tablet (25 mg total) by mouth once daily., Disp: 30 tablet, Rfl: 5    nystatin-triamcinolone (MYCOLOG II) cream, Apply topically 2 (two) times daily. Groin area, Disp: 30 g, Rfl: 0    tenofovir alafenamide (VEMLIDY) 25 mg Tab, Take 1 tablet (25 mg total) by mouth once daily., Disp: 30 tablet, Rfl: 7    triamcinolone acetonide 0.1% (KENALOG) 0.1 % cream, Apply topically 2 (two) times daily as needed. Neck and body, not groin, Disp: 80 g, Rfl: 1    Patient Active Problem List    Diagnosis Date Noted    Rotator cuff syndrome 01/16/2020    S/P arterial stent 12/27/2019    Liver fibrosis 12/12/2019    Polyp of colon 12/03/2018    Hyperlipidemia 08/04/2016    S/p bilateral carpal tunnel release 08/03/2016    Family history of stroke 08/03/2016    Hypertension, essential 06/30/2015     "NAFLD (nonalcoholic fatty liver disease) 01/22/2015    Chronic hepatitis B 02/27/2014    DDD (degenerative disc disease), cervical 08/28/2013    PAD (peripheral artery disease) 06/14/2013    Bilateral shoulder pain 03/19/2013       Past Medical History:   Diagnosis Date    Anticoagulant long-term use     Dermatitis     Hepatitis B     Hypertension     Liver fibrosis 12/12/2019    F2 on fibroscan 11/2018    NAFLD (nonalcoholic fatty liver disease)        Past Surgical History:   Procedure Laterality Date    CARPAL TUNNEL RELEASE      COLONOSCOPY      PTA/stenting of distal right SFA       SHOULDER SURGERY      stents           Objective:     Vitals:    07/08/20 1312   BP: 126/82   Pulse: 73   SpO2: 97%   Weight: 87.3 kg (192 lb 7.4 oz)   Height: 5' 9" (1.753 m)   PainSc:   8       Body mass index is 28.42 kg/m².    Physical Exam  Constitutional:       General: He is not in acute distress.     Appearance: Normal appearance. He is well-developed. He is not ill-appearing or diaphoretic.   HENT:      Head: Normocephalic and atraumatic.      Right Ear: Tympanic membrane normal.      Left Ear: Tympanic membrane normal.      Nose: Nose normal.      Mouth/Throat:      Pharynx: Oropharynx is clear. No oropharyngeal exudate or posterior oropharyngeal erythema.   Eyes:      General: No scleral icterus.     Conjunctiva/sclera: Conjunctivae normal.   Neck:      Musculoskeletal: Normal range of motion and neck supple.   Cardiovascular:      Rate and Rhythm: Normal rate and regular rhythm.      Pulses: Normal pulses.      Heart sounds: Normal heart sounds.   Pulmonary:      Effort: Pulmonary effort is normal. No respiratory distress.      Breath sounds: Normal breath sounds.   Abdominal:      General: Abdomen is flat. Bowel sounds are normal. There is no distension.      Palpations: There is no mass.      Tenderness: There is no abdominal tenderness.      Hernia: No hernia is present.   Musculoskeletal: Normal range " of motion.   Skin:     General: Skin is warm and dry.   Neurological:      Mental Status: He is alert and oriented to person, place, and time.   Psychiatric:         Behavior: Behavior normal.       Assessment:     1. Preoperative clearance    2. Hypertension, essential    3. PAD (peripheral artery disease)    4. Chronic hepatitis B    5. Liver fibrosis      Plan:     Ramiro was seen today for pre-op exam and hand injury.    Diagnoses and all orders for this visit:    Preoperative clearance  Will clear and fill out paper work once chest xray and labs are resulted.  Patient will come tomorrow to  papers  -     X-Ray Chest PA And Lateral; Future  -     CBC auto differential; Future  -     Comprehensive metabolic panel; Future    Hypertension, essential  Stable  At goal today  On hctz 25 mg daily    PAD (peripheral artery disease)  Followed by cardiology  Cleared by cardiology for procedure  Hx of stents to bilateral lower extremities    Chronic hepatitis B  Stable  Followed by hepatology    Liver fibrosis  Stable  Followed by hepatology    Patient Instructions   Lab work today for CBC and CMP    Chest xray today.    Will call you once labs and xrays resulted and you have been cleared for surgery.  You can come  the papers after cleared.    Call or return to clinic if symptoms worsen or do not improve with discussed treatment plan.

## 2020-07-10 ENCOUNTER — TELEPHONE (OUTPATIENT)
Dept: HEPATOLOGY | Facility: CLINIC | Age: 55
End: 2020-07-10

## 2020-07-10 NOTE — TELEPHONE ENCOUNTER
Contacted patient and scheduled appointment as instructed. Sent reminders in the mail.     Instructions:     Mikki Proctor Staff   Caller: Unspecified (Today, 11:34 AM)             Pt was returning a call to Manuel         Pt contact [phone]

## 2020-07-10 NOTE — TELEPHONE ENCOUNTER
Attempted to contact patient and reschedule all appointments due to patient having surgery but patient didn't answer. Left patient a voicemail stating the purpose for the call. Awaiting a call back.     Instructions:     ----- Message -----   From: Esthela Leger NP   Sent: 7/9/2020   1:33 PM CDT   To: Africa Proctor Staff     See below, pt may need to reschedule my f/u visit. Please contact pt to reschedule visit     Thanks     Esthela   ----- Message -----   From: Rebeka Mandel NP   Sent: 7/9/2020  11:50 AM CDT   To: Yves Castellon MD, Esthela Leger NP     CMP reviewed.  Ordered for pre-op testing.  Patient cleared for surgery.   Bili level mildly elevated in patient with liver fibrosis and chronic hep B.  Patient called and notified.  Patient has an appointment scheduled with hepatology on 07/13 for follow up.  However, this is the day he is scheduled for the ORIF to repair arm fracture.  Will reach out to hepatology specialist to see if they can call and reschedule him.       Rebeka Mandel NP

## 2020-07-14 ENCOUNTER — TELEPHONE (OUTPATIENT)
Dept: RADIOLOGY | Facility: HOSPITAL | Age: 55
End: 2020-07-14

## 2020-07-14 ENCOUNTER — TELEPHONE (OUTPATIENT)
Dept: CARDIOLOGY | Facility: CLINIC | Age: 55
End: 2020-07-14

## 2020-07-14 DIAGNOSIS — E78.5 HYPERLIPIDEMIA, UNSPECIFIED HYPERLIPIDEMIA TYPE: ICD-10-CM

## 2020-07-14 DIAGNOSIS — I73.9 PAD (PERIPHERAL ARTERY DISEASE): ICD-10-CM

## 2020-07-14 RX ORDER — ATORVASTATIN CALCIUM 80 MG/1
80 TABLET, FILM COATED ORAL DAILY
Qty: 90 TABLET | Refills: 3 | Status: SHIPPED | OUTPATIENT
Start: 2020-07-14 | End: 2021-01-05 | Stop reason: SDUPTHER

## 2020-07-14 NOTE — TELEPHONE ENCOUNTER
Contacted pt to review results of his lipid panel which suggests that LDL is not optimized for patient with PAD. Discussed increases atorvastatin dose to 80mg daily as well as lifestyle changes such as daily exercise and Mediterranean diet. Pt verbalized understanding and requested prescription to be sent to [facility].    Will repeat lipid panel in 3 months.     Akiko Jacobson MD  Cardiology, PGY V  Pager: [phone]

## 2020-07-15 ENCOUNTER — HOSPITAL ENCOUNTER (OUTPATIENT)
Dept: RADIOLOGY | Facility: HOSPITAL | Age: 55
Discharge: HOME | End: 2020-07-15
Attending: NURSE PRACTITIONER
Payer: COMMERCIAL

## 2020-07-15 ENCOUNTER — LAB VISIT (OUTPATIENT)
Dept: LAB | Facility: HOSPITAL | Age: 55
End: 2020-07-15
Attending: NURSE PRACTITIONER
Payer: COMMERCIAL

## 2020-07-15 DIAGNOSIS — B18.1 CHRONIC HEPATITIS B: ICD-10-CM

## 2020-07-15 LAB
AFP SERPL-MCNC: 2.1 NG/ML (ref 0–8.4)
ALBUMIN SERPL BCP-MCNC: 3.7 G/DL (ref 3.5–5.2)
ALP SERPL-CCNC: 68 U/L (ref 55–135)
ALT SERPL W/O P-5'-P-CCNC: 38 U/L (ref 10–44)
ANION GAP SERPL CALC-SCNC: 7 MMOL/L (ref 8–16)
AST SERPL-CCNC: 22 U/L (ref 10–40)
BASOPHILS # BLD AUTO: 0.11 K/UL (ref 0–0.2)
BASOPHILS NFR BLD: 1.5 % (ref 0–1.9)
BILIRUB SERPL-MCNC: 0.6 MG/DL (ref 0.1–1)
BUN SERPL-MCNC: 16 MG/DL (ref 6–20)
CALCIUM SERPL-MCNC: 9.2 MG/DL (ref 8.7–10.5)
CHLORIDE SERPL-SCNC: 105 MMOL/L (ref 95–110)
CO2 SERPL-SCNC: 27 MMOL/L (ref 23–29)
CREAT SERPL-MCNC: 1 MG/DL (ref 0.5–1.4)
DIFFERENTIAL METHOD: ABNORMAL
EOSINOPHIL # BLD AUTO: 0.1 K/UL (ref 0–0.5)
EOSINOPHIL NFR BLD: 1.2 % (ref 0–8)
ERYTHROCYTE [DISTWIDTH] IN BLOOD BY AUTOMATED COUNT: 13.7 % (ref 11.5–14.5)
EST. GFR  (AFRICAN AMERICAN): >60 ML/MIN/1.73 M^2
EST. GFR  (NON AFRICAN AMERICAN): >60 ML/MIN/1.73 M^2
GLUCOSE SERPL-MCNC: 89 MG/DL (ref 70–110)
HCT VFR BLD AUTO: 48.5 % (ref 40–54)
HGB BLD-MCNC: 14.6 G/DL (ref 14–18)
IMM GRANULOCYTES # BLD AUTO: 0.01 K/UL (ref 0–0.04)
IMM GRANULOCYTES NFR BLD AUTO: 0.1 % (ref 0–0.5)
INR PPP: 1 (ref 0.8–1.2)
LYMPHOCYTES # BLD AUTO: 3.1 K/UL (ref 1–4.8)
LYMPHOCYTES NFR BLD: 42.2 % (ref 18–48)
MCH RBC QN AUTO: 25.7 PG (ref 27–31)
MCHC RBC AUTO-ENTMCNC: 30.1 G/DL (ref 32–36)
MCV RBC AUTO: 85 FL (ref 82–98)
MONOCYTES # BLD AUTO: 0.6 K/UL (ref 0.3–1)
MONOCYTES NFR BLD: 7.6 % (ref 4–15)
NEUTROPHILS # BLD AUTO: 3.5 K/UL (ref 1.8–7.7)
NEUTROPHILS NFR BLD: 47.4 % (ref 38–73)
NRBC BLD-RTO: 0 /100 WBC
PLATELET # BLD AUTO: 315 K/UL (ref 150–350)
PMV BLD AUTO: 10.3 FL (ref 9.2–12.9)
POTASSIUM SERPL-SCNC: 3.8 MMOL/L (ref 3.5–5.1)
PROT SERPL-MCNC: 7.8 G/DL (ref 6–8.4)
PROTHROMBIN TIME: 10.4 SEC (ref 9–12.5)
RBC # BLD AUTO: 5.69 M/UL (ref 4.6–6.2)
SODIUM SERPL-SCNC: 139 MMOL/L (ref 136–145)
WBC # BLD AUTO: 7.34 K/UL (ref 3.9–12.7)

## 2020-07-15 PROCEDURE — 76700 US EXAM ABDOM COMPLETE: CPT | Mod: TC

## 2020-07-15 PROCEDURE — 76700 US EXAM ABDOM COMPLETE: CPT | Mod: 26,,, | Performed by: RADIOLOGY

## 2020-07-15 PROCEDURE — 87340 HEPATITIS B SURFACE AG IA: CPT

## 2020-07-15 PROCEDURE — 36415 COLL VENOUS BLD VENIPUNCTURE: CPT

## 2020-07-15 PROCEDURE — 85025 COMPLETE CBC W/AUTO DIFF WBC: CPT

## 2020-07-15 PROCEDURE — 76700 US ABDOMEN COMPLETE: ICD-10-PCS | Mod: 26,,, | Performed by: RADIOLOGY

## 2020-07-15 PROCEDURE — 80053 COMPREHEN METABOLIC PANEL: CPT

## 2020-07-15 PROCEDURE — 82105 ALPHA-FETOPROTEIN SERUM: CPT

## 2020-07-15 PROCEDURE — 87517 HEPATITIS B DNA QUANT: CPT

## 2020-07-15 PROCEDURE — 85610 PROTHROMBIN TIME: CPT

## 2020-07-16 ENCOUNTER — PATIENT OUTREACH (OUTPATIENT)
Dept: ADMINISTRATIVE | Facility: OTHER | Age: 55
End: 2020-07-16

## 2020-07-16 LAB — HBV SURFACE AG SERPL QL IA: POSITIVE

## 2020-07-17 ENCOUNTER — PROCEDURE VISIT (OUTPATIENT)
Dept: HEPATOLOGY | Facility: CLINIC | Age: 55
End: 2020-07-17
Payer: COMMERCIAL

## 2020-07-17 ENCOUNTER — OFFICE VISIT (OUTPATIENT)
Dept: HEPATOLOGY | Facility: CLINIC | Age: 55
End: 2020-07-17
Payer: COMMERCIAL

## 2020-07-17 VITALS
WEIGHT: 192.25 LBS | TEMPERATURE: 98 F | SYSTOLIC BLOOD PRESSURE: 162 MMHG | HEART RATE: 77 BPM | HEIGHT: 69 IN | DIASTOLIC BLOOD PRESSURE: 92 MMHG | OXYGEN SATURATION: 96 % | BODY MASS INDEX: 28.47 KG/M2 | RESPIRATION RATE: 18 BRPM

## 2020-07-17 DIAGNOSIS — K74.00 LIVER FIBROSIS: ICD-10-CM

## 2020-07-17 DIAGNOSIS — B18.1 CHRONIC HEPATITIS B: Primary | ICD-10-CM

## 2020-07-17 DIAGNOSIS — E78.5 HYPERLIPIDEMIA, UNSPECIFIED HYPERLIPIDEMIA TYPE: ICD-10-CM

## 2020-07-17 DIAGNOSIS — K76.0 NAFLD (NONALCOHOLIC FATTY LIVER DISEASE): ICD-10-CM

## 2020-07-17 DIAGNOSIS — B18.1 CHRONIC HEPATITIS B: ICD-10-CM

## 2020-07-17 DIAGNOSIS — I10 HYPERTENSION, ESSENTIAL: ICD-10-CM

## 2020-07-17 PROCEDURE — 99999 PR PBB SHADOW E&M-EST. PATIENT-LVL V: ICD-10-PCS | Mod: PBBFAC,,, | Performed by: NURSE PRACTITIONER

## 2020-07-17 PROCEDURE — 99214 PR OFFICE/OUTPT VISIT, EST, LEVL IV, 30-39 MIN: ICD-10-PCS | Mod: S$GLB,,, | Performed by: NURSE PRACTITIONER

## 2020-07-17 PROCEDURE — 3008F BODY MASS INDEX DOCD: CPT | Mod: CPTII,S$GLB,, | Performed by: NURSE PRACTITIONER

## 2020-07-17 PROCEDURE — 3080F DIAST BP >= 90 MM HG: CPT | Mod: CPTII,S$GLB,, | Performed by: NURSE PRACTITIONER

## 2020-07-17 PROCEDURE — 91200 LIVER ELASTOGRAPHY: CPT | Mod: S$GLB,,, | Performed by: NURSE PRACTITIONER

## 2020-07-17 PROCEDURE — 3077F SYST BP >= 140 MM HG: CPT | Mod: CPTII,S$GLB,, | Performed by: NURSE PRACTITIONER

## 2020-07-17 PROCEDURE — 99214 OFFICE O/P EST MOD 30 MIN: CPT | Mod: S$GLB,,, | Performed by: NURSE PRACTITIONER

## 2020-07-17 PROCEDURE — 91200 FIBROSCAN (VIBRATION CONTROLLED TRANSIENT ELASTOGRAPHY): ICD-10-PCS | Mod: S$GLB,,, | Performed by: NURSE PRACTITIONER

## 2020-07-17 PROCEDURE — 99999 PR PBB SHADOW E&M-EST. PATIENT-LVL V: CPT | Mod: PBBFAC,,, | Performed by: NURSE PRACTITIONER

## 2020-07-17 PROCEDURE — 3077F PR MOST RECENT SYSTOLIC BLOOD PRESSURE >= 140 MM HG: ICD-10-PCS | Mod: CPTII,S$GLB,, | Performed by: NURSE PRACTITIONER

## 2020-07-17 PROCEDURE — 3008F PR BODY MASS INDEX (BMI) DOCUMENTED: ICD-10-PCS | Mod: CPTII,S$GLB,, | Performed by: NURSE PRACTITIONER

## 2020-07-17 PROCEDURE — 3080F PR MOST RECENT DIASTOLIC BLOOD PRESSURE >= 90 MM HG: ICD-10-PCS | Mod: CPTII,S$GLB,, | Performed by: NURSE PRACTITIONER

## 2020-07-17 RX ORDER — TENOFOVIR ALAFENAMIDE 25 MG/1
25 TABLET ORAL DAILY
Qty: 30 TABLET | Refills: 6 | Status: SHIPPED | OUTPATIENT
Start: 2020-07-17 | End: 2021-01-20 | Stop reason: SDUPTHER

## 2020-07-17 NOTE — PROGRESS NOTES
[facility] Established Patient Visit    Reason for Visit:  Chronic Hep B, liver fibrosis    PCP: Yves Serrano    HPI:  This is a 54 y.o. male with PMH noted below, here for follow up of chronic Hepatitis B      He started Viread on 2/25/14,was switched to Vemlidy 11/2018    Also + NAFLD. Risk factors for NAFLD include alcohol use, overweight, HTN, HLD    Previous serologic w/u negative for Juan's,  hemochromatosis, autoimmune etiology, and viral hepatitis C.     Liver fibrosis staging:  - Fibrosure 7/2014 - F1-F2, only mild fibrosis  - Elastography scan 1/2016 = F0-F1  - Fibroscan 11/2018 - F2, S3  -- Fibroscan 7/2020 noted F2 (kPA 7.1), S3 ()      Interval HPI: Presents today alone. Hep B labs 7/2020 showed + sAg, waiting for DNA labs to result  Does report some missed doses of Hep B medication in the past 6 months but recently taking daily without any missed doses   Previously labs 12/2019 showed DNA 25, + sAg. eAB +  Negative delta   Is drinking beer intermittently, approx twice weekly, previously recommended to stop completely    AST, ALT, platelets, synthetic liver functioning - WNL    Lab Results   Component Value Date    ALT 38 07/15/2020    AST 22 07/15/2020    ALKPHOS 68 07/15/2020    BILITOT 0.6 07/15/2020    ALBUMIN 3.7 07/15/2020    INR 1.0 07/15/2020     07/15/2020       HCC screening:  -- AFP WNL  next due 1/2021  -- US without lesions , next due 1/2021  HIV testing and Hep C testing negative    Previous EGD : not indicated currently     Denies family history of liver disease . +alcohol consumption   Social History     Substance and Sexual Activity   Alcohol Use Yes    Comment: 2 beers per week      +Immunity to Hep A    Denies jaundice, dark urine, abdominal distention, hematemesis, melena, slowed mentation. No abnormal skin rashes. No generalized joint or muscle pain.     PMHX:  has a past medical history of Anticoagulant long-term use, Dermatitis, Hepatitis B,  Hypertension, Liver fibrosis (12/12/2019), and NAFLD (nonalcoholic fatty liver disease).    PSHX:  has a past surgical history that includes Shoulder surgery; Colonoscopy; PTA/stenting of distal right SFA ; stents; and Carpal tunnel release.    The patient's social and family histories were reviewed by me and updated in the appropriate section of the electronic medical record.    Review of patient's allergies indicates:  No Known Allergies    Current Outpatient Medications on File Prior to Visit   Medication Sig Dispense Refill    atorvastatin (LIPITOR) 80 MG tablet Take 1 tablet (80 mg total) by mouth once daily. 90 tablet 3    clopidogrel (PLAVIX) 75 mg tablet Take 1 tablet (75 mg total) by mouth once daily. 90 tablet 3    cyclobenzaprine (FLEXERIL) 5 MG tablet Take 1 tablet (5 mg total) by mouth 3 (three) times daily as needed for Muscle spasms. 45 tablet 0    diclofenac sodium (VOLTAREN) 1 % Gel Apply 2 grams topically 3 (three) times daily as needed. 2 Tube 6    econazole nitrate 1 % cream       fexofenadine (ALLEGRA) 180 MG tablet Take 1 tablet (180 mg total) by mouth once daily. 30 tablet 1    fluticasone (FLONASE) 50 mcg/actuation nasal spray 2 sprays (100 mcg total) by Each Nare route once daily. 1 Bottle 5    hydroCHLOROthiazide (HYDRODIURIL) 25 MG tablet Take 1 tablet (25 mg total) by mouth once daily. 30 tablet 5    nystatin-triamcinolone (MYCOLOG II) cream Apply topically 2 (two) times daily. Groin area 30 g 0    tenofovir alafenamide (VEMLIDY) 25 mg Tab Take 1 tablet (25 mg total) by mouth once daily. 30 tablet 7    triamcinolone acetonide 0.1% (KENALOG) 0.1 % cream Apply topically 2 (two) times daily as needed. Neck and body, not groin 80 g 1    [DISCONTINUED] tenofovir (VIREAD) 300 mg Tab Take 1 tablet (300 mg total) by mouth once daily. TAKE 1 TABLET BY MOUTH    ONCE DAILY 30 tablet 0     No current facility-administered medications on file prior to visit.        SOCIAL HISTORY:   Social  "History     Tobacco Use   Smoking Status Never Smoker   Smokeless Tobacco Never Used       Social History     Substance and Sexual Activity   Alcohol Use Yes    Comment: 2 beers per week       Social History     Substance and Sexual Activity   Drug Use No       ROS:   GENERAL: Denies fever, chills, weight loss/gain, fatigue  HEENT: Denies headaches, dizziness, vision/hearing changes  CARDIOVASCULAR: Denies chest pain, palpitations, or edema  RESPIRATORY: Denies dyspnea, cough  GI: Denies abdominal pain, rectal bleeding, nausea, vomiting. No change in bowel pattern or color  : Denies dysuria, hematuria   SKIN: Denies rash, itching   NEURO: Denies confusion, memory loss, or mood changes  PSYCH: Denies depression or anxiety  HEME/LYMPH: Denies easy bruising or bleeding    Objective Findings:    PHYSICAL EXAM:   Friendly Black or  male, in no acute distress; alert and oriented to person, place and time  VITALS: BP (!) 186/98 (BP Location: Left arm, Patient Position: Sitting, BP Method: Medium (Automatic))   Pulse 77   Temp 98 °F (36.7 °C) (Oral)   Resp 18   Ht 5' 9" (1.753 m)   Wt 87.2 kg (192 lb 3.9 oz)   SpO2 96%   BMI 28.39 kg/m²   HENT: Normocephalic, without obvious abnormality. Oral mucosa pink and moist. Dentition good.  EYES: Sclerae anicteric. No conjunctival pallor.   NECK: Supple. No masses or cervical adenopathy.  CARDIOVASCULAR: Regular rate and rhythm. No murmurs.  RESPIRATORY: Normal respiratory effort. BBS CTA. No wheezes or crackles.  GI: Soft, non-tender, non-distended. No hepatosplenomegaly. No masses palpable. No ascites.  EXTREMITIES:  No clubbing, cyanosis or edema.  SKIN: Warm and dry. No jaundice. No rashes noted to exposed skin. No telangectasias noted. No palmar erythema.  NEURO:  Normal gait. No asterixis.  PSYCH:  Memory intact. Thought and speech pattern appropriate. Behavior normal. No depression or anxiety noted.    DIAGNOSTIC STUDIES:    ABD. U/S-    Done " 2020  FINDINGS:  Complete abdominal ultrasound performed. The liver measures 16.0 cm in length, normal in size and is homogeneous in echogenicity.  Common bile duct is within normal limits at 0.36 cm.  Multiple tiny stones noted within the gallbladder.  The visualized portions of the pancreas, IVC and abdominal aorta are within normal limits.   The right kidney measures 10.5 cm. The left kidney measures 11.1 cm. No hydronephrosis or renal masses identified.  The spleen measures 7.1 cm.     Impression:     1. Cholelithiasis without evidence to suggest cholecystitis.      LIVER BIOPSY-  None in the past      FIBROSCAN - done 2020  Median liver stiffness score:  7.1  CAP Reading: dB/m:  297  Fibrosis Stage:  F2  Steatosis Grade:  S3    done 2018  Fibroscan readin.4 KPa   Fibrosis:F2    CAP readin dB/m   Steatosis: :S3     EDUCATION:    -- Avoid alcohol. Avoid raw seafood.   -- Discussed transmission of HBV, including sexual transmission. Avoid sharing razors/toothbrushes, etc.   -- Discussed importance of Hep B screening for all current and previous sexual partners, as well as complete Hep B vaccination for all current sexual partners. For sexual partners who have not completed the Hep B vaccine series, barrier sexual protection is recommended to prevent spread of the virus.  -- Natural history of HBV including progression to cirrhosis reviewed.   -- We discussed the increased risk of hepatocellular carcinoma due to active hepatitis B infection. Continued screening every six months with ultrasound and AFP is recommended.   -- Discussed potential outcomes of cirrhosis, including liver cancer, liver failure, liver transplant, death.   -- Limit acetaminophen to 2000mg daily.  -- Advised immunization of all household members.  -- Discussed importance of compliance with medication regimen and risk of viral mutation if treatment is stopped prematurely.       ASSESSMENT & PLAN:  54 y.o. Black or   American male with:  1. Chronic hepatitis B, E Ab pos   -- On Vemlidy 25 mg daily , refilled today. Reinforced importance of taking daily without any missed doses   -- transaminases essentially WNL  -- awaiting repeat Hep B DNA lab  -- negative delta  -- synthetic liver function WNL  -- immunity to Hep A per labs   -- screening for Hep C and HIV negative  -- HCC screening Q 6 months with AFP and abd. U/S - both next due 6/2020  -- Last EGD : no indication from Hep B standpoint currently     2. Liver fibrosis per fibroscn  -- F2 on fibroscan in 2018  -- fibroscan 7/2020 with improving kPA but remains F2 (kPA 7.1), S3 steatosis  -- FIbroscan yearly, next due 7/2021    3. Fatty liver  -- transaminase WNL  -- US showed fatty liver  -- synthetic liver function WNL   -- risk factors for FLD include alcohol use, overweight, HTN, HLD    4. Alcohol use  - recommended alcohol abstinence given chronic Hep B    Social History     Substance and Sexual Activity   Alcohol Use Yes    Comment: 2 beers per week       Follow up in about 6 months (around 1/17/2021). with labs, US 1 week before      Thank you for allowing me to participate in the care of Ramiro Oneil    VIRGIE Nails    CC'ed note to:   Yves Serrano MD

## 2020-07-17 NOTE — PROCEDURES
FibroScan (Vibration Controlled Transient Elastography)    Date/Time: 7/17/2020 9:00 AM  Performed by: Esthela Leger NP  Authorized by: Esthela Leger NP     Diagnosis:  HBV    Probe:  M    Universal Protocol: Patient's identity, procedure and site were verified, confirmatory pause was performed.  Discussed procedure including risks and potential complications.  Questions answered.  Patient verbalizes understanding and wishes to proceed with VCTE.     Procedure: After providing explanations of the procedure, patient was placed in the supine position with right arm in maximum abduction to allow optimal exposure of right lateral abdomen.  Patient was briefly assessed, Testing was performed in the mid-axillary location, 50Hz Shear Wave pulses were applied and the resulting Shear Wave and Propagation Speed detected with a 3.5 MHz ultrasonic signal, using the FibroScan probe, Skin to liver capsule distance and liver parenchyma were accessed during the entire examination with the FibroScan probe, Patient was instructed to breathe normally and to abstain from sudden movements during the procedure, allowing for random measurements of liver stiffness. At least 10 Shear Waves were produced, Individual measurements of each Shear Wave were calculated.  Patient tolerated the procedure well with no complications.  Meets discharge criteria as was dismissed.  Rates pain 0 out of 10.  Patient will follow up with ordering provider to review results.      Findings  Median liver stiffness score:  7.1  CAP Reading: dB/m:  297    IQR/med %:  17  Interpretation  Fibrosis interpretation is based on medial liver stiffness - Kilopascal (kPa).    Fibrosis Stage:  F2  Steatosis interpretation is based on controlled attenuation parameter - (dB/m).    Steatosis Grade:  S3

## 2020-07-17 NOTE — PATIENT INSTRUCTIONS
-- Avoid alcohol. Avoid raw seafood.   -- Hepatitis B is spread through blood and bodily fluids. Do not share  razors/toothbrushes, etc, with others   -- it is possible that Hepatitis B can cause scar tissue in the liver, which can progress to cirrhosis.   -- Hepatitis B, in some cases, has a higher risk of liver cancer (hepatocellular carcinoma), especially with active hepatitis B infection. We will perform liver cancer screening every six months with ultrasound and AFP along with a clinic visit every 6 months  -- If you develop cirrhosis, it is important that we monitor you closely, as cirrhosis can cause liver cancer, liver failure, liver transplant, death.   -- Limit acetaminophen to 2000mg daily.  -- Recommended that all 1st degree relatives, household members and sexual contacts are screened for Hepatitis B. If they are negative for Hepatitis B, they should be vaccinated against Hepatitis B to protect themselves from gabe the virus  -- It is important for all current and previous sexual partners to be tested for Hepatitis B as well as complete Hep B vaccination. For sexual partners who have not completed the Hep B vaccine series, barrier sexual protection is recommended to prevent spread of the virus.  -- It is important that you take your Hepatitis B medication as prescribed without any missed doses, as missing doses or stopping the medication can cause the Hepatitis B virus to change and make it difficult to treat       There is no FDA approved therapy for non-alcoholic fatty liver disease. Therefore, these things are important:  1. Weight loss goal of 15 lbs, referral for [facility] if interested  2. Low carb/sugar, high fiber and protein diet.Try to limit your carb intake to LESS than 30-45 grams of carbs with a meal or LESS than 5-10 grams with any snack (total of any snack foods eaten during that time). Use MyFitness Pal adam to add up your carbs through the day. Do NOT drink any  beverages with calories or carbs (this can lead to high blood sugar and weight gain). Also, some of our patients have been very successful with weight loss using the pre made/planned meal planning services that limit calories and portion size (one example is Sensible Meals but there are many out there)  3. Exercise, 5 days per week, 30 minutes per day, as tolerated  4. Recommend good cholesterol, blood pressure, blood sugar levels     In some people, fatty liver can progress to steatohepatitis (inflamatory fatty liver) and possibly to cirrhosis, putting one at increased risk for liver cancer, liver failure, and death. Therefore, the lifestyle changes are very important to decrease this risk.     Website with information about fatty liver and inflammation related to fatty liver (AMBROSE) = www.nashtruth.com  AND www.NASHactually.com

## 2020-07-20 ENCOUNTER — TELEPHONE (OUTPATIENT)
Dept: PHARMACY | Facility: CLINIC | Age: 55
End: 2020-07-20

## 2020-07-20 LAB
HBV DNA SERPL NAA+PROBE-ACNC: <10 IU/ML
HBV DNA SERPL NAA+PROBE-LOG IU: <1 LOG (10) IU/ML
HBV DNA SERPL QL NAA+PROBE: DETECTED

## 2020-07-20 NOTE — TELEPHONE ENCOUNTER
Vemlidy refill and clinical follow up attempted. (Attempt 1). No answer. LVM for call back. Will f/u with patient if no return call.  - Copay $0.00

## 2020-07-24 NOTE — TELEPHONE ENCOUNTER
Vemlidy refill and clinical follow up attempted. (Attempt 2). No answer. LVM for call back. Will f/u with patient if no return call.  - Copay $0.00

## 2020-07-30 NOTE — TELEPHONE ENCOUNTER
Vemlidy refill and f/u attempted (attempt 3). No answer. LVM for call back. Will f/u with pt if no return call.  -Copay $0

## 2020-08-05 NOTE — TELEPHONE ENCOUNTER
Vemlidy refill and F/U attempted (attempt 4). [phone] - Spoke with patient's significant other, Roro. She agreed to have patient call back to for refill and F/U on Vemlidy. [facility] will f/u in 1 week if no call back. $0 copay.   - Will notify provider of lack of contact and send postcard to address on file.

## 2020-08-12 NOTE — TELEPHONE ENCOUNTER
Vemlidy refill and F/U attempted (attempt 5). No answer. LVM for call back. $0 copay.   -  Provider notified of lack of contact and postcard sent to address on file.     Will close out if no response in 1-week.

## 2020-08-19 NOTE — TELEPHONE ENCOUNTER
Last Vemlidy refill call attempt, no answer, LVM. [facility] will no long follow-up unless patient reaches out to refill medication. Provider aware.

## 2020-09-09 NOTE — TELEPHONE ENCOUNTER
Incoming call - Vemlidy refill confirmed and HBV F/U complete. Patient would like to  Vemlidy refill on  at [facility]. $0.00 copay- 004. Confirmed 2 patient identifiers - name and . Therapy appropriate.     [facility] had previously closed patient profile due to lack of contact. Patient states his phone was broken so that is why we could not get in touch with him. He states that he has NOT missed any doses due to having an extra bottle on hand. He states his wife had put a bottle in the cabinet when it was delivered and patient had found it. Patient advised of medication adherence importance and decreased effectiveness of medicine with missed doses. Patient also reminded to call [facility] when he has 7 doses remaining so refill can be delivered in time. Patient agreed and confirms having the phone # saved in his phone. Patient advised to also have [facility] phone # written down in case phone breaks again. Patient verbalized understanding. [facility] will notify provider (TOM Leger) of patient contact.     Patient has 4 doses of Vemlidy remaining and takes it daily.  Pt reports they are not having any side effects. No new medications, no new allergies or health conditions reported at this time. Allergies reviewed and medication reconciliation complete (reviewed and documented in [facility]).  Disease education reviewed (including transmission and prevention). Patient counseled on importance of maintaining adherence and keeping lab appointments which were scheduled. HBV DNA VL detected at less then 10 IU/mL (7/15/20). CrCl 89.1 mL/min - med/dose appropriate. LFTs - (7/15/20). All questions answered and addressed to patients satisfaction. Advised to call [facility] and provider if any issues arise.  Pt verbalized understanding.

## 2020-09-25 ENCOUNTER — TELEPHONE (OUTPATIENT)
Dept: ENDOSCOPY | Facility: HOSPITAL | Age: 55
End: 2020-09-25

## 2020-09-25 NOTE — TELEPHONE ENCOUNTER
Patient needs to be scheduled for a colonoscopy. Is it ok for him to hold Plavix 5 days prior? Please advise.    Thank you,  Gita

## 2020-09-28 ENCOUNTER — TELEPHONE (OUTPATIENT)
Dept: CARDIOLOGY | Facility: CLINIC | Age: 55
End: 2020-09-28

## 2020-09-28 RX ORDER — ASPIRIN 81 MG/1
81 TABLET ORAL DAILY
Qty: 90 TABLET | Refills: 0
Start: 2020-09-28 | End: 2021-09-30

## 2020-09-29 ENCOUNTER — TELEPHONE (OUTPATIENT)
Dept: ENDOSCOPY | Facility: HOSPITAL | Age: 55
End: 2020-09-29

## 2020-09-29 NOTE — TELEPHONE ENCOUNTER
Approval to hold Plavix received from . Called patient to schedule colonoscopy. Left voicemail message with direct number to call back.

## 2020-10-05 ENCOUNTER — TELEPHONE (OUTPATIENT)
Dept: PHARMACY | Facility: CLINIC | Age: 55
End: 2020-10-05

## 2020-10-13 ENCOUNTER — TELEPHONE (OUTPATIENT)
Dept: PHARMACY | Facility: CLINIC | Age: 55
End: 2020-10-13

## 2020-11-05 ENCOUNTER — TELEPHONE (OUTPATIENT)
Dept: PHARMACY | Facility: CLINIC | Age: 55
End: 2020-11-05

## 2020-12-01 DIAGNOSIS — Z00.00 ANNUAL PHYSICAL EXAM: Primary | ICD-10-CM

## 2020-12-01 DIAGNOSIS — E78.5 HYPERLIPIDEMIA, UNSPECIFIED HYPERLIPIDEMIA TYPE: ICD-10-CM

## 2020-12-01 DIAGNOSIS — Z12.5 PROSTATE CANCER SCREENING: ICD-10-CM

## 2020-12-01 DIAGNOSIS — I10 HYPERTENSION, ESSENTIAL: ICD-10-CM

## 2020-12-01 NOTE — TELEPHONE ENCOUNTER
No new care gaps identified.  Powered by [employer]. Reference number: [id number]. 12/01/2020 12:50:54 PM CST

## 2020-12-01 NOTE — PROGRESS NOTES
Refill Routing Note   Medication(s) are not appropriate for processing by [facility] for the following reason(s):     - Required vitals are abnormal  [facility] action(s):  Defer     Medication Therapy Plan: CDMR. BP elevated; Defer to PCP  Medication reconciliation completed: No   Automatic Epic Generated Protocol Data:        Requested Prescriptions   Pending Prescriptions Disp Refills    hydroCHLOROthiazide (HYDRODIURIL) 25 MG tablet 90 tablet 1     Sig: Take 1 tablet (25 mg total) by mouth once daily.       Cardiovascular: Diuretics - Thiazide Failed - 12/1/2020 12:50 PM        Failed - Last BP in normal range within 360 days.     BP Readings from Last 3 Encounters:   07/17/20 (!) 162/92   07/08/20 126/82   07/07/20 (!) 140/80              Passed - Patient is at least 18 years old        Passed - Office visit in past 12 months or future 90 days     Recent Outpatient Visits            4 months ago Chronic hepatitis B    [facility] - Transplant 1st Fl Esthela Leger NP    4 months ago Preoperative clearance    [facility] Rebeka Mandel NP    4 months ago Hyperlipidemia, unspecified hyperlipidemia type    [facility]-Cardiology Svcs 3rd Floor Akiko Jacobson MD    7 months ago Dermatitis    [facility] Yves Castellon MD    10 months ago Bilateral shoulder pain, unspecified chronicity    [facility] - Sports Med 1st Fl Herb Rich MD          Future Appointments              In 1 month Yves Castellon MD [facility], [facility] PCW    In 1 month [facility] [place] - Ultrasound, [facility]    In 1 month LABORATORY, [facility] [place] - Laboratory, [facility]    In 1 month Esthela Leger NP [facility] - Transplant 1st Fl, [place] [facility]                Passed - Cr is 1.4 or below and within 360 days     Creatinine   Date Value Ref Range Status   07/15/2020 1.0 0.5 - 1.4 mg/dL Final   07/08/2020 1.0 0.5 - 1.4 mg/dL Final   12/11/2019  0.9 0.5 - 1.4 mg/dL Final              Passed - K in normal range and within 360 days     Potassium   Date Value Ref Range Status   07/15/2020 3.8 3.5 - 5.1 mmol/L Final   07/08/2020 4.1 3.5 - 5.1 mmol/L Final   12/11/2019 3.7 3.5 - 5.1 mmol/L Final              Passed - Na is between 130 and 148 and within 360 days     Sodium   Date Value Ref Range Status   07/15/2020 139 136 - 145 mmol/L Final   07/08/2020 137 136 - 145 mmol/L Final   12/11/2019 142 136 - 145 mmol/L Final              Passed - eGFR within 360 days     eGFR if non    Date Value Ref Range Status   07/15/2020 >60.0 >60 mL/min/1.73 m^2 Final     Comment:     Calculation used to obtain the estimated glomerular filtration  rate (eGFR) is the CKD-EPI equation.      07/08/2020 >60.0 >60 mL/min/1.73 m^2 Final     Comment:     Calculation used to obtain the estimated glomerular filtration  rate (eGFR) is the CKD-EPI equation.      12/11/2019 >60.0 >60 mL/min/1.73 m^2 Final     Comment:     Calculation used to obtain the estimated glomerular filtration  rate (eGFR) is the CKD-EPI equation.        eGFR if    Date Value Ref Range Status   07/15/2020 >60.0 >60 mL/min/1.73 m^2 Final   07/08/2020 >60.0 >60 mL/min/1.73 m^2 Final   12/11/2019 >60.0 >60 mL/min/1.73 m^2 Final                    Appointments  past 12m or future 3m with PCP    Date Provider   Last Visit   4/21/2020 Yves Castellon MD   Next Visit   12/31/2020 Yves Castellon MD   ED visits in past 90 days: 0        Note composed:4:39 PM 12/01/2020

## 2020-12-02 RX ORDER — HYDROCHLOROTHIAZIDE 25 MG/1
25 TABLET ORAL DAILY
Qty: 90 TABLET | Refills: 1 | Status: SHIPPED | OUTPATIENT
Start: 2020-12-02 | End: 2021-01-05 | Stop reason: SDUPTHER

## 2020-12-03 NOTE — TELEPHONE ENCOUNTER
----- Message from Yves Castellon MD sent at 12/1/2020  8:11 AM CST -----  Regarding: FW: Pt [phone]    ----- Message -----  From: Simon Haro  Sent: 12/1/2020   7:45 AM CST  To: [facility] Staff  Subject: Pt [phone]                                      Type: Orders Request    What orders/ testing are being requested? Annual    Is there a future appointment scheduled for the patient with PCP? Yes    When? 1/8/20    Comments:

## 2020-12-11 NOTE — TELEPHONE ENCOUNTER
Vemlidy refill attempted. (Attempt 5) NA, LVM for call. Patient has not responded to [facility] mail or phone correspondence. Provider previously notified of lack of contact. Will close out patient due to loss of contact.

## 2021-01-02 ENCOUNTER — LAB VISIT (OUTPATIENT)
Dept: LAB | Facility: HOSPITAL | Age: 56
End: 2021-01-02
Attending: FAMILY MEDICINE
Payer: COMMERCIAL

## 2021-01-02 DIAGNOSIS — Z12.5 PROSTATE CANCER SCREENING: ICD-10-CM

## 2021-01-02 DIAGNOSIS — E78.5 HYPERLIPIDEMIA, UNSPECIFIED HYPERLIPIDEMIA TYPE: ICD-10-CM

## 2021-01-02 DIAGNOSIS — Z00.00 ANNUAL PHYSICAL EXAM: ICD-10-CM

## 2021-01-02 LAB
CHOLEST SERPL-MCNC: 179 MG/DL (ref 120–199)
CHOLEST/HDLC SERPL: 5 {RATIO} (ref 2–5)
COMPLEXED PSA SERPL-MCNC: 1.4 NG/ML (ref 0–4)
HDLC SERPL-MCNC: 36 MG/DL (ref 40–75)
HDLC SERPL: 20.1 % (ref 20–50)
LDLC SERPL CALC-MCNC: 123.8 MG/DL (ref 63–159)
NONHDLC SERPL-MCNC: 143 MG/DL
TRIGL SERPL-MCNC: 96 MG/DL (ref 30–150)

## 2021-01-02 PROCEDURE — 84153 ASSAY OF PSA TOTAL: CPT

## 2021-01-02 PROCEDURE — 36415 COLL VENOUS BLD VENIPUNCTURE: CPT

## 2021-01-02 PROCEDURE — 80061 LIPID PANEL: CPT

## 2021-01-05 ENCOUNTER — OFFICE VISIT (OUTPATIENT)
Dept: INTERNAL MEDICINE | Facility: CLINIC | Age: 56
End: 2021-01-05
Attending: FAMILY MEDICINE
Payer: COMMERCIAL

## 2021-01-05 ENCOUNTER — SPECIALTY PHARMACY (OUTPATIENT)
Dept: PHARMACY | Facility: CLINIC | Age: 56
End: 2021-01-05

## 2021-01-05 VITALS
SYSTOLIC BLOOD PRESSURE: 130 MMHG | HEIGHT: 69 IN | DIASTOLIC BLOOD PRESSURE: 70 MMHG | BODY MASS INDEX: 29.52 KG/M2 | OXYGEN SATURATION: 97 % | WEIGHT: 199.31 LBS | HEART RATE: 78 BPM

## 2021-01-05 DIAGNOSIS — G89.29 CHRONIC PAIN OF BOTH SHOULDERS: ICD-10-CM

## 2021-01-05 DIAGNOSIS — B18.1 CHRONIC HEPATITIS B: Primary | ICD-10-CM

## 2021-01-05 DIAGNOSIS — M25.511 CHRONIC PAIN OF BOTH SHOULDERS: ICD-10-CM

## 2021-01-05 DIAGNOSIS — Z00.00 ANNUAL PHYSICAL EXAM: Primary | ICD-10-CM

## 2021-01-05 DIAGNOSIS — K76.0 NAFLD (NONALCOHOLIC FATTY LIVER DISEASE): ICD-10-CM

## 2021-01-05 DIAGNOSIS — M25.512 CHRONIC PAIN OF BOTH SHOULDERS: ICD-10-CM

## 2021-01-05 DIAGNOSIS — I73.9 PAD (PERIPHERAL ARTERY DISEASE): ICD-10-CM

## 2021-01-05 DIAGNOSIS — M54.50 LOW BACK PAIN WITHOUT SCIATICA, UNSPECIFIED BACK PAIN LATERALITY, UNSPECIFIED CHRONICITY: ICD-10-CM

## 2021-01-05 DIAGNOSIS — I77.9 PAOD (PERIPHERAL ARTERIAL OCCLUSIVE DISEASE): ICD-10-CM

## 2021-01-05 DIAGNOSIS — B18.1 CHRONIC HEPATITIS B: ICD-10-CM

## 2021-01-05 DIAGNOSIS — Z12.11 COLON CANCER SCREENING: ICD-10-CM

## 2021-01-05 DIAGNOSIS — E78.5 HYPERLIPIDEMIA, UNSPECIFIED HYPERLIPIDEMIA TYPE: ICD-10-CM

## 2021-01-05 DIAGNOSIS — Z95.9 S/P ARTERIAL STENT: ICD-10-CM

## 2021-01-05 DIAGNOSIS — I10 HYPERTENSION, ESSENTIAL: ICD-10-CM

## 2021-01-05 DIAGNOSIS — M79.672 LEFT FOOT PAIN: ICD-10-CM

## 2021-01-05 PROCEDURE — 99999 PR PBB SHADOW E&M-EST. PATIENT-LVL V: ICD-10-PCS | Mod: PBBFAC,,, | Performed by: FAMILY MEDICINE

## 2021-01-05 PROCEDURE — 99396 PREV VISIT EST AGE 40-64: CPT | Mod: S$GLB,,, | Performed by: FAMILY MEDICINE

## 2021-01-05 PROCEDURE — 3008F PR BODY MASS INDEX (BMI) DOCUMENTED: ICD-10-PCS | Mod: CPTII,S$GLB,, | Performed by: FAMILY MEDICINE

## 2021-01-05 PROCEDURE — 99999 PR PBB SHADOW E&M-EST. PATIENT-LVL V: CPT | Mod: PBBFAC,,, | Performed by: FAMILY MEDICINE

## 2021-01-05 PROCEDURE — 3075F SYST BP GE 130 - 139MM HG: CPT | Mod: CPTII,S$GLB,, | Performed by: FAMILY MEDICINE

## 2021-01-05 PROCEDURE — 3075F PR MOST RECENT SYSTOLIC BLOOD PRESS GE 130-139MM HG: ICD-10-PCS | Mod: CPTII,S$GLB,, | Performed by: FAMILY MEDICINE

## 2021-01-05 PROCEDURE — 3008F BODY MASS INDEX DOCD: CPT | Mod: CPTII,S$GLB,, | Performed by: FAMILY MEDICINE

## 2021-01-05 PROCEDURE — 99396 PR PREVENTIVE VISIT,EST,40-64: ICD-10-PCS | Mod: S$GLB,,, | Performed by: FAMILY MEDICINE

## 2021-01-05 PROCEDURE — 1126F AMNT PAIN NOTED NONE PRSNT: CPT | Mod: S$GLB,,, | Performed by: FAMILY MEDICINE

## 2021-01-05 PROCEDURE — 3078F DIAST BP <80 MM HG: CPT | Mod: CPTII,S$GLB,, | Performed by: FAMILY MEDICINE

## 2021-01-05 PROCEDURE — 3078F PR MOST RECENT DIASTOLIC BLOOD PRESSURE < 80 MM HG: ICD-10-PCS | Mod: CPTII,S$GLB,, | Performed by: FAMILY MEDICINE

## 2021-01-05 PROCEDURE — 1126F PR PAIN SEVERITY QUANTIFIED, NO PAIN PRESENT: ICD-10-PCS | Mod: S$GLB,,, | Performed by: FAMILY MEDICINE

## 2021-01-05 RX ORDER — CYCLOBENZAPRINE HCL 5 MG
5 TABLET ORAL 3 TIMES DAILY PRN
Qty: 45 TABLET | Refills: 0 | Status: SHIPPED | OUTPATIENT
Start: 2021-01-05 | End: 2022-02-16 | Stop reason: SDUPTHER

## 2021-01-05 RX ORDER — HYDROCHLOROTHIAZIDE 25 MG/1
25 TABLET ORAL DAILY
Qty: 90 TABLET | Refills: 1 | Status: SHIPPED | OUTPATIENT
Start: 2021-01-05 | End: 2022-02-16 | Stop reason: SDUPTHER

## 2021-01-05 RX ORDER — AMLODIPINE BESYLATE 2.5 MG/1
2.5 TABLET ORAL DAILY
Qty: 90 TABLET | Refills: 1 | Status: SHIPPED | OUTPATIENT
Start: 2021-01-05 | End: 2022-02-16 | Stop reason: SDUPTHER

## 2021-01-05 RX ORDER — ATORVASTATIN CALCIUM 80 MG/1
80 TABLET, FILM COATED ORAL DAILY
Qty: 90 TABLET | Refills: 3 | Status: SHIPPED | OUTPATIENT
Start: 2021-01-05 | End: 2022-02-16 | Stop reason: SDUPTHER

## 2021-01-08 ENCOUNTER — TELEPHONE (OUTPATIENT)
Dept: RADIOLOGY | Facility: HOSPITAL | Age: 56
End: 2021-01-08

## 2021-01-11 ENCOUNTER — LAB VISIT (OUTPATIENT)
Dept: LAB | Facility: HOSPITAL | Age: 56
End: 2021-01-11
Attending: NURSE PRACTITIONER
Payer: COMMERCIAL

## 2021-01-11 ENCOUNTER — HOSPITAL ENCOUNTER (OUTPATIENT)
Dept: RADIOLOGY | Facility: HOSPITAL | Age: 56
Discharge: HOME | End: 2021-01-11
Attending: NURSE PRACTITIONER
Payer: COMMERCIAL

## 2021-01-11 DIAGNOSIS — B18.1 CHRONIC HEPATITIS B: ICD-10-CM

## 2021-01-11 LAB
ALBUMIN SERPL BCP-MCNC: 4 G/DL (ref 3.5–5.2)
ALP SERPL-CCNC: 69 U/L (ref 55–135)
ALT SERPL W/O P-5'-P-CCNC: 48 U/L (ref 10–44)
ANION GAP SERPL CALC-SCNC: 8 MMOL/L (ref 8–16)
AST SERPL-CCNC: 31 U/L (ref 10–40)
BILIRUB SERPL-MCNC: 0.9 MG/DL (ref 0.1–1)
BUN SERPL-MCNC: 17 MG/DL (ref 6–20)
CALCIUM SERPL-MCNC: 8.9 MG/DL (ref 8.7–10.5)
CHLORIDE SERPL-SCNC: 103 MMOL/L (ref 95–110)
CO2 SERPL-SCNC: 27 MMOL/L (ref 23–29)
CREAT SERPL-MCNC: 1 MG/DL (ref 0.5–1.4)
ERYTHROCYTE [DISTWIDTH] IN BLOOD BY AUTOMATED COUNT: 13.9 % (ref 11.5–14.5)
EST. GFR  (AFRICAN AMERICAN): >60 ML/MIN/1.73 M^2
EST. GFR  (NON AFRICAN AMERICAN): >60 ML/MIN/1.73 M^2
GLUCOSE SERPL-MCNC: 80 MG/DL (ref 70–110)
HCT VFR BLD AUTO: 50.3 % (ref 40–54)
HGB BLD-MCNC: 15.4 G/DL (ref 14–18)
INR PPP: 1 (ref 0.8–1.2)
MCH RBC QN AUTO: 26 PG (ref 27–31)
MCHC RBC AUTO-ENTMCNC: 30.6 G/DL (ref 32–36)
MCV RBC AUTO: 85 FL (ref 82–98)
PLATELET # BLD AUTO: 297 K/UL (ref 150–350)
PMV BLD AUTO: 9.9 FL (ref 9.2–12.9)
POTASSIUM SERPL-SCNC: 4 MMOL/L (ref 3.5–5.1)
PROT SERPL-MCNC: 7.9 G/DL (ref 6–8.4)
PROTHROMBIN TIME: 11.1 SEC (ref 9–12.5)
RBC # BLD AUTO: 5.92 M/UL (ref 4.6–6.2)
SODIUM SERPL-SCNC: 138 MMOL/L (ref 136–145)
WBC # BLD AUTO: 6.22 K/UL (ref 3.9–12.7)

## 2021-01-11 PROCEDURE — 85027 COMPLETE CBC AUTOMATED: CPT

## 2021-01-11 PROCEDURE — 76700 US EXAM ABDOM COMPLETE: CPT | Mod: TC

## 2021-01-11 PROCEDURE — 80053 COMPREHEN METABOLIC PANEL: CPT

## 2021-01-11 PROCEDURE — 76700 US ABDOMEN COMPLETE: ICD-10-PCS | Mod: 26,,, | Performed by: RADIOLOGY

## 2021-01-11 PROCEDURE — 87340 HEPATITIS B SURFACE AG IA: CPT

## 2021-01-11 PROCEDURE — 85610 PROTHROMBIN TIME: CPT

## 2021-01-11 PROCEDURE — 87517 HEPATITIS B DNA QUANT: CPT

## 2021-01-11 PROCEDURE — 76700 US EXAM ABDOM COMPLETE: CPT | Mod: 26,,, | Performed by: RADIOLOGY

## 2021-01-11 PROCEDURE — 36415 COLL VENOUS BLD VENIPUNCTURE: CPT

## 2021-01-11 PROCEDURE — 82105 ALPHA-FETOPROTEIN SERUM: CPT

## 2021-01-11 PROCEDURE — 80321 ALCOHOLS BIOMARKERS 1OR 2: CPT

## 2021-01-12 LAB — AFP SERPL-MCNC: 2.4 NG/ML (ref 0–8.4)

## 2021-01-13 LAB
HBV DNA SERPL NAA+PROBE-ACNC: <10 IU/ML
HBV DNA SERPL NAA+PROBE-LOG IU: <1 LOG (10) IU/ML
HBV DNA SERPL QL NAA+PROBE: NOT DETECTED
HBV SURFACE AG SERPL QL IA: POSITIVE

## 2021-01-15 ENCOUNTER — TELEPHONE (OUTPATIENT)
Dept: HEPATOLOGY | Facility: CLINIC | Age: 56
End: 2021-01-15

## 2021-01-17 ENCOUNTER — PATIENT OUTREACH (OUTPATIENT)
Dept: ADMINISTRATIVE | Facility: OTHER | Age: 56
End: 2021-01-17

## 2021-01-20 ENCOUNTER — OFFICE VISIT (OUTPATIENT)
Dept: HEPATOLOGY | Facility: CLINIC | Age: 56
End: 2021-01-20
Payer: COMMERCIAL

## 2021-01-20 ENCOUNTER — SPECIALTY PHARMACY (OUTPATIENT)
Dept: PHARMACY | Facility: CLINIC | Age: 56
End: 2021-01-20

## 2021-01-20 VITALS
HEART RATE: 72 BPM | SYSTOLIC BLOOD PRESSURE: 166 MMHG | DIASTOLIC BLOOD PRESSURE: 92 MMHG | TEMPERATURE: 98 F | WEIGHT: 199.31 LBS | RESPIRATION RATE: 16 BRPM | BODY MASS INDEX: 29.52 KG/M2 | HEIGHT: 69 IN

## 2021-01-20 DIAGNOSIS — K76.0 NAFLD (NONALCOHOLIC FATTY LIVER DISEASE): ICD-10-CM

## 2021-01-20 DIAGNOSIS — B18.1 CHRONIC HEPATITIS B: Primary | ICD-10-CM

## 2021-01-20 DIAGNOSIS — K74.01 HEPATIC FIBROSIS, EARLY FIBROSIS: ICD-10-CM

## 2021-01-20 PROBLEM — K74.00 LIVER FIBROSIS: Status: RESOLVED | Noted: 2019-12-12 | Resolved: 2021-01-20

## 2021-01-20 LAB — PHOSPHATIDYLETHANOL (PETH): NEGATIVE NG/ML

## 2021-01-20 PROCEDURE — 3008F BODY MASS INDEX DOCD: CPT | Mod: CPTII,S$GLB,, | Performed by: NURSE PRACTITIONER

## 2021-01-20 PROCEDURE — 3080F DIAST BP >= 90 MM HG: CPT | Mod: CPTII,S$GLB,, | Performed by: NURSE PRACTITIONER

## 2021-01-20 PROCEDURE — 99999 PR PBB SHADOW E&M-EST. PATIENT-LVL V: ICD-10-PCS | Mod: PBBFAC,,, | Performed by: NURSE PRACTITIONER

## 2021-01-20 PROCEDURE — 3008F PR BODY MASS INDEX (BMI) DOCUMENTED: ICD-10-PCS | Mod: CPTII,S$GLB,, | Performed by: NURSE PRACTITIONER

## 2021-01-20 PROCEDURE — 1126F AMNT PAIN NOTED NONE PRSNT: CPT | Mod: S$GLB,,, | Performed by: NURSE PRACTITIONER

## 2021-01-20 PROCEDURE — 99214 PR OFFICE/OUTPT VISIT, EST, LEVL IV, 30-39 MIN: ICD-10-PCS | Mod: S$GLB,,, | Performed by: NURSE PRACTITIONER

## 2021-01-20 PROCEDURE — 3080F PR MOST RECENT DIASTOLIC BLOOD PRESSURE >= 90 MM HG: ICD-10-PCS | Mod: CPTII,S$GLB,, | Performed by: NURSE PRACTITIONER

## 2021-01-20 PROCEDURE — 99999 PR PBB SHADOW E&M-EST. PATIENT-LVL V: CPT | Mod: PBBFAC,,, | Performed by: NURSE PRACTITIONER

## 2021-01-20 PROCEDURE — 3077F SYST BP >= 140 MM HG: CPT | Mod: CPTII,S$GLB,, | Performed by: NURSE PRACTITIONER

## 2021-01-20 PROCEDURE — 3077F PR MOST RECENT SYSTOLIC BLOOD PRESSURE >= 140 MM HG: ICD-10-PCS | Mod: CPTII,S$GLB,, | Performed by: NURSE PRACTITIONER

## 2021-01-20 PROCEDURE — 99214 OFFICE O/P EST MOD 30 MIN: CPT | Mod: S$GLB,,, | Performed by: NURSE PRACTITIONER

## 2021-01-20 PROCEDURE — 1126F PR PAIN SEVERITY QUANTIFIED, NO PAIN PRESENT: ICD-10-PCS | Mod: S$GLB,,, | Performed by: NURSE PRACTITIONER

## 2021-01-20 RX ORDER — TENOFOVIR ALAFENAMIDE 25 MG/1
25 TABLET ORAL DAILY
Qty: 30 TABLET | Refills: 6 | Status: SHIPPED | OUTPATIENT
Start: 2021-01-20 | End: 2021-08-04 | Stop reason: SDUPTHER

## 2021-01-20 RX ORDER — NYSTATIN AND TRIAMCINOLONE ACETONIDE 100000; 1 [USP'U]/G; MG/G
CREAM TOPICAL 2 TIMES DAILY
Qty: 30 G | Refills: 0 | Status: SHIPPED | OUTPATIENT
Start: 2021-01-20 | End: 2022-02-16 | Stop reason: SDUPTHER

## 2021-02-03 ENCOUNTER — TELEPHONE (OUTPATIENT)
Dept: PAIN MEDICINE | Facility: CLINIC | Age: 56
End: 2021-02-03

## 2021-02-19 ENCOUNTER — TELEPHONE (OUTPATIENT)
Dept: INTERNAL MEDICINE | Facility: CLINIC | Age: 56
End: 2021-02-19

## 2021-02-23 ENCOUNTER — SPECIALTY PHARMACY (OUTPATIENT)
Dept: PHARMACY | Facility: CLINIC | Age: 56
End: 2021-02-23

## 2021-02-23 DIAGNOSIS — B18.1 CHRONIC HEPATITIS B: Primary | ICD-10-CM

## 2021-03-27 ENCOUNTER — TELEPHONE (OUTPATIENT)
Dept: ENDOSCOPY | Facility: HOSPITAL | Age: 56
End: 2021-03-27

## 2021-04-05 ENCOUNTER — SPECIALTY PHARMACY (OUTPATIENT)
Dept: PHARMACY | Facility: CLINIC | Age: 56
End: 2021-04-05

## 2021-04-05 DIAGNOSIS — B18.1 CHRONIC HEPATITIS B: Primary | ICD-10-CM

## 2021-04-07 ENCOUNTER — OFFICE VISIT (OUTPATIENT)
Dept: PODIATRY | Facility: CLINIC | Age: 56
End: 2021-04-07
Payer: COMMERCIAL

## 2021-04-07 VITALS
BODY MASS INDEX: 30.63 KG/M2 | HEIGHT: 69 IN | HEART RATE: 71 BPM | SYSTOLIC BLOOD PRESSURE: 147 MMHG | DIASTOLIC BLOOD PRESSURE: 87 MMHG | WEIGHT: 206.81 LBS

## 2021-04-07 DIAGNOSIS — B35.1 ONYCHOMYCOSIS DUE TO DERMATOPHYTE: Primary | ICD-10-CM

## 2021-04-07 PROCEDURE — 1125F PR PAIN SEVERITY QUANTIFIED, PAIN PRESENT: ICD-10-PCS | Mod: S$GLB,,, | Performed by: PODIATRIST

## 2021-04-07 PROCEDURE — 99999 PR PBB SHADOW E&M-EST. PATIENT-LVL III: CPT | Mod: PBBFAC,,, | Performed by: PODIATRIST

## 2021-04-07 PROCEDURE — 3077F PR MOST RECENT SYSTOLIC BLOOD PRESSURE >= 140 MM HG: ICD-10-PCS | Mod: CPTII,S$GLB,, | Performed by: PODIATRIST

## 2021-04-07 PROCEDURE — 3008F PR BODY MASS INDEX (BMI) DOCUMENTED: ICD-10-PCS | Mod: CPTII,S$GLB,, | Performed by: PODIATRIST

## 2021-04-07 PROCEDURE — 3008F BODY MASS INDEX DOCD: CPT | Mod: CPTII,S$GLB,, | Performed by: PODIATRIST

## 2021-04-07 PROCEDURE — 3079F PR MOST RECENT DIASTOLIC BLOOD PRESSURE 80-89 MM HG: ICD-10-PCS | Mod: CPTII,S$GLB,, | Performed by: PODIATRIST

## 2021-04-07 PROCEDURE — 3079F DIAST BP 80-89 MM HG: CPT | Mod: CPTII,S$GLB,, | Performed by: PODIATRIST

## 2021-04-07 PROCEDURE — 99999 PR PBB SHADOW E&M-EST. PATIENT-LVL III: ICD-10-PCS | Mod: PBBFAC,,, | Performed by: PODIATRIST

## 2021-04-07 PROCEDURE — 1125F AMNT PAIN NOTED PAIN PRSNT: CPT | Mod: S$GLB,,, | Performed by: PODIATRIST

## 2021-04-07 PROCEDURE — 3077F SYST BP >= 140 MM HG: CPT | Mod: CPTII,S$GLB,, | Performed by: PODIATRIST

## 2021-04-07 PROCEDURE — 99203 OFFICE O/P NEW LOW 30 MIN: CPT | Mod: S$GLB,,, | Performed by: PODIATRIST

## 2021-04-07 PROCEDURE — 99203 PR OFFICE/OUTPT VISIT, NEW, LEVL III, 30-44 MIN: ICD-10-PCS | Mod: S$GLB,,, | Performed by: PODIATRIST

## 2021-04-14 ENCOUNTER — TELEPHONE (OUTPATIENT)
Dept: PODIATRY | Facility: CLINIC | Age: 56
End: 2021-04-14

## 2021-05-05 ENCOUNTER — SPECIALTY PHARMACY (OUTPATIENT)
Dept: PHARMACY | Facility: CLINIC | Age: 56
End: 2021-05-05

## 2021-05-28 ENCOUNTER — PATIENT OUTREACH (OUTPATIENT)
Dept: ADMINISTRATIVE | Facility: OTHER | Age: 56
End: 2021-05-28

## 2021-05-31 ENCOUNTER — HOSPITAL ENCOUNTER (OUTPATIENT)
Dept: RADIOLOGY | Facility: OTHER | Age: 56
Discharge: HOME | End: 2021-05-31
Attending: NURSE PRACTITIONER
Payer: COMMERCIAL

## 2021-05-31 ENCOUNTER — OFFICE VISIT (OUTPATIENT)
Dept: SPINE | Facility: CLINIC | Age: 56
End: 2021-05-31
Payer: COMMERCIAL

## 2021-05-31 VITALS
WEIGHT: 206.81 LBS | DIASTOLIC BLOOD PRESSURE: 72 MMHG | SYSTOLIC BLOOD PRESSURE: 140 MMHG | BODY MASS INDEX: 30.63 KG/M2 | HEART RATE: 70 BPM | HEIGHT: 69 IN

## 2021-05-31 DIAGNOSIS — G89.29 CHRONIC BILATERAL LOW BACK PAIN WITHOUT SCIATICA: ICD-10-CM

## 2021-05-31 DIAGNOSIS — M79.18 MYOFASCIAL PAIN: Primary | ICD-10-CM

## 2021-05-31 DIAGNOSIS — M54.50 CHRONIC BILATERAL LOW BACK PAIN WITHOUT SCIATICA: ICD-10-CM

## 2021-05-31 DIAGNOSIS — M79.18 MYOFASCIAL PAIN: ICD-10-CM

## 2021-05-31 PROCEDURE — 3008F PR BODY MASS INDEX (BMI) DOCUMENTED: ICD-10-PCS | Mod: CPTII,S$GLB,, | Performed by: NURSE PRACTITIONER

## 2021-05-31 PROCEDURE — 72110 X-RAY EXAM L-2 SPINE 4/>VWS: CPT | Mod: TC,FY

## 2021-05-31 PROCEDURE — 1125F PR PAIN SEVERITY QUANTIFIED, PAIN PRESENT: ICD-10-PCS | Mod: S$GLB,,, | Performed by: NURSE PRACTITIONER

## 2021-05-31 PROCEDURE — 99204 OFFICE O/P NEW MOD 45 MIN: CPT | Mod: S$GLB,,, | Performed by: NURSE PRACTITIONER

## 2021-05-31 PROCEDURE — 1125F AMNT PAIN NOTED PAIN PRSNT: CPT | Mod: S$GLB,,, | Performed by: NURSE PRACTITIONER

## 2021-05-31 PROCEDURE — 72110 XR LUMBAR SPINE 5 VIEW WITH FLEX AND EXT: ICD-10-PCS | Mod: 26,,, | Performed by: RADIOLOGY

## 2021-05-31 PROCEDURE — 99204 PR OFFICE/OUTPT VISIT, NEW, LEVL IV, 45-59 MIN: ICD-10-PCS | Mod: S$GLB,,, | Performed by: NURSE PRACTITIONER

## 2021-05-31 PROCEDURE — 3008F BODY MASS INDEX DOCD: CPT | Mod: CPTII,S$GLB,, | Performed by: NURSE PRACTITIONER

## 2021-05-31 PROCEDURE — 99999 PR PBB SHADOW E&M-EST. PATIENT-LVL IV: ICD-10-PCS | Mod: PBBFAC,,, | Performed by: NURSE PRACTITIONER

## 2021-05-31 PROCEDURE — 99999 PR PBB SHADOW E&M-EST. PATIENT-LVL IV: CPT | Mod: PBBFAC,,, | Performed by: NURSE PRACTITIONER

## 2021-05-31 PROCEDURE — 72110 X-RAY EXAM L-2 SPINE 4/>VWS: CPT | Mod: 26,,, | Performed by: RADIOLOGY

## 2021-06-09 ENCOUNTER — CLINICAL SUPPORT (OUTPATIENT)
Dept: REHABILITATION | Facility: HOSPITAL | Age: 56
End: 2021-06-09
Payer: COMMERCIAL

## 2021-06-09 DIAGNOSIS — G89.29 CHRONIC BILATERAL LOW BACK PAIN WITHOUT SCIATICA: ICD-10-CM

## 2021-06-09 DIAGNOSIS — M54.50 CHRONIC BILATERAL LOW BACK PAIN WITHOUT SCIATICA: ICD-10-CM

## 2021-06-09 DIAGNOSIS — M79.18 MYOFASCIAL PAIN: ICD-10-CM

## 2021-06-09 DIAGNOSIS — M54.50 ACUTE MIDLINE LOW BACK PAIN WITHOUT SCIATICA: ICD-10-CM

## 2021-06-09 PROCEDURE — 97161 PT EVAL LOW COMPLEX 20 MIN: CPT

## 2021-06-09 PROCEDURE — 97110 THERAPEUTIC EXERCISES: CPT

## 2021-06-15 ENCOUNTER — CLINICAL SUPPORT (OUTPATIENT)
Dept: REHABILITATION | Facility: HOSPITAL | Age: 56
End: 2021-06-15
Payer: COMMERCIAL

## 2021-06-15 DIAGNOSIS — M54.50 ACUTE MIDLINE LOW BACK PAIN WITHOUT SCIATICA: ICD-10-CM

## 2021-06-15 PROCEDURE — 97140 MANUAL THERAPY 1/> REGIONS: CPT

## 2021-06-15 PROCEDURE — 97110 THERAPEUTIC EXERCISES: CPT

## 2021-06-18 ENCOUNTER — CLINICAL SUPPORT (OUTPATIENT)
Dept: REHABILITATION | Facility: HOSPITAL | Age: 56
End: 2021-06-18
Payer: COMMERCIAL

## 2021-06-18 DIAGNOSIS — M54.50 ACUTE MIDLINE LOW BACK PAIN WITHOUT SCIATICA: ICD-10-CM

## 2021-06-18 PROCEDURE — 97140 MANUAL THERAPY 1/> REGIONS: CPT

## 2021-06-18 PROCEDURE — 97110 THERAPEUTIC EXERCISES: CPT

## 2021-06-21 ENCOUNTER — SPECIALTY PHARMACY (OUTPATIENT)
Dept: PHARMACY | Facility: CLINIC | Age: 56
End: 2021-06-21

## 2021-06-22 ENCOUNTER — CLINICAL SUPPORT (OUTPATIENT)
Dept: REHABILITATION | Facility: HOSPITAL | Age: 56
End: 2021-06-22
Payer: COMMERCIAL

## 2021-06-22 DIAGNOSIS — M54.50 ACUTE MIDLINE LOW BACK PAIN WITHOUT SCIATICA: ICD-10-CM

## 2021-06-22 PROCEDURE — 97110 THERAPEUTIC EXERCISES: CPT

## 2021-07-12 ENCOUNTER — TELEPHONE (OUTPATIENT)
Dept: PODIATRY | Facility: CLINIC | Age: 56
End: 2021-07-12

## 2021-07-13 ENCOUNTER — TELEPHONE (OUTPATIENT)
Dept: RADIOLOGY | Facility: HOSPITAL | Age: 56
End: 2021-07-13

## 2021-07-20 ENCOUNTER — TELEPHONE (OUTPATIENT)
Dept: INTERNAL MEDICINE | Facility: CLINIC | Age: 56
End: 2021-07-20

## 2021-07-20 ENCOUNTER — PATIENT OUTREACH (OUTPATIENT)
Dept: ADMINISTRATIVE | Facility: OTHER | Age: 56
End: 2021-07-20

## 2021-07-20 ENCOUNTER — TELEPHONE (OUTPATIENT)
Dept: SPINE | Facility: CLINIC | Age: 56
End: 2021-07-20

## 2021-07-28 ENCOUNTER — TELEPHONE (OUTPATIENT)
Dept: HEPATOLOGY | Facility: CLINIC | Age: 56
End: 2021-07-28

## 2021-07-28 ENCOUNTER — TELEPHONE (OUTPATIENT)
Dept: RADIOLOGY | Facility: HOSPITAL | Age: 56
End: 2021-07-28

## 2021-07-29 ENCOUNTER — TELEPHONE (OUTPATIENT)
Dept: HEPATOLOGY | Facility: CLINIC | Age: 56
End: 2021-07-29

## 2021-07-29 ENCOUNTER — LAB VISIT (OUTPATIENT)
Dept: LAB | Facility: HOSPITAL | Age: 56
End: 2021-07-29
Attending: NURSE PRACTITIONER
Payer: COMMERCIAL

## 2021-07-29 ENCOUNTER — HOSPITAL ENCOUNTER (OUTPATIENT)
Dept: RADIOLOGY | Facility: HOSPITAL | Age: 56
Discharge: HOME | End: 2021-07-29
Attending: NURSE PRACTITIONER
Payer: COMMERCIAL

## 2021-07-29 DIAGNOSIS — B18.1 CHRONIC HEPATITIS B: ICD-10-CM

## 2021-07-29 DIAGNOSIS — B18.1 CHRONIC HEPATITIS B: Primary | ICD-10-CM

## 2021-07-29 LAB
AFP SERPL-MCNC: 2.6 NG/ML (ref 0–8.4)
ALBUMIN SERPL BCP-MCNC: 3.6 G/DL (ref 3.5–5.2)
ALP SERPL-CCNC: 65 U/L (ref 55–135)
ALT SERPL W/O P-5'-P-CCNC: 43 U/L (ref 10–44)
ANION GAP SERPL CALC-SCNC: 10 MMOL/L (ref 8–16)
AST SERPL-CCNC: 29 U/L (ref 10–40)
BILIRUB SERPL-MCNC: 0.7 MG/DL (ref 0.1–1)
BUN SERPL-MCNC: 12 MG/DL (ref 6–20)
CALCIUM SERPL-MCNC: 8.9 MG/DL (ref 8.7–10.5)
CHLORIDE SERPL-SCNC: 108 MMOL/L (ref 95–110)
CO2 SERPL-SCNC: 21 MMOL/L (ref 23–29)
CREAT SERPL-MCNC: 0.9 MG/DL (ref 0.5–1.4)
ERYTHROCYTE [DISTWIDTH] IN BLOOD BY AUTOMATED COUNT: 13.8 % (ref 11.5–14.5)
EST. GFR  (AFRICAN AMERICAN): >60 ML/MIN/1.73 M^2
EST. GFR  (NON AFRICAN AMERICAN): >60 ML/MIN/1.73 M^2
GLUCOSE SERPL-MCNC: 98 MG/DL (ref 70–110)
HCT VFR BLD AUTO: 45.4 % (ref 40–54)
HGB BLD-MCNC: 14.4 G/DL (ref 14–18)
INR PPP: 1 (ref 0.8–1.2)
MCH RBC QN AUTO: 26.3 PG (ref 27–31)
MCHC RBC AUTO-ENTMCNC: 31.7 G/DL (ref 32–36)
MCV RBC AUTO: 83 FL (ref 82–98)
PLATELET # BLD AUTO: 276 K/UL (ref 150–450)
PMV BLD AUTO: 10.5 FL (ref 9.2–12.9)
POTASSIUM SERPL-SCNC: 3.7 MMOL/L (ref 3.5–5.1)
PROT SERPL-MCNC: 7.4 G/DL (ref 6–8.4)
PROTHROMBIN TIME: 11.1 SEC (ref 9–12.5)
RBC # BLD AUTO: 5.47 M/UL (ref 4.6–6.2)
SODIUM SERPL-SCNC: 139 MMOL/L (ref 136–145)
WBC # BLD AUTO: 6.38 K/UL (ref 3.9–12.7)

## 2021-07-29 PROCEDURE — 80053 COMPREHEN METABOLIC PANEL: CPT | Performed by: NURSE PRACTITIONER

## 2021-07-29 PROCEDURE — 87517 HEPATITIS B DNA QUANT: CPT | Performed by: NURSE PRACTITIONER

## 2021-07-29 PROCEDURE — 36415 COLL VENOUS BLD VENIPUNCTURE: CPT | Performed by: NURSE PRACTITIONER

## 2021-07-29 PROCEDURE — 85027 COMPLETE CBC AUTOMATED: CPT | Performed by: NURSE PRACTITIONER

## 2021-07-29 PROCEDURE — 76700 US EXAM ABDOM COMPLETE: CPT | Mod: 26,,, | Performed by: RADIOLOGY

## 2021-07-29 PROCEDURE — 87340 HEPATITIS B SURFACE AG IA: CPT | Performed by: NURSE PRACTITIONER

## 2021-07-29 PROCEDURE — 82105 ALPHA-FETOPROTEIN SERUM: CPT | Performed by: NURSE PRACTITIONER

## 2021-07-29 PROCEDURE — 85610 PROTHROMBIN TIME: CPT | Performed by: NURSE PRACTITIONER

## 2021-07-29 PROCEDURE — 76700 US ABDOMEN COMPLETE: ICD-10-PCS | Mod: 26,,, | Performed by: RADIOLOGY

## 2021-07-29 PROCEDURE — 76700 US EXAM ABDOM COMPLETE: CPT | Mod: TC

## 2021-07-30 LAB — HBV SURFACE AG SERPL QL IA: POSITIVE

## 2021-08-02 LAB
HBV DNA SERPL NAA+PROBE-ACNC: 55 IU/ML
HBV DNA SERPL NAA+PROBE-LOG IU: 1.74 LOG (10) IU/ML
HBV DNA SERPL QL NAA+PROBE: DETECTED

## 2021-08-03 ENCOUNTER — SPECIALTY PHARMACY (OUTPATIENT)
Dept: PHARMACY | Facility: CLINIC | Age: 56
End: 2021-08-03

## 2021-08-04 ENCOUNTER — SPECIALTY PHARMACY (OUTPATIENT)
Dept: PHARMACY | Facility: CLINIC | Age: 56
End: 2021-08-04

## 2021-08-04 ENCOUNTER — PROCEDURE VISIT (OUTPATIENT)
Dept: HEPATOLOGY | Facility: CLINIC | Age: 56
End: 2021-08-04
Payer: COMMERCIAL

## 2021-08-04 ENCOUNTER — OFFICE VISIT (OUTPATIENT)
Dept: HEPATOLOGY | Facility: CLINIC | Age: 56
End: 2021-08-04
Payer: COMMERCIAL

## 2021-08-04 VITALS
BODY MASS INDEX: 30.76 KG/M2 | TEMPERATURE: 98 F | WEIGHT: 207.69 LBS | RESPIRATION RATE: 18 BRPM | SYSTOLIC BLOOD PRESSURE: 156 MMHG | DIASTOLIC BLOOD PRESSURE: 72 MMHG | HEART RATE: 66 BPM | OXYGEN SATURATION: 96 % | HEIGHT: 69 IN

## 2021-08-04 DIAGNOSIS — E78.5 HYPERLIPIDEMIA, UNSPECIFIED HYPERLIPIDEMIA TYPE: ICD-10-CM

## 2021-08-04 DIAGNOSIS — K76.0 NAFLD (NONALCOHOLIC FATTY LIVER DISEASE): ICD-10-CM

## 2021-08-04 DIAGNOSIS — I10 HYPERTENSION, ESSENTIAL: ICD-10-CM

## 2021-08-04 DIAGNOSIS — B18.1 CHRONIC HEPATITIS B: ICD-10-CM

## 2021-08-04 DIAGNOSIS — B18.1 CHRONIC HEPATITIS B: Primary | ICD-10-CM

## 2021-08-04 PROBLEM — K74.01 HEPATIC FIBROSIS, EARLY FIBROSIS: Status: RESOLVED | Noted: 2019-12-12 | Resolved: 2021-08-04

## 2021-08-04 PROCEDURE — 99999 PR PBB SHADOW E&M-EST. PATIENT-LVL V: CPT | Mod: PBBFAC,,, | Performed by: NURSE PRACTITIONER

## 2021-08-04 PROCEDURE — 3078F PR MOST RECENT DIASTOLIC BLOOD PRESSURE < 80 MM HG: ICD-10-PCS | Mod: CPTII,S$GLB,, | Performed by: NURSE PRACTITIONER

## 2021-08-04 PROCEDURE — 3077F PR MOST RECENT SYSTOLIC BLOOD PRESSURE >= 140 MM HG: ICD-10-PCS | Mod: CPTII,S$GLB,, | Performed by: NURSE PRACTITIONER

## 2021-08-04 PROCEDURE — 3008F BODY MASS INDEX DOCD: CPT | Mod: CPTII,S$GLB,, | Performed by: NURSE PRACTITIONER

## 2021-08-04 PROCEDURE — 1159F MED LIST DOCD IN RCRD: CPT | Mod: CPTII,S$GLB,, | Performed by: NURSE PRACTITIONER

## 2021-08-04 PROCEDURE — 99214 PR OFFICE/OUTPT VISIT, EST, LEVL IV, 30-39 MIN: ICD-10-PCS | Mod: S$GLB,,, | Performed by: NURSE PRACTITIONER

## 2021-08-04 PROCEDURE — 99999 PR PBB SHADOW E&M-EST. PATIENT-LVL V: ICD-10-PCS | Mod: PBBFAC,,, | Performed by: NURSE PRACTITIONER

## 2021-08-04 PROCEDURE — 91200 FIBROSCAN (VIBRATION CONTROLLED TRANSIENT ELASTOGRAPHY): ICD-10-PCS | Mod: S$GLB,,, | Performed by: NURSE PRACTITIONER

## 2021-08-04 PROCEDURE — 91200 LIVER ELASTOGRAPHY: CPT | Mod: S$GLB,,, | Performed by: NURSE PRACTITIONER

## 2021-08-04 PROCEDURE — 1125F AMNT PAIN NOTED PAIN PRSNT: CPT | Mod: CPTII,S$GLB,, | Performed by: NURSE PRACTITIONER

## 2021-08-04 PROCEDURE — 1125F PR PAIN SEVERITY QUANTIFIED, PAIN PRESENT: ICD-10-PCS | Mod: CPTII,S$GLB,, | Performed by: NURSE PRACTITIONER

## 2021-08-04 PROCEDURE — 3008F PR BODY MASS INDEX (BMI) DOCUMENTED: ICD-10-PCS | Mod: CPTII,S$GLB,, | Performed by: NURSE PRACTITIONER

## 2021-08-04 PROCEDURE — 3078F DIAST BP <80 MM HG: CPT | Mod: CPTII,S$GLB,, | Performed by: NURSE PRACTITIONER

## 2021-08-04 PROCEDURE — 1160F PR REVIEW ALL MEDS BY PRESCRIBER/CLIN PHARMACIST DOCUMENTED: ICD-10-PCS | Mod: CPTII,S$GLB,, | Performed by: NURSE PRACTITIONER

## 2021-08-04 PROCEDURE — 3077F SYST BP >= 140 MM HG: CPT | Mod: CPTII,S$GLB,, | Performed by: NURSE PRACTITIONER

## 2021-08-04 PROCEDURE — 1159F PR MEDICATION LIST DOCUMENTED IN MEDICAL RECORD: ICD-10-PCS | Mod: CPTII,S$GLB,, | Performed by: NURSE PRACTITIONER

## 2021-08-04 PROCEDURE — 1160F RVW MEDS BY RX/DR IN RCRD: CPT | Mod: CPTII,S$GLB,, | Performed by: NURSE PRACTITIONER

## 2021-08-04 PROCEDURE — 99214 OFFICE O/P EST MOD 30 MIN: CPT | Mod: S$GLB,,, | Performed by: NURSE PRACTITIONER

## 2021-08-04 RX ORDER — TENOFOVIR ALAFENAMIDE 25 MG/1
25 TABLET ORAL DAILY
Qty: 30 TABLET | Refills: 11 | Status: SHIPPED | OUTPATIENT
Start: 2021-08-04 | End: 2022-08-22 | Stop reason: SDUPTHER

## 2021-08-06 ENCOUNTER — TELEPHONE (OUTPATIENT)
Dept: HEPATOLOGY | Facility: CLINIC | Age: 56
End: 2021-08-06

## 2021-09-10 ENCOUNTER — TELEPHONE (OUTPATIENT)
Dept: PHARMACY | Facility: CLINIC | Age: 56
End: 2021-09-10

## 2021-09-18 ENCOUNTER — IMMUNIZATION (OUTPATIENT)
Dept: INTERNAL MEDICINE | Facility: CLINIC | Age: 56
End: 2021-09-18
Payer: COMMERCIAL

## 2021-09-18 DIAGNOSIS — Z23 NEED FOR VACCINATION: Primary | ICD-10-CM

## 2021-09-18 PROCEDURE — 91300 COVID-19, MRNA, LNP-S, PF, 30 MCG/0.3 ML DOSE VACCINE: ICD-10-PCS | Mod: ,,, | Performed by: INTERNAL MEDICINE

## 2021-09-18 PROCEDURE — 0001A COVID-19, MRNA, LNP-S, PF, 30 MCG/0.3 ML DOSE VACCINE: ICD-10-PCS | Mod: CV19,,, | Performed by: INTERNAL MEDICINE

## 2021-09-18 PROCEDURE — 0001A COVID-19, MRNA, LNP-S, PF, 30 MCG/0.3 ML DOSE VACCINE: CPT | Mod: CV19,,, | Performed by: INTERNAL MEDICINE

## 2021-09-18 PROCEDURE — 91300 COVID-19, MRNA, LNP-S, PF, 30 MCG/0.3 ML DOSE VACCINE: CPT | Mod: ,,, | Performed by: INTERNAL MEDICINE

## 2021-09-20 RX ORDER — CLOPIDOGREL BISULFATE 75 MG/1
75 TABLET ORAL DAILY
Qty: 30 TABLET | Refills: 11 | Status: ON HOLD | OUTPATIENT
Start: 2021-09-20 | End: 2021-10-29 | Stop reason: HOSPADM

## 2021-09-29 ENCOUNTER — HOSPITAL ENCOUNTER (OUTPATIENT)
Dept: CARDIOLOGY | Facility: HOSPITAL | Age: 56
Discharge: HOME | End: 2021-09-29
Attending: STUDENT IN AN ORGANIZED HEALTH CARE EDUCATION/TRAINING PROGRAM
Payer: COMMERCIAL

## 2021-09-29 ENCOUNTER — OFFICE VISIT (OUTPATIENT)
Dept: CARDIOLOGY | Facility: CLINIC | Age: 56
End: 2021-09-29
Payer: COMMERCIAL

## 2021-09-29 ENCOUNTER — PATIENT OUTREACH (OUTPATIENT)
Dept: ADMINISTRATIVE | Facility: OTHER | Age: 56
End: 2021-09-29

## 2021-09-29 VITALS
WEIGHT: 193.81 LBS | DIASTOLIC BLOOD PRESSURE: 90 MMHG | HEART RATE: 74 BPM | BODY MASS INDEX: 28.71 KG/M2 | HEIGHT: 69 IN | SYSTOLIC BLOOD PRESSURE: 162 MMHG

## 2021-09-29 DIAGNOSIS — E78.5 HYPERLIPIDEMIA, UNSPECIFIED HYPERLIPIDEMIA TYPE: ICD-10-CM

## 2021-09-29 DIAGNOSIS — I73.9 PAD (PERIPHERAL ARTERY DISEASE): ICD-10-CM

## 2021-09-29 DIAGNOSIS — I73.9 PAD (PERIPHERAL ARTERY DISEASE): Primary | ICD-10-CM

## 2021-09-29 DIAGNOSIS — I10 HYPERTENSION, ESSENTIAL: ICD-10-CM

## 2021-09-29 LAB
LEFT ANT TIBIAL SYS PSV: 0 CM/S
LEFT CFA PSV: 120 CM/S
LEFT DORSALIS PEDIS PSV: 0 CM/S
LEFT EXTERNAL ILIAC PSV: 158 CM/S
LEFT PERONEAL SYS PSV: 22 CM/S
LEFT POPLITEAL PSV: 70 CM/S
LEFT POST TIBIAL SYS PSV: 57 CM/S
LEFT PROFUNDA SYS PSV: 222 CM/S
LEFT SUPER FEMORAL DIST SYS PSV: 146 CM/S
LEFT SUPER FEMORAL MID SYS PSV: 0 CM/S
LEFT SUPER FEMORAL OSTIAL SYS PSV: 137 CM/S
LEFT SUPER FEMORAL PROX SYS PSV: 0 CM/S
LEFT TIB/PER TRUNK SYS PSV: 51 CM/S
OHS CV LEFT LOWER EXTREMITY ABI (NO CALC): 0.68
OHS CV LOWER EXTREMITY STENT MEASUREMENTS - RIGHT - MSFA S1 - OST: 0
OHS CV RIGHT ABI LOWER EXTREMITY (NO CALC): 0.6
RIGHT ANT TIBIAL SYS PSV: 17 CM/S
RIGHT CFA PSV: 149 CM/S
RIGHT DORSALIS PEDIS PSV: 0 CM/S
RIGHT EXTERNAL ILLIAC PSV: 192 CM/S
RIGHT PERONEAL SYS PSV: 17 CM/S
RIGHT POPLITEAL PSV: 40 CM/S
RIGHT POST TIBIAL SYS PSV: 34 CM/S
RIGHT PROFUNDA SYS PSV: 157 CM/S
RIGHT SUPER FEMORAL DIST SYS PSV: 46 CM/S
RIGHT SUPER FEMORAL MID SYS PSV: 0 CM/S
RIGHT SUPER FEMORAL OSTIAL SYS PSV: 59 CM/S
RIGHT SUPER FEMORAL PROX SYS PSV: 0 CM/S
RIGHT TIB/PER TRUNK SYS PSV: 28 CM/S

## 2021-09-29 PROCEDURE — 93925 LOWER EXTREMITY STUDY: CPT | Mod: 26,,, | Performed by: INTERNAL MEDICINE

## 2021-09-29 PROCEDURE — 99214 OFFICE O/P EST MOD 30 MIN: CPT | Mod: S$GLB,,, | Performed by: STUDENT IN AN ORGANIZED HEALTH CARE EDUCATION/TRAINING PROGRAM

## 2021-09-29 PROCEDURE — 93925 LOWER EXTREMITY STUDY: CPT

## 2021-09-29 PROCEDURE — 99999 PR PBB SHADOW E&M-EST. PATIENT-LVL V: ICD-10-PCS | Mod: PBBFAC,,, | Performed by: STUDENT IN AN ORGANIZED HEALTH CARE EDUCATION/TRAINING PROGRAM

## 2021-09-29 PROCEDURE — 93925 CV US DOPPLER ARTERIAL LEGS BILATERAL (CUPID ONLY): ICD-10-PCS | Mod: 26,,, | Performed by: INTERNAL MEDICINE

## 2021-09-29 PROCEDURE — 99999 PR PBB SHADOW E&M-EST. PATIENT-LVL V: CPT | Mod: PBBFAC,,, | Performed by: STUDENT IN AN ORGANIZED HEALTH CARE EDUCATION/TRAINING PROGRAM

## 2021-09-29 PROCEDURE — 99214 PR OFFICE/OUTPT VISIT, EST, LEVL IV, 30-39 MIN: ICD-10-PCS | Mod: S$GLB,,, | Performed by: STUDENT IN AN ORGANIZED HEALTH CARE EDUCATION/TRAINING PROGRAM

## 2021-10-08 ENCOUNTER — TELEPHONE (OUTPATIENT)
Dept: SLEEP MEDICINE | Facility: CLINIC | Age: 56
End: 2021-10-08

## 2021-10-09 ENCOUNTER — IMMUNIZATION (OUTPATIENT)
Dept: INTERNAL MEDICINE | Facility: CLINIC | Age: 56
End: 2021-10-09
Payer: COMMERCIAL

## 2021-10-09 DIAGNOSIS — Z23 NEED FOR VACCINATION: Primary | ICD-10-CM

## 2021-10-09 PROCEDURE — 0002A COVID-19, MRNA, LNP-S, PF, 30 MCG/0.3 ML DOSE VACCINE: CPT | Mod: CV19,PBBFAC | Performed by: INTERNAL MEDICINE

## 2021-10-09 PROCEDURE — 91300 COVID-19, MRNA, LNP-S, PF, 30 MCG/0.3 ML DOSE VACCINE: CPT | Mod: PBBFAC | Performed by: INTERNAL MEDICINE

## 2021-10-11 ENCOUNTER — TELEPHONE (OUTPATIENT)
Dept: CARDIOLOGY | Facility: HOSPITAL | Age: 56
End: 2021-10-11

## 2021-10-12 ENCOUNTER — OFFICE VISIT (OUTPATIENT)
Dept: CARDIOLOGY | Facility: CLINIC | Age: 56
End: 2021-10-12
Payer: COMMERCIAL

## 2021-10-12 ENCOUNTER — DOCUMENTATION ONLY (OUTPATIENT)
Dept: CARDIOLOGY | Facility: CLINIC | Age: 56
End: 2021-10-12

## 2021-10-12 VITALS
DIASTOLIC BLOOD PRESSURE: 85 MMHG | BODY MASS INDEX: 28.73 KG/M2 | SYSTOLIC BLOOD PRESSURE: 145 MMHG | HEIGHT: 69 IN | WEIGHT: 194 LBS | HEART RATE: 97 BPM

## 2021-10-12 DIAGNOSIS — E78.5 HYPERLIPIDEMIA, UNSPECIFIED HYPERLIPIDEMIA TYPE: ICD-10-CM

## 2021-10-12 DIAGNOSIS — I73.9 PAD (PERIPHERAL ARTERY DISEASE): Primary | ICD-10-CM

## 2021-10-12 DIAGNOSIS — I10 HYPERTENSION, ESSENTIAL: ICD-10-CM

## 2021-10-12 PROCEDURE — 3079F DIAST BP 80-89 MM HG: CPT | Mod: CPTII,S$GLB,, | Performed by: INTERNAL MEDICINE

## 2021-10-12 PROCEDURE — 3008F BODY MASS INDEX DOCD: CPT | Mod: CPTII,S$GLB,, | Performed by: INTERNAL MEDICINE

## 2021-10-12 PROCEDURE — 99999 PR PBB SHADOW E&M-EST. PATIENT-LVL IV: ICD-10-PCS | Mod: PBBFAC,,, | Performed by: INTERNAL MEDICINE

## 2021-10-12 PROCEDURE — 99205 PR OFFICE/OUTPT VISIT, NEW, LEVL V, 60-74 MIN: ICD-10-PCS | Mod: S$GLB,,, | Performed by: INTERNAL MEDICINE

## 2021-10-12 PROCEDURE — 99999 PR PBB SHADOW E&M-EST. PATIENT-LVL IV: CPT | Mod: PBBFAC,,, | Performed by: INTERNAL MEDICINE

## 2021-10-12 PROCEDURE — 3077F SYST BP >= 140 MM HG: CPT | Mod: CPTII,S$GLB,, | Performed by: INTERNAL MEDICINE

## 2021-10-12 PROCEDURE — 99205 OFFICE O/P NEW HI 60 MIN: CPT | Mod: S$GLB,,, | Performed by: INTERNAL MEDICINE

## 2021-10-12 PROCEDURE — 3077F PR MOST RECENT SYSTOLIC BLOOD PRESSURE >= 140 MM HG: ICD-10-PCS | Mod: CPTII,S$GLB,, | Performed by: INTERNAL MEDICINE

## 2021-10-12 PROCEDURE — 3008F PR BODY MASS INDEX (BMI) DOCUMENTED: ICD-10-PCS | Mod: CPTII,S$GLB,, | Performed by: INTERNAL MEDICINE

## 2021-10-12 PROCEDURE — 3079F PR MOST RECENT DIASTOLIC BLOOD PRESSURE 80-89 MM HG: ICD-10-PCS | Mod: CPTII,S$GLB,, | Performed by: INTERNAL MEDICINE

## 2021-10-12 RX ORDER — SODIUM CHLORIDE 9 MG/ML
INJECTION, SOLUTION INTRAVENOUS CONTINUOUS
Status: CANCELLED | OUTPATIENT
Start: 2021-10-12 | End: 2021-10-12

## 2021-10-12 RX ORDER — DIPHENHYDRAMINE HCL 50 MG
50 CAPSULE ORAL ONCE
Status: CANCELLED | OUTPATIENT
Start: 2021-10-12 | End: 2021-10-12

## 2021-10-14 ENCOUNTER — OFFICE VISIT (OUTPATIENT)
Dept: SLEEP MEDICINE | Facility: CLINIC | Age: 56
End: 2021-10-14
Payer: COMMERCIAL

## 2021-10-14 VITALS
HEART RATE: 69 BPM | BODY MASS INDEX: 29.22 KG/M2 | WEIGHT: 197.31 LBS | HEIGHT: 69 IN | SYSTOLIC BLOOD PRESSURE: 141 MMHG | DIASTOLIC BLOOD PRESSURE: 76 MMHG

## 2021-10-14 DIAGNOSIS — R06.83 SNORING: ICD-10-CM

## 2021-10-14 DIAGNOSIS — G47.10 HYPERSOMNOLENCE: Primary | ICD-10-CM

## 2021-10-14 PROCEDURE — 1159F MED LIST DOCD IN RCRD: CPT | Mod: CPTII,S$GLB,, | Performed by: INTERNAL MEDICINE

## 2021-10-14 PROCEDURE — 3078F PR MOST RECENT DIASTOLIC BLOOD PRESSURE < 80 MM HG: ICD-10-PCS | Mod: CPTII,S$GLB,, | Performed by: INTERNAL MEDICINE

## 2021-10-14 PROCEDURE — 3008F BODY MASS INDEX DOCD: CPT | Mod: CPTII,S$GLB,, | Performed by: INTERNAL MEDICINE

## 2021-10-14 PROCEDURE — 99204 PR OFFICE/OUTPT VISIT, NEW, LEVL IV, 45-59 MIN: ICD-10-PCS | Mod: S$GLB,,, | Performed by: INTERNAL MEDICINE

## 2021-10-14 PROCEDURE — 3077F SYST BP >= 140 MM HG: CPT | Mod: CPTII,S$GLB,, | Performed by: INTERNAL MEDICINE

## 2021-10-14 PROCEDURE — 99999 PR PBB SHADOW E&M-EST. PATIENT-LVL III: CPT | Mod: PBBFAC,,, | Performed by: INTERNAL MEDICINE

## 2021-10-14 PROCEDURE — 99999 PR PBB SHADOW E&M-EST. PATIENT-LVL III: ICD-10-PCS | Mod: PBBFAC,,, | Performed by: INTERNAL MEDICINE

## 2021-10-14 PROCEDURE — 99204 OFFICE O/P NEW MOD 45 MIN: CPT | Mod: S$GLB,,, | Performed by: INTERNAL MEDICINE

## 2021-10-14 PROCEDURE — 3008F PR BODY MASS INDEX (BMI) DOCUMENTED: ICD-10-PCS | Mod: CPTII,S$GLB,, | Performed by: INTERNAL MEDICINE

## 2021-10-14 PROCEDURE — 1159F PR MEDICATION LIST DOCUMENTED IN MEDICAL RECORD: ICD-10-PCS | Mod: CPTII,S$GLB,, | Performed by: INTERNAL MEDICINE

## 2021-10-14 PROCEDURE — 3078F DIAST BP <80 MM HG: CPT | Mod: CPTII,S$GLB,, | Performed by: INTERNAL MEDICINE

## 2021-10-14 PROCEDURE — 3077F PR MOST RECENT SYSTOLIC BLOOD PRESSURE >= 140 MM HG: ICD-10-PCS | Mod: CPTII,S$GLB,, | Performed by: INTERNAL MEDICINE

## 2021-10-18 ENCOUNTER — TELEPHONE (OUTPATIENT)
Dept: SLEEP MEDICINE | Facility: OTHER | Age: 56
End: 2021-10-18

## 2021-10-22 ENCOUNTER — TELEPHONE (OUTPATIENT)
Dept: SLEEP MEDICINE | Facility: OTHER | Age: 56
End: 2021-10-22

## 2021-10-25 ENCOUNTER — SPECIALTY PHARMACY (OUTPATIENT)
Dept: PHARMACY | Facility: CLINIC | Age: 56
End: 2021-10-25
Payer: COMMERCIAL

## 2021-10-26 ENCOUNTER — TELEPHONE (OUTPATIENT)
Dept: SLEEP MEDICINE | Facility: OTHER | Age: 56
End: 2021-10-26
Payer: COMMERCIAL

## 2021-10-27 ENCOUNTER — LAB VISIT (OUTPATIENT)
Dept: LAB | Facility: HOSPITAL | Age: 56
End: 2021-10-27
Attending: INTERNAL MEDICINE
Payer: COMMERCIAL

## 2021-10-27 DIAGNOSIS — I73.9 PAD (PERIPHERAL ARTERY DISEASE): ICD-10-CM

## 2021-10-27 LAB
ABO + RH BLD: NORMAL
ANION GAP SERPL CALC-SCNC: 9 MMOL/L (ref 8–16)
BLD GP AB SCN CELLS X3 SERPL QL: NORMAL
BUN SERPL-MCNC: 15 MG/DL (ref 6–20)
CALCIUM SERPL-MCNC: 9.2 MG/DL (ref 8.7–10.5)
CHLORIDE SERPL-SCNC: 108 MMOL/L (ref 95–110)
CO2 SERPL-SCNC: 24 MMOL/L (ref 23–29)
CREAT SERPL-MCNC: 0.9 MG/DL (ref 0.5–1.4)
ERYTHROCYTE [DISTWIDTH] IN BLOOD BY AUTOMATED COUNT: 14.4 % (ref 11.5–14.5)
EST. GFR  (AFRICAN AMERICAN): >60 ML/MIN/1.73 M^2
EST. GFR  (NON AFRICAN AMERICAN): >60 ML/MIN/1.73 M^2
GLUCOSE SERPL-MCNC: 104 MG/DL (ref 70–110)
HCT VFR BLD AUTO: 48.3 % (ref 40–54)
HGB BLD-MCNC: 15.3 G/DL (ref 14–18)
INR PPP: 1 (ref 0.8–1.2)
MCH RBC QN AUTO: 26.4 PG (ref 27–31)
MCHC RBC AUTO-ENTMCNC: 31.7 G/DL (ref 32–36)
MCV RBC AUTO: 83 FL (ref 82–98)
PLATELET # BLD AUTO: 299 K/UL (ref 150–450)
PMV BLD AUTO: 9.4 FL (ref 9.2–12.9)
POTASSIUM SERPL-SCNC: 4.1 MMOL/L (ref 3.5–5.1)
PROTHROMBIN TIME: 11 SEC (ref 9–12.5)
RBC # BLD AUTO: 5.8 M/UL (ref 4.6–6.2)
SODIUM SERPL-SCNC: 141 MMOL/L (ref 136–145)
WBC # BLD AUTO: 6.82 K/UL (ref 3.9–12.7)

## 2021-10-27 PROCEDURE — 85027 COMPLETE CBC AUTOMATED: CPT | Performed by: INTERNAL MEDICINE

## 2021-10-27 PROCEDURE — 85610 PROTHROMBIN TIME: CPT | Performed by: INTERNAL MEDICINE

## 2021-10-27 PROCEDURE — 86900 BLOOD TYPING SEROLOGIC ABO: CPT | Performed by: INTERNAL MEDICINE

## 2021-10-27 PROCEDURE — 36415 COLL VENOUS BLD VENIPUNCTURE: CPT | Performed by: INTERNAL MEDICINE

## 2021-10-27 PROCEDURE — 80048 BASIC METABOLIC PNL TOTAL CA: CPT | Performed by: INTERNAL MEDICINE

## 2021-10-28 ENCOUNTER — OFFICE VISIT (OUTPATIENT)
Dept: INTERNAL MEDICINE | Facility: CLINIC | Age: 56
End: 2021-10-28
Attending: FAMILY MEDICINE
Payer: COMMERCIAL

## 2021-10-28 ENCOUNTER — HOSPITAL ENCOUNTER (OUTPATIENT)
Dept: RADIOLOGY | Facility: HOSPITAL | Age: 56
Discharge: HOME | End: 2021-10-28
Attending: FAMILY MEDICINE
Payer: COMMERCIAL

## 2021-10-28 ENCOUNTER — IMMUNIZATION (OUTPATIENT)
Dept: INTERNAL MEDICINE | Facility: CLINIC | Age: 56
End: 2021-10-28
Payer: COMMERCIAL

## 2021-10-28 VITALS
OXYGEN SATURATION: 97 % | SYSTOLIC BLOOD PRESSURE: 138 MMHG | WEIGHT: 195 LBS | DIASTOLIC BLOOD PRESSURE: 72 MMHG | HEIGHT: 69 IN | BODY MASS INDEX: 28.88 KG/M2 | HEART RATE: 90 BPM

## 2021-10-28 DIAGNOSIS — M54.2 CERVICALGIA: ICD-10-CM

## 2021-10-28 DIAGNOSIS — Z95.9 S/P ARTERIAL STENT: ICD-10-CM

## 2021-10-28 DIAGNOSIS — M54.6 THORACIC BACK PAIN, UNSPECIFIED BACK PAIN LATERALITY, UNSPECIFIED CHRONICITY: ICD-10-CM

## 2021-10-28 DIAGNOSIS — R51.9 NONINTRACTABLE HEADACHE, UNSPECIFIED CHRONICITY PATTERN, UNSPECIFIED HEADACHE TYPE: ICD-10-CM

## 2021-10-28 DIAGNOSIS — M54.6 THORACIC BACK PAIN, UNSPECIFIED BACK PAIN LATERALITY, UNSPECIFIED CHRONICITY: Primary | ICD-10-CM

## 2021-10-28 DIAGNOSIS — I77.9 PAOD (PERIPHERAL ARTERIAL OCCLUSIVE DISEASE): ICD-10-CM
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
## 2021-10-28 PROCEDURE — 1159F MED LIST DOCD IN RCRD: CPT | Mod: CPTII,S$GLB,, | Performed by: FAMILY MEDICINE

## 2021-10-28 PROCEDURE — 3075F PR MOST RECENT SYSTOLIC BLOOD PRESS GE 130-139MM HG: ICD-10-PCS | Mod: CPTII,S$GLB,, | Performed by: FAMILY MEDICINE

## 2021-10-28 PROCEDURE — 99214 PR OFFICE/OUTPT VISIT, EST, LEVL IV, 30-39 MIN: ICD-10-PCS | Mod: 25,S$GLB,, | Performed by: FAMILY MEDICINE

## 2021-10-28 PROCEDURE — 99999 PR PBB SHADOW E&M-EST. PATIENT-LVL IV: ICD-10-PCS | Mod: PBBFAC,,, | Performed by: FAMILY MEDICINE

## 2021-10-28 PROCEDURE — 3078F PR MOST RECENT DIASTOLIC BLOOD PRESSURE < 80 MM HG: ICD-10-PCS | Mod: CPTII,S$GLB,, | Performed by: FAMILY MEDICINE

## 2021-10-28 PROCEDURE — 3075F SYST BP GE 130 - 139MM HG: CPT | Mod: CPTII,S$GLB,, | Performed by: FAMILY MEDICINE

## 2021-10-28 PROCEDURE — 3008F BODY MASS INDEX DOCD: CPT | Mod: CPTII,S$GLB,, | Performed by: FAMILY MEDICINE

## 2021-10-28 PROCEDURE — 1160F PR REVIEW ALL MEDS BY PRESCRIBER/CLIN PHARMACIST DOCUMENTED: ICD-10-PCS | Mod: CPTII,S$GLB,, | Performed by: FAMILY MEDICINE

## 2021-10-28 PROCEDURE — 99999 PR PBB SHADOW E&M-EST. PATIENT-LVL IV: CPT | Mod: PBBFAC,,, | Performed by: FAMILY MEDICINE

## 2021-10-28 PROCEDURE — 90686 IIV4 VACC NO PRSV 0.5 ML IM: CPT | Mod: S$GLB,,, | Performed by: FAMILY MEDICINE

## 2021-10-28 PROCEDURE — 3078F DIAST BP <80 MM HG: CPT | Mod: CPTII,S$GLB,, | Performed by: FAMILY MEDICINE

## 2021-10-28 PROCEDURE — 72040 X-RAY EXAM NECK SPINE 2-3 VW: CPT | Mod: 26,,, | Performed by: RADIOLOGY

## 2021-10-28 PROCEDURE — 1159F PR MEDICATION LIST DOCUMENTED IN MEDICAL RECORD: ICD-10-PCS | Mod: CPTII,S$GLB,, | Performed by: FAMILY MEDICINE

## 2021-10-28 PROCEDURE — 99214 OFFICE O/P EST MOD 30 MIN: CPT | Mod: 25,S$GLB,, | Performed by: FAMILY MEDICINE

## 2021-10-28 PROCEDURE — 72040 X-RAY EXAM NECK SPINE 2-3 VW: CPT | Mod: TC

## 2021-10-28 PROCEDURE — 1160F RVW MEDS BY RX/DR IN RCRD: CPT | Mod: CPTII,S$GLB,, | Performed by: FAMILY MEDICINE

## 2021-10-28 PROCEDURE — 90471 IMMUNIZATION ADMIN: CPT | Mod: S$GLB,,, | Performed by: FAMILY MEDICINE

## 2021-10-28 PROCEDURE — 72070 X-RAY EXAM THORAC SPINE 2VWS: CPT | Mod: TC

## 2021-10-28 PROCEDURE — 90471 FLU VACCINE (QUAD) GREATER THAN OR EQUAL TO 3YO PRESERVATIVE FREE IM: ICD-10-PCS | Mod: S$GLB,,, | Performed by: FAMILY MEDICINE

## 2021-10-28 PROCEDURE — 3008F PR BODY MASS INDEX (BMI) DOCUMENTED: ICD-10-PCS | Mod: CPTII,S$GLB,, | Performed by: FAMILY MEDICINE

## 2021-10-28 PROCEDURE — 72070 X-RAY EXAM THORAC SPINE 2VWS: CPT | Mod: 26,,, | Performed by: RADIOLOGY

## 2021-10-28 PROCEDURE — 72070 XR THORACIC SPINE AP LATERAL: ICD-10-PCS | Mod: 26,,, | Performed by: RADIOLOGY

## 2021-10-28 PROCEDURE — 90686 FLU VACCINE (QUAD) GREATER THAN OR EQUAL TO 3YO PRESERVATIVE FREE IM: ICD-10-PCS | Mod: S$GLB,,, | Performed by: FAMILY MEDICINE

## 2021-10-28 PROCEDURE — 72040 XR CERVICAL SPINE AP LATERAL: ICD-10-PCS | Mod: 26,,, | Performed by: RADIOLOGY

## 2021-10-28 RX ORDER — CYCLOBENZAPRINE HCL 5 MG
5 TABLET ORAL 3 TIMES DAILY PRN
Qty: 45 TABLET | Refills: 0 | Status: SHIPPED | OUTPATIENT
Start: 2021-10-28 | End: 2021-11-04 | Stop reason: SDUPTHER

## 2021-10-29 ENCOUNTER — HOSPITAL ENCOUNTER (OUTPATIENT)
Facility: HOSPITAL | Age: 56
Discharge: HOME | End: 2021-10-29
Attending: INTERNAL MEDICINE | Admitting: INTERNAL MEDICINE
Payer: COMMERCIAL

## 2021-10-29 VITALS
HEIGHT: 69 IN | HEART RATE: 77 BPM | DIASTOLIC BLOOD PRESSURE: 60 MMHG | BODY MASS INDEX: 28.73 KG/M2 | WEIGHT: 194 LBS | OXYGEN SATURATION: 99 % | RESPIRATION RATE: 18 BRPM | SYSTOLIC BLOOD PRESSURE: 132 MMHG | TEMPERATURE: 97 F

## 2021-10-29 DIAGNOSIS — I73.9 PAD (PERIPHERAL ARTERY DISEASE): ICD-10-CM

## 2021-10-29 DIAGNOSIS — M25.512 SHOULDER PAIN, LEFT: ICD-10-CM

## 2021-10-29 LAB
ABO + RH BLD: NORMAL
ANION GAP SERPL CALC-SCNC: 7 MMOL/L (ref 8–16)
BASOPHILS # BLD AUTO: 0.08 K/UL (ref 0–0.2)
BASOPHILS NFR BLD: 1.5 % (ref 0–1.9)
BLD GP AB SCN CELLS X3 SERPL QL: NORMAL
BUN SERPL-MCNC: 12 MG/DL (ref 6–20)
CALCIUM SERPL-MCNC: 9 MG/DL (ref 8.7–10.5)
CHLORIDE SERPL-SCNC: 109 MMOL/L (ref 95–110)
CO2 SERPL-SCNC: 24 MMOL/L (ref 23–29)
CREAT SERPL-MCNC: 0.8 MG/DL (ref 0.5–1.4)
DIFFERENTIAL METHOD: ABNORMAL
EOSINOPHIL # BLD AUTO: 0.1 K/UL (ref 0–0.5)
EOSINOPHIL NFR BLD: 1.5 % (ref 0–8)
ERYTHROCYTE [DISTWIDTH] IN BLOOD BY AUTOMATED COUNT: 13.4 % (ref 11.5–14.5)
EST. GFR  (AFRICAN AMERICAN): >60 ML/MIN/1.73 M^2
EST. GFR  (NON AFRICAN AMERICAN): >60 ML/MIN/1.73 M^2
GLUCOSE SERPL-MCNC: 109 MG/DL (ref 70–110)
HCT VFR BLD AUTO: 49.3 % (ref 40–54)
HGB BLD-MCNC: 15.4 G/DL (ref 14–18)
IMM GRANULOCYTES # BLD AUTO: 0.01 K/UL (ref 0–0.04)
IMM GRANULOCYTES NFR BLD AUTO: 0.2 % (ref 0–0.5)
LYMPHOCYTES # BLD AUTO: 2.1 K/UL (ref 1–4.8)
LYMPHOCYTES NFR BLD: 39.1 % (ref 18–48)
MCH RBC QN AUTO: 26 PG (ref 27–31)
MCHC RBC AUTO-ENTMCNC: 31.2 G/DL (ref 32–36)
MCV RBC AUTO: 83 FL (ref 82–98)
MONOCYTES # BLD AUTO: 0.5 K/UL (ref 0.3–1)
MONOCYTES NFR BLD: 9 % (ref 4–15)
NEUTROPHILS # BLD AUTO: 2.6 K/UL (ref 1.8–7.7)
NEUTROPHILS NFR BLD: 48.7 % (ref 38–73)
NRBC BLD-RTO: 0 /100 WBC
PLATELET # BLD AUTO: 344 K/UL (ref 150–450)
PMV BLD AUTO: 9.2 FL (ref 9.2–12.9)
POTASSIUM SERPL-SCNC: 3.9 MMOL/L (ref 3.5–5.1)
RBC # BLD AUTO: 5.93 M/UL (ref 4.6–6.2)
SODIUM SERPL-SCNC: 140 MMOL/L (ref 136–145)
WBC # BLD AUTO: 5.24 K/UL (ref 3.9–12.7)

## 2021-10-29 PROCEDURE — 25000003 PHARM REV CODE 250: Performed by: INTERNAL MEDICINE

## 2021-10-29 PROCEDURE — 93010 ELECTROCARDIOGRAM REPORT: CPT | Mod: ,,, | Performed by: INTERNAL MEDICINE

## 2021-10-29 PROCEDURE — 63600175 PHARM REV CODE 636 W HCPCS: Performed by: INTERNAL MEDICINE

## 2021-10-29 PROCEDURE — 93010 EKG 12-LEAD: ICD-10-PCS | Mod: 77,,, | Performed by: INTERNAL MEDICINE

## 2021-10-29 PROCEDURE — 99153 MOD SED SAME PHYS/QHP EA: CPT | Performed by: INTERNAL MEDICINE

## 2021-10-29 PROCEDURE — 27201423 OPTIME MED/SURG SUP & DEVICES STERILE SUPPLY: Performed by: INTERNAL MEDICINE

## 2021-10-29 PROCEDURE — 37225 HC FEM/POPL REVAS W/ATHER: CPT | Mod: RT | Performed by: INTERNAL MEDICINE

## 2021-10-29 PROCEDURE — C2623 CATH, TRANSLUMIN, DRUG-COAT: HCPCS | Performed by: INTERNAL MEDICINE

## 2021-10-29 PROCEDURE — C1769 GUIDE WIRE: HCPCS | Performed by: INTERNAL MEDICINE

## 2021-10-29 PROCEDURE — C1757 CATH, THROMBECTOMY/EMBOLECT: HCPCS | Performed by: INTERNAL MEDICINE

## 2021-10-29 PROCEDURE — 93010 EKG 12-LEAD: ICD-10-PCS | Mod: ,,, | Performed by: INTERNAL MEDICINE

## 2021-10-29 PROCEDURE — 86900 BLOOD TYPING SEROLOGIC ABO: CPT | Performed by: INTERNAL MEDICINE

## 2021-10-29 PROCEDURE — 99152 MOD SED SAME PHYS/QHP 5/>YRS: CPT | Performed by: INTERNAL MEDICINE

## 2021-10-29 PROCEDURE — 25000003 PHARM REV CODE 250: Performed by: STUDENT IN AN ORGANIZED HEALTH CARE EDUCATION/TRAINING PROGRAM

## 2021-10-29 PROCEDURE — 85025 COMPLETE CBC W/AUTO DIFF WBC: CPT | Performed by: STUDENT IN AN ORGANIZED HEALTH CARE EDUCATION/TRAINING PROGRAM

## 2021-10-29 PROCEDURE — 85347 COAGULATION TIME ACTIVATED: CPT | Performed by: INTERNAL MEDICINE

## 2021-10-29 PROCEDURE — 80048 BASIC METABOLIC PNL TOTAL CA: CPT | Performed by: STUDENT IN AN ORGANIZED HEALTH CARE EDUCATION/TRAINING PROGRAM

## 2021-10-29 PROCEDURE — C1724 CATH, TRANS ATHEREC,ROTATION: HCPCS | Performed by: INTERNAL MEDICINE

## 2021-10-29 PROCEDURE — 37225 PR FEM/POPL REVAS W/ATHERECTOMY: ICD-10-PCS | Mod: RT,,, | Performed by: INTERNAL MEDICINE

## 2021-10-29 PROCEDURE — C1887 CATHETER, GUIDING: HCPCS | Performed by: INTERNAL MEDICINE

## 2021-10-29 PROCEDURE — 93005 ELECTROCARDIOGRAM TRACING: CPT

## 2021-10-29 PROCEDURE — 99152 PR MOD CONSCIOUS SEDATION, SAME PHYS, 5+ YRS, FIRST 15 MIN: ICD-10-PCS | Mod: ,,, | Performed by: INTERNAL MEDICINE

## 2021-10-29 PROCEDURE — 37225 PR FEM/POPL REVAS W/ATHERECTOMY: CPT | Mod: RT,,, | Performed by: INTERNAL MEDICINE

## 2021-10-29 PROCEDURE — 93010 ELECTROCARDIOGRAM REPORT: CPT | Mod: 77,,, | Performed by: INTERNAL MEDICINE

## 2021-10-29 PROCEDURE — 25500020 PHARM REV CODE 255: Performed by: INTERNAL MEDICINE

## 2021-10-29 PROCEDURE — C1725 CATH, TRANSLUMIN NON-LASER: HCPCS | Performed by: INTERNAL MEDICINE

## 2021-10-29 PROCEDURE — 99152 MOD SED SAME PHYS/QHP 5/>YRS: CPT | Mod: ,,, | Performed by: INTERNAL MEDICINE

## 2021-10-29 PROCEDURE — 75630 X-RAY AORTA LEG ARTERIES: CPT | Mod: 59 | Performed by: INTERNAL MEDICINE

## 2021-10-29 PROCEDURE — 75630 X-RAY AORTA LEG ARTERIES: CPT | Mod: 26,59,, | Performed by: INTERNAL MEDICINE

## 2021-10-29 PROCEDURE — 75630 PR  ANGIO AORTOBIFEMORAL W CATH: ICD-10-PCS | Mod: 26,59,, | Performed by: INTERNAL MEDICINE

## 2021-10-29 PROCEDURE — C1894 INTRO/SHEATH, NON-LASER: HCPCS | Performed by: INTERNAL MEDICINE

## 2021-10-29 RX ORDER — IODIXANOL 320 MG/ML
INJECTION, SOLUTION INTRAVASCULAR
Status: DISCONTINUED | OUTPATIENT
Start: 2021-10-29 | End: 2021-10-29 | Stop reason: HOSPADM

## 2021-10-29 RX ORDER — HEPARIN SOD,PORCINE/0.9 % NACL 1000/500ML
INTRAVENOUS SOLUTION INTRAVENOUS
Status: DISCONTINUED | OUTPATIENT
Start: 2021-10-29 | End: 2021-10-29 | Stop reason: HOSPADM

## 2021-10-29 RX ORDER — CLOPIDOGREL 300 MG/1
300 TABLET, FILM COATED ORAL ONCE
Status: COMPLETED | OUTPATIENT
Start: 2021-10-29 | End: 2021-10-29

## 2021-10-29 RX ORDER — ASPIRIN 325 MG
325 TABLET ORAL ONCE
Status: COMPLETED | OUTPATIENT
Start: 2021-10-29 | End: 2021-10-29

## 2021-10-29 RX ORDER — SODIUM CHLORIDE 9 MG/ML
INJECTION, SOLUTION INTRAVENOUS CONTINUOUS
Status: ACTIVE | OUTPATIENT
Start: 2021-10-29 | End: 2021-10-29

## 2021-10-29 RX ORDER — ASPIRIN 81 MG/1
81 TABLET ORAL DAILY
Qty: 30 TABLET | Refills: 11 | Status: SHIPPED | OUTPATIENT
Start: 2021-10-29 | End: 2022-09-19

## 2021-10-29 RX ORDER — HYDRALAZINE HYDROCHLORIDE 20 MG/ML
INJECTION INTRAMUSCULAR; INTRAVENOUS
Status: DISCONTINUED | OUTPATIENT
Start: 2021-10-29 | End: 2021-10-29 | Stop reason: HOSPADM

## 2021-10-29 RX ORDER — ONDANSETRON 8 MG/1
8 TABLET, ORALLY DISINTEGRATING ORAL EVERY 8 HOURS PRN
Status: DISCONTINUED | OUTPATIENT
Start: 2021-10-29 | End: 2021-10-29 | Stop reason: HOSPADM

## 2021-10-29 RX ORDER — LIDOCAINE HYDROCHLORIDE 20 MG/ML
INJECTION, SOLUTION EPIDURAL; INFILTRATION; INTRACAUDAL; PERINEURAL
Status: DISCONTINUED | OUTPATIENT
Start: 2021-10-29 | End: 2021-10-29 | Stop reason: HOSPADM

## 2021-10-29 RX ORDER — HEPARIN SODIUM 1000 [USP'U]/ML
INJECTION, SOLUTION INTRAVENOUS; SUBCUTANEOUS
Status: DISCONTINUED | OUTPATIENT
Start: 2021-10-29 | End: 2021-10-29 | Stop reason: HOSPADM

## 2021-10-29 RX ORDER — DIPHENHYDRAMINE HCL 50 MG
50 CAPSULE ORAL ONCE
Status: COMPLETED | OUTPATIENT
Start: 2021-10-29 | End: 2021-10-29

## 2021-10-29 RX ORDER — ACETAMINOPHEN 325 MG/1
650 TABLET ORAL EVERY 4 HOURS PRN
Status: DISCONTINUED | OUTPATIENT
Start: 2021-10-29 | End: 2021-10-29 | Stop reason: HOSPADM

## 2021-10-29 RX ORDER — MIDAZOLAM HYDROCHLORIDE 2 MG/2ML
INJECTION, SOLUTION INTRAMUSCULAR; INTRAVENOUS
Status: DISCONTINUED | OUTPATIENT
Start: 2021-10-29 | End: 2021-10-29 | Stop reason: HOSPADM

## 2021-10-29 RX ORDER — FENTANYL CITRATE 50 UG/ML
INJECTION, SOLUTION INTRAMUSCULAR; INTRAVENOUS
Status: DISCONTINUED | OUTPATIENT
Start: 2021-10-29 | End: 2021-10-29 | Stop reason: HOSPADM

## 2021-10-29 RX ADMIN — CLOPIDOGREL BISULFATE 300 MG: 300 TABLET, FILM COATED ORAL at 09:10

## 2021-10-29 RX ADMIN — ACETAMINOPHEN 650 MG: 325 TABLET ORAL at 07:10

## 2021-10-29 RX ADMIN — DIPHENHYDRAMINE HYDROCHLORIDE 50 MG: 50 CAPSULE ORAL at 09:10

## 2021-10-29 RX ADMIN — ASPIRIN 325 MG ORAL TABLET 325 MG: 325 PILL ORAL at 09:10

## 2021-10-29 RX ADMIN — SODIUM CHLORIDE: 0.9 INJECTION, SOLUTION INTRAVENOUS at 09:10

## 2021-11-01 DIAGNOSIS — I73.9 PAD (PERIPHERAL ARTERY DISEASE): Primary | ICD-10-CM

## 2021-11-01 LAB
POC ACTIVATED CLOTTING TIME K: 158 SEC (ref 74–137)
POC ACTIVATED CLOTTING TIME K: 191 SEC (ref 74–137)
POC ACTIVATED CLOTTING TIME K: 202 SEC (ref 74–137)
SAMPLE: ABNORMAL

## 2021-11-02 ENCOUNTER — OFFICE VISIT (OUTPATIENT)
Dept: ORTHOPEDICS | Facility: CLINIC | Age: 56
End: 2021-11-02
Attending: FAMILY MEDICINE
Payer: COMMERCIAL

## 2021-11-02 VITALS — BODY MASS INDEX: 28.67 KG/M2 | WEIGHT: 194.13 LBS

## 2021-11-02 DIAGNOSIS — M54.6 THORACIC BACK PAIN, UNSPECIFIED BACK PAIN LATERALITY, UNSPECIFIED CHRONICITY: ICD-10-CM

## 2021-11-02 PROCEDURE — 3008F PR BODY MASS INDEX (BMI) DOCUMENTED: ICD-10-PCS | Mod: CPTII,S$GLB,, | Performed by: PHYSICIAN ASSISTANT

## 2021-11-02 PROCEDURE — 3008F BODY MASS INDEX DOCD: CPT | Mod: CPTII,S$GLB,, | Performed by: PHYSICIAN ASSISTANT

## 2021-11-02 PROCEDURE — 1159F MED LIST DOCD IN RCRD: CPT | Mod: CPTII,S$GLB,, | Performed by: PHYSICIAN ASSISTANT

## 2021-11-02 PROCEDURE — 1160F RVW MEDS BY RX/DR IN RCRD: CPT | Mod: CPTII,S$GLB,, | Performed by: PHYSICIAN ASSISTANT

## 2021-11-02 PROCEDURE — 1159F PR MEDICATION LIST DOCUMENTED IN MEDICAL RECORD: ICD-10-PCS | Mod: CPTII,S$GLB,, | Performed by: PHYSICIAN ASSISTANT

## 2021-11-02 PROCEDURE — 99999 PR PBB SHADOW E&M-EST. PATIENT-LVL III: CPT | Mod: PBBFAC,,, | Performed by: PHYSICIAN ASSISTANT

## 2021-11-02 PROCEDURE — 1160F PR REVIEW ALL MEDS BY PRESCRIBER/CLIN PHARMACIST DOCUMENTED: ICD-10-PCS | Mod: CPTII,S$GLB,, | Performed by: PHYSICIAN ASSISTANT

## 2021-11-02 PROCEDURE — 99214 PR OFFICE/OUTPT VISIT, EST, LEVL IV, 30-39 MIN: ICD-10-PCS | Mod: S$GLB,,, | Performed by: PHYSICIAN ASSISTANT

## 2021-11-02 PROCEDURE — 99999 PR PBB SHADOW E&M-EST. PATIENT-LVL III: ICD-10-PCS | Mod: PBBFAC,,, | Performed by: PHYSICIAN ASSISTANT

## 2021-11-02 PROCEDURE — 99214 OFFICE O/P EST MOD 30 MIN: CPT | Mod: S$GLB,,, | Performed by: PHYSICIAN ASSISTANT

## 2021-11-02 RX ORDER — METHYLPREDNISOLONE 4 MG/1
TABLET ORAL
Qty: 21 EACH | Refills: 0 | Status: SHIPPED | OUTPATIENT
Start: 2021-11-02 | End: 2021-12-13 | Stop reason: ALTCHOICE

## 2021-11-04 ENCOUNTER — OFFICE VISIT (OUTPATIENT)
Dept: INTERNAL MEDICINE | Facility: CLINIC | Age: 56
End: 2021-11-04
Attending: FAMILY MEDICINE
Payer: COMMERCIAL

## 2021-11-04 VITALS
HEIGHT: 69 IN | WEIGHT: 194.56 LBS | BODY MASS INDEX: 28.82 KG/M2 | DIASTOLIC BLOOD PRESSURE: 88 MMHG | SYSTOLIC BLOOD PRESSURE: 146 MMHG | HEART RATE: 70 BPM | OXYGEN SATURATION: 97 %

## 2021-11-04 DIAGNOSIS — R51.9 NONINTRACTABLE HEADACHE, UNSPECIFIED CHRONICITY PATTERN, UNSPECIFIED HEADACHE TYPE: ICD-10-CM

## 2021-11-04 DIAGNOSIS — R59.9 ADENOPATHY: ICD-10-CM

## 2021-11-04 DIAGNOSIS — M54.6 THORACIC BACK PAIN, UNSPECIFIED BACK PAIN LATERALITY, UNSPECIFIED CHRONICITY: Primary | ICD-10-CM

## 2021-11-04 DIAGNOSIS — I10 HYPERTENSION, ESSENTIAL: ICD-10-CM

## 2021-11-04 DIAGNOSIS — I77.9 PAOD (PERIPHERAL ARTERIAL OCCLUSIVE DISEASE): ICD-10-CM

## 2021-11-04 DIAGNOSIS — M54.2 CERVICALGIA: ICD-10-CM

## 2021-11-04 PROCEDURE — 1159F PR MEDICATION LIST DOCUMENTED IN MEDICAL RECORD: ICD-10-PCS | Mod: CPTII,S$GLB,, | Performed by: FAMILY MEDICINE

## 2021-11-04 PROCEDURE — 3079F DIAST BP 80-89 MM HG: CPT | Mod: CPTII,S$GLB,, | Performed by: FAMILY MEDICINE

## 2021-11-04 PROCEDURE — 99999 PR PBB SHADOW E&M-EST. PATIENT-LVL V: ICD-10-PCS | Mod: PBBFAC,,, | Performed by: FAMILY MEDICINE

## 2021-11-04 PROCEDURE — 3008F PR BODY MASS INDEX (BMI) DOCUMENTED: ICD-10-PCS | Mod: CPTII,S$GLB,, | Performed by: FAMILY MEDICINE

## 2021-11-04 PROCEDURE — 99214 OFFICE O/P EST MOD 30 MIN: CPT | Mod: S$GLB,,, | Performed by: FAMILY MEDICINE

## 2021-11-04 PROCEDURE — 1160F PR REVIEW ALL MEDS BY PRESCRIBER/CLIN PHARMACIST DOCUMENTED: ICD-10-PCS | Mod: CPTII,S$GLB,, | Performed by: FAMILY MEDICINE

## 2021-11-04 PROCEDURE — 99214 PR OFFICE/OUTPT VISIT, EST, LEVL IV, 30-39 MIN: ICD-10-PCS | Mod: S$GLB,,, | Performed by: FAMILY MEDICINE

## 2021-11-04 PROCEDURE — 1160F RVW MEDS BY RX/DR IN RCRD: CPT | Mod: CPTII,S$GLB,, | Performed by: FAMILY MEDICINE

## 2021-11-04 PROCEDURE — 1159F MED LIST DOCD IN RCRD: CPT | Mod: CPTII,S$GLB,, | Performed by: FAMILY MEDICINE

## 2021-11-04 PROCEDURE — 3079F PR MOST RECENT DIASTOLIC BLOOD PRESSURE 80-89 MM HG: ICD-10-PCS | Mod: CPTII,S$GLB,, | Performed by: FAMILY MEDICINE

## 2021-11-04 PROCEDURE — 3008F BODY MASS INDEX DOCD: CPT | Mod: CPTII,S$GLB,, | Performed by: FAMILY MEDICINE

## 2021-11-04 PROCEDURE — 3077F SYST BP >= 140 MM HG: CPT | Mod: CPTII,S$GLB,, | Performed by: FAMILY MEDICINE

## 2021-11-04 PROCEDURE — 99999 PR PBB SHADOW E&M-EST. PATIENT-LVL V: CPT | Mod: PBBFAC,,, | Performed by: FAMILY MEDICINE

## 2021-11-04 PROCEDURE — 3077F PR MOST RECENT SYSTOLIC BLOOD PRESSURE >= 140 MM HG: ICD-10-PCS | Mod: CPTII,S$GLB,, | Performed by: FAMILY MEDICINE

## 2021-11-08 ENCOUNTER — TELEPHONE (OUTPATIENT)
Dept: SLEEP MEDICINE | Facility: OTHER | Age: 56
End: 2021-11-08
Payer: COMMERCIAL

## 2021-11-11 ENCOUNTER — TELEPHONE (OUTPATIENT)
Dept: SLEEP MEDICINE | Facility: OTHER | Age: 56
End: 2021-11-11
Payer: COMMERCIAL

## 2021-11-16 ENCOUNTER — TELEPHONE (OUTPATIENT)
Dept: SLEEP MEDICINE | Facility: OTHER | Age: 56
End: 2021-11-16
Payer: COMMERCIAL

## 2021-11-16 ENCOUNTER — OFFICE VISIT (OUTPATIENT)
Dept: OTOLARYNGOLOGY | Facility: CLINIC | Age: 56
End: 2021-11-16
Attending: FAMILY MEDICINE
Payer: COMMERCIAL

## 2021-11-16 VITALS
DIASTOLIC BLOOD PRESSURE: 79 MMHG | BODY MASS INDEX: 29.24 KG/M2 | HEART RATE: 77 BPM | WEIGHT: 198 LBS | SYSTOLIC BLOOD PRESSURE: 159 MMHG

## 2021-11-16 DIAGNOSIS — R22.0 LOCALIZED SWELLING, MASS AND LUMP, HEAD: Primary | ICD-10-CM

## 2021-11-16 PROCEDURE — 99203 OFFICE O/P NEW LOW 30 MIN: CPT | Mod: S$GLB,,, | Performed by: OTOLARYNGOLOGY

## 2021-11-16 PROCEDURE — 3078F DIAST BP <80 MM HG: CPT | Mod: CPTII,S$GLB,, | Performed by: OTOLARYNGOLOGY

## 2021-11-16 PROCEDURE — 99203 PR OFFICE/OUTPT VISIT, NEW, LEVL III, 30-44 MIN: ICD-10-PCS | Mod: S$GLB,,, | Performed by: OTOLARYNGOLOGY

## 2021-11-16 PROCEDURE — 3077F SYST BP >= 140 MM HG: CPT | Mod: CPTII,S$GLB,, | Performed by: OTOLARYNGOLOGY

## 2021-11-16 PROCEDURE — 3008F PR BODY MASS INDEX (BMI) DOCUMENTED: ICD-10-PCS | Mod: CPTII,S$GLB,, | Performed by: OTOLARYNGOLOGY

## 2021-11-16 PROCEDURE — 1159F MED LIST DOCD IN RCRD: CPT | Mod: CPTII,S$GLB,, | Performed by: OTOLARYNGOLOGY

## 2021-11-16 PROCEDURE — 3077F PR MOST RECENT SYSTOLIC BLOOD PRESSURE >= 140 MM HG: ICD-10-PCS | Mod: CPTII,S$GLB,, | Performed by: OTOLARYNGOLOGY

## 2021-11-16 PROCEDURE — 99999 PR PBB SHADOW E&M-EST. PATIENT-LVL III: ICD-10-PCS | Mod: PBBFAC,,, | Performed by: OTOLARYNGOLOGY

## 2021-11-16 PROCEDURE — 3008F BODY MASS INDEX DOCD: CPT | Mod: CPTII,S$GLB,, | Performed by: OTOLARYNGOLOGY

## 2021-11-16 PROCEDURE — 3078F PR MOST RECENT DIASTOLIC BLOOD PRESSURE < 80 MM HG: ICD-10-PCS | Mod: CPTII,S$GLB,, | Performed by: OTOLARYNGOLOGY

## 2021-11-16 PROCEDURE — 99999 PR PBB SHADOW E&M-EST. PATIENT-LVL III: CPT | Mod: PBBFAC,,, | Performed by: OTOLARYNGOLOGY

## 2021-11-16 PROCEDURE — 1159F PR MEDICATION LIST DOCUMENTED IN MEDICAL RECORD: ICD-10-PCS | Mod: CPTII,S$GLB,, | Performed by: OTOLARYNGOLOGY

## 2021-11-29 ENCOUNTER — PATIENT OUTREACH (OUTPATIENT)
Dept: ADMINISTRATIVE | Facility: OTHER | Age: 56
End: 2021-11-29
Payer: COMMERCIAL

## 2021-11-30 ENCOUNTER — OFFICE VISIT (OUTPATIENT)
Dept: CARDIOLOGY | Facility: CLINIC | Age: 56
End: 2021-11-30
Payer: COMMERCIAL

## 2021-11-30 ENCOUNTER — HOSPITAL ENCOUNTER (OUTPATIENT)
Dept: CARDIOLOGY | Facility: HOSPITAL | Age: 56
Discharge: HOME | End: 2021-11-30
Attending: INTERNAL MEDICINE
Payer: COMMERCIAL

## 2021-11-30 VITALS
HEART RATE: 74 BPM | WEIGHT: 198.19 LBS | OXYGEN SATURATION: 97 % | DIASTOLIC BLOOD PRESSURE: 80 MMHG | SYSTOLIC BLOOD PRESSURE: 161 MMHG | HEIGHT: 69 IN | BODY MASS INDEX: 29.36 KG/M2

## 2021-11-30 DIAGNOSIS — I10 HYPERTENSION, ESSENTIAL: ICD-10-CM

## 2021-11-30 DIAGNOSIS — I73.9 PAD (PERIPHERAL ARTERY DISEASE): ICD-10-CM

## 2021-11-30 DIAGNOSIS — I77.9 PAOD (PERIPHERAL ARTERIAL OCCLUSIVE DISEASE): ICD-10-CM

## 2021-11-30 DIAGNOSIS — E78.5 HYPERLIPIDEMIA, UNSPECIFIED HYPERLIPIDEMIA TYPE: ICD-10-CM

## 2021-11-30 PROCEDURE — 99215 PR OFFICE/OUTPT VISIT, EST, LEVL V, 40-54 MIN: ICD-10-PCS | Mod: S$GLB,,, | Performed by: INTERNAL MEDICINE

## 2021-11-30 PROCEDURE — 99215 OFFICE O/P EST HI 40 MIN: CPT | Mod: S$GLB,,, | Performed by: INTERNAL MEDICINE

## 2021-11-30 PROCEDURE — 93925 CV US DOPPLER ARTERIAL LEGS BILATERAL (CUPID ONLY): ICD-10-PCS | Mod: 26,,, | Performed by: INTERNAL MEDICINE

## 2021-11-30 PROCEDURE — 93925 LOWER EXTREMITY STUDY: CPT

## 2021-11-30 PROCEDURE — 99999 PR PBB SHADOW E&M-EST. PATIENT-LVL IV: ICD-10-PCS | Mod: PBBFAC,,, | Performed by: INTERNAL MEDICINE

## 2021-11-30 PROCEDURE — 93925 LOWER EXTREMITY STUDY: CPT | Mod: 26,,, | Performed by: INTERNAL MEDICINE

## 2021-11-30 PROCEDURE — 99999 PR PBB SHADOW E&M-EST. PATIENT-LVL IV: CPT | Mod: PBBFAC,,, | Performed by: INTERNAL MEDICINE

## 2021-12-01 ENCOUNTER — HOSPITAL ENCOUNTER (OUTPATIENT)
Dept: RADIOLOGY | Facility: HOSPITAL | Age: 56
Discharge: HOME | End: 2021-12-01
Attending: FAMILY MEDICINE
Payer: COMMERCIAL

## 2021-12-01 DIAGNOSIS — R59.9 ADENOPATHY: ICD-10-CM

## 2021-12-01 LAB
LEFT ANT TIBIAL SYS PSV: 0 CM/S
LEFT CFA PSV: 149 CM/S
LEFT DORSALIS PEDIS PSV: 0 CM/S
LEFT EXTERNAL ILIAC PSV: 183 CM/S
LEFT PERONEAL SYS PSV: 32 CM/S
LEFT POPLITEAL PSV: 63 CM/S
LEFT POST TIBIAL SYS PSV: 48 CM/S
LEFT PROFUNDA SYS PSV: 278 CM/S
LEFT SUPER FEMORAL DIST SYS PSV: 158 CM/S
LEFT SUPER FEMORAL MID SYS PSV: 0 CM/S
LEFT SUPER FEMORAL OSTIAL SYS PSV: 67 CM/S
LEFT SUPER FEMORAL PROX SYS PSV: 0 CM/S
LEFT TIB/PER TRUNK SYS PSV: 63 CM/S
OHS CV LEFT LOWER EXTREMITY ABI (NO CALC): 0.62
OHS CV LOWER EXTREMITY STENT MEASUREMENTS - RIGHT - MSFA S1 - DIST: 88
OHS CV LOWER EXTREMITY STENT MEASUREMENTS - RIGHT - MSFA S1 - MID: 101
OHS CV LOWER EXTREMITY STENT MEASUREMENTS - RIGHT - MSFA S1 - OST: 82
OHS CV LOWER EXTREMITY STENT MEASUREMENTS - RIGHT - MSFA S1 - PROX: 102
OHS CV RIGHT ABI LOWER EXTREMITY (NO CALC): 0.69
RIGHT ANT TIBIAL SYS PSV: 0 CM/S
RIGHT CFA PSV: 190 CM/S
RIGHT DORSALIS PEDIS PSV: 0 CM/S
RIGHT EXTERNAL ILLIAC PSV: 149 CM/S
RIGHT PERONEAL SYS PSV: 40 CM/S
RIGHT POPLITEAL PSV: 511 CM/S
RIGHT POST TIBIAL SYS PSV: 57 CM/S
RIGHT PROFUNDA SYS PSV: 127 CM/S
RIGHT SUPER FEMORAL DIST SYS PSV: 117 CM/S
RIGHT SUPER FEMORAL OSTIAL SYS PSV: 93 CM/S
RIGHT SUPER FEMORAL PROX SYS PSV: 135 CM/S
RIGHT TIB/PER TRUNK SYS PSV: 37 CM/S

## 2021-12-01 PROCEDURE — 76536 US SOFT TISSUE HEAD NECK THYROID: ICD-10-PCS | Mod: 26,,, | Performed by: RADIOLOGY

## 2021-12-01 PROCEDURE — 76536 US EXAM OF HEAD AND NECK: CPT | Mod: TC

## 2021-12-01 PROCEDURE — 76536 US EXAM OF HEAD AND NECK: CPT | Mod: 26,,, | Performed by: RADIOLOGY

## 2021-12-06 ENCOUNTER — CLINICAL SUPPORT (OUTPATIENT)
Dept: REHABILITATION | Facility: HOSPITAL | Age: 56
End: 2021-12-06
Payer: COMMERCIAL

## 2021-12-06 DIAGNOSIS — Z74.09 DECREASED STRENGTH, ENDURANCE, AND MOBILITY: Primary | ICD-10-CM

## 2021-12-06 DIAGNOSIS — R68.89 DECREASED STRENGTH, ENDURANCE, AND MOBILITY: Primary | ICD-10-CM

## 2021-12-06 DIAGNOSIS — M53.82 IMPAIRED RANGE OF MOTION OF CERVICAL SPINE: ICD-10-CM

## 2021-12-06 DIAGNOSIS — R53.1 DECREASED STRENGTH, ENDURANCE, AND MOBILITY: Primary | ICD-10-CM

## 2021-12-06 DIAGNOSIS — G89.29 CHRONIC LEFT SHOULDER PAIN: ICD-10-CM

## 2021-12-06 DIAGNOSIS — R29.3 POSTURE IMBALANCE: ICD-10-CM

## 2021-12-06 DIAGNOSIS — M25.512 CHRONIC LEFT SHOULDER PAIN: ICD-10-CM

## 2021-12-06 DIAGNOSIS — M54.2 CERVICALGIA: ICD-10-CM

## 2021-12-06 PROCEDURE — 97162 PT EVAL MOD COMPLEX 30 MIN: CPT

## 2021-12-13 ENCOUNTER — OFFICE VISIT (OUTPATIENT)
Dept: INTERNAL MEDICINE | Facility: CLINIC | Age: 56
End: 2021-12-13
Attending: FAMILY MEDICINE
Payer: COMMERCIAL

## 2021-12-13 ENCOUNTER — SPECIALTY PHARMACY (OUTPATIENT)
Dept: PHARMACY | Facility: CLINIC | Age: 56
End: 2021-12-13
Payer: COMMERCIAL

## 2021-12-13 VITALS
HEART RATE: 83 BPM | WEIGHT: 200.75 LBS | DIASTOLIC BLOOD PRESSURE: 76 MMHG | HEIGHT: 69 IN | OXYGEN SATURATION: 97 % | SYSTOLIC BLOOD PRESSURE: 132 MMHG | BODY MASS INDEX: 29.73 KG/M2

## 2021-12-13 DIAGNOSIS — M54.2 CERVICALGIA: ICD-10-CM

## 2021-12-13 DIAGNOSIS — Z95.9 S/P ARTERIAL STENT: ICD-10-CM

## 2021-12-13 DIAGNOSIS — M25.512 CHRONIC PAIN OF BOTH SHOULDERS: ICD-10-CM

## 2021-12-13 DIAGNOSIS — M25.511 CHRONIC PAIN OF BOTH SHOULDERS: ICD-10-CM

## 2021-12-13 DIAGNOSIS — G89.29 CHRONIC PAIN OF BOTH SHOULDERS: ICD-10-CM

## 2021-12-13 DIAGNOSIS — I10 HYPERTENSION, ESSENTIAL: Primary | ICD-10-CM

## 2021-12-13 DIAGNOSIS — Z79.01 ANTICOAGULANT LONG-TERM USE: ICD-10-CM

## 2021-12-13 DIAGNOSIS — Z12.11 COLON CANCER SCREENING: ICD-10-CM

## 2021-12-13 DIAGNOSIS — M50.30 DDD (DEGENERATIVE DISC DISEASE), CERVICAL: ICD-10-CM

## 2021-12-13 DIAGNOSIS — I77.9 PAOD (PERIPHERAL ARTERIAL OCCLUSIVE DISEASE): ICD-10-CM

## 2021-12-13 PROCEDURE — 99999 PR PBB SHADOW E&M-EST. PATIENT-LVL III: ICD-10-PCS | Mod: PBBFAC,,, | Performed by: FAMILY MEDICINE

## 2021-12-13 PROCEDURE — 99213 PR OFFICE/OUTPT VISIT, EST, LEVL III, 20-29 MIN: ICD-10-PCS | Mod: S$GLB,,, | Performed by: FAMILY MEDICINE

## 2021-12-13 PROCEDURE — 99999 PR PBB SHADOW E&M-EST. PATIENT-LVL III: CPT | Mod: PBBFAC,,, | Performed by: FAMILY MEDICINE

## 2021-12-13 PROCEDURE — 99213 OFFICE O/P EST LOW 20 MIN: CPT | Mod: S$GLB,,, | Performed by: FAMILY MEDICINE

## 2021-12-14 ENCOUNTER — CLINICAL SUPPORT (OUTPATIENT)
Dept: REHABILITATION | Facility: HOSPITAL | Age: 56
End: 2021-12-14
Payer: COMMERCIAL

## 2021-12-14 DIAGNOSIS — M25.512 CHRONIC LEFT SHOULDER PAIN: ICD-10-CM

## 2021-12-14 DIAGNOSIS — M54.50 ACUTE MIDLINE LOW BACK PAIN WITHOUT SCIATICA: ICD-10-CM

## 2021-12-14 DIAGNOSIS — G89.29 CHRONIC LEFT SHOULDER PAIN: ICD-10-CM

## 2021-12-14 PROCEDURE — 97110 THERAPEUTIC EXERCISES: CPT | Mod: CQ

## 2021-12-14 PROCEDURE — 97140 MANUAL THERAPY 1/> REGIONS: CPT | Mod: CQ

## 2021-12-15 PROBLEM — M25.512 CHRONIC LEFT SHOULDER PAIN: Status: ACTIVE | Noted: 2021-12-15

## 2021-12-15 PROBLEM — G89.29 CHRONIC LEFT SHOULDER PAIN: Status: ACTIVE | Noted: 2021-12-15

## 2021-12-16 ENCOUNTER — TELEPHONE (OUTPATIENT)
Dept: ENDOSCOPY | Facility: HOSPITAL | Age: 56
End: 2021-12-16
Payer: COMMERCIAL

## 2021-12-17 ENCOUNTER — CLINICAL SUPPORT (OUTPATIENT)
Dept: REHABILITATION | Facility: HOSPITAL | Age: 56
End: 2021-12-17
Payer: COMMERCIAL

## 2021-12-17 DIAGNOSIS — M25.512 CHRONIC LEFT SHOULDER PAIN: ICD-10-CM

## 2021-12-17 DIAGNOSIS — G89.29 CHRONIC LEFT SHOULDER PAIN: ICD-10-CM

## 2021-12-17 PROCEDURE — 97140 MANUAL THERAPY 1/> REGIONS: CPT

## 2021-12-17 PROCEDURE — 97110 THERAPEUTIC EXERCISES: CPT

## 2021-12-27 ENCOUNTER — CLINICAL SUPPORT (OUTPATIENT)
Dept: REHABILITATION | Facility: HOSPITAL | Age: 56
End: 2021-12-27
Payer: COMMERCIAL

## 2021-12-27 DIAGNOSIS — R53.1 DECREASED STRENGTH, ENDURANCE, AND MOBILITY: ICD-10-CM

## 2021-12-27 DIAGNOSIS — R68.89 DECREASED STRENGTH, ENDURANCE, AND MOBILITY: ICD-10-CM

## 2021-12-27 DIAGNOSIS — M25.512 CHRONIC LEFT SHOULDER PAIN: ICD-10-CM

## 2021-12-27 DIAGNOSIS — M54.2 CERVICALGIA: Primary | ICD-10-CM

## 2021-12-27 DIAGNOSIS — G89.29 CHRONIC LEFT SHOULDER PAIN: ICD-10-CM

## 2021-12-27 DIAGNOSIS — Z74.09 DECREASED STRENGTH, ENDURANCE, AND MOBILITY: ICD-10-CM

## 2021-12-27 DIAGNOSIS — M53.82 IMPAIRED RANGE OF MOTION OF CERVICAL SPINE: ICD-10-CM

## 2021-12-27 PROCEDURE — 97140 MANUAL THERAPY 1/> REGIONS: CPT

## 2021-12-27 PROCEDURE — 97110 THERAPEUTIC EXERCISES: CPT

## 2022-01-03 ENCOUNTER — CLINICAL SUPPORT (OUTPATIENT)
Dept: REHABILITATION | Facility: HOSPITAL | Age: 57
End: 2022-01-03
Payer: COMMERCIAL

## 2022-01-03 DIAGNOSIS — M25.512 CHRONIC LEFT SHOULDER PAIN: ICD-10-CM

## 2022-01-03 DIAGNOSIS — G89.29 CHRONIC LEFT SHOULDER PAIN: ICD-10-CM

## 2022-01-03 DIAGNOSIS — R29.898 DECREASED STRENGTH OF UPPER EXTREMITY: ICD-10-CM

## 2022-01-03 PROBLEM — M54.50 ACUTE MIDLINE LOW BACK PAIN WITHOUT SCIATICA: Status: RESOLVED | Noted: 2021-06-09 | Resolved: 2022-01-03

## 2022-01-03 PROCEDURE — 97110 THERAPEUTIC EXERCISES: CPT

## 2022-01-03 PROCEDURE — 97140 MANUAL THERAPY 1/> REGIONS: CPT

## 2022-01-03 NOTE — PROGRESS NOTES
[facility]   Physical Therapy Treatment Note     Name: Ramiro Oneil  Clinic Number: [id number]    Therapy Diagnosis:   Encounter Diagnoses   Name Primary?    Chronic left shoulder pain     Decreased strength of upper extremity      Physician: Yves Castellon MD    Visit Date: 1/3/2022    Physician: Yves Castellon MD   Physician Orders: PT Eval and Treat  Medical Diagnosis from Referral: Cervicalgia                      Evaluation Date: 12/6/2021  Authorization Period Expiration: 11/4/2022  Plan of Care Expiration: 3/6/2022                  Progress Update: 1/6/2021                 Visit # / Visits authorized: 4 / 16                    FOTO: 1 / 3       PRECAUTIONS: Standard Precautions         Time In: 1300  Time Out: 1345  Total Appointment Time (timed & untimed codes): 45 minutes    PTA Visit #: 0/5     Time In: 1300   Time Out: 1345  Total Billable Time: 45 minutes    SUBJECTIVE     Pt reports: that pain is decreased left shoulder and neck regions.  He does report that flexibility is increased.  He was compliant with home exercise program.  Response to previous treatment: good  Functional change: none noted    Pain: 4/10  Location: neck/left shoulder    OBJECTIVE     Objective Measures updated at progress report unless specified.     Treatment     Ramiro received the treatments listed below:      therapeutic exercises to develop strength, endurance, ROM, flexibility, posture and core stabilization for 30 minutes including:       TherEx 12/6/2021     Upper cycle X  Level 2 - 3 forward / 3 backward   Supine forward bend/backward bend with overpressure x  10 second holds x 2 minutes   Sitting - Cervical Rotations with overpressure  x 10 second holds x 3 minutes   Pulley - scaption x  10 second holds x 3 minutes   Right sidelying, left shoulder abduction overhead x  X 3 minutes   Supine - shoulder dowel flex and external rotation x  10 second holds x 2 minutes each   Open  books - x  10 second holds x 2 minutes    Prone on elbows for elbow flexion  x X 2 minutes   Prone left shoulder middle traps x X 2 minutes   Prone left shoulder lower traps x X 2 minutes   Behind the back stretch with dowel x 10 second holds x 2 minutes         manual therapy techniques: Myofacial release and Soft tissue Mobilization were applied to the: neck and left shoulder for 15 minutes, including:  Joint Mobilization: inferior, posterior, and anterior glides at the left GH joint  End range abduction and flexion performed with scapular stabilization  Suboccipital release    Patient Education and Home Exercises     Home Exercises Provided and Patient Education Provided     Education provided:   -     Written Home Exercises Provided: Patient instructed to cont prior HEP. Exercises were reviewed and Ramiro was able to demonstrate them prior to the end of the session.  Ramiro demonstrated good  understanding of the education provided. See EMR under Patient Instructions for exercises provided during therapy sessions    ASSESSMENT     Patient reports that shoulder and cervical spine ROM are both improved.  There is still some AROM deficits into shoulder flexion and abduction.  This still appears related to scapular stabilization.  Will continue to progress into more scapular stabilization with continued treatment.      Ramiro Is progressing well towards his goals.   Pt prognosis is Fair.     Pt will continue to benefit from skilled outpatient physical therapy to address the deficits listed in the problem list box on initial evaluation, provide pt/family education and to maximize pt's level of independence in the home and community environment.     Pt's spiritual, cultural and educational needs considered and pt agreeable to plan of care and goals.     Anticipated barriers to physical therapy: co-morbidities    GOALS:  SHORT TERM GOALS: 4 weeks,  1/62022   1. Patient will be educated on home exercises Met     2. Patient will demonstrate increased ROM at the cervical spine forward flexion, extension, and rotation FN     3. Increased shoulder ROM to flex/abduction to 90%  Met   4. Report of ability to perform work and housework tasks with pain at or less than 2/10        LONG TERM GOALS: 8 weeks, 2/6/2021   1. Patient will  Be independent with home exercise Met    2. Patient will demonstrate increased ROM into cervical forward flexion, extension, and rotation to FN     3. Increased UE strength to 4+/5      4. Work related activities with pain at or less than 2/10      5. Patient will meet predicted functional outcome (FOTO) score: 33% to increase self-worth & perceived functional ability.                  PLAN   Plan of care Certification: 12/6/2021 to 3/6/2022    Continue Plan of Care (POC) and progress per patient tolerance.    Demetrio Little, PT

## 2022-01-06 ENCOUNTER — TELEPHONE (OUTPATIENT)
Dept: REHABILITATION | Facility: HOSPITAL | Age: 57
End: 2022-01-06
Payer: COMMERCIAL

## 2022-01-06 NOTE — TELEPHONE ENCOUNTER
Name: Ramiro Oneil  Clinic Number: [id number]      Call Date: 1/6/2022    Reason for call: missed appointment    Discussion: Ramiro Oneil was reached via phone number and stated that he has another appointment today.    Follow up Visit's: 1/10/21 @ 1:00pm with Liam. Informed patient to call or cancel on mychart per no show policy.

## 2022-01-10 ENCOUNTER — CLINICAL SUPPORT (OUTPATIENT)
Dept: REHABILITATION | Facility: HOSPITAL | Age: 57
End: 2022-01-10
Payer: COMMERCIAL

## 2022-01-10 DIAGNOSIS — M25.512 CHRONIC LEFT SHOULDER PAIN: ICD-10-CM

## 2022-01-10 DIAGNOSIS — R53.1 DECREASED STRENGTH, ENDURANCE, AND MOBILITY: ICD-10-CM

## 2022-01-10 DIAGNOSIS — R68.89 DECREASED STRENGTH, ENDURANCE, AND MOBILITY: ICD-10-CM

## 2022-01-10 DIAGNOSIS — M54.2 CERVICALGIA: ICD-10-CM

## 2022-01-10 DIAGNOSIS — Z74.09 DECREASED STRENGTH, ENDURANCE, AND MOBILITY: ICD-10-CM

## 2022-01-10 DIAGNOSIS — R29.898 DECREASED STRENGTH OF UPPER EXTREMITY: ICD-10-CM

## 2022-01-10 DIAGNOSIS — G89.29 CHRONIC LEFT SHOULDER PAIN: ICD-10-CM

## 2022-01-10 PROCEDURE — 97110 THERAPEUTIC EXERCISES: CPT

## 2022-01-10 PROCEDURE — 97140 MANUAL THERAPY 1/> REGIONS: CPT

## 2022-01-10 NOTE — PROGRESS NOTES
[facility]   Physical Therapy Treatment Note     Name: Ramiro Oneil  Clinic Number: [id number]    Therapy Diagnosis:   Encounter Diagnoses   Name Primary?    Decreased strength of upper extremity     Chronic left shoulder pain     Decreased strength, endurance, and mobility     Cervicalgia      Physician: Yves Castellon MD    Visit Date: 1/10/2022    Physician: Yves Castellon MD   Physician Orders: PT Eval and Treat  Medical Diagnosis from Referral: Cervicalgia                      Evaluation Date: 12/6/2021  Authorization Period Expiration: 11/4/2022  Plan of Care Expiration: 3/6/2022                  Progress Update: 1/6/2021                 Visit # / Visits authorized: 5 / 16                    FOTO: 1 / 3       PRECAUTIONS: Standard Precautions        PTA Visit #: 0/5     Time In: 1300   Time Out: 1345  Total Billable Time: 45 minutes    SUBJECTIVE     Pt reports: that pain is decreased left shoulder and neck regions.  He does report that flexibility is increased.  He was compliant with home exercise program.  Response to previous treatment: good  Functional change: none noted    Pain: 4/10  Location: neck/left shoulder    OBJECTIVE     Objective Measures updated at progress report unless specified.     Treatment     Ramiro received the treatments listed below:      therapeutic exercises to develop strength, endurance, ROM, flexibility, posture and core stabilization for 30 minutes including:       TherEx 12/6/2021     Upper cycle X  Level 2 - 3 forward / 3 backward   Supine forward bend/backward bend with overpressure  10 second holds x 2 minutes   Sitting - Cervical Rotations with overpressure  10 second holds x 3 minutes   Pulley - scaption x  10 second holds x 3 minutes   Right sidelying, left shoulder abduction overhead x  X 3 minutes   Supine - shoulder dowel flex and external rotation x  10 second holds x 2 minutes each   Open books - x 10 second holds x 2  minutes    Prone on elbows for elbow flexion  X 2 minutes   Prone left shoulder middle traps x X 2 minutes   Prone left shoulder lower traps x X 2 minutes   Behind the back stretch with dowel x 10 second holds x 2 minutes         manual therapy techniques:   Joint Mobilization: inferior, posterior, and anterior glides at the left GH joint  End range abduction and flexion performed with scapular stabilization    Patient Education and Home Exercises     Home Exercises Provided and Patient Education Provided     Education provided:   -     Written Home Exercises Provided: Patient instructed to cont prior HEP. Exercises were reviewed and Ramiro was able to demonstrate them prior to the end of the session.  Ramiro demonstrated good  understanding of the education provided. See EMR under Patient Instructions for exercises provided during therapy sessions    ASSESSMENT     Patient with improving ROM at bilateral shoulders.  Will require to continue working toward greater scapular and shoulder strength with continued treatment now that PROM is improving.     Ramiro Is progressing well towards his goals.   Pt prognosis is Fair.     Pt will continue to benefit from skilled outpatient physical therapy to address the deficits listed in the problem list box on initial evaluation, provide pt/family education and to maximize pt's level of independence in the home and community environment.     Pt's spiritual, cultural and educational needs considered and pt agreeable to plan of care and goals.     Anticipated barriers to physical therapy: co-morbidities    GOALS:  SHORT TERM GOALS: 4 weeks,  1/62022   1. Patient will be educated on home exercises Met    2. Patient will demonstrate increased ROM at the cervical spine forward flexion, extension, and rotation FN     3. Increased shoulder ROM to flex/abduction to 90%  Met   4. Report of ability to perform work and housework tasks with pain at or less than 2/10        LONG TERM GOALS:  8 weeks, 2/6/2021   1. Patient will  Be independent with home exercise Met    2. Patient will demonstrate increased ROM into cervical forward flexion, extension, and rotation to FN     3. Increased UE strength to 4+/5      4. Work related activities with pain at or less than 2/10      5. Patient will meet predicted functional outcome (FOTO) score: 33% to increase self-worth & perceived functional ability.                  PLAN   Plan of care Certification: 12/6/2021 to 3/6/2022    Continue Plan of Care (POC) and progress per patient tolerance.    Demetrio Little, PT

## 2022-01-18 ENCOUNTER — OFFICE VISIT (OUTPATIENT)
Dept: CARDIOLOGY | Facility: CLINIC | Age: 57
End: 2022-01-18
Payer: COMMERCIAL

## 2022-01-18 ENCOUNTER — DOCUMENTATION ONLY (OUTPATIENT)
Dept: CARDIOLOGY | Facility: CLINIC | Age: 57
End: 2022-01-18

## 2022-01-18 VITALS
BODY MASS INDEX: 29.68 KG/M2 | DIASTOLIC BLOOD PRESSURE: 74 MMHG | HEART RATE: 77 BPM | WEIGHT: 200.38 LBS | SYSTOLIC BLOOD PRESSURE: 158 MMHG | HEIGHT: 69 IN | OXYGEN SATURATION: 98 %

## 2022-01-18 DIAGNOSIS — E78.5 HYPERLIPIDEMIA, UNSPECIFIED HYPERLIPIDEMIA TYPE: ICD-10-CM

## 2022-01-18 DIAGNOSIS — Z79.01 ANTICOAGULANT LONG-TERM USE: ICD-10-CM

## 2022-01-18 DIAGNOSIS — I73.9 PAD (PERIPHERAL ARTERY DISEASE): Primary | ICD-10-CM

## 2022-01-18 DIAGNOSIS — I10 HYPERTENSION, ESSENTIAL: ICD-10-CM

## 2022-01-18 DIAGNOSIS — B18.1 CHRONIC HEPATITIS B: ICD-10-CM

## 2022-01-18 PROCEDURE — 3008F PR BODY MASS INDEX (BMI) DOCUMENTED: ICD-10-PCS | Mod: CPTII,S$GLB,, | Performed by: INTERNAL MEDICINE

## 2022-01-18 PROCEDURE — 3077F SYST BP >= 140 MM HG: CPT | Mod: CPTII,S$GLB,, | Performed by: INTERNAL MEDICINE

## 2022-01-18 PROCEDURE — 1160F RVW MEDS BY RX/DR IN RCRD: CPT | Mod: CPTII,S$GLB,, | Performed by: INTERNAL MEDICINE

## 2022-01-18 PROCEDURE — 99215 OFFICE O/P EST HI 40 MIN: CPT | Mod: S$GLB,,, | Performed by: INTERNAL MEDICINE

## 2022-01-18 PROCEDURE — 99999 PR PBB SHADOW E&M-EST. PATIENT-LVL IV: ICD-10-PCS | Mod: PBBFAC,,, | Performed by: INTERNAL MEDICINE

## 2022-01-18 PROCEDURE — 1159F MED LIST DOCD IN RCRD: CPT | Mod: CPTII,S$GLB,, | Performed by: INTERNAL MEDICINE

## 2022-01-18 PROCEDURE — 3077F PR MOST RECENT SYSTOLIC BLOOD PRESSURE >= 140 MM HG: ICD-10-PCS | Mod: CPTII,S$GLB,, | Performed by: INTERNAL MEDICINE

## 2022-01-18 PROCEDURE — 99999 PR PBB SHADOW E&M-EST. PATIENT-LVL IV: CPT | Mod: PBBFAC,,, | Performed by: INTERNAL MEDICINE

## 2022-01-18 PROCEDURE — 99215 PR OFFICE/OUTPT VISIT, EST, LEVL V, 40-54 MIN: ICD-10-PCS | Mod: S$GLB,,, | Performed by: INTERNAL MEDICINE

## 2022-01-18 PROCEDURE — 1160F PR REVIEW ALL MEDS BY PRESCRIBER/CLIN PHARMACIST DOCUMENTED: ICD-10-PCS | Mod: CPTII,S$GLB,, | Performed by: INTERNAL MEDICINE

## 2022-01-18 PROCEDURE — 3078F DIAST BP <80 MM HG: CPT | Mod: CPTII,S$GLB,, | Performed by: INTERNAL MEDICINE

## 2022-01-18 PROCEDURE — 3078F PR MOST RECENT DIASTOLIC BLOOD PRESSURE < 80 MM HG: ICD-10-PCS | Mod: CPTII,S$GLB,, | Performed by: INTERNAL MEDICINE

## 2022-01-18 PROCEDURE — 3008F BODY MASS INDEX DOCD: CPT | Mod: CPTII,S$GLB,, | Performed by: INTERNAL MEDICINE

## 2022-01-18 PROCEDURE — 1159F PR MEDICATION LIST DOCUMENTED IN MEDICAL RECORD: ICD-10-PCS | Mod: CPTII,S$GLB,, | Performed by: INTERNAL MEDICINE

## 2022-01-18 RX ORDER — DIPHENHYDRAMINE HCL 50 MG
50 CAPSULE ORAL ONCE
Status: CANCELLED | OUTPATIENT
Start: 2022-01-18 | End: 2022-01-18

## 2022-01-18 RX ORDER — SODIUM CHLORIDE 9 MG/ML
INJECTION, SOLUTION INTRAVENOUS CONTINUOUS
Status: CANCELLED | OUTPATIENT
Start: 2022-01-18 | End: 2022-01-18

## 2022-01-18 NOTE — PROGRESS NOTES
Interventional Cardiology Clinic Note    Subjective:   Patient ID:  Ramiro Oneil is a 56 y.o. male who presents for follow-up of her peripheral arterial disease     On 10/29/2021 patient had peripheral angiogram and PTA. Distal Right SFA  was crossed in antegrade manner. Atherectomy performed using Jetstream device with distal filter protection. This was followed by balloon angioplasty and finally DCB was used with good final result.  He now presents today with worsening right lower extremity leg pain he states that he is on a unable to walk more than 50 ft without calf pain and must hold onto the wall to ambulate.  After his procedure he noted significant improvement in his leg pain however 2 months after the procedure the right leg pain reoccurred.  He had an ultrasound recently which revealed a severe stenosis in his right popliteal artery and this is the leg that hurts him the most.  He denies any ulcers, rest pain, nonhealing wounds.       11/30/2021  · Right popliteal severe stenosis. Patent SFA stent. Occluded anterior tibial and posterior tibial.  · Left SFA, AT, DP total occlusion.  · Reduced KARMA's consistent with bilateral moderate to severe PAD.        Review of Systems   Constitutional: Negative for chills and fever.   HENT: Negative for nosebleeds.    Eyes: Negative for redness.   Cardiovascular: Negative for chest pain, claudication, dyspnea on exertion, leg swelling, near-syncope, orthopnea, palpitations, paroxysmal nocturnal dyspnea and syncope.   Respiratory: Negative for cough, shortness of breath and sputum production.    Hematologic/Lymphatic: Negative for bleeding problem.   Musculoskeletal: Negative for joint swelling and muscle weakness.   Gastrointestinal: Negative for hematemesis, melena, nausea and vomiting.   Genitourinary: Negative for hematuria.   Neurological: Negative for dizziness and weakness.          History:       Past Medical History:   Diagnosis Date    Anticoagulant  long-term use     Arthritis     Dermatitis     Hepatitis B     History of surgery on arm 07/2020    Hypertension     Liver fibrosis 12/12/2019    F2 on fibroscan 11/2018    NAFLD (nonalcoholic fatty liver disease)      Past Surgical History:   Procedure Laterality Date    CARPAL TUNNEL RELEASE      COLONOSCOPY      PTA/stenting of distal right SFA       right hand surgery      SHOULDER SURGERY      stents       Social History     Socioeconomic History    Marital status:      Spouse name: Roro    Number of children: 2   Occupational History    Occupation:      Employer: [employer]   Tobacco Use    Smoking status: Never Smoker    Smokeless tobacco: Never Used   Substance and Sexual Activity    Alcohol use: Not Currently     Comment: 1-2 non alchoholic beers per week    Drug use: No    Sexual activity: Yes   Social History Narrative     [employer], , two children.        Family History   Problem Relation Age of Onset    Diabetes Mother     Hypertension Mother     Cancer Father         lung    Diabetes Sister     Cancer Sister         breast    Hypertension Sister     Cancer Brother         prostate    Cancer Paternal Uncle     Diabetes Paternal Uncle     Stroke Sister     Liver disease Neg Hx     Cirrhosis Neg Hx       Review of patient's allergies indicates:  No Known Allergies  has a current medication list which includes the following prescription(s): amlodipine, aspirin, atorvastatin, cyclobenzaprine, diclofenac sodium, econazole nitrate, fexofenadine, fluticasone propionate, hydrochlorothiazide, nystatin-triamcinolone, rivaroxaban, vemlidy, triamcinolone acetonide 0.1%, and [DISCONTINUED] tenofovir disoproxil fumarate.           Meds:   Review of patient's allergies indicates:  No Known Allergies    Current Outpatient Medications:     amLODIPine (NORVASC) 2.5 MG tablet, Take 1 tablet (2.5 mg total) by mouth once daily., Disp: 90  "tablet, Rfl: 1    aspirin (ECOTRIN) 81 MG EC tablet, Take 1 tablet (81 mg total) by mouth once daily., Disp: 30 tablet, Rfl: 11    atorvastatin (LIPITOR) 80 MG tablet, Take 1 tablet (80 mg total) by mouth once daily., Disp: 90 tablet, Rfl: 3    cyclobenzaprine (FLEXERIL) 5 MG tablet, Take 1 tablet (5 mg total) by mouth 3 (three) times daily as needed for Muscle spasms., Disp: 45 tablet, Rfl: 0    diclofenac sodium (VOLTAREN) 1 % Gel, Apply 2 grams topically 3 (three) times daily as needed., Disp: 2 Tube, Rfl: 6    econazole nitrate 1 % cream, , Disp: , Rfl:     fexofenadine (ALLEGRA) 180 MG tablet, Take 1 tablet (180 mg total) by mouth once daily., Disp: 30 tablet, Rfl: 1    fluticasone (FLONASE) 50 mcg/actuation nasal spray, 2 sprays (100 mcg total) by Each Nare route once daily., Disp: 1 Bottle, Rfl: 5    hydroCHLOROthiazide (HYDRODIURIL) 25 MG tablet, Take 1 tablet (25 mg total) by mouth once daily., Disp: 90 tablet, Rfl: 1    nystatin-triamcinolone (MYCOLOG II) cream, Apply topically 2 (two) times daily to Groin area., Disp: 30 g, Rfl: 0    rivaroxaban (XARELTO) 2.5 mg Tab, Take 1 tablet (2.5 mg total) by mouth 2 (two) times daily with meals., Disp: 60 tablet, Rfl: 11    tenofovir alafenamide (VEMLIDY) 25 mg Tab, Take 1 tablet (25 mg total) by mouth once daily., Disp: 30 tablet, Rfl: 11    triamcinolone acetonide 0.1% (KENALOG) 0.1 % cream, Apply topically 2 (two) times daily as needed. Neck and body, not groin, Disp: 80 g, Rfl: 1    Objective:   BP (!) 158/74 (BP Location: Left arm, Patient Position: Sitting, BP Method: Large (Automatic))   Pulse 77   Ht 5' 9" (1.753 m)   Wt 90.9 kg (200 lb 6.4 oz)   SpO2 98%   BMI 29.59 kg/m²   Physical Exam  Constitutional:       Appearance: Normal appearance.   HENT:      Head: Normocephalic and atraumatic.      Mouth/Throat:      Mouth: Mucous membranes are dry.   Eyes:      Extraocular Movements: Extraocular movements intact.   Cardiovascular:      Rate " and Rhythm: Normal rate and regular rhythm.      Pulses:           Dorsalis pedis pulses are detected w/ Doppler on the right side.        Posterior tibial pulses are detected w/ Doppler on the right side and detected w/ Doppler on the left side.      Heart sounds: Normal heart sounds, S1 normal and S2 normal. No murmur heard.      Pulmonary:      Effort: Pulmonary effort is normal.      Breath sounds: Normal breath sounds. No wheezing, rhonchi or rales.   Abdominal:      General: Bowel sounds are normal. There is no distension.      Palpations: Abdomen is soft.      Tenderness: There is no abdominal tenderness.   Musculoskeletal:      Cervical back: Normal range of motion.      Right lower leg: No edema.      Left lower leg: No edema.   Skin:     General: Skin is warm and dry.   Neurological:      Mental Status: He is alert and oriented to person, place, and time.   Psychiatric:         Mood and Affect: Mood normal.         Behavior: Behavior normal.         Labs:     Lab Results   Component Value Date     10/29/2021    K 3.9 10/29/2021     10/29/2021    CO2 24 10/29/2021    BUN 12 10/29/2021    CREATININE 0.8 10/29/2021    ANIONGAP 7 (L) 10/29/2021     Lab Results   Component Value Date    HGBA1C 6.4 (H) 07/12/2011     No results found for: BNP, BNPTRIAGEBLO    Lab Results   Component Value Date    WBC 5.24 10/29/2021    HGB 15.4 10/29/2021    HCT 49.3 10/29/2021     10/29/2021    GRAN 2.6 10/29/2021    GRAN 48.7 10/29/2021     Lab Results   Component Value Date    CHOL 179 01/02/2021    CHOL 154 11/16/2017    HDL 36 (L) 01/02/2021    LDLCALC 123.8 01/02/2021    TRIG 96 01/02/2021       Lab Results   Component Value Date     10/29/2021    K 3.9 10/29/2021     10/29/2021    CO2 24 10/29/2021    BUN 12 10/29/2021    CREATININE 0.8 10/29/2021    ANIONGAP 7 (L) 10/29/2021     Lab Results   Component Value Date    HGBA1C 6.4 (H) 07/12/2011     No results found for: BNP, BNPTRIAGEBLO Lab  Results   Component Value Date    WBC 5.24 10/29/2021    HGB 15.4 10/29/2021    HCT 49.3 10/29/2021     10/29/2021    GRAN 2.6 10/29/2021    GRAN 48.7 10/29/2021     Lab Results   Component Value Date    CHOL 179 01/02/2021    CHOL 154 11/16/2017    HDL 36 (L) 01/02/2021    LDLCALC 123.8 01/02/2021    TRIG 96 01/02/2021              Assessment/Plan:       1. PAD (peripheral artery disease)   Patient is a 56-year-old gentleman with severe peripheral arterial disease and had a recent intervention in October to his SFA which require jet stream and stenting of the SFA.  After this procedure he had significant relief in his right lower extremity pain however 2 months after procedure he noted severe claudication when ambulating even less than 50 ft.  He had an ultrasound which revealed a severe stenosis in his right popliteal artery.  His right leg hurts him worse than his left leg does.  So we will proceed with intervention of the right lower extremity likely ballooning the popliteal and stenting with a Supera stent.    1. Peripheral angiogram + Intervention of the popliteal artery.  2. Antiplatelets: ASA/Xarelto  3. Access:  Left common femoral access  4. Sheath:  6 Ivorian sheath, please get a 6 Ivorian crossover sheath, please get a 4 Ivorian KODY catheter, a floppy angled Glidewire.  5. Please get a Supera stent  6. The risks, benefits, and alternatives of coronary vascular angiography and possible intervention were discussed with the patient. All questions were answered and informed consent was obtained. I had a detailed discussion with the patient regarding risk of stroke, MI, bleeding access site complications including limb loss, allergy, kidney failure including dialysis and death.  7. The patient understands the risks and benefits and wishes to go ahead with the procedure.  8. All patient's questions were answered         2. Hypertension, essential   Blood pressure is elevated today will continue current  medications for now.   3. Hyperlipidemia, unspecified hyperlipidemia type   Continue statin.   4. Chronic hepatitis B   Management per Infectious Disease.   5. Anticoagulant long-term use   Patient with severe peripheral arterial disease so he is on reduced dose Xarelto.       Signed:  Latoya Garcia MD  Interventional Cardiology Fellow  Pager [phone]    Interventional Cardiology Staff  I have personally taken the history and examined this patient. I have discussed and agree with the resident's findings and plan as documented in the resident's note.    Rodger Blake  error

## 2022-01-18 NOTE — H&P (VIEW-ONLY)
Interventional Cardiology Clinic Note    Subjective:   Patient ID:  Ramiro Oneil is a 56 y.o. male who presents for follow-up of her peripheral arterial disease     On 10/29/2021 patient had peripheral angiogram and PTA. Distal Right SFA  was crossed in antegrade manner. Atherectomy performed using Jetstream device with distal filter protection. This was followed by balloon angioplasty and finally DCB was used with good final result.  He now presents today with worsening right lower extremity leg pain he states that he is on a unable to walk more than 50 ft without calf pain and must hold onto the wall to ambulate.  After his procedure he noted significant improvement in his leg pain however 2 months after the procedure the right leg pain reoccurred.  He had an ultrasound recently which revealed a severe stenosis in his right popliteal artery and this is the leg that hurts him the most.  He denies any ulcers, rest pain, nonhealing wounds.       11/30/2021  · Right popliteal severe stenosis. Patent SFA stent. Occluded anterior tibial and posterior tibial.  · Left SFA, AT, DP total occlusion.  · Reduced KARMA's consistent with bilateral moderate to severe PAD.        Review of Systems   Constitutional: Negative for chills and fever.   HENT: Negative for nosebleeds.    Eyes: Negative for redness.   Cardiovascular: Negative for chest pain, claudication, dyspnea on exertion, leg swelling, near-syncope, orthopnea, palpitations, paroxysmal nocturnal dyspnea and syncope.   Respiratory: Negative for cough, shortness of breath and sputum production.    Hematologic/Lymphatic: Negative for bleeding problem.   Musculoskeletal: Negative for joint swelling and muscle weakness.   Gastrointestinal: Negative for hematemesis, melena, nausea and vomiting.   Genitourinary: Negative for hematuria.   Neurological: Negative for dizziness and weakness.          History:       Past Medical History:   Diagnosis Date    Anticoagulant  long-term use     Arthritis     Dermatitis     Hepatitis B     History of surgery on arm 07/2020    Hypertension     Liver fibrosis 12/12/2019    F2 on fibroscan 11/2018    NAFLD (nonalcoholic fatty liver disease)      Past Surgical History:   Procedure Laterality Date    CARPAL TUNNEL RELEASE      COLONOSCOPY      PTA/stenting of distal right SFA       right hand surgery      SHOULDER SURGERY      stents       Social History     Socioeconomic History    Marital status:      Spouse name: Roro    Number of children: 2   Occupational History    Occupation:      Employer: [employer]   Tobacco Use    Smoking status: Never Smoker    Smokeless tobacco: Never Used   Substance and Sexual Activity    Alcohol use: Not Currently     Comment: 1-2 non alchoholic beers per week    Drug use: No    Sexual activity: Yes   Social History Narrative     [employer], , two children.        Family History   Problem Relation Age of Onset    Diabetes Mother     Hypertension Mother     Cancer Father         lung    Diabetes Sister     Cancer Sister         breast    Hypertension Sister     Cancer Brother         prostate    Cancer Paternal Uncle     Diabetes Paternal Uncle     Stroke Sister     Liver disease Neg Hx     Cirrhosis Neg Hx       Review of patient's allergies indicates:  No Known Allergies  has a current medication list which includes the following prescription(s): amlodipine, aspirin, atorvastatin, cyclobenzaprine, diclofenac sodium, econazole nitrate, fexofenadine, fluticasone propionate, hydrochlorothiazide, nystatin-triamcinolone, rivaroxaban, vemlidy, triamcinolone acetonide 0.1%, and [DISCONTINUED] tenofovir disoproxil fumarate.           Meds:   Review of patient's allergies indicates:  No Known Allergies    Current Outpatient Medications:     amLODIPine (NORVASC) 2.5 MG tablet, Take 1 tablet (2.5 mg total) by mouth once daily., Disp: 90  "tablet, Rfl: 1    aspirin (ECOTRIN) 81 MG EC tablet, Take 1 tablet (81 mg total) by mouth once daily., Disp: 30 tablet, Rfl: 11    atorvastatin (LIPITOR) 80 MG tablet, Take 1 tablet (80 mg total) by mouth once daily., Disp: 90 tablet, Rfl: 3    cyclobenzaprine (FLEXERIL) 5 MG tablet, Take 1 tablet (5 mg total) by mouth 3 (three) times daily as needed for Muscle spasms., Disp: 45 tablet, Rfl: 0    diclofenac sodium (VOLTAREN) 1 % Gel, Apply 2 grams topically 3 (three) times daily as needed., Disp: 2 Tube, Rfl: 6    econazole nitrate 1 % cream, , Disp: , Rfl:     fexofenadine (ALLEGRA) 180 MG tablet, Take 1 tablet (180 mg total) by mouth once daily., Disp: 30 tablet, Rfl: 1    fluticasone (FLONASE) 50 mcg/actuation nasal spray, 2 sprays (100 mcg total) by Each Nare route once daily., Disp: 1 Bottle, Rfl: 5    hydroCHLOROthiazide (HYDRODIURIL) 25 MG tablet, Take 1 tablet (25 mg total) by mouth once daily., Disp: 90 tablet, Rfl: 1    nystatin-triamcinolone (MYCOLOG II) cream, Apply topically 2 (two) times daily to Groin area., Disp: 30 g, Rfl: 0    rivaroxaban (XARELTO) 2.5 mg Tab, Take 1 tablet (2.5 mg total) by mouth 2 (two) times daily with meals., Disp: 60 tablet, Rfl: 11    tenofovir alafenamide (VEMLIDY) 25 mg Tab, Take 1 tablet (25 mg total) by mouth once daily., Disp: 30 tablet, Rfl: 11    triamcinolone acetonide 0.1% (KENALOG) 0.1 % cream, Apply topically 2 (two) times daily as needed. Neck and body, not groin, Disp: 80 g, Rfl: 1    Objective:   BP (!) 158/74 (BP Location: Left arm, Patient Position: Sitting, BP Method: Large (Automatic))   Pulse 77   Ht 5' 9" (1.753 m)   Wt 90.9 kg (200 lb 6.4 oz)   SpO2 98%   BMI 29.59 kg/m²   Physical Exam  Constitutional:       Appearance: Normal appearance.   HENT:      Head: Normocephalic and atraumatic.      Mouth/Throat:      Mouth: Mucous membranes are dry.   Eyes:      Extraocular Movements: Extraocular movements intact.   Cardiovascular:      Rate " and Rhythm: Normal rate and regular rhythm.      Pulses:           Dorsalis pedis pulses are detected w/ Doppler on the right side.        Posterior tibial pulses are detected w/ Doppler on the right side and detected w/ Doppler on the left side.      Heart sounds: Normal heart sounds, S1 normal and S2 normal. No murmur heard.      Pulmonary:      Effort: Pulmonary effort is normal.      Breath sounds: Normal breath sounds. No wheezing, rhonchi or rales.   Abdominal:      General: Bowel sounds are normal. There is no distension.      Palpations: Abdomen is soft.      Tenderness: There is no abdominal tenderness.   Musculoskeletal:      Cervical back: Normal range of motion.      Right lower leg: No edema.      Left lower leg: No edema.   Skin:     General: Skin is warm and dry.   Neurological:      Mental Status: He is alert and oriented to person, place, and time.   Psychiatric:         Mood and Affect: Mood normal.         Behavior: Behavior normal.         Labs:     Lab Results   Component Value Date     10/29/2021    K 3.9 10/29/2021     10/29/2021    CO2 24 10/29/2021    BUN 12 10/29/2021    CREATININE 0.8 10/29/2021    ANIONGAP 7 (L) 10/29/2021     Lab Results   Component Value Date    HGBA1C 6.4 (H) 07/12/2011     No results found for: BNP, BNPTRIAGEBLO    Lab Results   Component Value Date    WBC 5.24 10/29/2021    HGB 15.4 10/29/2021    HCT 49.3 10/29/2021     10/29/2021    GRAN 2.6 10/29/2021    GRAN 48.7 10/29/2021     Lab Results   Component Value Date    CHOL 179 01/02/2021    CHOL 154 11/16/2017    HDL 36 (L) 01/02/2021    LDLCALC 123.8 01/02/2021    TRIG 96 01/02/2021       Lab Results   Component Value Date     10/29/2021    K 3.9 10/29/2021     10/29/2021    CO2 24 10/29/2021    BUN 12 10/29/2021    CREATININE 0.8 10/29/2021    ANIONGAP 7 (L) 10/29/2021     Lab Results   Component Value Date    HGBA1C 6.4 (H) 07/12/2011     No results found for: BNP, BNPTRIAGEBLO Lab  Results   Component Value Date    WBC 5.24 10/29/2021    HGB 15.4 10/29/2021    HCT 49.3 10/29/2021     10/29/2021    GRAN 2.6 10/29/2021    GRAN 48.7 10/29/2021     Lab Results   Component Value Date    CHOL 179 01/02/2021    CHOL 154 11/16/2017    HDL 36 (L) 01/02/2021    LDLCALC 123.8 01/02/2021    TRIG 96 01/02/2021              Assessment/Plan:       1. PAD (peripheral artery disease)   Patient is a 56-year-old gentleman with severe peripheral arterial disease and had a recent intervention in October to his SFA which require jet stream and stenting of the SFA.  After this procedure he had significant relief in his right lower extremity pain however 2 months after procedure he noted severe claudication when ambulating even less than 50 ft.  He had an ultrasound which revealed a severe stenosis in his right popliteal artery.  His right leg hurts him worse than his left leg does.  So we will proceed with intervention of the right lower extremity likely ballooning the popliteal and stenting with a Supera stent.    1. Peripheral angiogram + Intervention of the popliteal artery.  2. Antiplatelets: ASA/Xarelto  3. Access:  Left common femoral access  4. Sheath:  6 Peruvian sheath, please get a 6 Peruvian crossover sheath, please get a 4 Peruvian KODY catheter, a floppy angled Glidewire.  5. Please get a Supera stent  6. The risks, benefits, and alternatives of coronary vascular angiography and possible intervention were discussed with the patient. All questions were answered and informed consent was obtained. I had a detailed discussion with the patient regarding risk of stroke, MI, bleeding access site complications including limb loss, allergy, kidney failure including dialysis and death.  7. The patient understands the risks and benefits and wishes to go ahead with the procedure.  8. All patient's questions were answered         2. Hypertension, essential   Blood pressure is elevated today will continue current  medications for now.   3. Hyperlipidemia, unspecified hyperlipidemia type   Continue statin.   4. Chronic hepatitis B   Management per Infectious Disease.   5. Anticoagulant long-term use   Patient with severe peripheral arterial disease so he is on reduced dose Xarelto.       Signed:  Latoya Garcia MD  Interventional Cardiology Fellow  Pager [phone]    Interventional Cardiology Staff  I have personally taken the history and examined this patient. I have discussed and agree with the resident's findings and plan as documented in the resident's note.    Rodger Blake  error

## 2022-01-18 NOTE — PROGRESS NOTES
"OUTPATIENT CATHETERIZATION INSTRUCTIONS    You have been scheduled for a procedure in the catheterization lab on Friday, February 4, 2022.     Please report to the Cardiology Waiting Area on the Third floor of the hospital and check in at 6 AM.   You will then be taken to the SSCU (Short Stay Cardiac Unit) and prepared for your procedure. Please be aware that this is not the time of your procedure but the time you are to arrive. The procedures are scheduled on an hourly basis; however, emergency cases take precedence over all other cases.       You may not have anything to eat or drink after midnight the night before your test. You may take your regular morning medications with water. If there are any medications that you should not take you will be instructed to hold them that morning. If you are diabetic and on Metformin (Glucophage) do not take it the day before, the day of, and for 2 days after your procedure.      The procedure will take 1-2 hours to perform. After the procedure, you will return to SSCU on the third floor of the hospital. You will need to lie still (or keep your arm still) for the next 4 to 6 hours to minimize bleeding from the puncture site. Your family may remain in the room with you during this time.       You may be able to be discharged home that same afternoon if there is someone to drive you home and there were no complications. If you have one of the balloon, stent, or device procedures you may spend the night in the hospital. Your doctor will determine, based on your progress, the date and time of your discharge. The results of your procedure will be discussed with you before you are discharged. Any further testing or procedures will be scheduled for you either before you leave or you will be called with these appointments.       If you should have any questions, concerns, or need to change the date of your procedure, please call "TAMELA Sommers @ [phone]    Special " Instructions:    Continue your current medications        THE ABOVE INSTRUCTIONS WERE GIVEN TO THE PATIENT VERBALLY AND THEY VERBALIZED UNDERSTANDING.  THEY DO NOT REQUIRE ANY SPECIAL NEEDS AND DO NOT HAVE ANY LEARNING BARRIERS.          Directions for Reporting to Cardiology Waiting Area in the Hospital  If you park in the Parking Garage:  Take elevators to the1st floor of the parking garage.  Continue past the gift shop, coffee shop, and piano.  Take a right and go to the gold elevators. (Elevator B)  Take the elevator to the 3rd floor.  Follow the arrow on the sign on the wall that says Cath Lab Registration/EP Lab Registration.  Follow the long hallway all the way around until you come to a big open area.  This is the registration area.  Check in at Reception Desk.    OR    If family is dropping you off:  Have them drop you off at the front of the Hospital under the green overhang.  Enter through the doors and take a right.  Take the E elevators to the 3rd floor Cardiology Waiting Area.  Check in at the Reception Desk in the waiting room.

## 2022-01-27 ENCOUNTER — CLINICAL SUPPORT (OUTPATIENT)
Dept: REHABILITATION | Facility: HOSPITAL | Age: 57
End: 2022-01-27
Payer: COMMERCIAL

## 2022-01-27 DIAGNOSIS — Z74.09 DECREASED STRENGTH, ENDURANCE, AND MOBILITY: ICD-10-CM

## 2022-01-27 DIAGNOSIS — R53.1 DECREASED STRENGTH, ENDURANCE, AND MOBILITY: ICD-10-CM

## 2022-01-27 DIAGNOSIS — R68.89 DECREASED STRENGTH, ENDURANCE, AND MOBILITY: ICD-10-CM

## 2022-01-27 DIAGNOSIS — M54.2 CERVICALGIA: ICD-10-CM

## 2022-01-27 DIAGNOSIS — M25.512 CHRONIC LEFT SHOULDER PAIN: ICD-10-CM

## 2022-01-27 DIAGNOSIS — R29.898 DECREASED STRENGTH OF UPPER EXTREMITY: ICD-10-CM

## 2022-01-27 DIAGNOSIS — G89.29 CHRONIC LEFT SHOULDER PAIN: ICD-10-CM

## 2022-01-27 PROCEDURE — 97110 THERAPEUTIC EXERCISES: CPT

## 2022-01-27 PROCEDURE — 97140 MANUAL THERAPY 1/> REGIONS: CPT

## 2022-01-27 NOTE — PROGRESS NOTES
[facility]   Physical Therapy Treatment Note and Progress Note    Name: Ramiro Oneil  Clinic Number: [id number]    Therapy Diagnosis:   Encounter Diagnoses   Name Primary?    Decreased strength of upper extremity     Chronic left shoulder pain     Decreased strength, endurance, and mobility     Cervicalgia      Physician: Yves Castellon MD    Visit Date: 1/27/2022    Physician: Yves Castellon MD   Physician Orders: PT Eval and Treat  Medical Diagnosis from Referral: Cervicalgia                      Evaluation Date: 12/6/2021  Authorization Period Expiration: 11/4/2022  Plan of Care Expiration: 3/6/2022                  Progress Update: 2/27/2021                 Visit # / Visits authorized: 6 / 16                    FOTO: 1 / 3       PRECAUTIONS: Standard Precautions        PTA Visit #: 0/5     Time In: 1300   Time Out: 1400  Total Billable Time: 60 minutes    SUBJECTIVE     Pt reports: that pain is decreased left shoulder and neck regions.  He does report that flexibility is increased.  He was compliant with home exercise program.  Response to previous treatment: good  Functional change: none noted    Pain: 4/10  Location: neck/left shoulder    OBJECTIVE     Bilateral Shoulder AROM: Right/Left Shoulder Flex 140 deg / 146 deg, Abd 141 deg / 136 deg, ER 52 deg / 64 deg, Functional IR gluteal / L3    Cervical Spine ROM: FB 61 deg, BB 32 deg, R SB 36, L SB 30 deg, R Rotation 56, L Rot 46 deg    SFMA: Cervical flexion, rotation, and extension patterns  - dysfunctional non-painful (DN), Upper extremity Flexion Pattern -  Right - DN, Left - DN, Upper Extremity Extension Pattern - Right - DN, Left - DN    Treatment     Ramiro received the treatments listed below:      therapeutic exercises to develop strength, endurance, ROM, flexibility, posture and core stabilization for 30 minutes including:       TherEx 12/6/2021     Upper cycle X  Level 2 - 3 forward / 3 backward   Supine  forward bend/backward bend with overpressure  10 second holds x 2 minutes   Sitting - Cervical Rotations with overpressure  10 second holds x 3 minutes   Pulley - scaption x  10 second holds x 3 minutes   Right sidelying, left shoulder abduction overhead x  X 3 minutes   Supine - shoulder dowel flex and external rotation x  10 second holds x 2 minutes each   Open books - x 10 second holds x 2 minutes    Prone on elbows for elbow flexion  X 2 minutes   Prone left shoulder middle traps x X 2 minutes   Prone left shoulder lower traps x X 2 minutes   Behind the back stretch with dowel x 10 second holds x 2 minutes   Wall slides x 10 second holds with bilateral wall slides into shoulder flexion         manual therapy techniques:   Joint Mobilization: inferior, posterior, and anterior glides at the left GH joint  End range abduction and flexion performed with scapular stabilization    Patient Education and Home Exercises     Home Exercises Provided and Patient Education Provided     Education provided:   -     Written Home Exercises Provided: Patient instructed to cont prior HEP. Exercises were reviewed and Ramiro was able to demonstrate them prior to the end of the session.  Ramiro demonstrated good  understanding of the education provided. See EMR under Patient Instructions for exercises provided during therapy sessions    ASSESSMENT     Patient with improving ROM at bilateral shoulders.  Will require to continue working toward greater scapular and shoulder strength with continued treatment now that PROM is improving.  Still requires greater scapular mobility along with scapular stabilization.    Ramiro Is progressing well towards his goals.   Pt prognosis is Fair.     Pt will continue to benefit from skilled outpatient physical therapy to address the deficits listed in the problem list box on initial evaluation, provide pt/family education and to maximize pt's level of independence in the home and community  environment.     Pt's spiritual, cultural and educational needs considered and pt agreeable to plan of care and goals.     Anticipated barriers to physical therapy: co-morbidities    GOALS:  SHORT TERM GOALS: 4 weeks,  1/27/2022   1. Patient will be educated on home exercises Met    2. Patient will demonstrate increased ROM at the cervical spine forward flexion, extension, and rotation FN PC    3. Increased shoulder ROM to flex/abduction to 90%  Met   4. Report of ability to perform work and housework tasks with pain at or less than 2/10  MET      LONG TERM GOALS: 8 weeks, 2/27/2022   1. Patient will  Be independent with home exercise Met    2. Patient will demonstrate increased ROM into cervical forward flexion, extension, and rotation to FN PC    3. Increased UE strength to 4+/5  PC    4. Work related activities with pain at or less than 2/10  MET    5. Patient will meet predicted functional outcome (FOTO) score: 33% to increase self-worth & perceived functional ability. PC                 PLAN   Plan of care Certification: 12/6/2021 to 3/6/2022    Continue Plan of Care (POC) and progress per patient tolerance.    Demetrio Little, PT

## 2022-01-31 ENCOUNTER — CLINICAL SUPPORT (OUTPATIENT)
Dept: REHABILITATION | Facility: HOSPITAL | Age: 57
End: 2022-01-31
Payer: COMMERCIAL

## 2022-01-31 DIAGNOSIS — Z74.09 DECREASED STRENGTH, ENDURANCE, AND MOBILITY: ICD-10-CM

## 2022-01-31 DIAGNOSIS — R68.89 DECREASED STRENGTH, ENDURANCE, AND MOBILITY: ICD-10-CM

## 2022-01-31 DIAGNOSIS — R53.1 DECREASED STRENGTH, ENDURANCE, AND MOBILITY: ICD-10-CM

## 2022-01-31 DIAGNOSIS — G89.29 CHRONIC LEFT SHOULDER PAIN: ICD-10-CM

## 2022-01-31 DIAGNOSIS — M25.512 CHRONIC LEFT SHOULDER PAIN: ICD-10-CM

## 2022-01-31 DIAGNOSIS — R29.898 DECREASED STRENGTH OF UPPER EXTREMITY: ICD-10-CM

## 2022-01-31 DIAGNOSIS — M54.2 CERVICALGIA: ICD-10-CM

## 2022-01-31 PROCEDURE — 97110 THERAPEUTIC EXERCISES: CPT

## 2022-01-31 PROCEDURE — 97140 MANUAL THERAPY 1/> REGIONS: CPT

## 2022-01-31 NOTE — PROGRESS NOTES
[facility]   Physical Therapy Treatment Note and Progress Note    Name: Ramiro Oneil  Clinic Number: [id number]    Therapy Diagnosis:   Encounter Diagnoses   Name Primary?    Decreased strength of upper extremity     Chronic left shoulder pain     Decreased strength, endurance, and mobility     Cervicalgia      Physician: Yves Castellon MD    Visit Date: 1/31/2022    Physician: Yves Castellon MD   Physician Orders: PT Eval and Treat  Medical Diagnosis from Referral: Cervicalgia                      Evaluation Date: 12/6/2021  Authorization Period Expiration: 11/4/2022  Plan of Care Expiration: 3/6/2022                  Progress Update: 2/27/2021                 Visit # / Visits authorized: 7 / 16                    FOTO: 1 / 3       PRECAUTIONS: Standard Precautions        PTA Visit #: 0/5     Time In: 1300   Time Out: 1400  Total Billable Time: 60 minutes    SUBJECTIVE     Pt reports: that he experiences no significant pain.  He was compliant with home exercise program.  Response to previous treatment: good  Functional change: none noted    Pain: 4/10  Location: neck/left shoulder    OBJECTIVE       Treatment     Ramiro received the treatments listed below:      therapeutic exercises to develop strength, endurance, ROM, flexibility, posture and core stabilization for 30 minutes including:       TherEx 12/6/2021     Upper cycle X  Level 2 - 3 forward / 3 backward   Supine forward bend/backward bend with overpressure  10 second holds x 2 minutes   Sitting - Cervical Rotations with overpressure  10 second holds x 3 minutes   Pulley - scaption x  10 second holds x 3 minutes   Right sidelying, left shoulder abduction overhead x  X 3 minutes   Supine - shoulder dowel flex and external rotation x  10 second holds x 2 minutes each   Open books - x 10 second holds x 2 minutes    Prone on elbows for elbow flexion  X 2 minutes   Prone left shoulder middle traps x X 2 minutes    Prone left shoulder lower traps x X 2 minutes   Behind the back stretch with dowel x 10 second holds x 2 minutes   Wall slides x 10 second holds with bilateral wall slides into shoulder flexion         manual therapy techniques:   Joint Mobilization: inferior, posterior, and anterior glides at the left GH joint  End range abduction and flexion performed with scapular stabilization    Patient Education and Home Exercises     Home Exercises Provided and Patient Education Provided     Education provided:   -     Written Home Exercises Provided: Patient instructed to cont prior HEP. Exercises were reviewed and Ramiro was able to demonstrate them prior to the end of the session.  Ramiro demonstrated good  understanding of the education provided. See EMR under Patient Instructions for exercises provided during therapy sessions    ASSESSMENT     Patient reports that he has experienced significant pain relief.  He continues to progress into more stabilization exercises.  Still with significant deficits into functional IR reaching.  Will continue to progress further into that with continued treatment.    Ramiro Is progressing well towards his goals.   Pt prognosis is Fair.     Pt will continue to benefit from skilled outpatient physical therapy to address the deficits listed in the problem list box on initial evaluation, provide pt/family education and to maximize pt's level of independence in the home and community environment.     Pt's spiritual, cultural and educational needs considered and pt agreeable to plan of care and goals.     Anticipated barriers to physical therapy: co-morbidities    GOALS:  SHORT TERM GOALS: 4 weeks,  1/27/2022   1. Patient will be educated on home exercises Met    2. Patient will demonstrate increased ROM at the cervical spine forward flexion, extension, and rotation FN PC    3. Increased shoulder ROM to flex/abduction to 90%  Met   4. Report of ability to perform work and housework tasks  with pain at or less than 2/10  MET      LONG TERM GOALS: 8 weeks, 2/27/2022   1. Patient will  Be independent with home exercise Met    2. Patient will demonstrate increased ROM into cervical forward flexion, extension, and rotation to FN PC    3. Increased UE strength to 4+/5  PC    4. Work related activities with pain at or less than 2/10  MET    5. Patient will meet predicted functional outcome (FOTO) score: 33% to increase self-worth & perceived functional ability. PC                 PLAN   Plan of care Certification: 12/6/2021 to 3/6/2022    Continue Plan of Care (POC) and progress per patient tolerance.    Demetrio Little, PT

## 2022-02-01 ENCOUNTER — LAB VISIT (OUTPATIENT)
Dept: PRIMARY CARE CLINIC | Facility: CLINIC | Age: 57
End: 2022-02-01
Payer: COMMERCIAL

## 2022-02-01 ENCOUNTER — TELEPHONE (OUTPATIENT)
Dept: RADIOLOGY | Facility: HOSPITAL | Age: 57
End: 2022-02-01
Payer: COMMERCIAL

## 2022-02-01 DIAGNOSIS — Z01.818 PRE-OP TESTING: ICD-10-CM

## 2022-02-01 LAB
SARS-COV-2 RNA RESP QL NAA+PROBE: NOT DETECTED
SARS-COV-2- CYCLE NUMBER: NORMAL

## 2022-02-01 PROCEDURE — U0005 INFEC AGEN DETEC AMPLI PROBE: HCPCS | Performed by: INTERNAL MEDICINE

## 2022-02-01 PROCEDURE — U0003 INFECTIOUS AGENT DETECTION BY NUCLEIC ACID (DNA OR RNA); SEVERE ACUTE RESPIRATORY SYNDROME CORONAVIRUS 2 (SARS-COV-2) (CORONAVIRUS DISEASE [COVID-19]), AMPLIFIED PROBE TECHNIQUE, MAKING USE OF HIGH THROUGHPUT TECHNOLOGIES AS DESCRIBED BY CMS-2020-01-R: HCPCS | Performed by: INTERNAL MEDICINE

## 2022-02-02 ENCOUNTER — HOSPITAL ENCOUNTER (OUTPATIENT)
Dept: RADIOLOGY | Facility: HOSPITAL | Age: 57
Discharge: HOME | End: 2022-02-02
Attending: NURSE PRACTITIONER
Payer: COMMERCIAL

## 2022-02-02 ENCOUNTER — LAB VISIT (OUTPATIENT)
Dept: LAB | Facility: HOSPITAL | Age: 57
End: 2022-02-02
Attending: NURSE PRACTITIONER
Payer: COMMERCIAL

## 2022-02-02 DIAGNOSIS — B18.1 CHRONIC HEPATITIS B: ICD-10-CM

## 2022-02-02 LAB
AFP SERPL-MCNC: 2.6 NG/ML (ref 0–8.4)
ERYTHROCYTE [DISTWIDTH] IN BLOOD BY AUTOMATED COUNT: 13.7 % (ref 11.5–14.5)
HCT VFR BLD AUTO: 45.8 % (ref 40–54)
HGB BLD-MCNC: 14.4 G/DL (ref 14–18)
INR PPP: 1 (ref 0.8–1.2)
MCH RBC QN AUTO: 26.2 PG (ref 27–31)
MCHC RBC AUTO-ENTMCNC: 31.4 G/DL (ref 32–36)
MCV RBC AUTO: 83 FL (ref 82–98)
PLATELET # BLD AUTO: 305 K/UL (ref 150–450)
PMV BLD AUTO: 9.6 FL (ref 9.2–12.9)
PROTHROMBIN TIME: 11.1 SEC (ref 9–12.5)
RBC # BLD AUTO: 5.5 M/UL (ref 4.6–6.2)
WBC # BLD AUTO: 6.69 K/UL (ref 3.9–12.7)

## 2022-02-02 PROCEDURE — 76700 US ABDOMEN COMPLETE: ICD-10-PCS | Mod: 26,,, | Performed by: RADIOLOGY

## 2022-02-02 PROCEDURE — 87517 HEPATITIS B DNA QUANT: CPT | Performed by: NURSE PRACTITIONER

## 2022-02-02 PROCEDURE — 85027 COMPLETE CBC AUTOMATED: CPT | Performed by: NURSE PRACTITIONER

## 2022-02-02 PROCEDURE — 36415 COLL VENOUS BLD VENIPUNCTURE: CPT | Performed by: NURSE PRACTITIONER

## 2022-02-02 PROCEDURE — 80053 COMPREHEN METABOLIC PANEL: CPT | Performed by: NURSE PRACTITIONER

## 2022-02-02 PROCEDURE — 85610 PROTHROMBIN TIME: CPT | Performed by: NURSE PRACTITIONER

## 2022-02-02 PROCEDURE — 82105 ALPHA-FETOPROTEIN SERUM: CPT | Performed by: NURSE PRACTITIONER

## 2022-02-02 PROCEDURE — 76700 US EXAM ABDOM COMPLETE: CPT | Mod: 26,,, | Performed by: RADIOLOGY

## 2022-02-02 PROCEDURE — 87340 HEPATITIS B SURFACE AG IA: CPT | Performed by: NURSE PRACTITIONER

## 2022-02-02 PROCEDURE — 76700 US EXAM ABDOM COMPLETE: CPT | Mod: TC

## 2022-02-03 LAB
ALBUMIN SERPL BCP-MCNC: 3.8 G/DL (ref 3.5–5.2)
ALP SERPL-CCNC: 63 U/L (ref 55–135)
ALT SERPL W/O P-5'-P-CCNC: 35 U/L (ref 10–44)
ANION GAP SERPL CALC-SCNC: 8 MMOL/L (ref 8–16)
AST SERPL-CCNC: 25 U/L (ref 10–40)
BILIRUB SERPL-MCNC: 0.8 MG/DL (ref 0.1–1)
BUN SERPL-MCNC: 14 MG/DL (ref 6–20)
CALCIUM SERPL-MCNC: 8.9 MG/DL (ref 8.7–10.5)
CHLORIDE SERPL-SCNC: 105 MMOL/L (ref 95–110)
CO2 SERPL-SCNC: 22 MMOL/L (ref 23–29)
CREAT SERPL-MCNC: 0.9 MG/DL (ref 0.5–1.4)
EST. GFR  (AFRICAN AMERICAN): >60 ML/MIN/1.73 M^2
EST. GFR  (NON AFRICAN AMERICAN): >60 ML/MIN/1.73 M^2
GLUCOSE SERPL-MCNC: 85 MG/DL (ref 70–110)
POTASSIUM SERPL-SCNC: 3.9 MMOL/L (ref 3.5–5.1)
PROT SERPL-MCNC: 7.5 G/DL (ref 6–8.4)
SODIUM SERPL-SCNC: 135 MMOL/L (ref 136–145)

## 2022-02-03 NOTE — PROGRESS NOTES
Labs reviewed. Blood counts, liver and kidney function tests normal/stable. AFP normal. Will send test results letter to patient, once Hepatitis B labs have resulted.    SOHAN Castaneda  Hepatology Nurse Practitioner  [facility] [place]

## 2022-02-04 ENCOUNTER — HOSPITAL ENCOUNTER (OUTPATIENT)
Facility: HOSPITAL | Age: 57
Discharge: HOME | End: 2022-02-04
Attending: INTERNAL MEDICINE | Admitting: INTERNAL MEDICINE
Payer: COMMERCIAL

## 2022-02-04 VITALS
BODY MASS INDEX: 29.92 KG/M2 | DIASTOLIC BLOOD PRESSURE: 74 MMHG | RESPIRATION RATE: 18 BRPM | HEIGHT: 69 IN | HEART RATE: 75 BPM | OXYGEN SATURATION: 100 % | SYSTOLIC BLOOD PRESSURE: 164 MMHG | WEIGHT: 202 LBS | TEMPERATURE: 97 F

## 2022-02-04 DIAGNOSIS — I73.9 PAD (PERIPHERAL ARTERY DISEASE): ICD-10-CM

## 2022-02-04 LAB
ABO + RH BLD: NORMAL
BLD GP AB SCN CELLS X3 SERPL QL: NORMAL
HBV SURFACE AG SERPL QL IA: POSITIVE

## 2022-02-04 PROCEDURE — 75710 ARTERY X-RAYS ARM/LEG: CPT | Mod: 26,,, | Performed by: INTERNAL MEDICINE

## 2022-02-04 PROCEDURE — 99153 MOD SED SAME PHYS/QHP EA: CPT | Performed by: INTERNAL MEDICINE

## 2022-02-04 PROCEDURE — 36247 INS CATH ABD/L-EXT ART 3RD: CPT | Mod: RT,,, | Performed by: INTERNAL MEDICINE

## 2022-02-04 PROCEDURE — C1769 GUIDE WIRE: HCPCS | Performed by: INTERNAL MEDICINE

## 2022-02-04 PROCEDURE — 36247 PR PLACE CATH SUBSUBSELECT ART,ABD/PEL: ICD-10-PCS | Mod: RT,,, | Performed by: INTERNAL MEDICINE

## 2022-02-04 PROCEDURE — 25000003 PHARM REV CODE 250: Performed by: STUDENT IN AN ORGANIZED HEALTH CARE EDUCATION/TRAINING PROGRAM

## 2022-02-04 PROCEDURE — 75710 ARTERY X-RAYS ARM/LEG: CPT | Performed by: INTERNAL MEDICINE

## 2022-02-04 PROCEDURE — 99152 MOD SED SAME PHYS/QHP 5/>YRS: CPT | Mod: ,,, | Performed by: INTERNAL MEDICINE

## 2022-02-04 PROCEDURE — 36247 INS CATH ABD/L-EXT ART 3RD: CPT | Mod: RT | Performed by: INTERNAL MEDICINE

## 2022-02-04 PROCEDURE — 25000003 PHARM REV CODE 250: Performed by: INTERNAL MEDICINE

## 2022-02-04 PROCEDURE — 85347 COAGULATION TIME ACTIVATED: CPT | Performed by: INTERNAL MEDICINE

## 2022-02-04 PROCEDURE — 86901 BLOOD TYPING SEROLOGIC RH(D): CPT | Performed by: INTERNAL MEDICINE

## 2022-02-04 PROCEDURE — C1894 INTRO/SHEATH, NON-LASER: HCPCS | Performed by: INTERNAL MEDICINE

## 2022-02-04 PROCEDURE — 99152 PR MOD CONSCIOUS SEDATION, SAME PHYS, 5+ YRS, FIRST 15 MIN: ICD-10-PCS | Mod: ,,, | Performed by: INTERNAL MEDICINE

## 2022-02-04 PROCEDURE — 63600175 PHARM REV CODE 636 W HCPCS: Performed by: INTERNAL MEDICINE

## 2022-02-04 PROCEDURE — 75710 PR  ANGIO EXTREMITY UNILAT: ICD-10-PCS | Mod: 26,,, | Performed by: INTERNAL MEDICINE

## 2022-02-04 PROCEDURE — 99152 MOD SED SAME PHYS/QHP 5/>YRS: CPT | Performed by: INTERNAL MEDICINE

## 2022-02-04 PROCEDURE — 25500020 PHARM REV CODE 255: Performed by: INTERNAL MEDICINE

## 2022-02-04 RX ORDER — SODIUM CHLORIDE 9 MG/ML
INJECTION, SOLUTION INTRAVENOUS CONTINUOUS
Status: ACTIVE | OUTPATIENT
Start: 2022-02-04 | End: 2022-02-04

## 2022-02-04 RX ORDER — MIDAZOLAM HYDROCHLORIDE 1 MG/ML
INJECTION, SOLUTION INTRAMUSCULAR; INTRAVENOUS
Status: DISCONTINUED | OUTPATIENT
Start: 2022-02-04 | End: 2022-02-04 | Stop reason: HOSPADM

## 2022-02-04 RX ORDER — IODIXANOL 320 MG/ML
INJECTION, SOLUTION INTRAVASCULAR
Status: DISCONTINUED | OUTPATIENT
Start: 2022-02-04 | End: 2022-02-04 | Stop reason: HOSPADM

## 2022-02-04 RX ORDER — CEFAZOLIN SODIUM 1 G/3ML
INJECTION, POWDER, FOR SOLUTION INTRAMUSCULAR; INTRAVENOUS
Status: DISCONTINUED | OUTPATIENT
Start: 2022-02-04 | End: 2022-02-04 | Stop reason: HOSPADM

## 2022-02-04 RX ORDER — NAPROXEN SODIUM 220 MG/1
81 TABLET, FILM COATED ORAL DAILY
Status: DISCONTINUED | OUTPATIENT
Start: 2022-02-04 | End: 2022-02-04 | Stop reason: HOSPADM

## 2022-02-04 RX ORDER — HEPARIN SOD,PORCINE/0.9 % NACL 1000/500ML
INTRAVENOUS SOLUTION INTRAVENOUS
Status: DISCONTINUED | OUTPATIENT
Start: 2022-02-04 | End: 2022-02-04 | Stop reason: HOSPADM

## 2022-02-04 RX ORDER — DIPHENHYDRAMINE HCL 50 MG
50 CAPSULE ORAL ONCE
Status: COMPLETED | OUTPATIENT
Start: 2022-02-04 | End: 2022-02-04

## 2022-02-04 RX ORDER — HEPARIN SODIUM 1000 [USP'U]/ML
INJECTION, SOLUTION INTRAVENOUS; SUBCUTANEOUS
Status: DISCONTINUED | OUTPATIENT
Start: 2022-02-04 | End: 2022-02-04 | Stop reason: HOSPADM

## 2022-02-04 RX ORDER — FENTANYL CITRATE 50 UG/ML
INJECTION, SOLUTION INTRAMUSCULAR; INTRAVENOUS
Status: DISCONTINUED | OUTPATIENT
Start: 2022-02-04 | End: 2022-02-04 | Stop reason: HOSPADM

## 2022-02-04 RX ORDER — ACETAMINOPHEN 325 MG/1
650 TABLET ORAL EVERY 4 HOURS PRN
Status: DISCONTINUED | OUTPATIENT
Start: 2022-02-04 | End: 2022-02-04 | Stop reason: HOSPADM

## 2022-02-04 RX ORDER — ONDANSETRON 8 MG/1
8 TABLET, ORALLY DISINTEGRATING ORAL EVERY 8 HOURS PRN
Status: DISCONTINUED | OUTPATIENT
Start: 2022-02-04 | End: 2022-02-04 | Stop reason: HOSPADM

## 2022-02-04 RX ORDER — LIDOCAINE HYDROCHLORIDE 20 MG/ML
INJECTION, SOLUTION EPIDURAL; INFILTRATION; INTRACAUDAL; PERINEURAL
Status: DISCONTINUED | OUTPATIENT
Start: 2022-02-04 | End: 2022-02-04 | Stop reason: HOSPADM

## 2022-02-04 RX ORDER — HYDRALAZINE HYDROCHLORIDE 20 MG/ML
INJECTION INTRAMUSCULAR; INTRAVENOUS
Status: DISCONTINUED | OUTPATIENT
Start: 2022-02-04 | End: 2022-02-04 | Stop reason: HOSPADM

## 2022-02-04 RX ADMIN — DIPHENHYDRAMINE HYDROCHLORIDE 50 MG: 50 CAPSULE ORAL at 07:02

## 2022-02-04 RX ADMIN — SODIUM CHLORIDE: 0.9 INJECTION, SOLUTION INTRAVENOUS at 07:02

## 2022-02-04 RX ADMIN — ASPIRIN 81 MG CHEWABLE TABLET 81 MG: 81 TABLET CHEWABLE at 07:02

## 2022-02-04 NOTE — BRIEF OP NOTE
Brief Operative Note:    : Rodger Blake MD     Referring Physician: Yves Castellon     All Operators: Surgeon(s):  MD Cuco Grimm MD Stephanie Maria Madonis, MD     Preoperative Diagnosis: PAD (peripheral artery disease) [I73.9]     Postop Diagnosis: PAD (peripheral artery disease) [I73.9]    Treatments/Procedures: Procedure(s) (LRB):  PTA, PERIPHERAL VESSEL (N/A)    Findings:  -occluded ostial to distal SFA  -significant collateralization with reconstitution distally  See catheterization report for full details.    Recommendations:  -recommend vascular surgery consult  -continue medical management  -no intervention done    Estimated Blood loss: 20 cc    Specimens removed: No    Signed:  Cuco Blake MD  Page # [phone]  Cardiovascular Fellow  [facility]

## 2022-02-04 NOTE — DISCHARGE SUMMARY
Discharge Summary  Interventional Cardiology      Admit Date: 2/4/2022    Discharge Date :2/4/2022    Attending Physician: Rodger Blake    Discharge Physician: Cuco Blake    Discharged Condition: good    Discharge Diagnosis: PAD (peripheral artery disease) [I73.9]    Treatments/Procedures: Procedure(s) (LRB):  PTA, PERIPHERAL VESSEL (N/A)    Hospital Course:    Patient is a 56-year-old gentleman with severe peripheral arterial disease and had a recent intervention in October to his SFA which require jet stream and stenting of the SFA.  After this procedure he had significant relief in his right lower extremity pain however 2 months after procedure he noted severe claudication when ambulating even less than 50 ft.  He had an ultrasound which revealed a severe stenosis in his right popliteal artery.  His right leg hurts him worse than his left leg does.  Angiogram right lower extremity done today.  See details below.     Findings:  -occluded ostial to distal SFA  -significant collateralization with reconstitution distally  See catheterization report for full details.     Recommendations:  -recommend vascular surgery consult  -continue medical management  -no intervention done    Significant Diagnostic Studies: none    Disposition: Home or Self Care    Diet: Regular    Follow up: Office 3 months    Activity: No heavy lifting till seen in office.    Patient Instructions:   Current Discharge Medication List      CONTINUE these medications which have NOT CHANGED    Details   amLODIPine (NORVASC) 2.5 MG tablet Take 1 tablet (2.5 mg total) by mouth once daily.  Qty: 90 tablet, Refills: 1      aspirin (ECOTRIN) 81 MG EC tablet Take 1 tablet (81 mg total) by mouth once daily.  Qty: 30 tablet, Refills: 11      atorvastatin (LIPITOR) 80 MG tablet Take 1 tablet (80 mg total) by mouth once daily.  Qty: 90 tablet, Refills: 3    Associated Diagnoses: Hyperlipidemia, unspecified hyperlipidemia type       hydroCHLOROthiazide (HYDRODIURIL) 25 MG tablet Take 1 tablet (25 mg total) by mouth once daily.  Qty: 90 tablet, Refills: 1    Comments: Please inactivate all prior scripts with same name and strength including on holds.  Associated Diagnoses: Hypertension, essential      rivaroxaban (XARELTO) 2.5 mg Tab Take 1 tablet (2.5 mg total) by mouth 2 (two) times daily with meals.  Qty: 60 tablet, Refills: 11      tenofovir alafenamide (VEMLIDY) 25 mg Tab Take 1 tablet (25 mg total) by mouth once daily.  Qty: 30 tablet, Refills: 11    Associated Diagnoses: Chronic hepatitis B      cyclobenzaprine (FLEXERIL) 5 MG tablet Take 1 tablet (5 mg total) by mouth 3 (three) times daily as needed for Muscle spasms.  Qty: 45 tablet, Refills: 0      diclofenac sodium (VOLTAREN) 1 % Gel Apply 2 grams topically 3 (three) times daily as needed.  Qty: 2 Tube, Refills: 6    Associated Diagnoses: Primary osteoarthritis of right knee; Acute pain of right knee; Current use of long term anticoagulation      econazole nitrate 1 % cream       fexofenadine (ALLEGRA) 180 MG tablet Take 1 tablet (180 mg total) by mouth once daily.  Qty: 30 tablet, Refills: 1      fluticasone (FLONASE) 50 mcg/actuation nasal spray 2 sprays (100 mcg total) by Each Nare route once daily.  Qty: 1 Bottle, Refills: 5      nystatin-triamcinolone (MYCOLOG II) cream Apply topically 2 (two) times daily to Groin area.  Qty: 30 g, Refills: 0      triamcinolone acetonide 0.1% (KENALOG) 0.1 % cream Apply topically 2 (two) times daily as needed. Neck and body, not groin  Qty: 80 g, Refills: 1           Signed:  Cuco Blake MD  Page # [phone]  Cardiovascular Fellow  [facility]

## 2022-02-04 NOTE — PLAN OF CARE
Received report from TAMELA Ashley. Patient s/p PTA, AAOx4. VSS, no c/o pain or discomfort at this time, resp even and unlabored. left groin gauze/tegaderm dressing is CDI. Doppler Pulses.  No active bleeding. No hematoma noted. Post procedure protocol reviewed with patient and patient's wife. Understanding verbalized. wife at bedside. Nurse call bell within reach. Will continue to monitor per post procedure protocol.

## 2022-02-04 NOTE — PLAN OF CARE
Patient arrived to room. PIV placed, labs sent. Admit assessment completed. Plan of care discussed with patient. Wife at bedside. Nurse call bell within reach.  Will monitor

## 2022-02-04 NOTE — Clinical Note
A percutaneous stick to the left femoral artery was performed. Ultrasound guidance was used to obtain access.

## 2022-02-04 NOTE — Clinical Note
70 ml of contrast were injected throughout the case. 130 mL of contrast was the total wasted during the case. 200 mL was the total amount used during the case.

## 2022-02-04 NOTE — INTERVAL H&P NOTE
The patient has been examined and the H&P has been reviewed:    I concur with the findings and no changes have occurred since H&P was written.    Procedure risks, benefits and alternative options discussed and understood by patient/family.      Assessment/Plan:       1. PAD (peripheral artery disease)   Patient is a 56-year-old gentleman with severe peripheral arterial disease and had a recent intervention in October to his SFA which require jet stream and stenting of the SFA.  After this procedure he had significant relief in his right lower extremity pain however 2 months after procedure he noted severe claudication when ambulating even less than 50 ft.  He had an ultrasound which revealed a severe stenosis in his right popliteal artery.  His right leg hurts him worse than his left leg does.  So we will proceed with intervention of the right lower extremity likely ballooning the popliteal and stenting with a Supera stent.     Peripheral angiogram + Intervention of the popliteal artery.  Antiplatelets: ASA/Xarelto  Access:  Left common femoral access  Sheath:  6 Danish sheath, please get a 6 Danish crossover sheath, please get a 4 Danish KODY catheter, a floppy angled Glidewire.  Please get a Supera stent  The risks, benefits, and alternatives of coronary vascular angiography and possible intervention were discussed with the patient. All questions were answered and informed consent was obtained. I had a detailed discussion with the patient regarding risk of stroke, MI, bleeding access site complications including limb loss, allergy, kidney failure including dialysis and death.  The patient understands the risks and benefits and wishes to go ahead with the procedure.  All patient's questions were answered     Signed:  Cuco Blake MD  Page # [phone]  Cardiovascular Fellow  [facility]

## 2022-02-05 NOTE — PROGRESS NOTES
Patient ambulated around unit with standby assist. L groin dressing remained CDI. No bleeding or hematoma noted.

## 2022-02-07 LAB
HBV DNA SERPL NAA+PROBE-ACNC: 33 IU/ML
HBV DNA SERPL NAA+PROBE-LOG IU: 1.52 LOG (10) IU/ML
HBV DNA SERPL QL NAA+PROBE: DETECTED
POC ACTIVATED CLOTTING TIME K: 190 SEC (ref 74–137)
POC ACTIVATED CLOTTING TIME K: 244 SEC (ref 74–137)
SAMPLE: ABNORMAL
SAMPLE: ABNORMAL

## 2022-02-08 DIAGNOSIS — I77.9 PAOD (PERIPHERAL ARTERIAL OCCLUSIVE DISEASE): Primary | ICD-10-CM

## 2022-02-15 ENCOUNTER — TELEPHONE (OUTPATIENT)
Dept: VASCULAR SURGERY | Facility: CLINIC | Age: 57
End: 2022-02-15
Payer: COMMERCIAL

## 2022-02-15 NOTE — TELEPHONE ENCOUNTER
Left message for the pt to call the clinic. We had  To make appointment changes. appt was moved to 3/10/22. appt was mailed

## 2022-02-16 ENCOUNTER — OFFICE VISIT (OUTPATIENT)
Dept: INTERNAL MEDICINE | Facility: CLINIC | Age: 57
End: 2022-02-16
Payer: COMMERCIAL

## 2022-02-16 ENCOUNTER — SPECIALTY PHARMACY (OUTPATIENT)
Dept: PHARMACY | Facility: CLINIC | Age: 57
End: 2022-02-16
Payer: COMMERCIAL

## 2022-02-16 VITALS
DIASTOLIC BLOOD PRESSURE: 95 MMHG | BODY MASS INDEX: 29.92 KG/M2 | SYSTOLIC BLOOD PRESSURE: 145 MMHG | WEIGHT: 202 LBS | HEIGHT: 69 IN

## 2022-02-16 DIAGNOSIS — M50.30 DDD (DEGENERATIVE DISC DISEASE), CERVICAL: ICD-10-CM

## 2022-02-16 DIAGNOSIS — M79.602 LEFT ARM PAIN: Primary | ICD-10-CM

## 2022-02-16 DIAGNOSIS — E78.5 HYPERLIPIDEMIA, UNSPECIFIED HYPERLIPIDEMIA TYPE: ICD-10-CM

## 2022-02-16 DIAGNOSIS — I10 HYPERTENSION, ESSENTIAL: ICD-10-CM

## 2022-02-16 DIAGNOSIS — M25.512 CHRONIC PAIN OF BOTH SHOULDERS: ICD-10-CM

## 2022-02-16 DIAGNOSIS — Z79.01 ANTICOAGULANT LONG-TERM USE: ICD-10-CM

## 2022-02-16 DIAGNOSIS — M25.511 CHRONIC PAIN OF BOTH SHOULDERS: ICD-10-CM

## 2022-02-16 DIAGNOSIS — G89.29 CHRONIC PAIN OF BOTH SHOULDERS: ICD-10-CM

## 2022-02-16 DIAGNOSIS — I77.9 PAOD (PERIPHERAL ARTERIAL OCCLUSIVE DISEASE): ICD-10-CM

## 2022-02-16 DIAGNOSIS — B18.1 CHRONIC HEPATITIS B: ICD-10-CM

## 2022-02-16 DIAGNOSIS — Z95.9 S/P ARTERIAL STENT: ICD-10-CM

## 2022-02-16 DIAGNOSIS — K76.0 NAFLD (NONALCOHOLIC FATTY LIVER DISEASE): ICD-10-CM

## 2022-02-16 PROCEDURE — 99214 PR OFFICE/OUTPT VISIT, EST, LEVL IV, 30-39 MIN: ICD-10-PCS | Mod: S$GLB,,, | Performed by: NURSE PRACTITIONER

## 2022-02-16 PROCEDURE — 3080F PR MOST RECENT DIASTOLIC BLOOD PRESSURE >= 90 MM HG: ICD-10-PCS | Mod: CPTII,S$GLB,, | Performed by: NURSE PRACTITIONER

## 2022-02-16 PROCEDURE — 99999 PR PBB SHADOW E&M-EST. PATIENT-LVL IV: ICD-10-PCS | Mod: PBBFAC,,, | Performed by: NURSE PRACTITIONER

## 2022-02-16 PROCEDURE — 3080F DIAST BP >= 90 MM HG: CPT | Mod: CPTII,S$GLB,, | Performed by: NURSE PRACTITIONER

## 2022-02-16 PROCEDURE — 99999 PR PBB SHADOW E&M-EST. PATIENT-LVL IV: CPT | Mod: PBBFAC,,, | Performed by: NURSE PRACTITIONER

## 2022-02-16 PROCEDURE — 3008F BODY MASS INDEX DOCD: CPT | Mod: CPTII,S$GLB,, | Performed by: NURSE PRACTITIONER

## 2022-02-16 PROCEDURE — 3008F PR BODY MASS INDEX (BMI) DOCUMENTED: ICD-10-PCS | Mod: CPTII,S$GLB,, | Performed by: NURSE PRACTITIONER

## 2022-02-16 PROCEDURE — 3077F SYST BP >= 140 MM HG: CPT | Mod: CPTII,S$GLB,, | Performed by: NURSE PRACTITIONER

## 2022-02-16 PROCEDURE — 1159F PR MEDICATION LIST DOCUMENTED IN MEDICAL RECORD: ICD-10-PCS | Mod: CPTII,S$GLB,, | Performed by: NURSE PRACTITIONER

## 2022-02-16 PROCEDURE — 1159F MED LIST DOCD IN RCRD: CPT | Mod: CPTII,S$GLB,, | Performed by: NURSE PRACTITIONER

## 2022-02-16 PROCEDURE — 99214 OFFICE O/P EST MOD 30 MIN: CPT | Mod: S$GLB,,, | Performed by: NURSE PRACTITIONER

## 2022-02-16 PROCEDURE — 3077F PR MOST RECENT SYSTOLIC BLOOD PRESSURE >= 140 MM HG: ICD-10-PCS | Mod: CPTII,S$GLB,, | Performed by: NURSE PRACTITIONER

## 2022-02-16 RX ORDER — AMLODIPINE BESYLATE 2.5 MG/1
2.5 TABLET ORAL DAILY
Qty: 90 TABLET | Refills: 1 | Status: CANCELLED | OUTPATIENT
Start: 2022-02-16

## 2022-02-16 RX ORDER — ATORVASTATIN CALCIUM 80 MG/1
80 TABLET, FILM COATED ORAL DAILY
Qty: 90 TABLET | Refills: 3 | Status: CANCELLED | OUTPATIENT
Start: 2022-02-16

## 2022-02-16 RX ORDER — NYSTATIN AND TRIAMCINOLONE ACETONIDE 100000; 1 [USP'U]/G; MG/G
CREAM TOPICAL 2 TIMES DAILY
Qty: 30 G | Refills: 0 | Status: SHIPPED | OUTPATIENT
Start: 2022-02-16 | End: 2022-08-29

## 2022-02-16 RX ORDER — CYCLOBENZAPRINE HCL 5 MG
5 TABLET ORAL 3 TIMES DAILY PRN
Qty: 45 TABLET | Refills: 0 | Status: SHIPPED | OUTPATIENT
Start: 2022-02-16 | End: 2022-08-29

## 2022-02-16 RX ORDER — HYDROCHLOROTHIAZIDE 25 MG/1
25 TABLET ORAL DAILY
Qty: 90 TABLET | Refills: 1 | Status: SHIPPED | OUTPATIENT
Start: 2022-02-16 | End: 2022-08-29 | Stop reason: SDUPTHER

## 2022-02-16 RX ORDER — ATORVASTATIN CALCIUM 80 MG/1
80 TABLET, FILM COATED ORAL DAILY
Qty: 90 TABLET | Refills: 3 | Status: SHIPPED | OUTPATIENT
Start: 2022-02-16 | End: 2023-08-21 | Stop reason: SDUPTHER

## 2022-02-16 RX ORDER — AMLODIPINE BESYLATE 2.5 MG/1
2.5 TABLET ORAL DAILY
Qty: 90 TABLET | Refills: 1 | Status: SHIPPED | OUTPATIENT
Start: 2022-02-16 | End: 2022-08-29 | Stop reason: SDUPTHER

## 2022-02-16 RX ORDER — HYDROCHLOROTHIAZIDE 25 MG/1
25 TABLET ORAL DAILY
Qty: 90 TABLET | Refills: 1 | Status: CANCELLED | OUTPATIENT
Start: 2022-02-16

## 2022-02-16 NOTE — TELEPHONE ENCOUNTER
Care Due:                  Date            Visit Type   Department     Provider  --------------------------------------------------------------------------------                                EP -                              PRIMARY      [facility]  Last Visit: 12-      CARE ([facility])   MEDICINE       ILANA VERDIN  Next Visit: None Scheduled  None         None Found                                                            Last  Test          Frequency    Reason                     Performed    Due Date  --------------------------------------------------------------------------------    Lipid Panel.  12 months..  atorvastatin.............  01- 12-    Powered by [employer]. Reference number: [id number].   2/16/2022 1:42:42 PM CST
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
## 2022-02-16 NOTE — TELEPHONE ENCOUNTER
Specialty Pharmacy - Refill Coordination    Specialty Medication Orders Linked to Encounter    Flowsheet Row Most Recent Value   Medication #1 tenofovir alafenamide (VEMLIDY) 25 mg Tab (Order#[id number], Rx#[id number])          Refill Questions - Documented Responses    Flowsheet Row Most Recent Value   Patient Availability and HIPAA Verification    Does patient want to proceed with activity? Yes   HIPAA/medical authority confirmed? Yes   Relationship to patient of person spoken to? Self   Refill Screening Questions    Changes to allergies? No   Changes to medications? No   New conditions since last clinic visit? No   Unplanned office visit, urgent care, ED, or hospital admission in the last 4 weeks? No   How does patient/caregiver feel medication is working? Very good   Financial problems or insurance changes? No   How many doses of your specialty medications were missed in the last 4 weeks? 0   Would patient like to speak to a pharmacist? No   When does the patient need to receive the medication? 02/16/22   Refill Delivery Questions    How will the patient receive the medication? Pickup   When does the patient need to receive the medication? 02/16/22   Address in [facility] confirmed and updated if neccessary? Yes   Expected Copay ($) 0   Is the patient able to afford the medication copay? Yes   Payment Method zero copay   Days supply of Refill 30   Supplies needed? No supplies needed   Refill activity completed? Yes   Refill activity plan Refill scheduled   Shipment/Pickup Date: 02/16/22          Current Outpatient Medications   Medication Sig    amLODIPine (NORVASC) 2.5 MG tablet Take 1 tablet (2.5 mg total) by mouth once daily.    aspirin (ECOTRIN) 81 MG EC tablet Take 1 tablet (81 mg total) by mouth once daily.    atorvastatin (LIPITOR) 80 MG tablet Take 1 tablet (80 mg total) by mouth once daily.    cyclobenzaprine (FLEXERIL) 5 MG tablet Take 1 tablet (5 mg total) by mouth 3 (three) times daily as  needed for Muscle spasms.    diclofenac sodium (VOLTAREN) 1 % Gel Apply 2 grams topically 3 (three) times daily as needed.    econazole nitrate 1 % cream     fexofenadine (ALLEGRA) 180 MG tablet Take 1 tablet (180 mg total) by mouth once daily.    fluticasone (FLONASE) 50 mcg/actuation nasal spray 2 sprays (100 mcg total) by Each Nare route once daily.    hydroCHLOROthiazide (HYDRODIURIL) 25 MG tablet Take 1 tablet (25 mg total) by mouth once daily.    nystatin-triamcinolone (MYCOLOG II) cream Apply topically 2 (two) times daily to Groin area.    rivaroxaban (XARELTO) 2.5 mg Tab Take 1 tablet (2.5 mg total) by mouth 2 (two) times daily with meals.    tenofovir alafenamide (VEMLIDY) 25 mg Tab Take 1 tablet (25 mg total) by mouth once daily.    triamcinolone acetonide 0.1% (KENALOG) 0.1 % cream Apply topically 2 (two) times daily as needed. Neck and body, not groin   Last reviewed on 2/4/2022  6:13 AM by Alley Garza RN    Review of patient's allergies indicates:  No Known Allergies Last reviewed on  2/4/2022 6:08 AM by Alley Garza      Tasks added this encounter   No tasks added.   Tasks due within next 3 months   No tasks due.     Ramon Perez, PharmD  [facility]  [address] [place] [id number]  Phone: [phone]  Fax: [phone]

## 2022-02-17 ENCOUNTER — TELEPHONE (OUTPATIENT)
Dept: SPORTS MEDICINE | Facility: CLINIC | Age: 57
End: 2022-02-17
Payer: COMMERCIAL

## 2022-02-17 ENCOUNTER — SPECIALTY PHARMACY (OUTPATIENT)
Dept: PHARMACY | Facility: CLINIC | Age: 57
End: 2022-02-17
Payer: COMMERCIAL
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
## 2022-03-08 ENCOUNTER — PATIENT OUTREACH (OUTPATIENT)
Dept: ADMINISTRATIVE | Facility: OTHER | Age: 57
End: 2022-03-08
Payer: COMMERCIAL

## 2022-03-08 NOTE — PROGRESS NOTES
LINKS immunization registry updated  Health Maintenance updated  Chart reviewed for overdue [facility]active [facility] (DILCIA) health maintenance testing (CRS, Breast Ca, Diabetic Eye Exam)   Orders entered:N/A  Patient has open case request for colonoscopy.

## 2022-03-10 ENCOUNTER — INITIAL CONSULT (OUTPATIENT)
Dept: VASCULAR SURGERY | Facility: CLINIC | Age: 57
End: 2022-03-10
Attending: SURGERY
Payer: COMMERCIAL

## 2022-03-10 ENCOUNTER — HOSPITAL ENCOUNTER (OUTPATIENT)
Dept: VASCULAR SURGERY | Facility: CLINIC | Age: 57
Discharge: HOME | End: 2022-03-10
Attending: SURGERY
Payer: COMMERCIAL

## 2022-03-10 VITALS
DIASTOLIC BLOOD PRESSURE: 77 MMHG | HEART RATE: 76 BPM | TEMPERATURE: 99 F | BODY MASS INDEX: 29.71 KG/M2 | HEIGHT: 69 IN | SYSTOLIC BLOOD PRESSURE: 156 MMHG | WEIGHT: 200.63 LBS

## 2022-03-10 DIAGNOSIS — Z01.818 PREOP EXAMINATION: Primary | ICD-10-CM

## 2022-03-10 DIAGNOSIS — I70.202 FEMORAL ARTERY OCCLUSION, LEFT: ICD-10-CM

## 2022-03-10 DIAGNOSIS — I77.9 PAOD (PERIPHERAL ARTERIAL OCCLUSIVE DISEASE): Primary | ICD-10-CM

## 2022-03-10 DIAGNOSIS — I77.9 PAOD (PERIPHERAL ARTERIAL OCCLUSIVE DISEASE): ICD-10-CM

## 2022-03-10 DIAGNOSIS — Z01.818 PREOP EXAMINATION: ICD-10-CM

## 2022-03-10 PROCEDURE — 99999 PR PBB SHADOW E&M-EST. PATIENT-LVL IV: CPT | Mod: PBBFAC,,, | Performed by: SURGERY

## 2022-03-10 PROCEDURE — 93970 EXTREMITY STUDY: CPT | Mod: 52,S$GLB,, | Performed by: SURGERY

## 2022-03-10 PROCEDURE — 99204 OFFICE O/P NEW MOD 45 MIN: CPT | Mod: S$GLB,,, | Performed by: SURGERY

## 2022-03-10 PROCEDURE — 93970 PR US DUPLEX, UPPER OR LOWER EXT VENOUS,COMPLETE BILAT: ICD-10-PCS | Mod: 52,S$GLB,, | Performed by: SURGERY

## 2022-03-10 PROCEDURE — 99999 PR PBB SHADOW E&M-EST. PATIENT-LVL IV: ICD-10-PCS | Mod: PBBFAC,,, | Performed by: SURGERY

## 2022-03-10 PROCEDURE — 93923 UPR/LXTR ART STDY 3+ LVLS: CPT | Mod: S$GLB,,, | Performed by: SURGERY

## 2022-03-10 PROCEDURE — 93923 PR NON-INVASIVE PHYSIOLOGIC STUDY EXTREMITY 3 LEVELS: ICD-10-PCS | Mod: S$GLB,,, | Performed by: SURGERY

## 2022-03-10 PROCEDURE — 99204 PR OFFICE/OUTPT VISIT, NEW, LEVL IV, 45-59 MIN: ICD-10-PCS | Mod: S$GLB,,, | Performed by: SURGERY

## 2022-03-10 NOTE — PROGRESS NOTES
Ramiro Oneil  03/10/2022    HPI:  Mr. Oneil is a 56 y.o. male with a h/o Hepatitis B, NAFLD, HTN, who is here today for evaluation of  PAD. He underwent a stent with interventional cardiology which was placed in the R SFA which initially improved his symptoms, however, they recurred about 2 months after the procedure with his right leg more symptomatic now than his left. He then underwent an angiogram demonstrating occlusion of the right SFA and right anterior tibial artery. He reports constant bilateral calf pain that worsens with exertion that is worse on the right side. He can walk less than a block before he has to stop. He often has to remove his shoes and elevate the legs to improve this pain. He right great toe is numb. He also has leg swelling that does not worsen throughout the day but is worse on the right side. He is currently on Xarelto, ASA, and statin.     No history of MI/stroke  Tobacco use: never smoker    Past Medical History:   Diagnosis Date    Anticoagulant long-term use     Arthritis     Dermatitis     Hepatitis B     History of surgery on arm 07/2020    Hypertension     Liver fibrosis 12/12/2019    F2 on fibroscan 11/2018    NAFLD (nonalcoholic fatty liver disease)      Past Surgical History:   Procedure Laterality Date    CARPAL TUNNEL RELEASE      COLONOSCOPY      PERCUTANEOUS TRANSLUMINAL ANGIOPLASTY (PTA) OF PERIPHERAL VESSEL N/A 2/4/2022    Procedure: PTA, PERIPHERAL VESSEL;  Surgeon: Rodger Blake MD;  Location: [facility] CATH LAB;  Service: Cardiology;  Laterality: N/A;    PTA/stenting of distal right SFA       right hand surgery      SHOULDER SURGERY      stents       Family History   Problem Relation Age of Onset    Diabetes Mother     Hypertension Mother     Cancer Father         lung    Diabetes Sister     Cancer Sister         breast    Hypertension Sister     Cancer Brother         prostate    Cancer Paternal Uncle     Diabetes Paternal Uncle     Stroke  Sister     Liver disease Neg Hx     Cirrhosis Neg Hx      Social History     Socioeconomic History    Marital status:      Spouse name: Roro    Number of children: 2   Occupational History    Occupation:      Employer: [employer]   Tobacco Use    Smoking status: Never Smoker    Smokeless tobacco: Never Used   Substance and Sexual Activity    Alcohol use: Not Currently     Comment: 1-2 non alchoholic beers per week    Drug use: No    Sexual activity: Yes   Social History Narrative     [employer], , two children.          Current Outpatient Medications:     amLODIPine (NORVASC) 2.5 MG tablet, Take 1 tablet (2.5 mg total) by mouth once daily., Disp: 90 tablet, Rfl: 1    aspirin (ECOTRIN) 81 MG EC tablet, Take 1 tablet (81 mg total) by mouth once daily., Disp: 30 tablet, Rfl: 11    atorvastatin (LIPITOR) 80 MG tablet, Take 1 tablet (80 mg total) by mouth once daily., Disp: 90 tablet, Rfl: 3    cyclobenzaprine (FLEXERIL) 5 MG tablet, Take 1 tablet (5 mg total) by mouth 3 (three) times daily as needed for Muscle spasms., Disp: 45 tablet, Rfl: 0    diclofenac sodium (VOLTAREN) 1 % Gel, Apply 2 grams topically 3 (three) times daily as needed., Disp: 2 Tube, Rfl: 6    econazole nitrate 1 % cream, , Disp: , Rfl:     fexofenadine (ALLEGRA) 180 MG tablet, Take 1 tablet (180 mg total) by mouth once daily., Disp: 30 tablet, Rfl: 1    fluticasone (FLONASE) 50 mcg/actuation nasal spray, 2 sprays (100 mcg total) by Each Nare route once daily., Disp: 1 Bottle, Rfl: 5    hydroCHLOROthiazide (HYDRODIURIL) 25 MG tablet, Take 1 tablet (25 mg total) by mouth once daily., Disp: 90 tablet, Rfl: 1    nystatin-triamcinolone (MYCOLOG II) cream, Apply topically 2 (two) times daily to Groin area., Disp: 30 g, Rfl: 0    rivaroxaban (XARELTO) 2.5 mg Tab, Take 1 tablet (2.5 mg total) by mouth 2 (two) times daily with meals., Disp: 60 tablet, Rfl: 11    tenofovir alafenamide  (VEMLIDY) 25 mg Tab, Take 1 tablet (25 mg total) by mouth once daily., Disp: 30 tablet, Rfl: 11    triamcinolone acetonide 0.1% (KENALOG) 0.1 % cream, Apply topically 2 (two) times daily as needed. Neck and body, not groin, Disp: 80 g, Rfl: 1     REVIEW OF SYSTEMS:  General: negative; Respiratory: no cough, shortness of breath, or wheezing; Cardiovascular: no chest pain or dyspnea on exertion; Gastrointestinal: no abdominal pain, change in bowel habits, or bloody stools; Genito-Urinary: no dysuria, trouble voiding, or hematuria; Musculoskeletal: no weakness or decreased range of motion, Neurological: no TIA or stroke symptoms; Psychiatric: no nervousness, anxiety or depression.    PHYSICAL EXAM:              General appearance: Alert, well-appearing, and in no distress.  Oriented to person, place, and time  Neurological: Normal speech, no focal findings noted; CN II - XII grossly intact  Musculoskeletal:Digits/nail without cyanosis/clubbing.  Normal muscle strength/tone.  Neck: Supple, no significant adenopathy  Chest:Clear to auscultation, no wheezes, rales or rhonchi, symmetric air entry, No use of accessory muscles   Cardiac:Normal rate and regular rhythm, S1 and S2 normal  Abdomen:Soft, nontender, nondistended, no masses or organomegaly, No rebound tenderness noted; bowel sounds normal, No groin adenopathy   Extremities: No femoral pulse on the left, 1+ femoral pulse on the right, pedal pulses are not palpable. Monophasic PT and DP on the right. Monophasic PT on the left. No DP or AT signals on the left. No pre-tibial edema. No ulcerations. Delayed capillary refill in the left foot. Both feet are warm without wounds.    LAB RESULTS:  Lab Results   Component Value Date    K 3.9 02/02/2022    K 3.9 10/29/2021    K 4.1 10/27/2021    CREATININE 0.9 02/02/2022    CREATININE 0.8 10/29/2021    CREATININE 0.9 10/27/2021     Lab Results   Component Value Date    WBC 6.69 02/02/2022    WBC 5.24 10/29/2021    WBC 6.82  10/27/2021    HCT 45.8 02/02/2022    HCT 49.3 10/29/2021    HCT 48.3 10/27/2021     02/02/2022     10/29/2021     10/27/2021     Lab Results   Component Value Date    HGBA1C 6.4 (H) 07/12/2011    HGBA1C 6.3 (H) 11/04/2009     IMAGING:    KARMA:  R: 0.27  L: 0.60    IMP/PLAN:  56 y.o. male with Hepatitis B, NAFLD, HTN here with severe PAD. He does not have tissue loss at this time. He has previously underwent stenting with interventional cardiology and now has stents in the R CFA.  He describes his R foot pain as similar to his L - given the distribution and character of the pain, and wide disparity in the KARMA, I suspect that diabetic peripheral neuropathy is contributing to his symptoms.      - Patient will likely need a bypass to treat his symptoms.  - Ordered vein mapping   - Follow up to discuss surgical planning  - neurology referral     Cristi Marcelo MD  Vascular & Endovascular Surgery

## 2022-03-24 ENCOUNTER — SPECIALTY PHARMACY (OUTPATIENT)
Dept: PHARMACY | Facility: CLINIC | Age: 57
End: 2022-03-24
Payer: COMMERCIAL

## 2022-03-24 NOTE — TELEPHONE ENCOUNTER
Specialty Pharmacy - Refill Coordination    Specialty Medication Orders Linked to Encounter    Flowsheet Row Most Recent Value   Medication #1 tenofovir alafenamide (VEMLIDY) 25 mg Tab (Order#[id number], Rx#[id number])        Refill Questions - Documented Responses    Flowsheet Row Most Recent Value   Patient Availability and HIPAA Verification    Does patient want to proceed with activity? Unable to Reach        We have had multiple attempts to the patient and have been unsuccessful to reach the patient. We will stop reaching out to the patient but in the event that the patient needs the med and contacts us, we will communicate and begin dispensing for the patient. At your next visit with the patient, please review the importance of being in contact with our specialty pharmacy as a part of our care team.    Interventions added this encounter   Closed: [facility] Provider Intervention - Drug therapy adherence: tenofovir alafenamide (VEMLIDY) 25 mg Tab     Sterling Kelsey, PharmD  [facility]  [address] [place] [id number]  Phone: [phone]  Fax: [phone]

## 2022-05-02 ENCOUNTER — TELEPHONE (OUTPATIENT)
Dept: INTERNAL MEDICINE | Facility: CLINIC | Age: 57
End: 2022-05-02
Payer: COMMERCIAL

## 2022-05-27 ENCOUNTER — TELEPHONE (OUTPATIENT)
Dept: VASCULAR SURGERY | Facility: CLINIC | Age: 57
End: 2022-05-27
Payer: COMMERCIAL

## 2022-05-27 DIAGNOSIS — I77.9 PAOD (PERIPHERAL ARTERIAL OCCLUSIVE DISEASE): Primary | ICD-10-CM

## 2022-05-27 NOTE — TELEPHONE ENCOUNTER
----- Message from Amara Cook sent at 5/27/2022  8:37 AM CDT -----  Ramiro Oneil calling regarding Appointment Access  (message) for #severe stenosis in his right popliteal artery.  Pt stated his foot is bad call back [phone]

## 2022-06-02 ENCOUNTER — OFFICE VISIT (OUTPATIENT)
Dept: VASCULAR SURGERY | Facility: CLINIC | Age: 57
End: 2022-06-02
Attending: SURGERY
Payer: COMMERCIAL

## 2022-06-02 VITALS
DIASTOLIC BLOOD PRESSURE: 82 MMHG | HEART RATE: 67 BPM | TEMPERATURE: 98 F | WEIGHT: 198.44 LBS | BODY MASS INDEX: 29.39 KG/M2 | HEIGHT: 69 IN | SYSTOLIC BLOOD PRESSURE: 165 MMHG

## 2022-06-02 DIAGNOSIS — I70.209 FEMORAL ARTERY OCCLUSION: ICD-10-CM

## 2022-06-02 DIAGNOSIS — Z95.9 S/P ARTERIAL STENT: ICD-10-CM

## 2022-06-02 DIAGNOSIS — I77.9 PAOD (PERIPHERAL ARTERIAL OCCLUSIVE DISEASE): Primary | ICD-10-CM

## 2022-06-02 DIAGNOSIS — I73.9 PAD (PERIPHERAL ARTERY DISEASE): Primary | ICD-10-CM

## 2022-06-02 PROCEDURE — 99214 PR OFFICE/OUTPT VISIT, EST, LEVL IV, 30-39 MIN: ICD-10-PCS | Mod: S$GLB,,, | Performed by: SURGERY

## 2022-06-02 PROCEDURE — 3077F SYST BP >= 140 MM HG: CPT | Mod: CPTII,S$GLB,, | Performed by: SURGERY

## 2022-06-02 PROCEDURE — 99999 PR PBB SHADOW E&M-EST. PATIENT-LVL III: ICD-10-PCS | Mod: PBBFAC,,, | Performed by: SURGERY

## 2022-06-02 PROCEDURE — 3008F BODY MASS INDEX DOCD: CPT | Mod: CPTII,S$GLB,, | Performed by: SURGERY

## 2022-06-02 PROCEDURE — 3079F PR MOST RECENT DIASTOLIC BLOOD PRESSURE 80-89 MM HG: ICD-10-PCS | Mod: CPTII,S$GLB,, | Performed by: SURGERY

## 2022-06-02 PROCEDURE — 99214 OFFICE O/P EST MOD 30 MIN: CPT | Mod: S$GLB,,, | Performed by: SURGERY

## 2022-06-02 PROCEDURE — 3077F PR MOST RECENT SYSTOLIC BLOOD PRESSURE >= 140 MM HG: ICD-10-PCS | Mod: CPTII,S$GLB,, | Performed by: SURGERY

## 2022-06-02 PROCEDURE — 99999 PR PBB SHADOW E&M-EST. PATIENT-LVL III: CPT | Mod: PBBFAC,,, | Performed by: SURGERY

## 2022-06-02 PROCEDURE — 3008F PR BODY MASS INDEX (BMI) DOCUMENTED: ICD-10-PCS | Mod: CPTII,S$GLB,, | Performed by: SURGERY

## 2022-06-02 PROCEDURE — 3079F DIAST BP 80-89 MM HG: CPT | Mod: CPTII,S$GLB,, | Performed by: SURGERY

## 2022-06-02 NOTE — PROGRESS NOTES
Ramiro Oneil  06/02/2022    HPI:  Mr. Oneil is a 56 y.o. male with a h/o Hepatitis B, NAFLD, HTN, who is here today for evaluation of  PAD. He underwent a stent with interventional cardiology which was placed in the R SFA which initially improved his symptoms, however, they recurred about 2 months after the procedure with his right leg more symptomatic now than his left. He then underwent an angiogram demonstrating occlusion of the right SFA and right anterior tibial artery. He reports constant bilateral calf pain that worsens with exertion that is worse on the right side. He can walk less than a block before he has to stop. He often has to remove his shoes and elevate the legs to improve this pain. He right great toe is numb. He also has leg swelling that does not worsen throughout the day but is worse on the right side. He is currently on Xarelto, ASA, and statin.     Interval History:   Patient comes back after a vein map.   States that his right leg is getting worse. The pain starts as soon as he walks. The pain is in the calf and in his foot.   The left side hearts as well but not as much as the R.   Has not seen neurology     Rt prox GSV thigh AP diam  2.3 mm   Rt mid GSV thigh AP diam   2.0 mm   Rt distal GSV thigh AP diam    1.8 mm   Rt GSV knee AP diam    1.9 mm   Rt prox GSV calf AP diam   2.6 mm   Rt mid GSV calf AP diam    2.9 mm   Rt distal GSV calf AP diam 2.8 mm   Lt prox GSV thigh AP diam  1.9 mm   Lt mid GSV thigh AP diam   2.4 mm   Lt distal GSV thigh AP diam    2.4 mm   Lt GSV knee AP diam    1.9 mm   Lt prox GSV calf AP diam   2.4 mm   Lt mid GSV calf AP diam    1.6 mm   Lt distal GSV calf AP diam 2.9 mm     No history of MI/stroke  Tobacco use: never smoker    Past Medical History:   Diagnosis Date    Anticoagulant long-term use     Arthritis     Dermatitis     Hepatitis B     History of surgery on arm 07/2020    Hypertension     Liver fibrosis 12/12/2019    F2 on fibroscan 11/2018     NAFLD (nonalcoholic fatty liver disease)      Past Surgical History:   Procedure Laterality Date    CARPAL TUNNEL RELEASE      COLONOSCOPY      PERCUTANEOUS TRANSLUMINAL ANGIOPLASTY (PTA) OF PERIPHERAL VESSEL N/A 2/4/2022    Procedure: PTA, PERIPHERAL VESSEL;  Surgeon: Rodger Blake MD;  Location: [facility] CATH LAB;  Service: Cardiology;  Laterality: N/A;    PTA/stenting of distal right SFA       right hand surgery      SHOULDER SURGERY      stents       Family History   Problem Relation Age of Onset    Diabetes Mother     Hypertension Mother     Cancer Father         lung    Diabetes Sister     Cancer Sister         breast    Hypertension Sister     Cancer Brother         prostate    Cancer Paternal Uncle     Diabetes Paternal Uncle     Stroke Sister     Liver disease Neg Hx     Cirrhosis Neg Hx      Social History     Socioeconomic History    Marital status:      Spouse name: Roro    Number of children: 2   Occupational History    Occupation:      Employer: [employer]   Tobacco Use    Smoking status: Never Smoker    Smokeless tobacco: Never Used   Substance and Sexual Activity    Alcohol use: Not Currently     Comment: 1-2 non alchoholic beers per week    Drug use: No    Sexual activity: Yes   Social History Narrative     [employer], , two children.          Current Outpatient Medications:     amLODIPine (NORVASC) 2.5 MG tablet, Take 1 tablet (2.5 mg total) by mouth once daily., Disp: 90 tablet, Rfl: 1    aspirin (ECOTRIN) 81 MG EC tablet, Take 1 tablet (81 mg total) by mouth once daily., Disp: 30 tablet, Rfl: 11    atorvastatin (LIPITOR) 80 MG tablet, Take 1 tablet (80 mg total) by mouth once daily., Disp: 90 tablet, Rfl: 3    cyclobenzaprine (FLEXERIL) 5 MG tablet, Take 1 tablet (5 mg total) by mouth 3 (three) times daily as needed for Muscle spasms., Disp: 45 tablet, Rfl: 0    diclofenac sodium (VOLTAREN) 1 % Gel, Apply 2  grams topically 3 (three) times daily as needed., Disp: 2 Tube, Rfl: 6    econazole nitrate 1 % cream, , Disp: , Rfl:     fexofenadine (ALLEGRA) 180 MG tablet, Take 1 tablet (180 mg total) by mouth once daily., Disp: 30 tablet, Rfl: 1    fluticasone (FLONASE) 50 mcg/actuation nasal spray, 2 sprays (100 mcg total) by Each Nare route once daily., Disp: 1 Bottle, Rfl: 5    hydroCHLOROthiazide (HYDRODIURIL) 25 MG tablet, Take 1 tablet (25 mg total) by mouth once daily., Disp: 90 tablet, Rfl: 1    nystatin-triamcinolone (MYCOLOG II) cream, Apply topically 2 (two) times daily to Groin area., Disp: 30 g, Rfl: 0    rivaroxaban (XARELTO) 2.5 mg Tab, Take 1 tablet (2.5 mg total) by mouth 2 (two) times daily with meals., Disp: 60 tablet, Rfl: 11    tenofovir alafenamide (VEMLIDY) 25 mg Tab, Take 1 tablet (25 mg total) by mouth once daily., Disp: 30 tablet, Rfl: 11    triamcinolone acetonide 0.1% (KENALOG) 0.1 % cream, Apply topically 2 (two) times daily as needed. Neck and body, not groin, Disp: 80 g, Rfl: 1     REVIEW OF SYSTEMS:  General: negative; Respiratory: no cough, shortness of breath, or wheezing; Cardiovascular: no chest pain or dyspnea on exertion; Gastrointestinal: no abdominal pain, change in bowel habits, or bloody stools; Genito-Urinary: no dysuria, trouble voiding, or hematuria; Musculoskeletal: no weakness or decreased range of motion, Neurological: no TIA or stroke symptoms; Psychiatric: no nervousness, anxiety or depression.    PHYSICAL EXAM:   Right Arm BP - Sittin/82 (22 1009)  Left Arm BP - Sittin/84 (22 1009)  Pulse: 67  Temp: 98.1 °F (36.7 °C)    General appearance: Alert, well-appearing, and in no distress.  Oriented to person, place, and time  Neurological: Normal speech, no focal findings noted; CN II - XII grossly intact  Musculoskeletal:Digits/nail without cyanosis/clubbing.  Normal muscle strength/tone.  Neck: Supple, no significant adenopathy  Chest:Clear to  auscultation, no wheezes, rales or rhonchi, symmetric air entry, No use of accessory muscles   Cardiac:Normal rate and regular rhythm, S1 and S2 normal  Abdomen:Soft, nontender, nondistended, no masses or organomegaly, No rebound tenderness noted; bowel sounds normal, No groin adenopathy   Extremities: No femoral pulse on the left, 1+ femoral pulse on the right, pedal pulses are not palpable. Monophasic FT and Biphasic PT on the right. Monophasic PT on the left. No DP or AT signals on the left. No pre-tibial edema. No ulcerations. Delayed capillary refill in the left foot. Both feet are warm without wounds.    LAB RESULTS:  Lab Results   Component Value Date    K 3.9 02/02/2022    K 3.9 10/29/2021    K 4.1 10/27/2021    CREATININE 0.9 02/02/2022    CREATININE 0.8 10/29/2021    CREATININE 0.9 10/27/2021     Lab Results   Component Value Date    WBC 6.69 02/02/2022    WBC 5.24 10/29/2021    WBC 6.82 10/27/2021    HCT 45.8 02/02/2022    HCT 49.3 10/29/2021    HCT 48.3 10/27/2021     02/02/2022     10/29/2021     10/27/2021     Lab Results   Component Value Date    HGBA1C 6.4 (H) 07/12/2011    HGBA1C 6.3 (H) 11/04/2009     IMAGING:    KARMA:  R: 0.27  L: 0.60    IMP/PLAN:  56 y.o. male with Hepatitis B, NAFLD, HTN here with severe PAD. He does not have tissue loss at this time. He has previously underwent stenting with interventional cardiology and now has stents in the R CFA.  He describes his R foot pain as similar to his L - given the distribution and character of the pain, and wide disparity in the KARMA, I suspect that diabetic peripheral neuropathy is contributing to his symptoms.      - Vein is not ideal for a Bypass  - If we proceed with a bypass will need to use graft, which is not ideal for him based on his age.   - Will defer surgery as a last resource   - Not having any rest pain.   - neurology referral   - Will follow up in 3 months     Cristi Marcelo MD  Vascular & Endovascular Surgery

## 2022-06-13 ENCOUNTER — OFFICE VISIT (OUTPATIENT)
Dept: PODIATRY | Facility: CLINIC | Age: 57
End: 2022-06-13
Attending: SURGERY
Payer: COMMERCIAL

## 2022-06-13 ENCOUNTER — SPECIALTY PHARMACY (OUTPATIENT)
Dept: PHARMACY | Facility: CLINIC | Age: 57
End: 2022-06-13
Payer: COMMERCIAL

## 2022-06-13 VITALS
DIASTOLIC BLOOD PRESSURE: 89 MMHG | HEART RATE: 79 BPM | HEIGHT: 69 IN | WEIGHT: 204.56 LBS | BODY MASS INDEX: 30.3 KG/M2 | SYSTOLIC BLOOD PRESSURE: 169 MMHG

## 2022-06-13 DIAGNOSIS — L84 CORN OR CALLUS: ICD-10-CM

## 2022-06-13 DIAGNOSIS — B35.1 ONYCHOMYCOSIS DUE TO DERMATOPHYTE: Primary | ICD-10-CM

## 2022-06-13 DIAGNOSIS — M79.671 FOOT PAIN, RIGHT: ICD-10-CM

## 2022-06-13 DIAGNOSIS — B18.1 CHRONIC HEPATITIS B: Primary | ICD-10-CM

## 2022-06-13 DIAGNOSIS — I77.9 PAOD (PERIPHERAL ARTERIAL OCCLUSIVE DISEASE): ICD-10-CM

## 2022-06-13 DIAGNOSIS — Z95.9 S/P ARTERIAL STENT: ICD-10-CM

## 2022-06-13 PROCEDURE — 3008F PR BODY MASS INDEX (BMI) DOCUMENTED: ICD-10-PCS | Mod: CPTII,S$GLB,, | Performed by: PODIATRIST

## 2022-06-13 PROCEDURE — 1160F RVW MEDS BY RX/DR IN RCRD: CPT | Mod: CPTII,S$GLB,, | Performed by: PODIATRIST

## 2022-06-13 PROCEDURE — 1159F MED LIST DOCD IN RCRD: CPT | Mod: CPTII,S$GLB,, | Performed by: PODIATRIST

## 2022-06-13 PROCEDURE — 11721 PR DEBRIDEMENT OF NAILS, 6 OR MORE: ICD-10-PCS | Mod: 59,S$GLB,, | Performed by: PODIATRIST

## 2022-06-13 PROCEDURE — 99499 UNLISTED E&M SERVICE: CPT | Mod: S$GLB,,, | Performed by: PODIATRIST

## 2022-06-13 PROCEDURE — 3077F SYST BP >= 140 MM HG: CPT | Mod: CPTII,S$GLB,, | Performed by: PODIATRIST

## 2022-06-13 PROCEDURE — 1160F PR REVIEW ALL MEDS BY PRESCRIBER/CLIN PHARMACIST DOCUMENTED: ICD-10-PCS | Mod: CPTII,S$GLB,, | Performed by: PODIATRIST

## 2022-06-13 PROCEDURE — 99499 NO LOS: ICD-10-PCS | Mod: S$GLB,,, | Performed by: PODIATRIST

## 2022-06-13 PROCEDURE — 1159F PR MEDICATION LIST DOCUMENTED IN MEDICAL RECORD: ICD-10-PCS | Mod: CPTII,S$GLB,, | Performed by: PODIATRIST

## 2022-06-13 PROCEDURE — 99999 PR PBB SHADOW E&M-EST. PATIENT-LVL V: CPT | Mod: PBBFAC,,, | Performed by: PODIATRIST

## 2022-06-13 PROCEDURE — 3008F BODY MASS INDEX DOCD: CPT | Mod: CPTII,S$GLB,, | Performed by: PODIATRIST

## 2022-06-13 PROCEDURE — 11721 DEBRIDE NAIL 6 OR MORE: CPT | Mod: 59,S$GLB,, | Performed by: PODIATRIST

## 2022-06-13 PROCEDURE — 11055 PARING/CUTG B9 HYPRKER LES 1: CPT | Mod: S$GLB,,, | Performed by: PODIATRIST

## 2022-06-13 PROCEDURE — 3079F PR MOST RECENT DIASTOLIC BLOOD PRESSURE 80-89 MM HG: ICD-10-PCS | Mod: CPTII,S$GLB,, | Performed by: PODIATRIST

## 2022-06-13 PROCEDURE — 99999 PR PBB SHADOW E&M-EST. PATIENT-LVL V: ICD-10-PCS | Mod: PBBFAC,,, | Performed by: PODIATRIST

## 2022-06-13 PROCEDURE — 3079F DIAST BP 80-89 MM HG: CPT | Mod: CPTII,S$GLB,, | Performed by: PODIATRIST

## 2022-06-13 PROCEDURE — 3077F PR MOST RECENT SYSTOLIC BLOOD PRESSURE >= 140 MM HG: ICD-10-PCS | Mod: CPTII,S$GLB,, | Performed by: PODIATRIST

## 2022-06-13 PROCEDURE — 11055 PR TRIM HYPERKERATOTIC SKIN LESION, ONE: ICD-10-PCS | Mod: S$GLB,,, | Performed by: PODIATRIST

## 2022-06-13 NOTE — PATIENT INSTRUCTIONS
Recommend lotions: eucerin, eucerin for diabetics, aquaphor, A&D ointment, gold bond for diabetics, sween, [place] Bees all purpose baby ointment,  urea 40 with aloe (found on amazon.com)    Shoe recommendations: (try 6pm.com, zappos.com , nordstromrack.Revelens, or shoes.Revelens for discounted prices) you can visit [employer] shoes in [place]  or [facility] in the [facility] (there are also several shoe brand outlets in the [facility])    Asics (GT 2000 or gel foundations), new balance stability type shoes (such as the 940 series), saucony (stabil c3),  Salazar (GTS or Beast or transcend), propet, Jaime, HokaOne Bondi 7 (tennis shoe)    Sofft Brand (women) Angie&Michael (men), clarks, crocs, aerosoles, naturalizers, SAS, ecco, born, vik watson, rockports (dress shoes)    Vionic, burkenstocks, fitflops, propet (sandals)  Nike comfort thong sandals, crocs, propet (house shoes)    Nail Home remedy:  Vicks Vapor rub to nails for easier manageability    Occasional soaks for 15-20 mins in luke warm water with 1 cup of listerine and 1 cup of apple cider vinegar are ok You may add several drops of oil of oregano or tea tree oil as well      Wearing Proper Shoes                    You walk on your feet every day, forcing them to support the weight of your body. Repeated stress on your feet can cause damage over time. The right shoes can help protect your feet. The wrong shoes can cause more foot problems. Read the information below to help you find a shoe that fits your foot needs.     A good shoe fit will cover your foot outline.       A shoe that doesnt cover the outline is a bad fit.      Whats your foot shape?  To get a good fit, you need to know the shape of your foot. Do this simple test: While standing, place your foot on a piece of paper and trace around it. Is your foot straight or curved? Do you have a foot problem, such as a bunion, that causes your foot outline to show a bulge on the side of your big toe?  Finding your  fit  Bring your foot outline to the shoe store to help you find the right shoe. Place a shoe you like on top of the outline to see if it matches the shape. The shoe should cover the outline. (If you have a bunion, the shoe may not cover the bulge on the outline. Look for soft leather shoes to stretch over the bunion.) Once youve found a pair of proper shoes, put them on. Walk around. Be sure the shoes dont rub or pinch. If the shoes feel good, youve found your fit!  The right shoe for you  A good shoe has features that provide comfort and support. It must also be the right size and shape for your feet. Look for a shoe made of breathable fabric and lining, such as leather or canvas. Be sure that shoes have enough tread to prevent slipping. Go to a good shoe store for help finding the right shoe.  Good shoe features  An ideal shoe has the following:  Laces for support. If tying laces is a problem for you, try shoes with Velcro fasteners or anh.  A front of the shoe (toe box) with ½ inch space in front of your longest toes.  An arch shape that supports your foot.  No more than 1½ inches of heel.  A stiff, snug back of the shoe to keep your foot from sliding around.  A smooth lining with no rough seams.  Shoe shopping tips  Below are some dos and donts for when you go to the shoe store.  Do:  Select the shoes that feel right. Wear them around the house. Then bring them to your foot healthcare provider to check for fit. If they dont fit well, return them.  Shop late in the day, when your feet will be slightly bigger.  Each time you buy shoes, have both your feet measured while you are standing. Foot size changes with time.  Pick shoes to suit their purpose. High heels are OK for an occasional night on the town. But for everyday wear, choose a more sensible shoe.  Try on shoes while wearing any inserts specially made for your feet (orthoses).  Try on both the right and left shoes. If your feet are different  sizes, pick a pair that fits the larger foot.  Dont:  Dont buy shoes based on shoe size alone. Always try on shoes, as sizes differ from brand to brand and within brands.  Dont expect shoes to break in. If they dont fit at the store, dont buy them.  Dont buy a shoe that doesnt match your foot shape.  What about socks?  Always wear socks with shoes. Socks help absorb sweat and reduce friction and blistering. When shopping for shoes, choose soft, padded socks with seams that dont irritate your feet.  If you have foot problems  Some foot problems cause deformities. This can make it hard to find a good fit. Look for shoes made of soft leather to stretch over the deformity. If you have bunions, buy shoes with a wider toe box. To fit hammertoes, look for shoes with a tall toe box. If you have arch problems, you may need inserts. In some cases, youll need to have custom footwear or orthoses made for your feet.  Suggested footwear  Ask your healthcare provider what kind of footwear you need. He or she may recommend a certain brand or shoe store.  Date Last Reviewed: 8/1/2016  © 8671-4703 [employer]. [address] [place] [address], [place] [address]. All rights reserved. This information is not intended as a substitute for professional medical care. Always follow your healthcare professional's instructions.        What Are Corns and Calluses?    Corns and calluses are your bodys response to friction or pressure against the skin. If your foot rubs the inside of your shoe, the affected area of skin thickens. Or, if a bone is not in the normal position, skin caught between bone and shoe or bone and ground builds up. In either case, the outer layer of skin thickens to protect the foot from unusual pressure. In many cases, corns and calluses look bad but are not harmful. However, more severe corns and calluses may become infected, destroy healthy tissue, or affect foot movement.    Corns  Corns usually grow on  top of the foot, often at the toe joint. Corns can range from a slight thickening of skin to a painful soft or hard bump. They often form on top of buckled toe joints (hammer toes). If your toes curl under, corns may grow on the tips of the toes. You may also get a corn on the end of a toe if it rubs against your shoe. Corns can also grow between toes, often between the first and second toes.    Calluses  Calluses grow on the bottom of the foot or on the outer edge of a toe or heel. A callus may spread across the ball of your foot. This type of callus is usually due to a problem with a metatarsal (the long bone at the base of a toe, near the ball of the foot). A pinch callus may grow along the outer edge of the heel or the big toe. Some calluses press up into the foot instead of spreading on the outside. A callus may form a central core or plug of tissue where pressure is greatest.  Date Last Reviewed: 9/21/2015 © 2000-2016 [employer]. [address] [place] [address]. All rights reserved. This information is not intended as a substitute for professional medical care. Always follow your healthcare professional's instructions.        Treating Corns and Calluses  If your corns or calluses are mild, reducing friction may help. Different shoes, moleskin patches, or soft pads may be all the treatment you need. In more severe cases, treating tissue buildup may require your doctors care. Sometimes custom-made shoe inserts (orthotics) or special pads are prescribed to reduce friction and pressure.    Change shoes  If you have corns, your doctor may suggest wearing shoes that have more toe room. This way, buckled joints are less likely to be pinched against the top of the shoe. If you have calluses, wearing a cushioned insole, arch support, or heel counter can help reduce friction.  Visit your doctor  In some cases, your doctor may trim away the outer layers of skin that make up the corn or callus.  For a painful corn, medicine may be injected beneath the built-up tissue.  Wear orthotics  Orthotics are specially made to meet the needs of your feet. They cushion calluses or divert pressure away from these problem areas. Worn as directed, orthotics help limit existing problems and prevent new ones from forming.  If you need surgery  If a bone or joint is out of place, certain parts of your foot may be under too much pressure. This can cause severe corns and calluses. In such cases, surgery may be the best way to correct the problem.  Outpatient procedures  In most cases, surgery to improve bone position is an outpatient procedure. Your doctor may cut away excess bone, reposition prominent bones, or even fuse joints. Sometimes tendons or ligaments are cut to reduce tension on a bone or joint. Your doctor will talk with you about the procedure that is best suited to your needs.  Date Last Reviewed: 7/1/2016  © 3699-9510 [employer]. [address] [place] [address], [place] [address]. All rights reserved. This information is not intended as a substitute for professional medical care. Always follow your healthcare professional's instructions.

## 2022-06-13 NOTE — TELEPHONE ENCOUNTER
Specialty Pharmacy - Refill Coordination    Specialty Medication Orders Linked to Encounter    Flowsheet Row Most Recent Value   Medication #1 tenofovir alafenamide (VEMLIDY) 25 mg Tab (Order#[id number], Rx#[id number])        Pt left [facility] a VM stating he got his phone turned back and requested a callback. Returned call to pt. He is in need of a Vemlidy refill. [facility] previously closed out due to lack of patient contact in March 2022. Last filled vemlidy in Feb 2022 though pt only endorsed 1-2 missed doses since that time and cited he had extra bottles on hand. Denied any changes to med list, allergies, or health conditions, Denied side effects. Reviewed importance of adherence and keeping in contact with [facility] for future refills. Verified address/phone number on file. Proceeded with refill:    Refill Questions - Documented Responses    Flowsheet Row Most Recent Value   Patient Availability and HIPAA Verification    Does patient want to proceed with activity? Yes   HIPAA/medical authority confirmed? Yes   Relationship to patient of person spoken to? Self   Refill Screening Questions    Changes to allergies? No   Changes to medications? No   New conditions since last clinic visit? No   Unplanned office visit, urgent care, ED, or hospital admission in the last 4 weeks? No   How does patient/caregiver feel medication is working? Good   Financial problems or insurance changes? No   How many doses of your specialty medications were missed in the last 4 weeks? 0   Would patient like to speak to a pharmacist? No   When does the patient need to receive the medication? 06/14/22   Refill Delivery Questions    How will the patient receive the medication? Delivery Aleena   When does the patient need to receive the medication? 06/14/22   Shipping Address Home   Address in [facility] confirmed and updated if neccessary? Yes   Expected Copay ($) 0   Is the patient able to afford the medication copay? Yes   Payment Method zero copay    Days supply of Refill 30   Supplies needed? No supplies needed   Refill activity completed? Yes   Refill activity plan Refill scheduled   Shipment/Pickup Date: 06/14/22          Current Outpatient Medications   Medication Sig    amLODIPine (NORVASC) 2.5 MG tablet Take 1 tablet (2.5 mg total) by mouth once daily.    aspirin (ECOTRIN) 81 MG EC tablet Take 1 tablet (81 mg total) by mouth once daily.    atorvastatin (LIPITOR) 80 MG tablet Take 1 tablet (80 mg total) by mouth once daily.    cyclobenzaprine (FLEXERIL) 5 MG tablet Take 1 tablet (5 mg total) by mouth 3 (three) times daily as needed for Muscle spasms.    diclofenac sodium (VOLTAREN) 1 % Gel Apply 2 grams topically 3 (three) times daily as needed.    econazole nitrate 1 % cream     fexofenadine (ALLEGRA) 180 MG tablet Take 1 tablet (180 mg total) by mouth once daily.    fluticasone (FLONASE) 50 mcg/actuation nasal spray 2 sprays (100 mcg total) by Each Nare route once daily.    hydroCHLOROthiazide (HYDRODIURIL) 25 MG tablet Take 1 tablet (25 mg total) by mouth once daily.    nystatin-triamcinolone (MYCOLOG II) cream Apply topically 2 (two) times daily to Groin area.    rivaroxaban (XARELTO) 2.5 mg Tab Take 1 tablet (2.5 mg total) by mouth 2 (two) times daily with meals.    tenofovir alafenamide (VEMLIDY) 25 mg Tab Take 1 tablet (25 mg total) by mouth once daily.    triamcinolone acetonide 0.1% (KENALOG) 0.1 % cream Apply topically 2 (two) times daily as needed. Neck and body, not groin   Last reviewed on 6/13/2022  8:54 AM by Jarad Live MA    Review of patient's allergies indicates:  No Known Allergies Last reviewed on  6/13/2022 8:49 AM by Jarad Live      Tasks added this encounter   7/7/2022 - Refill Call (Auto Added)   Tasks due within next 3 months   No tasks due.     Kamala Wright, PharmD  [facility]  [address] [facility] [address]  Phone: [phone]  Fax: [phone]

## 2022-06-13 NOTE — PROGRESS NOTES
Subjective:      Patient ID: Ramiro Oneil is a 56 y.o. male.    Chief Complaint: Foot Pain (Bilateral. Right worse than left) and Joint Swelling (Right foot swelling)    Ramiro is a 56 y.o. male who presents to the clinic for evaluation and treatment of high risk feet. Ramiro has a past medical history of Anticoagulant long-term use, Arthritis, Dermatitis, Hepatitis B, History of surgery on arm (07/2020), Hypertension, Liver fibrosis (12/12/2019), and NAFLD (nonalcoholic fatty liver disease).     Complains of  right foot pain, especially with palpation and ambulation. Description: moderate Nature: burning, sharp and throbbing Location: lateral, plantar and midfoot Onset of the symptoms was several months ago. Precipitating event: none known.  History of injury: no Current symptoms include: ability to bear weight, but with some pain. Alleviating factors: rest Symptoms have gradually worsened. Patient has had no prior foot problems. Evaluation to date: none. Treatment to date: none. Patients rates pain 7/10 on pain scale.     This patient has documented high risk feet requiring routine maintenance secondary to peripheral vascular disease.      S/p PTA 02/04/2022    Previously seen by my colleague, new to me.    PCP: Yves Serrano MD    Date Last Seen by PCP:2/16/22    Current shoe gear:  Flexible tennis shoes  Last encounter in this department: Visit date not found      Hemoglobin A1C   Date Value Ref Range Status   07/12/2011 6.4 (H) 4.0 - 6.2 % Final   11/04/2009 6.3 (H) 4.0 - 6.2 % Final           Patient Active Problem List   Diagnosis    Bilateral shoulder pain    PAOD (peripheral arterial occlusive disease)    Cervicalgia    DDD (degenerative disc disease), cervical    Chronic hepatitis B    NAFLD (nonalcoholic fatty liver disease)    Hypertension, essential    S/p bilateral carpal tunnel release    Family history of stroke    Hyperlipidemia    Polyp of colon    S/P arterial stent    Rotator  cuff syndrome    Decreased strength, endurance, and mobility    Posture imbalance    Impaired range of motion of cervical spine    Anticoagulant long-term use    Chronic left shoulder pain    Decreased strength of upper extremity    Preop examination       Current Outpatient Medications on File Prior to Visit   Medication Sig Dispense Refill    amLODIPine (NORVASC) 2.5 MG tablet Take 1 tablet (2.5 mg total) by mouth once daily. 90 tablet 1    aspirin (ECOTRIN) 81 MG EC tablet Take 1 tablet (81 mg total) by mouth once daily. 30 tablet 11    atorvastatin (LIPITOR) 80 MG tablet Take 1 tablet (80 mg total) by mouth once daily. 90 tablet 3    cyclobenzaprine (FLEXERIL) 5 MG tablet Take 1 tablet (5 mg total) by mouth 3 (three) times daily as needed for Muscle spasms. 45 tablet 0    diclofenac sodium (VOLTAREN) 1 % Gel Apply 2 grams topically 3 (three) times daily as needed. 2 Tube 6    econazole nitrate 1 % cream       fexofenadine (ALLEGRA) 180 MG tablet Take 1 tablet (180 mg total) by mouth once daily. 30 tablet 1    fluticasone (FLONASE) 50 mcg/actuation nasal spray 2 sprays (100 mcg total) by Each Nare route once daily. 1 Bottle 5    hydroCHLOROthiazide (HYDRODIURIL) 25 MG tablet Take 1 tablet (25 mg total) by mouth once daily. 90 tablet 1    nystatin-triamcinolone (MYCOLOG II) cream Apply topically 2 (two) times daily to Groin area. 30 g 0    rivaroxaban (XARELTO) 2.5 mg Tab Take 1 tablet (2.5 mg total) by mouth 2 (two) times daily with meals. 60 tablet 11    tenofovir alafenamide (VEMLIDY) 25 mg Tab Take 1 tablet (25 mg total) by mouth once daily. 30 tablet 11    triamcinolone acetonide 0.1% (KENALOG) 0.1 % cream Apply topically 2 (two) times daily as needed. Neck and body, not groin 80 g 1    [DISCONTINUED] tenofovir (VIREAD) 300 mg Tab Take 1 tablet (300 mg total) by mouth once daily. TAKE 1 TABLET BY MOUTH    ONCE DAILY 30 tablet 0     No current facility-administered medications on file  prior to visit.       Review of patient's allergies indicates:  No Known Allergies    Past Surgical History:   Procedure Laterality Date    CARPAL TUNNEL RELEASE      COLONOSCOPY      PERCUTANEOUS TRANSLUMINAL ANGIOPLASTY (PTA) OF PERIPHERAL VESSEL N/A 2/4/2022    Procedure: PTA, PERIPHERAL VESSEL;  Surgeon: Rodger Blake MD;  Location: [facility] CATH LAB;  Service: Cardiology;  Laterality: N/A;    PTA/stenting of distal right SFA       right hand surgery      SHOULDER SURGERY      stents         Family History   Problem Relation Age of Onset    Diabetes Mother     Hypertension Mother     Cancer Father         lung    Diabetes Sister     Cancer Sister         breast    Hypertension Sister     Cancer Brother         prostate    Cancer Paternal Uncle     Diabetes Paternal Uncle     Stroke Sister     Liver disease Neg Hx     Cirrhosis Neg Hx        Social History     Socioeconomic History    Marital status:      Spouse name: Roro    Number of children: 2   Occupational History    Occupation:      Employer: [employer]   Tobacco Use    Smoking status: Never Smoker    Smokeless tobacco: Never Used   Substance and Sexual Activity    Alcohol use: Not Currently     Comment: 1-2 non alchoholic beers per week    Drug use: No    Sexual activity: Yes   Social History Narrative     [employer] , two children.          Review of Systems   Constitutional: Negative for chills and fever.   Cardiovascular: Positive for leg swelling. Negative for claudication.   Respiratory: Negative for cough and shortness of breath.    Skin: Positive for dry skin and nail changes. Negative for itching and rash.   Musculoskeletal: Positive for arthritis, back pain, joint pain, myalgias, neck pain and stiffness. Negative for falls, joint swelling and muscle weakness.   Gastrointestinal: Negative for diarrhea, nausea and vomiting.   Neurological: Positive for numbness and  "paresthesias. Negative for tremors and weakness.   Psychiatric/Behavioral: Negative for altered mental status and hallucinations.           Objective:      Vitals:    06/13/22 0854   BP: (!) 169/89   Pulse: 79   Weight: 92.8 kg (204 lb 9.4 oz)   Height: 5' 9" (1.753 m)   PainSc:   7       Physical Exam  Vitals and nursing note reviewed.   Constitutional:       General: He is not in acute distress.     Appearance: He is well-developed. He is not toxic-appearing or diaphoretic.      Comments: alert and oriented x 3.    Cardiovascular:      Comments: Dorsalis pedis and posterior tibial pulses are diminished Bilaterally. Toes are cool to touch. Feet are warm proximally.There is decreased digital hair. Skin is atrophic, slightly hyperpigmented, and mildly edematous    Pulmonary:      Effort: No respiratory distress.   Musculoskeletal:         General: No deformity.      Right ankle: No tenderness. No lateral malleolus, medial malleolus, AITF ligament, CF ligament or posterior TF ligament tenderness.      Right Achilles Tendon: No defects. Salmeron's test negative.      Left ankle: No tenderness. No lateral malleolus, medial malleolus, AITF ligament, CF ligament or posterior TF ligament tenderness.      Left Achilles Tendon: No defects. Salmeron's test negative.      Right foot: No tenderness or bony tenderness.      Left foot: No tenderness or bony tenderness.      Comments: Muscle strength is 5/5 in all groups bilaterally.           Feet:      Right foot:      Protective Sensation: 5 sites tested. 5 sites sensed.      Skin integrity: Skin integrity normal.      Left foot:      Protective Sensation: 5 sites tested. 5 sites sensed.      Skin integrity: Skin integrity normal.   Lymphadenopathy:      Comments: No lymphatic streaking     Skin:     General: Skin is warm and dry.      Coloration: Skin is not pale.      Findings: No rash.      Nails: There is no clubbing.      Comments: Toenails 1-5 bilaterally are elongated by " 2-3 mm, thickened by 2-3 mm, discolored/yellowed, dystrophic, brittle with subungual debris.     Focal hyperkeratotic lesion with painful central core consisting entirely of hyperkeratotic tissue without open skin, drainage, pus, fluctuance, malodor, or signs of infection: sub 5th MTPJ right           Neurological:      Sensory: No sensory deficit.      Motor: No atrophy.      Comments: Light touch present     Psychiatric:         Attention and Perception: He is attentive.         Mood and Affect: Mood is not anxious. Affect is not inappropriate.         Speech: He is communicative. Speech is not slurred.         Behavior: Behavior is not combative.               Assessment:       Encounter Diagnoses   Name Primary?    PAOD (peripheral arterial occlusive disease)     S/P arterial stent     Onychomycosis due to dermatophyte Yes    Corn or callus     Foot pain, right          Plan:     Problem List Items Addressed This Visit     PAOD (peripheral arterial occlusive disease)    Overview     S/p PTA/stenting of distal right SFA ([employer] trial) 6/14/13.           S/P arterial stent      Other Visit Diagnoses     Onychomycosis due to dermatophyte    -  Primary    Corn or callus        Foot pain, right             I counseled the patient on his conditions, their implications and medical management.         Greater than 50% of this visit spent on counseling and coordination of care.    Education about the diabetic foot, neuropathy, and prevention of limb loss.    Shoe inspection. Patient instructed on proper foot hygeine. Wear comfortable, proper fitting shoes. Wash feet daily. Dry well. After drying, apply moisturizer to feet (no lotion to webspaces). Inspect feet daily for skin breaks, blisters, swelling, or redness. Wear cotton socks (preferably white)  Change socks every day. Do NOT walk barefoot. Do NOT use heating pads or hot water soaks. We discussed wearing proper shoe gear, daily foot inspections, never  walking without protective shoe gear.     Discussed edema control and the importance of daily moisturizer to the feet such as Gold bonds diabetic foot cream    Recommend applying vicks vaporub to thick abnormal toenails daily x 6 months to treat fungal nail infection.    - With patient's permission, nails were aggressively reduced and debrided x 10 to their soft tissue attachment mechanically and with electric , removing all offending nail and debris. Patient relates relief following the procedure.      After cleansing the  area w/ alcohol prep pad the above mentioned hyperkeratosis was trimmed utilizing No 15 scapel, to a smooth base with out incident. Patient tolerated this  well and reported comfort to the area of sub 5th MTPJ right    he will continue to monitor the areas daily, inspect his feet, wear protective shoe gear when ambulatory, moisturizer to maintain skin integrity and follow in this office in approximately 2-5 months, sooner p.r.n.

## 2022-07-07 ENCOUNTER — SPECIALTY PHARMACY (OUTPATIENT)
Dept: PHARMACY | Facility: CLINIC | Age: 57
End: 2022-07-07
Payer: COMMERCIAL

## 2022-07-11 ENCOUNTER — SPECIALTY PHARMACY (OUTPATIENT)
Dept: PHARMACY | Facility: CLINIC | Age: 57
End: 2022-07-11
Payer: COMMERCIAL

## 2022-07-11 NOTE — TELEPHONE ENCOUNTER
Outgoing call, Spoke with patient about Vemlidy refill, he said he have plenty on hand (15) , he told the last person that he always have extra. I asked have he missed any doses he said no. He said he will call [facility] when he gets low. [facility] will follow up

## 2022-07-22 ENCOUNTER — TELEPHONE (OUTPATIENT)
Dept: NEUROLOGY | Facility: CLINIC | Age: 57
End: 2022-07-22

## 2022-07-22 NOTE — TELEPHONE ENCOUNTER
----- Message from Debbi Nicole sent at 7/22/2022 10:10 AM CDT -----  Regarding: Sooner appointment  Contact: [phone]  Calling to schedule an appointment sooner than scheduled due to increased pain and referral over a month. Please call to schedule.

## 2022-07-22 NOTE — TELEPHONE ENCOUNTER
Left message on Mr. Joseph's voicemail explaining Dr. Chavez has nothing sooner, asked that he call back if he's like to switch providers.

## 2022-07-29 ENCOUNTER — TELEPHONE (OUTPATIENT)
Dept: NEUROLOGY | Facility: CLINIC | Age: 57
End: 2022-07-29
Payer: COMMERCIAL

## 2022-07-29 NOTE — TELEPHONE ENCOUNTER
Called and left a message for  regarding his appt with  on August 2 which is t his upcoming Tuesday. I stated that we have to rescheduled his appt due to  having a baby. Rescheduled to August 16 at 9:00AM

## 2022-08-03 NOTE — TELEPHONE ENCOUNTER
Specialty Pharmacy - Refill Coordination    Specialty Medication Orders Linked to Encounter    Flowsheet Row Most Recent Value   Medication #1 tenofovir alafenamide (VEMLIDY) 25 mg Tab (Order#[id number], Rx#[id number])        Refill Questions - Documented Responses    Flowsheet Row Most Recent Value   Patient Availability and HIPAA Verification    Does patient want to proceed with activity? Unable to Reach   HIPAA/medical authority confirmed? No        We have had multiple attempts to the patient and have been unsuccessful to reach the patient. We will stop reaching out to the patient but in the event that the patient needs the med and contacts us, we will communicate and begin dispensing for the patient. At your next visit with the patient, please review the importance of being in contact with our specialty pharmacy as a part of our care team.    Attempted to contact patient for Vemlidy refill.  Attempted on 7/20, 7/25, 7/28, and 8/3.  Last dispensed on 6/13/22 for 30 DS.       Interventions added this encounter   Closed: [facility] Provider Intervention - Drug therapy adherence: tenofovir alafenamide (VEMLIDY) 25 mg Tab     Abner Blank, PharmD  [facility]  [address] [facility] [address]  Phone: [phone]  Fax: [phone]

## 2022-08-04 ENCOUNTER — HOSPITAL ENCOUNTER (OUTPATIENT)
Dept: RADIOLOGY | Facility: HOSPITAL | Age: 57
Discharge: HOME | End: 2022-08-04
Attending: NURSE PRACTITIONER
Payer: COMMERCIAL

## 2022-08-04 ENCOUNTER — TELEPHONE (OUTPATIENT)
Dept: HEPATOLOGY | Facility: CLINIC | Age: 57
End: 2022-08-04
Payer: COMMERCIAL

## 2022-08-04 DIAGNOSIS — B18.1 CHRONIC HEPATITIS B: ICD-10-CM

## 2022-08-04 PROCEDURE — 76700 US ABDOMEN COMPLETE: ICD-10-PCS | Mod: 26,,, | Performed by: RADIOLOGY

## 2022-08-04 PROCEDURE — 76700 US EXAM ABDOM COMPLETE: CPT | Mod: 26,,, | Performed by: RADIOLOGY

## 2022-08-04 PROCEDURE — 76700 US EXAM ABDOM COMPLETE: CPT | Mod: TC

## 2022-08-04 NOTE — TELEPHONE ENCOUNTER
"----- Message from Abdulkadir Madden sent at 8/4/2022 10:37 AM CDT -----  Reschedule Existing Appointment        Appt Date: 8/5    Type of appt: 1 yr F/u    Physician: [place]    Reason for rescheduling? Requesting to change visit to Virtual    Caller: Self    Contact Preference: [phone]               Additional Information:  "Thank you for all that you do for our patients"     "

## 2022-08-04 NOTE — PROGRESS NOTES
[facility] - NEUROLOGY [address] [facility], [place]    Date: 8/5/22  Patient Name: Ramiro Oneil   MRN: [id number]   PCP: Yves Serrano  Referring Provider: Cristi Marcelo MD    Assessment:   Ramiro Oneil is a 56 y.o. male presenting foe evaluation of right foot paraesthesia. He has a history of peripheral arterial disease s/p stents with paraesthesia and hypoesthesia. Noting worsening right foot symptoms in past 6 months. Physical Exam is significant for hypoesthesia of pin-prick, temperature, proprioception and vibration in the RLE.     Evaluation for nerve injury and extent given chronic PAD. Could be from PAD or possibly iatrogenic/post-procedure complication with some ischemic exacerbation in territory of right superficial peroneal nerve.  Given unilaterality with acute onset, other systemic considerations like autoimmune and vitamin deficiency are less likely.    Plan:     -- EMG of BLE to evaluate for neuropathy  -- Start Gabapentin 300mg QHS  -- RTC in 3 to 6 months to reevaluate    Problem List Items Addressed This Visit    None     Visit Diagnoses     Paresthesia of right foot    -  Primary    Relevant Medications    gabapentin (NEURONTIN) 300 MG capsule    Other Relevant Orders    EMG W/ ULTRASOUND AND NERVE CONDUCTION TEST 2 Extremities    Numbness of right foot              Sailaja Lam MD    Patient note was created using MModal Dictation.  Any errors in syntax or even information may not have been identified and edited on initial review prior to signing this note.  Subjective:   Patient seen in consultation at the request of Cristi Marcelo MD for the evaluation of numbness in right foot. A copy of this note will be sent to the referring physician.        HPI:   Mr. Ramiro Oneil is a 56 y.o. male with chronic hepatitis B, NFLD, rotator cuff b/l injuries, HTN, b/l carpal tunnel, and PAOD s/p stents presenting with RLE numbness and swelling.      Mr. Oneil has history of occlusive vascular disease and is s/p R. SFA stent placement in Jan. 2022. He reports acute post-procedure loss of sensation in R. 1st toe. Then a few weeks later, he experienced worsening symptoms. He suffers with PAD-related chronic pain, but now he reports additional new onset numbness. In regards to chronic pain, he initially had slight improvment after his endovascular procedure, and then it worsened to greater than preprocedure. Additionally, around July 20th he woke up with right foot swelling that lasted an entire week. He provided a picture from July 20th. Takes Xarelto to help, but he is concerned that the new toe numbness is a sign that it is not working. He he denies back pain, fever, n/v/d/c, clumsiness, and falls.     Neurologic Problem  The patient's primary symptoms include focal sensory loss. The patient's pertinent negatives include no clumsiness or focal weakness. Primary symptoms comment: pain in calf muscle that wakes him up from sleep. This is a chronic problem. The current episode started more than 1 month ago. The neurological problem developed suddenly. The problem has been waxing and waning since onset. There was right-sided and lower extremity focality noted. Associated symptoms include abdominal pain and neck pain. Pertinent negatives include no chest pain, fever, light-headedness, nausea, palpitations or shortness of breath. Past treatments include bed rest, acetaminophen and walking. The treatment provided mild relief. His past medical history is significant for liver disease. There is no history of a bleeding disorder, a clotting disorder, a CVA, head trauma or seizures.       PAST MEDICAL HISTORY:  Past Medical History:   Diagnosis Date    Anticoagulant long-term use     Arthritis     Dermatitis     Hepatitis B     History of surgery on arm 07/2020    Hypertension     Liver fibrosis 12/12/2019    F2 on fibroscan 11/2018    NAFLD (nonalcoholic  fatty liver disease)        PAST SURGICAL HISTORY:  Past Surgical History:   Procedure Laterality Date    CARPAL TUNNEL RELEASE      COLONOSCOPY      PERCUTANEOUS TRANSLUMINAL ANGIOPLASTY (PTA) OF PERIPHERAL VESSEL N/A 2/4/2022    Procedure: PTA, PERIPHERAL VESSEL;  Surgeon: Rodger Blake MD;  Location: [facility] CATH LAB;  Service: Cardiology;  Laterality: N/A;    PTA/stenting of distal right SFA       right hand surgery      SHOULDER SURGERY      stents         CURRENT MEDS:  Current Outpatient Medications   Medication Sig Dispense Refill    amLODIPine (NORVASC) 2.5 MG tablet Take 1 tablet (2.5 mg total) by mouth once daily. 90 tablet 1    aspirin (ECOTRIN) 81 MG EC tablet Take 1 tablet (81 mg total) by mouth once daily. 30 tablet 11    atorvastatin (LIPITOR) 80 MG tablet Take 1 tablet (80 mg total) by mouth once daily. 90 tablet 3    cyclobenzaprine (FLEXERIL) 5 MG tablet Take 1 tablet (5 mg total) by mouth 3 (three) times daily as needed for Muscle spasms. 45 tablet 0    diclofenac sodium (VOLTAREN) 1 % Gel Apply 2 grams topically 3 (three) times daily as needed. 2 Tube 6    econazole nitrate 1 % cream       fexofenadine (ALLEGRA) 180 MG tablet Take 1 tablet (180 mg total) by mouth once daily. 30 tablet 1    fluticasone (FLONASE) 50 mcg/actuation nasal spray 2 sprays (100 mcg total) by Each Nare route once daily. 1 Bottle 5    hydroCHLOROthiazide (HYDRODIURIL) 25 MG tablet Take 1 tablet (25 mg total) by mouth once daily. 90 tablet 1    nystatin-triamcinolone (MYCOLOG II) cream Apply topically 2 (two) times daily to Groin area. 30 g 0    rivaroxaban (XARELTO) 2.5 mg Tab Take 1 tablet (2.5 mg total) by mouth 2 (two) times daily with meals. 60 tablet 11    tenofovir alafenamide (VEMLIDY) 25 mg Tab Take 1 tablet (25 mg total) by mouth once daily. 30 tablet 11    triamcinolone acetonide 0.1% (KENALOG) 0.1 % cream Apply topically 2 (two) times daily as needed. Neck and body, not groin 80 g 1     "gabapentin (NEURONTIN) 300 MG capsule Take 1 capsule (300 mg total) by mouth every evening. 30 capsule 11     No current facility-administered medications for this visit.       ALLERGIES:  Review of patient's allergies indicates:  No Known Allergies    FAMILY HISTORY:  Family History   Problem Relation Age of Onset    Diabetes Mother     Hypertension Mother     Cancer Father         lung    Diabetes Sister     Cancer Sister         breast    Hypertension Sister     Cancer Brother         prostate    Cancer Paternal Uncle     Diabetes Paternal Uncle     Stroke Sister     Liver disease Neg Hx     Cirrhosis Neg Hx        SOCIAL HISTORY:  Social History     Tobacco Use    Smoking status: Never Smoker    Smokeless tobacco: Never Used   Substance Use Topics    Alcohol use: Not Currently     Comment: 1-2 non alchoholic beers per week    Drug use: No       Review of Systems:  12 system review of systems is negative except for the symptoms mentioned in HPI.      Objective:     Vitals:    08/05/22 0832   BP: 139/72   BP Location: Right arm   Patient Position: Sitting   BP Method: Large (Automatic)   Pulse: 68   Weight: 88.2 kg (194 lb 7.1 oz)   Height: 5' 9" (1.753 m)     General: NAD, well nourished   Eyes: no tearing, discharge, no erythema   ENT: moist mucous membranes of the oral cavity, nares patent    Neck: Supple, full range of motion  Cardiovascular: Warm and well perfused, pulses equal and symmetrical  Lungs: Normal work of breathing, normal chest wall excursions  Skin: No rash, lesions, or breakdown on exposed skin  Psychiatry: Mood and affect are appropriate   Abdomen: soft, non tender, non distended  Extremeties: No cyanosis, clubbing or edema.    NEUROLOGICAL EXAMINATION:     MENTAL STATUS   Oriented to person, place, and time.   Attention: normal. Concentration: normal.   Speech: speech is normal     CRANIAL NERVES   Cranial nerves II through XII intact.     MOTOR EXAM   Muscle bulk: " normal  Overall muscle tone: normal  Right arm pronator drift: absent  Left arm pronator drift: absent    Strength   Strength 5/5 throughout.     REFLEXES     Reflexes   Right brachioradialis: 1+  Left brachioradialis: 1+  Right biceps: 1+  Left biceps: 1+  Right patellar: 0  Left patellar: 0  Right achilles: 0  Left achilles: 0  Right Machado: absent  Left Machado: absent  Right ankle clonus: absent  Left ankle clonus: absent    SENSORY EXAM   Right arm light touch: normal  Left arm light touch: normal  Right leg light touch: decreased from ankle  Left leg light touch: normal  Right arm vibration: normal  Left arm vibration: normal  Right leg vibration: decreased from ankle  Left leg vibration: normal  Right arm proprioception: normal  Left arm proprioception: normal  Right leg proprioception: decreased from ankle  Left leg proprioception: normal  Right arm pinprick: normal  Left arm pinprick: normal  Right leg pinprick: decreased from ankle  Left leg pinprick: normal  Sensory deficit distribution on right: sural  Romberg: negative       Most prominent hypoesthesia in right hallux to 1st MTP,   Proximal to MTP less prominent hypoesthesia only ventral foot     GAIT AND COORDINATION      Coordination   Finger to nose coordination: normal  Heel to shin coordination: normal    Tremor   Resting tremor: absent  Intention tremor: absent  Action tremor: absent       Mild circumduction of RLE with gait

## 2022-08-05 ENCOUNTER — TELEPHONE (OUTPATIENT)
Dept: HEPATOLOGY | Facility: CLINIC | Age: 57
End: 2022-08-05
Payer: COMMERCIAL

## 2022-08-05 ENCOUNTER — OFFICE VISIT (OUTPATIENT)
Dept: NEUROLOGY | Facility: CLINIC | Age: 57
End: 2022-08-05
Payer: COMMERCIAL

## 2022-08-05 VITALS
SYSTOLIC BLOOD PRESSURE: 139 MMHG | WEIGHT: 194.44 LBS | HEIGHT: 69 IN | HEART RATE: 68 BPM | DIASTOLIC BLOOD PRESSURE: 72 MMHG | BODY MASS INDEX: 28.8 KG/M2

## 2022-08-05 DIAGNOSIS — I77.9 PAOD (PERIPHERAL ARTERIAL OCCLUSIVE DISEASE): ICD-10-CM

## 2022-08-05 DIAGNOSIS — Z95.9 S/P ARTERIAL STENT: ICD-10-CM

## 2022-08-05 DIAGNOSIS — R20.0 NUMBNESS OF RIGHT FOOT: ICD-10-CM

## 2022-08-05 DIAGNOSIS — R20.2 PARESTHESIA OF RIGHT FOOT: Primary | ICD-10-CM

## 2022-08-05 PROCEDURE — 99204 PR OFFICE/OUTPT VISIT, NEW, LEVL IV, 45-59 MIN: ICD-10-PCS | Mod: S$GLB,,,

## 2022-08-05 PROCEDURE — 3075F PR MOST RECENT SYSTOLIC BLOOD PRESS GE 130-139MM HG: ICD-10-PCS | Mod: CPTII,S$GLB,,

## 2022-08-05 PROCEDURE — 1159F PR MEDICATION LIST DOCUMENTED IN MEDICAL RECORD: ICD-10-PCS | Mod: CPTII,S$GLB,,

## 2022-08-05 PROCEDURE — 1159F MED LIST DOCD IN RCRD: CPT | Mod: CPTII,S$GLB,,

## 2022-08-05 PROCEDURE — 99999 PR PBB SHADOW E&M-EST. PATIENT-LVL IV: ICD-10-PCS | Mod: PBBFAC,,,

## 2022-08-05 PROCEDURE — 3008F PR BODY MASS INDEX (BMI) DOCUMENTED: ICD-10-PCS | Mod: CPTII,S$GLB,,

## 2022-08-05 PROCEDURE — 3078F DIAST BP <80 MM HG: CPT | Mod: CPTII,S$GLB,,

## 2022-08-05 PROCEDURE — 3008F BODY MASS INDEX DOCD: CPT | Mod: CPTII,S$GLB,,

## 2022-08-05 PROCEDURE — 3075F SYST BP GE 130 - 139MM HG: CPT | Mod: CPTII,S$GLB,,

## 2022-08-05 PROCEDURE — 99204 OFFICE O/P NEW MOD 45 MIN: CPT | Mod: S$GLB,,,

## 2022-08-05 PROCEDURE — 99999 PR PBB SHADOW E&M-EST. PATIENT-LVL IV: CPT | Mod: PBBFAC,,,

## 2022-08-05 PROCEDURE — 3078F PR MOST RECENT DIASTOLIC BLOOD PRESSURE < 80 MM HG: ICD-10-PCS | Mod: CPTII,S$GLB,,

## 2022-08-05 RX ORDER — GABAPENTIN 300 MG/1
300 CAPSULE ORAL 3 TIMES DAILY
Qty: 90 CAPSULE | Refills: 11 | Status: SHIPPED | OUTPATIENT
Start: 2022-08-05 | End: 2022-08-05

## 2022-08-05 RX ORDER — GABAPENTIN 300 MG/1
300 CAPSULE ORAL NIGHTLY
Qty: 30 CAPSULE | Refills: 11 | Status: SHIPPED | OUTPATIENT
Start: 2022-08-05 | End: 2024-02-10 | Stop reason: SDUPTHER

## 2022-08-05 NOTE — TELEPHONE ENCOUNTER
----- Message from Alfred Garcia sent at 8/5/2022 10:23 AM CDT -----  Regarding: Change appt to virtual  Patient wishes to cancel appt and change to virtual today.  Patient has to set up the patient portal. Patient also states if he can change the appt to Monday morning between 7AM or 8AM it would be better for him.  Requesting a call back.      Call: [phone] (Mobile

## 2022-08-05 NOTE — PATIENT INSTRUCTIONS
Please schedule an nerve conduction study to evaluate for nerve damage.    Start Gabapentin 300mg nightly.    Return to clinic in 3 to 6 months.

## 2022-08-08 ENCOUNTER — TELEPHONE (OUTPATIENT)
Dept: HEPATOLOGY | Facility: CLINIC | Age: 57
End: 2022-08-08
Payer: COMMERCIAL

## 2022-08-08 NOTE — TELEPHONE ENCOUNTER
Pt no showed appt with me. Please contact pt and schedule him with a hepatology provider in [place] soon    Thanks !

## 2022-08-09 ENCOUNTER — TELEPHONE (OUTPATIENT)
Dept: HEPATOLOGY | Facility: CLINIC | Age: 57
End: 2022-08-09
Payer: COMMERCIAL

## 2022-08-09 NOTE — TELEPHONE ENCOUNTER
Left message letting Adalgisakelly know we could schedul him for 9/6/22 at 9 am      ----- Message from Madison Dunaway sent at 8/9/2022  1:21 PM CDT -----  Pt have an appt scheduled 9/6/2022 at 1:00 but he need a morning appt please. Call back number is [phone]. Thx. EL

## 2022-08-09 NOTE — TELEPHONE ENCOUNTER
Left Message on patient voice mail stating that we had an appointment available on 9/6/22 at 1:00 pm and to call back if this time will work.    ----- Message from Jaime Blackmon sent at 8/9/2022  9:09 AM CDT -----  Regarding: Sooner Appt  Patient states he is calling to speak with staff about getting a sooner appt time than 1:30pm on 9/20.    Contact: [phone]

## 2022-08-22 DIAGNOSIS — B18.1 CHRONIC HEPATITIS B: ICD-10-CM

## 2022-08-22 RX ORDER — TENOFOVIR ALAFENAMIDE 25 MG/1
25 TABLET ORAL DAILY
Qty: 30 TABLET | Refills: 11 | Status: SHIPPED | OUTPATIENT
Start: 2022-08-22 | End: 2023-10-17 | Stop reason: SDUPTHER

## 2022-08-25 ENCOUNTER — SPECIALTY PHARMACY (OUTPATIENT)
Dept: PHARMACY | Facility: CLINIC | Age: 57
End: 2022-08-25
Payer: COMMERCIAL

## 2022-08-25 DIAGNOSIS — B18.1 CHRONIC HEPATITIS B: Primary | ICD-10-CM

## 2022-08-25 NOTE — TELEPHONE ENCOUNTER
Vemlidy Rx received; no PA required by [employer]; copay no more than $0.      Last BI completed on 01/25/21.    Financial assistance not required.     Forwarding to BI for further review of [employer] plan.

## 2022-08-26 NOTE — TELEPHONE ENCOUNTER
Benefits Investigation    VemAlma Johns per [facility] Website  Deductible: None  Max OOP: $6250 ($3697.4 accumulated)  Estimated copay: $127.21      Vemlidy copay card on file, test claim $0    Forward to initial

## 2022-08-29 ENCOUNTER — LAB VISIT (OUTPATIENT)
Dept: LAB | Facility: HOSPITAL | Age: 57
End: 2022-08-29
Attending: FAMILY MEDICINE
Payer: COMMERCIAL

## 2022-08-29 ENCOUNTER — OFFICE VISIT (OUTPATIENT)
Dept: INTERNAL MEDICINE | Facility: CLINIC | Age: 57
End: 2022-08-29
Attending: FAMILY MEDICINE
Payer: COMMERCIAL

## 2022-08-29 ENCOUNTER — SPECIALTY PHARMACY (OUTPATIENT)
Dept: PHARMACY | Facility: CLINIC | Age: 57
End: 2022-08-29
Payer: COMMERCIAL

## 2022-08-29 VITALS
HEART RATE: 72 BPM | WEIGHT: 191.56 LBS | HEIGHT: 69 IN | OXYGEN SATURATION: 96 % | SYSTOLIC BLOOD PRESSURE: 142 MMHG | BODY MASS INDEX: 28.37 KG/M2 | DIASTOLIC BLOOD PRESSURE: 80 MMHG | RESPIRATION RATE: 16 BRPM

## 2022-08-29 DIAGNOSIS — I10 HYPERTENSION, ESSENTIAL: ICD-10-CM

## 2022-08-29 DIAGNOSIS — Z12.5 PROSTATE CANCER SCREENING: ICD-10-CM

## 2022-08-29 DIAGNOSIS — I25.10 ASCVD (ARTERIOSCLEROTIC CARDIOVASCULAR DISEASE): ICD-10-CM

## 2022-08-29 DIAGNOSIS — E78.5 HYPERLIPIDEMIA, UNSPECIFIED HYPERLIPIDEMIA TYPE: ICD-10-CM

## 2022-08-29 DIAGNOSIS — Z00.00 ANNUAL PHYSICAL EXAM: ICD-10-CM

## 2022-08-29 DIAGNOSIS — K76.0 NAFLD (NONALCOHOLIC FATTY LIVER DISEASE): ICD-10-CM

## 2022-08-29 DIAGNOSIS — K63.5 POLYP OF COLON, UNSPECIFIED PART OF COLON, UNSPECIFIED TYPE: ICD-10-CM

## 2022-08-29 DIAGNOSIS — Z12.11 COLON CANCER SCREENING: ICD-10-CM

## 2022-08-29 DIAGNOSIS — Z00.00 ANNUAL PHYSICAL EXAM: Primary | ICD-10-CM

## 2022-08-29 DIAGNOSIS — I77.9 PAOD (PERIPHERAL ARTERIAL OCCLUSIVE DISEASE): ICD-10-CM

## 2022-08-29 DIAGNOSIS — Z79.01 ANTICOAGULANT LONG-TERM USE: ICD-10-CM

## 2022-08-29 DIAGNOSIS — B18.1 CHRONIC HEPATITIS B: ICD-10-CM

## 2022-08-29 PROBLEM — G89.29 CHRONIC LEFT SHOULDER PAIN: Status: RESOLVED | Noted: 2021-12-15 | Resolved: 2022-08-29

## 2022-08-29 PROBLEM — R29.898 DECREASED STRENGTH OF UPPER EXTREMITY: Status: RESOLVED | Noted: 2022-01-03 | Resolved: 2022-08-29

## 2022-08-29 PROBLEM — M25.512 CHRONIC LEFT SHOULDER PAIN: Status: RESOLVED | Noted: 2021-12-15 | Resolved: 2022-08-29

## 2022-08-29 LAB
CHOLEST SERPL-MCNC: 165 MG/DL (ref 120–199)
CHOLEST/HDLC SERPL: 4.3 {RATIO} (ref 2–5)
COMPLEXED PSA SERPL-MCNC: 2.1 NG/ML (ref 0–4)
ESTIMATED AVG GLUCOSE: 131 MG/DL (ref 68–131)
HBA1C MFR BLD: 6.2 % (ref 4–5.6)
HDLC SERPL-MCNC: 38 MG/DL (ref 40–75)
HDLC SERPL: 23 % (ref 20–50)
LDLC SERPL CALC-MCNC: 111.6 MG/DL (ref 63–159)
NONHDLC SERPL-MCNC: 127 MG/DL
TRIGL SERPL-MCNC: 77 MG/DL (ref 30–150)

## 2022-08-29 PROCEDURE — 3077F PR MOST RECENT SYSTOLIC BLOOD PRESSURE >= 140 MM HG: ICD-10-PCS | Mod: CPTII,S$GLB,, | Performed by: FAMILY MEDICINE

## 2022-08-29 PROCEDURE — 99396 PR PREVENTIVE VISIT,EST,40-64: ICD-10-PCS | Mod: S$GLB,,, | Performed by: FAMILY MEDICINE

## 2022-08-29 PROCEDURE — 3077F SYST BP >= 140 MM HG: CPT | Mod: CPTII,S$GLB,, | Performed by: FAMILY MEDICINE

## 2022-08-29 PROCEDURE — 99999 PR PBB SHADOW E&M-EST. PATIENT-LVL V: ICD-10-PCS | Mod: PBBFAC,,, | Performed by: FAMILY MEDICINE

## 2022-08-29 PROCEDURE — 83036 HEMOGLOBIN GLYCOSYLATED A1C: CPT | Performed by: FAMILY MEDICINE

## 2022-08-29 PROCEDURE — 99999 PR PBB SHADOW E&M-EST. PATIENT-LVL V: CPT | Mod: PBBFAC,,, | Performed by: FAMILY MEDICINE

## 2022-08-29 PROCEDURE — 99396 PREV VISIT EST AGE 40-64: CPT | Mod: S$GLB,,, | Performed by: FAMILY MEDICINE

## 2022-08-29 PROCEDURE — 1159F PR MEDICATION LIST DOCUMENTED IN MEDICAL RECORD: ICD-10-PCS | Mod: CPTII,S$GLB,, | Performed by: FAMILY MEDICINE

## 2022-08-29 PROCEDURE — 3008F PR BODY MASS INDEX (BMI) DOCUMENTED: ICD-10-PCS | Mod: CPTII,S$GLB,, | Performed by: FAMILY MEDICINE

## 2022-08-29 PROCEDURE — 84153 ASSAY OF PSA TOTAL: CPT | Performed by: FAMILY MEDICINE

## 2022-08-29 PROCEDURE — 3079F PR MOST RECENT DIASTOLIC BLOOD PRESSURE 80-89 MM HG: ICD-10-PCS | Mod: CPTII,S$GLB,, | Performed by: FAMILY MEDICINE

## 2022-08-29 PROCEDURE — 1160F RVW MEDS BY RX/DR IN RCRD: CPT | Mod: CPTII,S$GLB,, | Performed by: FAMILY MEDICINE

## 2022-08-29 PROCEDURE — 1159F MED LIST DOCD IN RCRD: CPT | Mod: CPTII,S$GLB,, | Performed by: FAMILY MEDICINE

## 2022-08-29 PROCEDURE — 80061 LIPID PANEL: CPT | Performed by: FAMILY MEDICINE

## 2022-08-29 PROCEDURE — 1160F PR REVIEW ALL MEDS BY PRESCRIBER/CLIN PHARMACIST DOCUMENTED: ICD-10-PCS | Mod: CPTII,S$GLB,, | Performed by: FAMILY MEDICINE

## 2022-08-29 PROCEDURE — 3079F DIAST BP 80-89 MM HG: CPT | Mod: CPTII,S$GLB,, | Performed by: FAMILY MEDICINE

## 2022-08-29 PROCEDURE — 36415 COLL VENOUS BLD VENIPUNCTURE: CPT | Performed by: FAMILY MEDICINE

## 2022-08-29 PROCEDURE — 3008F BODY MASS INDEX DOCD: CPT | Mod: CPTII,S$GLB,, | Performed by: FAMILY MEDICINE

## 2022-08-29 RX ORDER — AMLODIPINE BESYLATE 5 MG/1
5 TABLET ORAL DAILY
Qty: 90 TABLET | Refills: 1 | Status: SHIPPED | OUTPATIENT
Start: 2022-08-29 | End: 2023-11-30 | Stop reason: SDUPTHER

## 2022-08-29 RX ORDER — HYDROCHLOROTHIAZIDE 25 MG/1
25 TABLET ORAL DAILY
Qty: 90 TABLET | Refills: 1 | Status: SHIPPED | OUTPATIENT
Start: 2022-08-29 | End: 2023-11-30 | Stop reason: SDUPTHER

## 2022-08-29 NOTE — PATIENT INSTRUCTIONS
Schedule lab orders for today.     If not contacted in a couple weeks by colonoscopy scheduling department - call Colonoscopy Scheduling Number - [phone].      Information about cholesterol, high blood pressure and healthy diet and activity recommendations can be found at the following links on the Internet:    http://www.nhlbi.nih.gov/health/health-topics/topics/hbc  http://www.nhlbi.nih.gov/health/educational/lose_wt/index.htm  Http://www.nhlbi.nih.gov/files/docs/public/heart/hbp_low.pdf  http://www.heart.org/HEARTORG/  http://diabetes.org/  https://www.cdc.gov/  Https://healthfinder.gov/  https://health.gov/dietaryguidelines/2015/guidelines/  https://health.gov/paguidelines/second-edition/pdf/Physical_Activity_Guidelines_2nd_edition.pdf

## 2022-08-29 NOTE — PROGRESS NOTES
Subjective:       Patient ID: Ramiro Oneil is a 57 y.o. male.    Chief Complaint: Annual Exam    Established patient for an annual wellness check/physical exam and also chronic disease management. Specific complaints - see dictation, M*model entries and please see ROS.  Past, Surgical, Family, Social Histories; Medications, Allergies reviewed and reconciled.  Health maintenance file reviewed and addressed items due. Recent applicable lab, imaging and cardiovascular results reviewed.  Problem list items reviewed and modified or added entries (in the overview section) may not be transcribed into this encounter note due to note writer format.      No new complaints since seeing me last.  Approximately 30 are leg claudication.  Repeat for mention approximately October with symptom resolution and 2 months later symptom recurrence.  Clinical vascular narrowing noted on catheterization report that he worry, patient referred to vascular surgery for further evaluation.  Felt high risk for surgery based on age and vascular characteristics.  Referred to neurology for further workup to better define symptoms.  Currently awaiting completion of EMG nerve conduction study.    Still has shoulder pain from time to time.  History of cervical radiculopathy.  Pain reported in the right neck with headache.    Review of Systems   Constitutional:  Negative for appetite change, chills, diaphoresis, fatigue and fever.   HENT:  Negative for congestion, postnasal drip, rhinorrhea, sore throat and trouble swallowing.    Eyes:  Negative for visual disturbance.   Respiratory:  Negative for cough, choking, chest tightness, shortness of breath and wheezing.    Cardiovascular:  Negative for chest pain and leg swelling.   Gastrointestinal:  Negative for abdominal distention, abdominal pain, diarrhea, nausea and vomiting.   Genitourinary:  Negative for difficulty urinating and hematuria.   Musculoskeletal:  Positive for arthralgias and myalgias.         30 yard leg caludication   Skin:  Negative for rash.   Neurological:  Positive for light-headedness and numbness. Negative for weakness and headaches.   Psychiatric/Behavioral:  Negative for confusion and dysphoric mood.      Objective:      Physical Exam  Vitals and nursing note reviewed.   Constitutional:       Appearance: He is well-developed. He is not diaphoretic.   Eyes:      General: No scleral icterus.  Neck:      Thyroid: No thyromegaly.      Vascular: No carotid bruit or JVD.      Trachea: No tracheal deviation.   Cardiovascular:      Rate and Rhythm: Normal rate and regular rhythm.      Heart sounds: Heart sounds are distant. No murmur heard.    No friction rub. No gallop.   Pulmonary:      Effort: Pulmonary effort is normal. No respiratory distress.      Breath sounds: Normal breath sounds. No wheezing or rales.   Abdominal:      General: There is no distension.      Palpations: Abdomen is soft. There is no mass.      Tenderness: There is no abdominal tenderness. There is no guarding or rebound.   Musculoskeletal:      Cervical back: Normal range of motion and neck supple.   Lymphadenopathy:      Cervical: No cervical adenopathy.   Skin:     General: Skin is warm and dry.      Findings: No erythema or rash.   Neurological:      Mental Status: He is alert and oriented to person, place, and time.      Cranial Nerves: No cranial nerve deficit.      Motor: No tremor.      Coordination: Coordination normal.      Gait: Gait normal.   Psychiatric:         Behavior: Behavior normal.         Thought Content: Thought content normal.         Judgment: Judgment normal.       Assessment:       1. Annual physical exam    2. PAOD (peripheral arterial occlusive disease)    3. Hypertension, essential    4. Hyperlipidemia, unspecified hyperlipidemia type    5. Anticoagulant long-term use    6. Chronic hepatitis B    7. NAFLD (nonalcoholic fatty liver disease)    8. Prostate cancer screening    9. Colon cancer  screening    10. Polyp of colon, unspecified part of colon, unspecified type    11. ASCVD (arteriosclerotic cardiovascular disease)          Plan:     Medication List with Changes/Refills   Current Medications    ASPIRIN (ECOTRIN) 81 MG EC TABLET    Take 1 tablet (81 mg total) by mouth once daily.    ATORVASTATIN (LIPITOR) 80 MG TABLET    Take 1 tablet (80 mg total) by mouth once daily.    ECONAZOLE NITRATE 1 % CREAM        FEXOFENADINE (ALLEGRA) 180 MG TABLET    Take 1 tablet (180 mg total) by mouth once daily.    FLUTICASONE (FLONASE) 50 MCG/ACTUATION NASAL SPRAY    2 sprays (100 mcg total) by Each Nare route once daily.    GABAPENTIN (NEURONTIN) 300 MG CAPSULE    Take 1 capsule (300 mg total) by mouth every evening.    RIVAROXABAN (XARELTO) 2.5 MG TAB    Take 1 tablet (2.5 mg total) by mouth 2 (two) times daily with meals.    TENOFOVIR ALAFENAMIDE (VEMLIDY) 25 MG TAB    Take 1 tablet (25 mg total) by mouth once daily.    TRIAMCINOLONE ACETONIDE 0.1% (KENALOG) 0.1 % CREAM    Apply topically 2 (two) times daily as needed. Neck and body, not groin   Changed and/or Refilled Medications    Modified Medication Previous Medication    AMLODIPINE (NORVASC) 5 MG TABLET amLODIPine (NORVASC) 2.5 MG tablet       Take 1 tablet (5 mg total) by mouth once daily.    Take 1 tablet (2.5 mg total) by mouth once daily.    HYDROCHLOROTHIAZIDE (HYDRODIURIL) 25 MG TABLET hydroCHLOROthiazide (HYDRODIURIL) 25 MG tablet       Take 1 tablet (25 mg total) by mouth once daily.    Take 1 tablet (25 mg total) by mouth once daily.   Discontinued Medications    CYCLOBENZAPRINE (FLEXERIL) 5 MG TABLET    Take 1 tablet (5 mg total) by mouth 3 (three) times daily as needed for Muscle spasms.    DICLOFENAC SODIUM (VOLTAREN) 1 % GEL    Apply 2 grams topically 3 (three) times daily as needed.    NYSTATIN-TRIAMCINOLONE (MYCOLOG II) CREAM    Apply topically 2 (two) times daily to Groin area.     Ramiro was seen today for annual exam.    Diagnoses and all  orders for this visit:    Annual physical exam  -     Hemoglobin A1C; Future    PAOD (peripheral arterial occlusive disease)  -     Ambulatory referral/consult to Cardiology; Future    Hypertension, essential  -     amLODIPine (NORVASC) 5 MG tablet; Take 1 tablet (5 mg total) by mouth once daily.  -     hydroCHLOROthiazide (HYDRODIURIL) 25 MG tablet; Take 1 tablet (25 mg total) by mouth once daily.    Hyperlipidemia, unspecified hyperlipidemia type  -     Lipid Panel; Future    Anticoagulant long-term use    Chronic hepatitis B    NAFLD (nonalcoholic fatty liver disease)    Prostate cancer screening  -     PSA, Screening; Future    Colon cancer screening  -     Case Request Endoscopy: COLONOSCOPY  -     Ambulatory referral/consult to Endo Procedure ; Future    Polyp of colon, unspecified part of colon, unspecified type  -     Case Request Endoscopy: COLONOSCOPY  -     Ambulatory referral/consult to Endo Procedure ; Future    ASCVD (arteriosclerotic cardiovascular disease)  -     Ambulatory referral/consult to Cardiology; Future    See meds, orders, follow up, routing and instructions sections of encounter and AVS. Discussed with patient and provided on AVS.    Discussed diet and exercise and links provided on AVS for detailed information.    Lab Results   Component Value Date     08/04/2022    K 4.1 08/04/2022     08/04/2022    BUN 12 08/04/2022    CREATININE 0.8 08/04/2022     08/04/2022    HGBA1C 6.4 (H) 07/12/2011    AST 33 08/04/2022    ALT 37 08/04/2022    ALBUMIN 3.8 08/04/2022    PROT 7.7 08/04/2022    BILITOT 1.1 (H) 08/04/2022    CHOL 179 01/02/2021    CHOL 154 11/16/2017    HDL 36 (L) 01/02/2021    LDLCALC 123.8 01/02/2021    TRIG 96 01/02/2021    WBC 5.32 08/04/2022    HGB 15.6 08/04/2022    HCT 49.4 08/04/2022     08/04/2022    PSA 1.4 01/02/2021    TSH 0.910 07/12/2011    URICACID 6.0 03/20/2013               PAOD (peripheral arterial occlusive  disease)  Currently being evaluated by Dr. Stack for surgical intervention, awaiting neurologic w/u    Chronic hepatitis B  Awaiting annual follow up, new provider in [place] pending    Hypertension, essential  Above goal, follow-up 1 month for BP recheck    Suggest general cardiology referral to discuss potential high risk for CAD based on other vascular history.  Increase amlodipine in 1 month BP follow-up here.

## 2022-08-29 NOTE — TELEPHONE ENCOUNTER
Specialty Pharmacy - Refill Coordination    Specialty Medication Orders Linked to Encounter      Flowsheet Row Most Recent Value   Medication #1 tenofovir alafenamide (VEMLIDY) 25 mg Tab (Order#[id number], Rx#[id number])            Refill Questions - Documented Responses      Flowsheet Row Most Recent Value   Patient Availability and HIPAA Verification    Does patient want to proceed with activity? Yes   HIPAA/medical authority confirmed? Yes   Relationship to patient of person spoken to? Self   Refill Screening Questions    Changes to allergies? No   Changes to medications? --  [Doses of HCTZ and amlodipine changed and pt started on Gabapentin. No DDI]   New conditions since last clinic visit? No   Unplanned office visit, urgent care, ED, or hospital admission in the last 4 weeks? No   How does patient/caregiver feel medication is working? Good   Financial problems or insurance changes? No   How many doses of your specialty medications were missed in the last 4 weeks? 0   Would patient like to speak to a pharmacist? No   When does the patient need to receive the medication? 08/31/22   Refill Delivery Questions    How will the patient receive the medication? Pickup   When does the patient need to receive the medication? 08/31/22   Expected Copay ($) 0   Is the patient able to afford the medication copay? Yes   Payment Method zero copay   Days supply of Refill 30   Supplies needed? No supplies needed   Refill activity completed? Yes   Refill activity plan Refill scheduled   Shipment/Pickup Date: 08/29/22            Current Outpatient Medications   Medication Sig    amLODIPine (NORVASC) 5 MG tablet Take 1 tablet (5 mg total) by mouth once daily.    aspirin (ECOTRIN) 81 MG EC tablet Take 1 tablet (81 mg total) by mouth once daily.    atorvastatin (LIPITOR) 80 MG tablet Take 1 tablet (80 mg total) by mouth once daily.    econazole nitrate 1 % cream     fexofenadine (ALLEGRA) 180 MG tablet Take 1 tablet (180 mg total) by  mouth once daily.    fluticasone (FLONASE) 50 mcg/actuation nasal spray 2 sprays (100 mcg total) by Each Nare route once daily.    gabapentin (NEURONTIN) 300 MG capsule Take 1 capsule (300 mg total) by mouth every evening.    hydroCHLOROthiazide (HYDRODIURIL) 25 MG tablet Take 1 tablet (25 mg total) by mouth once daily.    rivaroxaban (XARELTO) 2.5 mg Tab Take 1 tablet (2.5 mg total) by mouth 2 (two) times daily with meals.    tenofovir alafenamide (VEMLIDY) 25 mg Tab Take 1 tablet (25 mg total) by mouth once daily.    triamcinolone acetonide 0.1% (KENALOG) 0.1 % cream Apply topically 2 (two) times daily as needed. Neck and body, not groin   Last reviewed on 8/29/2022  9:43 AM by Yves Serrano MD    Review of patient's allergies indicates:  No Known Allergies Last reviewed on  8/29/2022 9:43 AM by Yves Serrano      Tasks added this encounter   9/23/2022 - Refill Call (Auto Added)  8/30/2022 - Pickup Reminder  5/29/2023 - Clinical - Follow Up Assesement (Annual)   Tasks due within next 3 months   8/25/2022 - Welcome Call     Niki Kolb, PharmD  [facility]  [address] [place], [place] [address]erson m[place]ie  [facility] [address]  Phone: [phone]  Fax: [phone]

## 2022-09-04 NOTE — PROGRESS NOTES
Hepatology Note    PATIENT: Ramiro Oneil  MRN: [id number]  DATE: 9/6/2022    Provider: Hepatologist - Dr Tapia  Urgency of review: non-urgent  Referring provider: No ref. provider found    Diagnosis:   1. Chronic hepatitis B    2. NAFLD (nonalcoholic fatty liver disease)    3. Hypertension, essential    4. Hepatic fibrosis, early fibrosis    5. Polyp of colon, unspecified part of colon, unspecified type        Chief complaint:   Chief Complaint   Patient presents with    Hepatic Disease    Hepatitis B         Subjective:    Initial History: Ramiro Oneil is a 57 y.o. male who was referred to Hepatology Clinic for consultation of Hepatitis B. Patient is new to me and previously managed by Esthela Leger NP       9/7/22  Patient does not have any new complains from liver standpoint. Liver enzymes are stable. Is drinking beer intermittently    He denies recent hematemesis, coffee ground emesis, melena, hematochezia, jaundice, confusion, LE edema, abdominal distension.     Patient is on Vemlidy   HBV undetectable. Normal AFP. US 8/22--No liver lesion  Per records  Patient was switched from Viread on (2/25/1r) to Vemlidy in 11/2018. Also + NAFLD. Risk factors for NAFLD include alcohol use, overweight, HTN, HLD    Previous serologic w/u negative for Juan's,  hemochromatosis, autoimmune etiology, and viral hepatitis C     Liver fibrosis staging:  - Fibrosure 7/2014 - F1-F2, only mild fibrosis  - Elastography scan 1/2016 = F0-F1  - Fibroscan 11/2018 - F2, S3  -- Fibroscan 7/2020 noted F2 (kPA 7.1), S3 ()  -- fibroscan 8/2021 noted no fibrosis, S3 steatosis (kPA 5.6, )    Patient is on antocaogulation for DVT    Prior Relevant History:    He  denies hepatotoxic medication    Review of systems:  A review of 12+ systems was conducted with pertinent positive and negative findings documented in HPI with all other systems reviewed and negative.      PFSH:  Past medical, family, and social history reviewed as  documented in chart with pertinent positive medical, family, and social history detailed in HPI.    Past Medical History:   Past Medical History:   Diagnosis Date    Anticoagulant long-term use     Arthritis     Dermatitis     Hepatitis B     History of surgery on arm 07/2020    Hypertension     Liver fibrosis 12/12/2019    F2 on fibroscan 11/2018    NAFLD (nonalcoholic fatty liver disease)        Past Surgical HIstory:   Past Surgical History:   Procedure Laterality Date    CARPAL TUNNEL RELEASE      COLONOSCOPY      PERCUTANEOUS TRANSLUMINAL ANGIOPLASTY (PTA) OF PERIPHERAL VESSEL N/A 2/4/2022    Procedure: PTA, PERIPHERAL VESSEL;  Surgeon: Rodger Blake MD;  Location: [facility] CATH LAB;  Service: Cardiology;  Laterality: N/A;    PTA/stenting of distal right SFA       right hand surgery      SHOULDER SURGERY      stents         Family History:   Family History   Problem Relation Age of Onset    Diabetes Mother     Hypertension Mother     Cancer Father         lung    Diabetes Sister     Cancer Sister         breast    Hypertension Sister     Cancer Brother         prostate    Cancer Paternal Uncle     Diabetes Paternal Uncle     Stroke Sister     Liver disease Neg Hx     Cirrhosis Neg Hx      He has no known family history of liver disease    Social History:  reports that he has never smoked. He has never used smokeless tobacco. He reports that he does not currently use alcohol. He reports that he does not use drugs.    He has no significant history of Alcohol     He denies history of IV drug use/Tatto  He  denies high-risk sexual contacts, no raw seafood, no sick contacts      Allergies:  Review of patient's allergies indicates:  No Known Allergies    Medications:  Current Outpatient Medications   Medication Sig Dispense Refill    amLODIPine (NORVASC) 5 MG tablet Take 1 tablet (5 mg total) by mouth once daily. 90 tablet 1    aspirin (ECOTRIN) 81 MG EC tablet Take 1 tablet (81 mg total) by mouth once daily. 30  tablet 11    atorvastatin (LIPITOR) 80 MG tablet Take 1 tablet (80 mg total) by mouth once daily. 90 tablet 3    econazole nitrate 1 % cream       fexofenadine (ALLEGRA) 180 MG tablet Take 1 tablet (180 mg total) by mouth once daily. 30 tablet 1    fluticasone (FLONASE) 50 mcg/actuation nasal spray 2 sprays (100 mcg total) by Each Nare route once daily. (Patient taking differently: 2 sprays by Each Nostril route as needed.) 1 Bottle 5    gabapentin (NEURONTIN) 300 MG capsule Take 1 capsule (300 mg total) by mouth every evening. 30 capsule 11    hydroCHLOROthiazide (HYDRODIURIL) 25 MG tablet Take 1 tablet (25 mg total) by mouth once daily. 90 tablet 1    rivaroxaban (XARELTO) 2.5 mg Tab Take 1 tablet (2.5 mg total) by mouth 2 (two) times daily with meals. 60 tablet 11    tenofovir alafenamide (VEMLIDY) 25 mg Tab Take 1 tablet (25 mg total) by mouth once daily. 30 tablet 11    triamcinolone acetonide 0.1% (KENALOG) 0.1 % cream Apply topically 2 (two) times daily as needed. Neck and body, not groin 80 g 1     No current facility-administered medications for this visit.       Review of Systems   Constitutional:  Negative for fatigue, fever and unexpected weight change.   HENT:  Negative for ear pain, nosebleeds and trouble swallowing.    Eyes:  Negative for discharge and redness.   Respiratory:  Negative for cough and shortness of breath.    Cardiovascular:  Negative for palpitations and leg swelling.   Gastrointestinal:  Negative for abdominal distention, abdominal pain, diarrhea and vomiting.   Endocrine: Negative for cold intolerance and polyuria.   Genitourinary:  Negative for flank pain and hematuria.   Musculoskeletal:  Negative for back pain.   Skin:  Negative for pallor.   Neurological:  Negative for seizures and headaches.   Hematological:  Does not bruise/bleed easily.   Psychiatric/Behavioral:  Negative for confusion and hallucinations.          Objective:      Vitals:   Vitals:    09/06/22 0951   BP: (!)  "160/94   BP Location: Right arm   Patient Position: Sitting   BP Method: Medium (Manual)   Pulse: 61   SpO2: 98%   Weight: 87.4 kg (192 lb 10.9 oz)   Height: 5' 9" (1.753 m)       Physical Exam  Constitutional:       Appearance: Normal appearance.   HENT:      Head: Normocephalic and atraumatic.      Right Ear: External ear normal.      Left Ear: External ear normal.      Mouth/Throat:      Mouth: Mucous membranes are moist.   Eyes:      Extraocular Movements: Extraocular movements intact.      Pupils: Pupils are equal, round, and reactive to light.   Cardiovascular:      Rate and Rhythm: Normal rate and regular rhythm.      Pulses: Normal pulses.      Heart sounds: Normal heart sounds.   Pulmonary:      Effort: Pulmonary effort is normal.      Breath sounds: Normal breath sounds.   Abdominal:      General: Bowel sounds are normal. There is no distension.      Palpations: Abdomen is soft. There is no mass.      Tenderness: There is no abdominal tenderness.   Musculoskeletal:         General: Normal range of motion.      Cervical back: Normal range of motion and neck supple.   Neurological:      General: No focal deficit present.      Mental Status: He is alert and oriented to person, place, and time.       Laboratory Data:  No visits with results within 1 Week(s) from this visit.   Latest known visit with results is:   Lab Visit on 08/29/2022   Component Date Value Ref Range Status    Cholesterol 08/29/2022 165  120 - 199 mg/dL Final    Comment: The National Cholesterol Education Program (NCEP) has set the  following guidelines (reference ranges) for Cholesterol:  Optimal.....................<200 mg/dL  Borderline High.............200-239 mg/dL  High........................> or = 240 mg/dL      Triglycerides 08/29/2022 77  30 - 150 mg/dL Final    Comment: The National Cholesterol Education Program (NCEP) has set the  following guidelines (reference values) for triglycerides:  Normal......................<150 " mg/dL  Borderline High.............150-199 mg/dL  High........................200-499 mg/dL      HDL 08/29/2022 38 (L)  40 - 75 mg/dL Final    Comment: The National Cholesterol Education Program (NCEP) has set the  following guidelines (reference values) for HDL Cholesterol:  Low...............<40 mg/dL  Optimal...........>60 mg/dL      LDL Cholesterol 08/29/2022 111.6  63.0 - 159.0 mg/dL Final    Comment: The National Cholesterol Education Program (NCEP) has set the  following guidelines (reference values) for LDL Cholesterol:  Optimal.......................<130 mg/dL  Borderline High...............130-159 mg/dL  High..........................160-189 mg/dL  Very High.....................>190 mg/dL      HDL/Cholesterol Ratio 08/29/2022 23.0  20.0 - 50.0 % Final    Total Cholesterol/HDL Ratio 08/29/2022 4.3  2.0 - 5.0 Final    Non-HDL Cholesterol 08/29/2022 127  mg/dL Final    Comment: Risk category and Non-HDL cholesterol goals:  Coronary heart disease (CHD)or equivalent (10-year risk of CHD >20%):  Non-HDL cholesterol goal     <130 mg/dL  Two or more CHD risk factors and 10-year risk of CHD <= 20%:  Non-HDL cholesterol goal     <160 mg/dL  0 to 1 CHD risk factor:  Non-HDL cholesterol goal     <190 mg/dL      PSA, Screen 08/29/2022 2.1  0.00 - 4.00 ng/mL Final    Comment: The testing method is a chemiluminescent microparticle immunoassay   manufactured by [employer] and performed on the    or   Brandtone system. Values obtained with different assay manufacturers   for   methods may be different and cannot be used interchangeably.  PSA Expected levels:  Hormonal Therapy: <0.05 ng/ml  Prostatectomy: <0.01 ng/ml  Radiation Therapy: <1.00 ng/ml      Hemoglobin A1C 08/29/2022 6.2 (H)  4.0 - 5.6 % Final    Comment: ADA Screening Guidelines:  5.7-6.4%  Consistent with prediabetes  >or=6.5%  Consistent with diabetes    High levels of fetal hemoglobin interfere with the HbA1C  assay. Heterozygous  hemoglobin variants (HbS, HgC, etc)do  not significantly interfere with this assay.   However, presence of multiple variants may affect accuracy.      Estimated Avg Glucose 08/29/2022 131  68 - 131 mg/dL Final          I personally reviewed imaging studies and outside records..    Assessment:       1. Chronic hepatitis B    2. NAFLD (nonalcoholic fatty liver disease)    3. Hypertension, essential    4. Hepatic fibrosis, early fibrosis    5. Polyp of colon, unspecified part of colon, unspecified type                 Plan:     Ramiro was seen today for hepatic disease and hepatitis b.    Diagnoses and all orders for this visit:    Chronic hepatitis B  -     CBC Auto Differential; Standing  -     Comprehensive Metabolic Panel; Standing  -     US Abdomen Limited; Standing  -     AFP Tumor Marker; Standing  -     Protime-INR; Standing  -     HEPATITIS B VIRAL DNA, QUANTITATIVE; Standing    NAFLD (nonalcoholic fatty liver disease)  -     CBC Auto Differential; Standing  -     Comprehensive Metabolic Panel; Standing  -     US Abdomen Limited; Standing  -     AFP Tumor Marker; Standing  -     Protime-INR; Standing  -     HEPATITIS B VIRAL DNA, QUANTITATIVE; Standing    Hypertension, essential    Hepatic fibrosis, early fibrosis    Polyp of colon, unspecified part of colon, unspecified type        Chronic hepatitis B, E Ab pos   -- On Vemlidy 25 mg daily . Reinforced importance of taking daily without any missed doses   -- transaminases essentially WNL  -- HCC screening Q 6 months with AFP and abd.    --Fibroscan 1-2 year     3. Fatty liver  -- risk factors for FLD include alcohol use, overweight, HTN, HLD     4. Alcohol use  - recommended alcohol abstinence given chronic Hep B       Return to clinic in 11 months.    I have sent communication to the referring physician and/or primary care provider.      Time Statement  A total of 45 minutes were spent   Time Includes preparing to see patient, reviewing  diagnostic studies  and records, direct face-to-face visit, completing orders, medications , reconciliation, prescription management, and care coordination    We discussed in depth the nature of the patient's disease, the management plan in details. I have provided the patient with an opportunity to ask questions and have all questions answered to his satisfaction.       NAFLD  There is no FDA approved therapy for fatty liver disease. Therefore, these things are important:  Limit alcohol consumption, none until further notice   2 Weight loss goal of 30 lbs, referral for [facility] if interested. Also, if interested in a dietician visit to create a weight loss plan, contact the dietician team at [facility] at [email or web address] to schedule a visit to you can call [facility] in [place]: [phone] and the  will transfer the call to one of the dieticians to schedule an appointment. Or you can also call [phone] to schedule. They do offer video visits   3. Low carb/sugar, high fiber and protein diet.Try to limit your carb intake to LESS than 30-45 grams of carbs with a meal or LESS than 5-10 grams with any snack (total of any snack foods eaten during that time). Use MyFitness Pal adam to add up your carbs through the day. Do NOT drink any beverages with calories or carbs (this can lead to high blood sugar and weight gain). Also, some of our patients have been very successful with weight loss using the pre made/planned meal planning services that limit calories and portion size (one example is Sensible Meals but there are many out there)  4. Exercise, 5 days per week, 30 minutes per day, as tolerated  5. Recommend good cholesterol, blood pressure, blood sugar levels .     In some people, fatty liver can progress to steatohepatitis (inflamatory fatty liver) and possibly to cirrhosis, putting one at increased risk for liver cancer, liver failure, and death. Therefore, the lifestyle changes  are very important to decrease this risk.      Website with information about fatty liver and inflammation related to fatty liver (AMBROSE) = [email or web address]  AND [email or web address]         Patient was seen in the liver transplant department at [facility] Brad Tapia MD  Transplant Hepatologist and Gastroenterologist  Hepatology and Liver Transplant  [facility]  [facility] [place]

## 2022-09-06 ENCOUNTER — OFFICE VISIT (OUTPATIENT)
Dept: HEPATOLOGY | Facility: CLINIC | Age: 57
End: 2022-09-06
Payer: COMMERCIAL

## 2022-09-06 VITALS
HEIGHT: 69 IN | WEIGHT: 192.69 LBS | OXYGEN SATURATION: 98 % | SYSTOLIC BLOOD PRESSURE: 160 MMHG | DIASTOLIC BLOOD PRESSURE: 94 MMHG | BODY MASS INDEX: 28.54 KG/M2 | HEART RATE: 61 BPM

## 2022-09-06 DIAGNOSIS — K74.01 HEPATIC FIBROSIS, EARLY FIBROSIS: ICD-10-CM

## 2022-09-06 DIAGNOSIS — K76.0 NAFLD (NONALCOHOLIC FATTY LIVER DISEASE): ICD-10-CM

## 2022-09-06 DIAGNOSIS — I10 HYPERTENSION, ESSENTIAL: ICD-10-CM

## 2022-09-06 DIAGNOSIS — B18.1 CHRONIC HEPATITIS B: Primary | ICD-10-CM

## 2022-09-06 DIAGNOSIS — K63.5 POLYP OF COLON, UNSPECIFIED PART OF COLON, UNSPECIFIED TYPE: ICD-10-CM

## 2022-09-06 PROCEDURE — 3080F PR MOST RECENT DIASTOLIC BLOOD PRESSURE >= 90 MM HG: ICD-10-PCS | Mod: CPTII,S$GLB,, | Performed by: INTERNAL MEDICINE

## 2022-09-06 PROCEDURE — 99999 PR PBB SHADOW E&M-EST. PATIENT-LVL IV: ICD-10-PCS | Mod: PBBFAC,,, | Performed by: INTERNAL MEDICINE

## 2022-09-06 PROCEDURE — 99203 PR OFFICE/OUTPT VISIT, NEW, LEVL III, 30-44 MIN: ICD-10-PCS | Mod: S$GLB,,, | Performed by: INTERNAL MEDICINE

## 2022-09-06 PROCEDURE — 99203 OFFICE O/P NEW LOW 30 MIN: CPT | Mod: S$GLB,,, | Performed by: INTERNAL MEDICINE

## 2022-09-06 PROCEDURE — 3077F PR MOST RECENT SYSTOLIC BLOOD PRESSURE >= 140 MM HG: ICD-10-PCS | Mod: CPTII,S$GLB,, | Performed by: INTERNAL MEDICINE

## 2022-09-06 PROCEDURE — 3077F SYST BP >= 140 MM HG: CPT | Mod: CPTII,S$GLB,, | Performed by: INTERNAL MEDICINE

## 2022-09-06 PROCEDURE — 3044F PR MOST RECENT HEMOGLOBIN A1C LEVEL <7.0%: ICD-10-PCS | Mod: CPTII,S$GLB,, | Performed by: INTERNAL MEDICINE

## 2022-09-06 PROCEDURE — 3008F PR BODY MASS INDEX (BMI) DOCUMENTED: ICD-10-PCS | Mod: CPTII,S$GLB,, | Performed by: INTERNAL MEDICINE

## 2022-09-06 PROCEDURE — 1159F PR MEDICATION LIST DOCUMENTED IN MEDICAL RECORD: ICD-10-PCS | Mod: CPTII,S$GLB,, | Performed by: INTERNAL MEDICINE

## 2022-09-06 PROCEDURE — 99999 PR PBB SHADOW E&M-EST. PATIENT-LVL IV: CPT | Mod: PBBFAC,,, | Performed by: INTERNAL MEDICINE

## 2022-09-06 PROCEDURE — 3044F HG A1C LEVEL LT 7.0%: CPT | Mod: CPTII,S$GLB,, | Performed by: INTERNAL MEDICINE

## 2022-09-06 PROCEDURE — 1159F MED LIST DOCD IN RCRD: CPT | Mod: CPTII,S$GLB,, | Performed by: INTERNAL MEDICINE

## 2022-09-06 PROCEDURE — 3008F BODY MASS INDEX DOCD: CPT | Mod: CPTII,S$GLB,, | Performed by: INTERNAL MEDICINE

## 2022-09-06 PROCEDURE — 3080F DIAST BP >= 90 MM HG: CPT | Mod: CPTII,S$GLB,, | Performed by: INTERNAL MEDICINE

## 2022-09-06 NOTE — PATIENT INSTRUCTIONS
NAFLD  There is no FDA approved therapy for fatty liver disease. Therefore, these things are important:  Limit alcohol consumption, none until further notice   2 Weight loss goal of 30 lbs, referral for [facility] if interested. Also, if interested in a dietician visit to create a weight loss plan, contact the dietician team at [facility] at [email or web address] to schedule a visit to you can call [facility] in [place]: [phone] and the  will transfer the call to one of the dieticians to schedule an appointment. Or you can also call [phone] to schedule. They do offer video visits   3. Low carb/sugar, high fiber and protein diet.Try to limit your carb intake to LESS than 30-45 grams of carbs with a meal or LESS than 5-10 grams with any snack (total of any snack foods eaten during that time). Use MyFitness Pal adam to add up your carbs through the day. Do NOT drink any beverages with calories or carbs (this can lead to high blood sugar and weight gain). Also, some of our patients have been very successful with weight loss using the pre made/planned meal planning services that limit calories and portion size (one example is Sensible Meals but there are many out there)  4. Exercise, 5 days per week, 30 minutes per day, as tolerated  5. Recommend good cholesterol, blood pressure, blood sugar levels .     In some people, fatty liver can progress to steatohepatitis (inflamatory fatty liver) and possibly to cirrhosis, putting one at increased risk for liver cancer, liver failure, and death. Therefore, the lifestyle changes are very important to decrease this risk.      Website with information about fatty liver and inflammation related to fatty liver (AMBROSE) = [email or web address]  AND [email or web address]

## 2022-09-09 ENCOUNTER — TELEPHONE (OUTPATIENT)
Dept: ENDOSCOPY | Facility: HOSPITAL | Age: 57
End: 2022-09-09
Payer: COMMERCIAL

## 2022-09-09 NOTE — TELEPHONE ENCOUNTER
Received voicemail message from patient on the endoscopy scheduling main line to schedule colonoscopy. Returned call. No answer. Left voicemail message with direct number [phone] to call back.

## 2022-09-09 NOTE — TELEPHONE ENCOUNTER
Patient has an order for a colonoscopy  Is it ok to hold Xarelto 2 days prior to procedure?  Please advise.  Thank you.    Susan

## 2022-09-19 ENCOUNTER — TELEPHONE (OUTPATIENT)
Dept: NEUROLOGY | Facility: CLINIC | Age: 57
End: 2022-09-19
Payer: COMMERCIAL

## 2022-09-19 ENCOUNTER — OFFICE VISIT (OUTPATIENT)
Dept: CARDIOLOGY | Facility: CLINIC | Age: 57
End: 2022-09-19
Attending: FAMILY MEDICINE
Payer: COMMERCIAL

## 2022-09-19 VITALS
SYSTOLIC BLOOD PRESSURE: 138 MMHG | HEART RATE: 70 BPM | OXYGEN SATURATION: 96 % | BODY MASS INDEX: 28.08 KG/M2 | HEIGHT: 69 IN | WEIGHT: 189.63 LBS | DIASTOLIC BLOOD PRESSURE: 82 MMHG

## 2022-09-19 DIAGNOSIS — E78.5 HYPERLIPIDEMIA, UNSPECIFIED HYPERLIPIDEMIA TYPE: ICD-10-CM

## 2022-09-19 DIAGNOSIS — K76.0 NAFLD (NONALCOHOLIC FATTY LIVER DISEASE): ICD-10-CM

## 2022-09-19 DIAGNOSIS — I77.9 PAOD (PERIPHERAL ARTERIAL OCCLUSIVE DISEASE): ICD-10-CM

## 2022-09-19 DIAGNOSIS — I10 HYPERTENSION, ESSENTIAL: ICD-10-CM

## 2022-09-19 DIAGNOSIS — I25.10 ASCVD (ARTERIOSCLEROTIC CARDIOVASCULAR DISEASE): ICD-10-CM

## 2022-09-19 DIAGNOSIS — Z79.01 ANTICOAGULANT LONG-TERM USE: Primary | ICD-10-CM

## 2022-09-19 DIAGNOSIS — B18.1 CHRONIC HEPATITIS B: ICD-10-CM

## 2022-09-19 PROCEDURE — 3075F PR MOST RECENT SYSTOLIC BLOOD PRESS GE 130-139MM HG: ICD-10-PCS | Mod: CPTII,S$GLB,, | Performed by: INTERNAL MEDICINE

## 2022-09-19 PROCEDURE — 99999 PR PBB SHADOW E&M-EST. PATIENT-LVL IV: ICD-10-PCS | Mod: PBBFAC,,, | Performed by: INTERNAL MEDICINE

## 2022-09-19 PROCEDURE — 3075F SYST BP GE 130 - 139MM HG: CPT | Mod: CPTII,S$GLB,, | Performed by: INTERNAL MEDICINE

## 2022-09-19 PROCEDURE — 1159F PR MEDICATION LIST DOCUMENTED IN MEDICAL RECORD: ICD-10-PCS | Mod: CPTII,S$GLB,, | Performed by: INTERNAL MEDICINE

## 2022-09-19 PROCEDURE — 99999 PR PBB SHADOW E&M-EST. PATIENT-LVL IV: CPT | Mod: PBBFAC,,, | Performed by: INTERNAL MEDICINE

## 2022-09-19 PROCEDURE — 3079F DIAST BP 80-89 MM HG: CPT | Mod: CPTII,S$GLB,, | Performed by: INTERNAL MEDICINE

## 2022-09-19 PROCEDURE — 99214 OFFICE O/P EST MOD 30 MIN: CPT | Mod: S$GLB,,, | Performed by: INTERNAL MEDICINE

## 2022-09-19 PROCEDURE — 1159F MED LIST DOCD IN RCRD: CPT | Mod: CPTII,S$GLB,, | Performed by: INTERNAL MEDICINE

## 2022-09-19 PROCEDURE — 3008F PR BODY MASS INDEX (BMI) DOCUMENTED: ICD-10-PCS | Mod: CPTII,S$GLB,, | Performed by: INTERNAL MEDICINE

## 2022-09-19 PROCEDURE — 3008F BODY MASS INDEX DOCD: CPT | Mod: CPTII,S$GLB,, | Performed by: INTERNAL MEDICINE

## 2022-09-19 PROCEDURE — 3044F HG A1C LEVEL LT 7.0%: CPT | Mod: CPTII,S$GLB,, | Performed by: INTERNAL MEDICINE

## 2022-09-19 PROCEDURE — 3044F PR MOST RECENT HEMOGLOBIN A1C LEVEL <7.0%: ICD-10-PCS | Mod: CPTII,S$GLB,, | Performed by: INTERNAL MEDICINE

## 2022-09-19 PROCEDURE — 99214 PR OFFICE/OUTPT VISIT, EST, LEVL IV, 30-39 MIN: ICD-10-PCS | Mod: S$GLB,,, | Performed by: INTERNAL MEDICINE

## 2022-09-19 PROCEDURE — 3079F PR MOST RECENT DIASTOLIC BLOOD PRESSURE 80-89 MM HG: ICD-10-PCS | Mod: CPTII,S$GLB,, | Performed by: INTERNAL MEDICINE

## 2022-09-19 RX ORDER — CILOSTAZOL 50 MG/1
50 TABLET ORAL 2 TIMES DAILY
Qty: 60 TABLET | Refills: 11 | Status: SHIPPED | OUTPATIENT
Start: 2022-09-19 | End: 2023-06-01

## 2022-09-19 RX ORDER — FAMOTIDINE 20 MG/1
20 TABLET, FILM COATED ORAL DAILY
Qty: 30 TABLET | Refills: 11 | Status: SHIPPED | OUTPATIENT
Start: 2022-09-19 | End: 2024-03-11

## 2022-09-19 NOTE — TELEPHONE ENCOUNTER
----- Message from Florin Mccoy MD sent at 9/19/2022  8:48 AM CDT -----  GM,  This patient would like a phone call, he is supposed to go for an EMG but states was not scheduled   Please assist   Thanks

## 2022-09-19 NOTE — PROGRESS NOTES
Subjective:   Patient ID:  Ramiro Oneil is a 57 y.o. male who presents for evaluation of No chief complaint on file.      HPI  57-year-old male, nonsmoker, comes in for follow-up on PAD    He had seen Dr. Blake and Dr. Marcelo in the past.    He had an intervention on his right lower extremity with SFA stenting.  Occluded on his most recent angiogram in February with collaterals.  An occluded right AT    His KARMA showed moderate severe disease bilaterally.  He states more symptoms on the right than on the left  ON HIS LAST UP visit with vascular surgery, it was believed that his symptoms could be a mix of neurogenic origin, possible diabetic neuropathy.  With possible plan for graft bypass given small veins.  He follows with neurology with plan for EMG however not done yet.    He states he never tried cilostazol.    He has no wounds or cuts.  No resting ischemia.    Past Medical History:   Diagnosis Date    Anticoagulant long-term use     Arthritis     Dermatitis     Hepatitis B     History of surgery on arm 07/2020    Hypertension     Liver fibrosis 12/12/2019    F2 on fibroscan 11/2018    NAFLD (nonalcoholic fatty liver disease)        Past Surgical History:   Procedure Laterality Date    CARPAL TUNNEL RELEASE      COLONOSCOPY      PERCUTANEOUS TRANSLUMINAL ANGIOPLASTY (PTA) OF PERIPHERAL VESSEL N/A 2/4/2022    Procedure: PTA, PERIPHERAL VESSEL;  Surgeon: Rodger Blake MD;  Location: [facility] CATH LAB;  Service: Cardiology;  Laterality: N/A;    PTA/stenting of distal right SFA       right hand surgery      SHOULDER SURGERY      stents         Social History     Tobacco Use    Smoking status: Never    Smokeless tobacco: Never   Substance Use Topics    Alcohol use: Not Currently     Comment: 1-2 non alchoholic beers per week    Drug use: No       Family History   Problem Relation Age of Onset    Diabetes Mother     Hypertension Mother     Cancer Father         lung    Diabetes Sister     Cancer Sister          breast    Hypertension Sister     Cancer Brother         prostate    Cancer Paternal Uncle     Diabetes Paternal Uncle     Stroke Sister     Liver disease Neg Hx     Cirrhosis Neg Hx        Review of Systems   Constitutional: Negative for fever and malaise/fatigue.   HENT:  Negative for sore throat.    Eyes:  Negative for blurred vision.   Cardiovascular:  Positive for claudication. Negative for chest pain, cyanosis, dyspnea on exertion, irregular heartbeat, leg swelling, near-syncope, orthopnea, palpitations, paroxysmal nocturnal dyspnea and syncope.   Respiratory:  Negative for cough and hemoptysis.    Hematologic/Lymphatic: Negative for bleeding problem.   Skin:  Negative for rash.   Musculoskeletal:  Negative for falls.   Gastrointestinal:  Negative for abdominal pain.   Genitourinary: Negative.    Neurological: Negative.    Psychiatric/Behavioral:  Negative for altered mental status and substance abuse.      Current Outpatient Medications on File Prior to Visit   Medication Sig    amLODIPine (NORVASC) 5 MG tablet Take 1 tablet (5 mg total) by mouth once daily.    atorvastatin (LIPITOR) 80 MG tablet Take 1 tablet (80 mg total) by mouth once daily.    econazole nitrate 1 % cream     fexofenadine (ALLEGRA) 180 MG tablet Take 1 tablet (180 mg total) by mouth once daily.    fluticasone (FLONASE) 50 mcg/actuation nasal spray 2 sprays (100 mcg total) by Each Nare route once daily. (Patient taking differently: 2 sprays by Each Nostril route as needed.)    gabapentin (NEURONTIN) 300 MG capsule Take 1 capsule (300 mg total) by mouth every evening.    hydroCHLOROthiazide (HYDRODIURIL) 25 MG tablet Take 1 tablet (25 mg total) by mouth once daily.    rivaroxaban (XARELTO) 2.5 mg Tab Take 1 tablet (2.5 mg total) by mouth 2 (two) times daily with meals.    tenofovir alafenamide (VEMLIDY) 25 mg Tab Take 1 tablet (25 mg total) by mouth once daily.    triamcinolone acetonide 0.1% (KENALOG) 0.1 % cream Apply topically 2 (two)  times daily as needed. Neck and body, not groin    [DISCONTINUED] aspirin (ECOTRIN) 81 MG EC tablet Take 1 tablet (81 mg total) by mouth once daily.    [DISCONTINUED] tenofovir (VIREAD) 300 mg Tab Take 1 tablet (300 mg total) by mouth once daily. TAKE 1 TABLET BY MOUTH    ONCE DAILY     No current facility-administered medications on file prior to visit.       Objective:   Objective:  Wt Readings from Last 3 Encounters:   09/19/22 86 kg (189 lb 9.5 oz)   09/06/22 87.4 kg (192 lb 10.9 oz)   08/29/22 86.9 kg (191 lb 9.3 oz)     Temp Readings from Last 3 Encounters:   06/02/22 98.1 °F (36.7 °C) (Oral)   03/10/22 98.6 °F (37 °C) (Oral)   02/04/22 97.3 °F (36.3 °C) (Temporal)     BP Readings from Last 3 Encounters:   09/19/22 138/82   09/06/22 (!) 160/94   08/29/22 (!) 142/80     Pulse Readings from Last 3 Encounters:   09/19/22 70   09/06/22 61   08/29/22 72       Physical Exam  Vitals reviewed.   Constitutional:       Appearance: He is well-developed.   HENT:      Head: Normocephalic and atraumatic.   Eyes:      General: No scleral icterus.     Conjunctiva/sclera: Conjunctivae normal.   Cardiovascular:      Rate and Rhythm: Normal rate and regular rhythm.      Pulses: Intact distal pulses.      Heart sounds: Normal heart sounds. No murmur heard.  Pulmonary:      Effort: No respiratory distress.      Breath sounds: No wheezing or rales.   Chest:      Chest wall: No tenderness.   Abdominal:      General: Bowel sounds are normal. There is no distension.      Palpations: Abdomen is soft.      Tenderness: There is no guarding.   Musculoskeletal:         General: Normal range of motion.      Cervical back: Normal range of motion and neck supple.   Skin:     General: Skin is warm.   Neurological:      Mental Status: He is alert and oriented to person, place, and time.       Lab Results   Component Value Date    CHOL 165 08/29/2022    CHOL 179 01/02/2021    CHOL 183 07/08/2020     Lab Results   Component Value Date    HDL 38  (L) 08/29/2022    HDL 36 (L) 01/02/2021    HDL 36 (L) 07/08/2020     Lab Results   Component Value Date    LDLCALC 111.6 08/29/2022    LDLCALC 123.8 01/02/2021    LDLCALC 125.4 07/08/2020     Lab Results   Component Value Date    TRIG 77 08/29/2022    TRIG 96 01/02/2021    TRIG 108 07/08/2020     Lab Results   Component Value Date    CHOLHDL 23.0 08/29/2022    CHOLHDL 20.1 01/02/2021    CHOLHDL 19.7 (L) 07/08/2020       Chemistry        Component Value Date/Time     08/04/2022 1045    K 4.1 08/04/2022 1045     08/04/2022 1045    CO2 25 08/04/2022 1045    BUN 12 08/04/2022 1045    CREATININE 0.8 08/04/2022 1045     08/04/2022 1045        Component Value Date/Time    CALCIUM 9.1 08/04/2022 1045    ALKPHOS 68 08/04/2022 1045    AST 33 08/04/2022 1045    ALT 37 08/04/2022 1045    BILITOT 1.1 (H) 08/04/2022 1045    ESTGFRAFRICA >60.0 02/02/2022 1135    EGFRNONAA >60.0 02/02/2022 1135          Lab Results   Component Value Date    TSH 0.910 07/12/2011     Lab Results   Component Value Date    INR 1.1 08/04/2022    INR 1.0 02/02/2022    INR 1.0 10/27/2021     Lab Results   Component Value Date    WBC 5.32 08/04/2022    HGB 15.6 08/04/2022    HCT 49.4 08/04/2022    MCV 84 08/04/2022     08/04/2022     BNP  @LABRCNTIP(BNP,BNPTRIAGEBLO)@  CrCl cannot be calculated (Patient's most recent lab result is older than the maximum 7 days allowed.).     Imaging:  ======  No results found for this or any previous visit.    No results found for this or any previous visit.    No results found for this or any previous visit.    Results for orders placed during the hospital encounter of 07/08/20    X-Ray Chest PA And Lateral    Narrative  EXAMINATION:  XR CHEST PA AND LATERAL    CLINICAL HISTORY:  Encounter for other preprocedural examination    TECHNIQUE:  PA and lateral views of the chest were performed.    COMPARISON:  07/23/2009    FINDINGS:  Mediastinal structures are midline. Cardiac silhouette and pulmonary  vascular distribution are normal.    Lung volumes are normal and symmetric. We detect no pulmonary disease, pleural fluid, lymph node enlargement, cardiac decompensation, pneumothorax, pneumomediastinum, pneumoperitoneum or significant osseous abnormality.    Impression  No acute cardiopulmonary process    Electronically signed by resident: Manoj Vaughn  Date:    07/08/2020  Time:    14:27    Electronically signed by: Brent Stephens MD  Date:    07/08/2020  Time:    14:55    No results found for this or any previous visit.    No valid procedures specified.    Diagnostic Results:  ECG: Reviewed    The 10-year ASCVD risk score (Breanna DK, et al., 2019) is: 15.1%    Values used to calculate the score:      Age: 57 years      Sex: Male      Is Non- : Yes      Diabetic: No      Tobacco smoker: No      Systolic Blood Pressure: 138 mmHg      Is BP treated: Yes      HDL Cholesterol: 38 mg/dL      Total Cholesterol: 165 mg/dL    Assessment and Plan:   Anticoagulant long-term use  -     famotidine (PEPCID) 20 MG tablet; Take 1 tablet (20 mg total) by mouth once daily.  Dispense: 30 tablet; Refill: 11    PAOD (peripheral arterial occlusive disease)  -     Ambulatory referral/consult to Cardiology  -     cilostazoL (PLETAL) 50 MG Tab; Take 1 tablet (50 mg total) by mouth 2 (two) times daily.  Dispense: 60 tablet; Refill: 11    ASCVD (arteriosclerotic cardiovascular disease)  -     Ambulatory referral/consult to Cardiology    Hypertension, essential    NAFLD (nonalcoholic fatty liver disease)    Chronic hepatitis B    Hyperlipidemia, unspecified hyperlipidemia type    Reviewed all tests and above medical conditions with patient in detail and formulated treatment plan.  Risk factor modification discussed.   Cardiac low salt diet discussed.  Maintaining healthy weight and weight loss goals were discussed in clinic.  Messaged neurology staff to complete his EMG.    Patient to start cilostazol.  Stop  aspirin continue with Xarelto  Follow-up with vascular surgery  Follow up in 6 months

## 2022-09-21 ENCOUNTER — TELEPHONE (OUTPATIENT)
Dept: ENDOSCOPY | Facility: HOSPITAL | Age: 57
End: 2022-09-21
Payer: COMMERCIAL

## 2022-09-21 NOTE — TELEPHONE ENCOUNTER
Patient has an order for a colonoscopy  Is it ok to hold Pletal 2 days prior to procedure?  Please advise.  Thank you.     Susan

## 2022-09-26 ENCOUNTER — SPECIALTY PHARMACY (OUTPATIENT)
Dept: PHARMACY | Facility: CLINIC | Age: 57
End: 2022-09-26
Payer: COMMERCIAL

## 2022-09-26 DIAGNOSIS — Z12.11 COLON CANCER SCREENING: Primary | ICD-10-CM

## 2022-09-26 DIAGNOSIS — B18.1 CHRONIC HEPATITIS B: Primary | ICD-10-CM

## 2022-09-26 RX ORDER — POLYETHYLENE GLYCOL 3350, SODIUM SULFATE ANHYDROUS, SODIUM BICARBONATE, SODIUM CHLORIDE, POTASSIUM CHLORIDE 236; 22.74; 6.74; 5.86; 2.97 G/4L; G/4L; G/4L; G/4L; G/4L
4 POWDER, FOR SOLUTION ORAL ONCE
Qty: 4000 ML | Refills: 0 | Status: SHIPPED | OUTPATIENT
Start: 2022-09-26 | End: 2022-10-15

## 2022-09-26 NOTE — TELEPHONE ENCOUNTER
Outgoing call regarding vemlidy refill, pt reports starting blood thinners, transferred to [facility]

## 2022-09-26 NOTE — TELEPHONE ENCOUNTER
Specialty Pharmacy - Refill Coordination    Specialty Medication Orders Linked to Encounter      Flowsheet Row Most Recent Value   Medication #1 tenofovir alafenamide (VEMLIDY) 25 mg Tab (Order#[id number], Rx#[id number])            Refill Questions - Documented Responses      Flowsheet Row Most Recent Value   Patient Availability and HIPAA Verification    Does patient want to proceed with activity? Yes   HIPAA/medical authority confirmed? Yes   Relationship to patient of person spoken to? Self   Refill Screening Questions    Changes to allergies? No   Changes to medications? Yes  [Started Cilostazol,  no DDIs with Vemlidy.]   New conditions since last clinic visit? No   Unplanned office visit, urgent care, ED, or hospital admission in the last 4 weeks? No   How does patient/caregiver feel medication is working? Good   Financial problems or insurance changes? No   How many doses of your specialty medications were missed in the last 4 weeks? 0   Would patient like to speak to a pharmacist? No   When does the patient need to receive the medication? 10/03/22   Refill Delivery Questions    How will the patient receive the medication? Delivery Aleena   When does the patient need to receive the medication? 10/03/22   Shipping Address Home   Address in [facility] confirmed and updated if neccessary? Yes   Expected Copay ($) 0   Is the patient able to afford the medication copay? Yes   Payment Method zero copay   Days supply of Refill 30   Supplies needed? No supplies needed   Refill activity completed? Yes   Refill activity plan Refill scheduled   Shipment/Pickup Date: 09/27/22            Current Outpatient Medications   Medication Sig    amLODIPine (NORVASC) 5 MG tablet Take 1 tablet (5 mg total) by mouth once daily.    atorvastatin (LIPITOR) 80 MG tablet Take 1 tablet (80 mg total) by mouth once daily.    cilostazoL (PLETAL) 50 MG Tab Take 1 tablet (50 mg total) by mouth 2 (two) times daily.    econazole nitrate 1 %  cream     famotidine (PEPCID) 20 MG tablet Take 1 tablet (20 mg total) by mouth once daily.    fexofenadine (ALLEGRA) 180 MG tablet Take 1 tablet (180 mg total) by mouth once daily.    fluticasone (FLONASE) 50 mcg/actuation nasal spray 2 sprays (100 mcg total) by Each Nare route once daily. (Patient taking differently: 2 sprays by Each Nostril route as needed.)    gabapentin (NEURONTIN) 300 MG capsule Take 1 capsule (300 mg total) by mouth every evening.    hydroCHLOROthiazide (HYDRODIURIL) 25 MG tablet Take 1 tablet (25 mg total) by mouth once daily.    polyethylene glycol (GOLYTELY) 236-22.74-6.74 -5.86 gram suspension Take 4,000 mLs (4 L total) by mouth once. for 1 dose    rivaroxaban (XARELTO) 2.5 mg Tab Take 1 tablet (2.5 mg total) by mouth 2 (two) times daily with meals.    tenofovir alafenamide (VEMLIDY) 25 mg Tab Take 1 tablet (25 mg total) by mouth once daily.    triamcinolone acetonide 0.1% (KENALOG) 0.1 % cream Apply topically 2 (two) times daily as needed. Neck and body, not groin   Last reviewed on 9/19/2022  8:27 AM by Jie Esposito LPN    Review of patient's allergies indicates:  No Known Allergies Last reviewed on  9/26/2022 9:04 AM by Susan Alvarado      Tasks added this encounter   10/26/2022 - Refill Call (Auto Added)   Tasks due within next 3 months   No tasks due.     Rissa Pandey, PharmD  [facility]  [address] [address]  Phone: [phone]  Fax: [phone]

## 2022-10-03 ENCOUNTER — TELEPHONE (OUTPATIENT)
Dept: INTERNAL MEDICINE | Facility: CLINIC | Age: 57
End: 2022-10-03
Payer: COMMERCIAL

## 2022-10-03 DIAGNOSIS — R97.20 RISING PSA LEVEL: Primary | ICD-10-CM

## 2022-10-03 NOTE — TELEPHONE ENCOUNTER
Called the pt to discuss test results and recommendations. I left the pt a VM asking for a call back.

## 2022-10-03 NOTE — TELEPHONE ENCOUNTER
Please call patient and explain that the tests show slight rise in the PSA (prostate) level.    I would like to refer patient to the urology department for further evaluation and treatment.    Please see referral orders and please call patient to schedule.     Thank you.

## 2022-10-04 ENCOUNTER — TELEPHONE (OUTPATIENT)
Dept: INTERNAL MEDICINE | Facility: CLINIC | Age: 57
End: 2022-10-04
Payer: COMMERCIAL

## 2022-10-04 NOTE — TELEPHONE ENCOUNTER
Pt returned a call to get scheduled with Urology. I got the pt scheduled at the [facility] in . Pt asked me to call and explain the PSA to his wife.    I called Mrs. Read and explained the PSA level and the increase that Mr. Rubio has had over the year. She expressed understanding.

## 2022-10-04 NOTE — TELEPHONE ENCOUNTER
I spoke to pt, informed him that his tests shows slight rise in the PSA (prostate) level, and that Dr. Castellon recommends that he be seen by the urology department for further evaluation and treatment. Pt verbalized understanding. Portal message was sent to Uro dpt to call pt for scheduling. I informed pt that Uro will call him.

## 2022-10-05 ENCOUNTER — TELEPHONE (OUTPATIENT)
Dept: UROLOGY | Facility: CLINIC | Age: 57
End: 2022-10-05
Payer: COMMERCIAL

## 2022-10-05 ENCOUNTER — OFFICE VISIT (OUTPATIENT)
Dept: UROLOGY | Facility: CLINIC | Age: 57
End: 2022-10-05
Payer: COMMERCIAL

## 2022-10-05 VITALS — HEIGHT: 69 IN | WEIGHT: 189 LBS | BODY MASS INDEX: 27.99 KG/M2

## 2022-10-05 DIAGNOSIS — R97.20 RISING PSA LEVEL: ICD-10-CM

## 2022-10-05 PROCEDURE — 3008F BODY MASS INDEX DOCD: CPT | Mod: CPTII,S$GLB,, | Performed by: NURSE PRACTITIONER

## 2022-10-05 PROCEDURE — 99204 OFFICE O/P NEW MOD 45 MIN: CPT | Mod: S$GLB,,, | Performed by: NURSE PRACTITIONER

## 2022-10-05 PROCEDURE — 99999 PR PBB SHADOW E&M-EST. PATIENT-LVL III: CPT | Mod: PBBFAC,,, | Performed by: NURSE PRACTITIONER

## 2022-10-05 PROCEDURE — 3044F PR MOST RECENT HEMOGLOBIN A1C LEVEL <7.0%: ICD-10-PCS | Mod: CPTII,S$GLB,, | Performed by: NURSE PRACTITIONER

## 2022-10-05 PROCEDURE — 3008F PR BODY MASS INDEX (BMI) DOCUMENTED: ICD-10-PCS | Mod: CPTII,S$GLB,, | Performed by: NURSE PRACTITIONER

## 2022-10-05 PROCEDURE — 1159F MED LIST DOCD IN RCRD: CPT | Mod: CPTII,S$GLB,, | Performed by: NURSE PRACTITIONER

## 2022-10-05 PROCEDURE — 1159F PR MEDICATION LIST DOCUMENTED IN MEDICAL RECORD: ICD-10-PCS | Mod: CPTII,S$GLB,, | Performed by: NURSE PRACTITIONER

## 2022-10-05 PROCEDURE — 3044F HG A1C LEVEL LT 7.0%: CPT | Mod: CPTII,S$GLB,, | Performed by: NURSE PRACTITIONER

## 2022-10-05 PROCEDURE — 99204 PR OFFICE/OUTPT VISIT, NEW, LEVL IV, 45-59 MIN: ICD-10-PCS | Mod: S$GLB,,, | Performed by: NURSE PRACTITIONER

## 2022-10-05 PROCEDURE — 99999 PR PBB SHADOW E&M-EST. PATIENT-LVL III: ICD-10-PCS | Mod: PBBFAC,,, | Performed by: NURSE PRACTITIONER

## 2022-10-05 RX ORDER — SILDENAFIL 100 MG/1
100 TABLET, FILM COATED ORAL DAILY PRN
Qty: 20 TABLET | Refills: 5 | Status: SHIPPED | OUTPATIENT
Start: 2022-10-05 | End: 2022-10-05

## 2022-10-05 RX ORDER — SILDENAFIL 100 MG/1
100 TABLET, FILM COATED ORAL DAILY PRN
Qty: 20 TABLET | Refills: 5 | Status: SHIPPED | OUTPATIENT
Start: 2022-10-05 | End: 2023-10-30 | Stop reason: SDUPTHER

## 2022-10-05 NOTE — TELEPHONE ENCOUNTER
I spoke with patient's wife, who agreed to appt today in [place]-[facility] with  Linda Covington NP.    ----- Message from Kelli Leahy LPN sent at 10/4/2022 11:14 AM CDT -----  Regarding: Elevated PSA  Good morning, can your staff please reach out to pt to schedule a clinic appointment in your department, per Dr. Castellon's request for a dx of elevated PSA? Thank you for your time and attention.     Iliana Moncada LPN

## 2022-10-05 NOTE — PROGRESS NOTES
Chief Complaint:   PSA increased   Erectile dysfunction    HPI:   Patient is a 57-year-old male that is presenting per primary care physician.  Patient had an increase in PSA slightly.  PSA increased from 1.4 to 2.1.  No BPH meds.  Urine in clinic is negative.  PVR is low, 18 mL.  No previous prostate biopsies.  Reports father had prostate cancer.No abd/pelvic pain and no exac/rel factors.  No hematuria.  No urolithiasis.  No urinary bother.  No  history.  Patient states that he would like to talk about options for erectile dysfunction.  Unable to maintain an erection.    Allergies:  Patient has no known allergies.    Medications:  has a current medication list which includes the following prescription(s): amlodipine, atorvastatin, cilostazol, econazole nitrate, famotidine, fexofenadine, fluticasone propionate, gabapentin, hydrochlorothiazide, rivaroxaban, vemlidy, triamcinolone acetonide 0.1%, sildenafil, and [DISCONTINUED] tenofovir disoproxil fumarate.    Review of Systems:  General: No fever, chills, fatigability, or weight loss.  Skin: No rashes, itching, or changes in color or texture of skin.  Chest: Denies LOPEZ, cyanosis, wheezing, cough, and sputum production.  Abdomen: Appetite fine. No weight loss. Denies diarrhea, abdominal pain, hematemesis, or blood in stool.  Musculoskeletal: No joint stiffness or swelling. Denies back pain.  : As above.  All other review of systems negative.    PMH:   has a past medical history of Anticoagulant long-term use, Arthritis, Dermatitis, Hepatitis B, History of surgery on arm (07/2020), Hypertension, Liver fibrosis (12/12/2019), and NAFLD (nonalcoholic fatty liver disease).    PSH:   has a past surgical history that includes Shoulder surgery; Colonoscopy; PTA/stenting of distal right SFA ; stents; Carpal tunnel release; right hand surgery; and Percutaneous transluminal angioplasty (PTA) of peripheral vessel (N/A, 2/4/2022).    FamHx: family history includes Cancer in  his brother, father, paternal uncle, and sister; Diabetes in his mother, paternal uncle, and sister; Hypertension in his mother and sister; Stroke in his sister.    SocHx:  reports that he has never smoked. He has never used smokeless tobacco. He reports that he does not currently use alcohol. He reports that he does not use drugs.      Physical Exam:  General: A&Ox3, no apparent distress, no deformities  Neck: No masses, normal thyroid  Lungs: normal inspiration, no use of accessory muscles  Heart: normal pulse, no arrhythmias  Abdomen: Soft, NT, ND, no masses, no hernias, no hepatosplenomegaly  Lymphatic: Neck and groin nodes negative  Skin: The skin is warm and dry. No jaundice.  Ext: No c/c/e.  : Test desc kim, no abnormalities of epididymus. Penis with normal penile and scrotal skin. Meatus normal. Normal rectal tone, no hemorrhoids. Prost 35 gm no nodules or masses appreciated. SV not palpable. Perineum and anus normal.    Labs/Studies:    Latest Reference Range & Units 12/16/17 08:27 12/03/18 14:32 11/29/19 16:39 01/02/21 08:49 08/29/22 10:36   PSA, Screen 0.00 - 4.00 ng/mL 1.1 0.72 1.3 1.4 2.1     Impression/Plan:   1. Rising PSA  Patient is aware that current PSA is normal.  PSA has risen slightly, therefore, will reassess labs in 6 months.  If still in normal range, will reassess annually.    2.ED - I reviewed the etiology and management of ED.  Management options include oral medications, vacuum erectile devices, MUSE urethral suppositories, intracavernosal injections, and surgical placement of a penile prosthesis.  I reviewed the risks and benefits of each.  For medications, I noted that they are safe and effective, but noted that side effects include flushing of the face, headaches, color vision change, blurry vision, and congestion/runny nose. They should not be used by anyone currently taking nitrates for chest pain, and I reviewed the patient's current medications in the chart and verbally with the  patient and he is not. For vacuum erectile devices, I noted that they also are safe but that they require the use of a restrictive ring at the base of the penis in order to prevent a detumescence, which can be uncomfortable for some patients. They also have the added benefits of being able to be combined with medical therapy for added effect.  For MUSE, I noted that these are reasonably effective, but that patients usually experience urethral burning, which can also be experienced by the partner, often requiring the use of condoms.  For intracavernosal injections I explained that this is usually the most efficacious medication to achieve a natural erection, but requires injecting the penis, which is not a suitable option for everybody.  For surgical options I reviewed a that there is the surgical risks which include pain, bleeding, and infection.  I noted that an infection of the device would require it to be removed, which can make replacement at a later date very difficult.  I explained that once a prosthesis is implanted the patient will never achieve a natural erection ever again. Rx for viagra sent to pharmacy and instructed patient on obtaining special pricing with [employer].

## 2022-10-17 ENCOUNTER — CLINICAL SUPPORT (OUTPATIENT)
Dept: ENDOSCOPY | Facility: HOSPITAL | Age: 57
End: 2022-10-17
Attending: FAMILY MEDICINE
Payer: COMMERCIAL

## 2022-10-17 DIAGNOSIS — K63.5 POLYP OF COLON, UNSPECIFIED PART OF COLON, UNSPECIFIED TYPE: ICD-10-CM

## 2022-10-17 DIAGNOSIS — Z12.11 COLON CANCER SCREENING: ICD-10-CM

## 2022-10-26 ENCOUNTER — SPECIALTY PHARMACY (OUTPATIENT)
Dept: PHARMACY | Facility: CLINIC | Age: 57
End: 2022-10-26
Payer: COMMERCIAL

## 2022-11-02 NOTE — PROGRESS NOTES
Patient's chart was reviewed for overdue DILCIA topics.  Immunizations reconciled.       
daughter (RN)

## 2022-11-04 NOTE — TELEPHONE ENCOUNTER
Specialty Pharmacy - Refill Coordination    Specialty Medication Orders Linked to Encounter      Flowsheet Row Most Recent Value   Medication #1 tenofovir alafenamide (VEMLIDY) 25 mg Tab (Order#[id number], Rx#[id number])          Refill Questions - Documented Responses      Flowsheet Row Most Recent Value   Patient Availability and HIPAA Verification    Does patient want to proceed with activity? Unable to Reach   HIPAA/medical authority confirmed? No          We have had multiple attempts to the patient and have been unsuccessful to reach the patient. We will stop reaching out to the patient but in the event that the patient needs the med and contacts us, we will communicate and begin dispensing for the patient. At your next visit with the patient, please review the importance of being in contact with our specialty pharmacy as a part of our care team.    Interventions added this encounter   Closed: [facility] Provider Intervention - Drug therapy adherence: tenofovir alafenamide (VEMLIDY) 25 mg Tab     Abner Blank, PharmD  [facility]  [address]  [facility] [address]  Phone: [phone]  Fax: [phone]

## 2022-11-07 ENCOUNTER — HOSPITAL ENCOUNTER (OUTPATIENT)
Facility: HOSPITAL | Age: 57
Discharge: HOME | End: 2022-11-07
Attending: COLON & RECTAL SURGERY | Admitting: COLON & RECTAL SURGERY
Payer: COMMERCIAL

## 2022-11-07 ENCOUNTER — ANESTHESIA EVENT (OUTPATIENT)
Dept: ENDOSCOPY | Facility: HOSPITAL | Age: 57
End: 2022-11-07
Payer: COMMERCIAL

## 2022-11-07 ENCOUNTER — ANESTHESIA (OUTPATIENT)
Dept: ENDOSCOPY | Facility: HOSPITAL | Age: 57
End: 2022-11-07
Payer: COMMERCIAL

## 2022-11-07 VITALS
WEIGHT: 189 LBS | SYSTOLIC BLOOD PRESSURE: 159 MMHG | TEMPERATURE: 98 F | OXYGEN SATURATION: 100 % | HEIGHT: 69 IN | HEART RATE: 55 BPM | DIASTOLIC BLOOD PRESSURE: 83 MMHG | BODY MASS INDEX: 27.99 KG/M2 | RESPIRATION RATE: 18 BRPM

## 2022-11-07 DIAGNOSIS — Z86.010 HISTORY OF COLON POLYPS: Primary | ICD-10-CM

## 2022-11-07 PROCEDURE — 27201089 HC SNARE, DISP (ANY): Performed by: COLON & RECTAL SURGERY

## 2022-11-07 PROCEDURE — 37000009 HC ANESTHESIA EA ADD 15 MINS: Performed by: COLON & RECTAL SURGERY

## 2022-11-07 PROCEDURE — 25000003 PHARM REV CODE 250: Performed by: NURSE ANESTHETIST, CERTIFIED REGISTERED

## 2022-11-07 PROCEDURE — 45385 PR COLONOSCOPY,REMV LESN,SNARE: ICD-10-PCS | Mod: 33,,, | Performed by: COLON & RECTAL SURGERY

## 2022-11-07 PROCEDURE — 45385 COLONOSCOPY W/LESION REMOVAL: CPT | Mod: PT | Performed by: COLON & RECTAL SURGERY

## 2022-11-07 PROCEDURE — 37000008 HC ANESTHESIA 1ST 15 MINUTES: Performed by: COLON & RECTAL SURGERY

## 2022-11-07 PROCEDURE — E9220 PRA ENDO ANESTHESIA: ICD-10-PCS | Mod: 33,,, | Performed by: NURSE ANESTHETIST, CERTIFIED REGISTERED

## 2022-11-07 PROCEDURE — 45385 COLONOSCOPY W/LESION REMOVAL: CPT | Mod: 33,,, | Performed by: COLON & RECTAL SURGERY

## 2022-11-07 PROCEDURE — 88305 TISSUE EXAM BY PATHOLOGIST: ICD-10-PCS | Mod: 26,,, | Performed by: PATHOLOGY

## 2022-11-07 PROCEDURE — E9220 PRA ENDO ANESTHESIA: HCPCS | Mod: 33,,, | Performed by: NURSE ANESTHETIST, CERTIFIED REGISTERED

## 2022-11-07 PROCEDURE — 88305 TISSUE EXAM BY PATHOLOGIST: CPT | Performed by: PATHOLOGY

## 2022-11-07 PROCEDURE — 63600175 PHARM REV CODE 636 W HCPCS: Performed by: NURSE ANESTHETIST, CERTIFIED REGISTERED

## 2022-11-07 PROCEDURE — 88305 TISSUE EXAM BY PATHOLOGIST: CPT | Mod: 26,,, | Performed by: PATHOLOGY

## 2022-11-07 RX ORDER — LIDOCAINE HYDROCHLORIDE 20 MG/ML
INJECTION INTRAVENOUS
Status: DISCONTINUED | OUTPATIENT
Start: 2022-11-07 | End: 2022-11-07

## 2022-11-07 RX ORDER — PROPOFOL 10 MG/ML
VIAL (ML) INTRAVENOUS CONTINUOUS PRN
Status: DISCONTINUED | OUTPATIENT
Start: 2022-11-07 | End: 2022-11-07

## 2022-11-07 RX ORDER — PROPOFOL 10 MG/ML
VIAL (ML) INTRAVENOUS
Status: DISCONTINUED | OUTPATIENT
Start: 2022-11-07 | End: 2022-11-07

## 2022-11-07 RX ORDER — SODIUM CHLORIDE 9 MG/ML
INJECTION, SOLUTION INTRAVENOUS CONTINUOUS
Status: DISCONTINUED | OUTPATIENT
Start: 2022-11-07 | End: 2022-11-07 | Stop reason: HOSPADM

## 2022-11-07 RX ADMIN — PROPOFOL 80 MG: 10 INJECTION, EMULSION INTRAVENOUS at 09:11

## 2022-11-07 RX ADMIN — Medication 175 MCG/KG/MIN: at 09:11

## 2022-11-07 RX ADMIN — SODIUM CHLORIDE: 0.9 INJECTION, SOLUTION INTRAVENOUS at 09:11

## 2022-11-07 RX ADMIN — LIDOCAINE HYDROCHLORIDE 50 MG: 20 INJECTION INTRAVENOUS at 09:11

## 2022-11-07 NOTE — ANESTHESIA POSTPROCEDURE EVALUATION
Anesthesia Post Evaluation    Patient: Ramiro Oneil    Procedure(s) Performed: Procedure(s) (LRB):  COLONOSCOPY (N/A)    Final Anesthesia Type: general      Patient location during evaluation: GI PACU  Patient participation: Yes- Able to Participate  Level of consciousness: awake and alert  Post-procedure vital signs: reviewed and stable  Pain management: adequate  Airway patency: patent    PONV status at discharge: No PONV  Anesthetic complications: no      Cardiovascular status: hemodynamically stable  Respiratory status: unassisted, spontaneous ventilation and room air  Hydration status: euvolemic  Follow-up not needed.          Vitals Value Taken Time   /83 11/07/22 1020   Temp 36.6 °C (97.8 °F) 11/07/22 0951   Pulse 55 11/07/22 1020   Resp 18 11/07/22 1020   SpO2 100 % 11/07/22 1020         Event Time   Out of Recovery 10:32:09         Pain/Karina Score: Karina Score: 10 (11/7/2022  9:52 AM)

## 2022-11-07 NOTE — PLAN OF CARE
Explained outcome of procedure and discharge instructions given patient verbalized understanding.

## 2022-11-07 NOTE — TRANSFER OF CARE
"Anesthesia Transfer of Care Note    Patient: Ramiro Oneil    Procedure(s) Performed: Procedure(s) (LRB):  COLONOSCOPY (N/A)    Patient location: GI    Anesthesia Type: MAC and general    Transport from OR: Transported from OR on room air with adequate spontaneous ventilation    Post pain: adequate analgesia    Post assessment: no apparent anesthetic complications and tolerated procedure well    Post vital signs: stable    Level of consciousness: awake and alert    Nausea/Vomiting: no nausea/vomiting    Complications: none    Transfer of care protocol was followed      Last vitals:   Visit Vitals  /75 (BP Location: Left arm, Patient Position: Lying)   Pulse 71   Temp 36.6 °C (97.9 °F) (Oral)   Resp 18   Ht 5' 9" (1.753 m)   Wt 85.7 kg (189 lb)   SpO2 96%   BMI 27.91 kg/m²     "

## 2022-11-07 NOTE — ANESTHESIA RELEASE NOTE
"Anesthesia Release from PACU Note    Patient: Ramiro Oneil    Procedure(s) Performed: Procedure(s) (LRB):  COLONOSCOPY (N/A)    Anesthesia type: general    Post pain: Adequate analgesia    Post assessment: no apparent anesthetic complications and tolerated procedure well    Last Vitals:   Visit Vitals  BP (!) 159/83   Pulse (!) 55   Temp 36.6 °C (97.8 °F)   Resp 18   Ht 5' 9" (1.753 m)   Wt 85.7 kg (189 lb)   SpO2 100%   BMI 27.91 kg/m²       Post vital signs: stable    Level of consciousness: awake and alert     Nausea/Vomiting: no nausea/no vomiting    Complications: none    Airway Patency: patent    Respiratory: unassisted, spontaneous ventilation, room air    Cardiovascular: stable and blood pressure at baseline    Hydration: euvolemic  "

## 2022-11-07 NOTE — PROVATION PATIENT INSTRUCTIONS
Discharge Summary/Instructions after an Endoscopic Procedure  Patient Name: Ramiro Oneil  Patient MRN: [id number]  Patient YOB: 1965 Monday, November 7, 2022  Brent Bowden MD  Dear patient,  As a result of recent federal legislation (The Federal Cures Act), you may   receive lab or pathology results from your procedure in your [facility]   account before your physician is able to contact you. Your physician or   their representative will relay the results to you with their   recommendations at their soonest availability.  Thank you,  RESTRICTIONS:  During your procedure today, you received medications for sedation.  These   medications may affect your judgment, balance and coordination.  Therefore,   for 24 hours, you have the following restrictions:   - DO NOT drive a car, operate machinery, make legal/financial decisions,   sign important papers or drink alcohol.    ACTIVITY:  Today: no heavy lifting, straining or running due to procedural   sedation/anesthesia.  The following day: return to full activity including work.  DIET:  Eat and drink normally unless instructed otherwise.     TREATMENT FOR COMMON SIDE EFFECTS:  - Mild abdominal pain, nausea, belching, bloating or excessive gas:  rest,   eat lightly and use a heating pad.  - Sore Throat: treat with throat lozenges and/or gargle with warm salt   water.  - Because air was used during the procedure, expelling large amounts of air   from your rectum or belching is normal.  - If a bowel prep was taken, you may not have a bowel movement for 1-3 days.    This is normal.  SYMPTOMS TO WATCH FOR AND REPORT TO YOUR PHYSICIAN:  1. Abdominal pain or bloating, other than gas cramps.  2. Chest pain.  3. Back pain.  4. Signs of infection such as: chills or fever occurring within 24 hours   after the procedure.  5. Rectal bleeding, which would show as bright red, maroon, or black stools.   (A tablespoon of blood from the rectum is not serious, especially  if   hemorrhoids are present.)  6. Vomiting.  7. Weakness or dizziness.  GO DIRECTLY TO THE NEAREST EMERGENCY ROOM IF YOU HAVE ANY OF THE FOLLOWING:      Difficulty breathing              Chills and/or fever over 101 F   Persistent vomiting and/or vomiting blood   Severe abdominal pain   Severe chest pain   Black, tarry stools   Bleeding- more than one tablespoon   Any other symptom or condition that you feel may need urgent attention  Your doctor recommends these additional instructions:  If any biopsies were taken, your doctors clinic will contact you in 1 to 2   weeks with any results.  - Patient has a contact number available for emergencies.  The signs and   symptoms of potential delayed complications were discussed with the   patient.  Return to normal activities tomorrow.  Written discharge   instructions were provided to the patient.   - Discharge patient to home (ambulatory).   - Resume regular diet indefinitely.   - Resume Pletal (cilostazol) tomorrow and Xarelto (rivaroxaban) tomorrow at   prior doses.  Refer to managing physician for further adjustment of   therapy.   - Repeat colonoscopy in 7 years for surveillance.  For questions, problems or results please call your physician - Brent Bowden MD at Work:  [phone].  [facility] PHONE NUMBER: [phone]  IF A COMPLICATION OR EMERGENCY SITUATION ARISES AND YOU ARE UNABLE TO REACH   YOUR PHYSICIAN - GO DIRECTLY TO THE EMERGENCY ROOM.  Brent Bowden MD  11/7/2022 9:47:42 AM  This report has been verified and signed electronically.  Dear patient,  As a result of recent federal legislation (The Federal Cures Act), you may   receive lab or pathology results from your procedure in your [facility]   account before your physician is able to contact you. Your physician or   their representative will relay the results to you with their   recommendations at their soonest availability.  Thank you,  PROVATION

## 2022-11-07 NOTE — H&P
COLONOSCOPY HISTORY AND PHYSICAL EXAM    Procedure : Colonoscopy      INDICATIONS: personal history of colon polyps    Family Hx of CRC: none    Last Colonoscopy:  2014  Findings: villous adenoma with focus of HGD       Past Medical History:   Diagnosis Date    Anticoagulant long-term use     Arthritis     Dermatitis     Hepatitis B     History of surgery on arm 07/2020    Hypertension     Liver fibrosis 12/12/2019    F2 on fibroscan 11/2018    NAFLD (nonalcoholic fatty liver disease)      Sedation Problems: NO  Family History   Problem Relation Age of Onset    Diabetes Mother     Hypertension Mother     Cancer Father         lung    Diabetes Sister     Cancer Sister         breast    Hypertension Sister     Cancer Brother         prostate    Cancer Paternal Uncle     Diabetes Paternal Uncle     Stroke Sister     Liver disease Neg Hx     Cirrhosis Neg Hx      Fam Hx of Sedation Problems: NO  Social History     Socioeconomic History    Marital status:      Spouse name: Roro    Number of children: 2   Occupational History    Occupation:      Employer: [employer]   Tobacco Use    Smoking status: Never    Smokeless tobacco: Never   Substance and Sexual Activity    Alcohol use: Not Currently     Comment: 1-2 non alchoholic beers per week    Drug use: No    Sexual activity: Yes   Social History Narrative     [employer] , two children.          Review of Systems - Negative except   Respiratory ROS: no dyspnea  Cardiovascular ROS: no exertional chest pain  Gastrointestinal ROS: NO abdominal discomfort,  NO rectal bleeding  Musculoskeletal ROS: no muscular pain  Neurological ROS: no recent stroke    Physical Exam:  There were no vitals taken for this visit.  General: no distress  Head: normocephalic  Mallampati Score   Neck: supple, symmetrical, trachea midline  Lungs:  normal respiratory effort  Heart: regular rate and rhythm  Abdomen: soft, non-tender non-distented;  bowel sounds normal; no masses,  no organomegaly  Extremities: no cyanosis or edema, or clubbing    ASA:  III    PLAN  COLONOSCOPY.    SedationPlan :MAC    The details of the procedure, the possible need for biopsy or polypectomy and the potential risks including bleeding, perforation, missed polyps were discussed in detail.

## 2022-11-11 LAB
FINAL PATHOLOGIC DIAGNOSIS: NORMAL
GROSS: NORMAL
Lab: NORMAL

## 2022-11-23 ENCOUNTER — PROCEDURE VISIT (OUTPATIENT)
Dept: NEUROLOGY | Facility: CLINIC | Age: 57
End: 2022-11-23
Payer: COMMERCIAL

## 2022-11-23 DIAGNOSIS — R20.2 PARESTHESIA OF RIGHT FOOT: ICD-10-CM

## 2022-11-23 PROCEDURE — 95911 PR NERVE CONDUCTION STUDY; 9-10 STUDIES: ICD-10-PCS | Mod: S$GLB,,, | Performed by: PSYCHIATRY & NEUROLOGY

## 2022-11-23 PROCEDURE — 95911 NRV CNDJ TEST 9-10 STUDIES: CPT | Mod: S$GLB,,, | Performed by: PSYCHIATRY & NEUROLOGY

## 2022-11-28 PROBLEM — R20.2 PARESTHESIA OF RIGHT FOOT: Status: ACTIVE | Noted: 2022-11-28

## 2022-11-28 NOTE — PROCEDURES
[facility]   Brad Barney  Department of Neurology  [address] [place] [address] GRISELDA Elliott  [address]  Phone [phone]     Fax  [phone]        Full Name: Ramiro Oneil Gender: Male  Patient ID: [id number] YOB: 1965      Visit Date: 11/23/2022 8:53 AM  Age: 57 Years  Examining Physician: Celina Prater M.D.  Referring Physician: Sailaja Lam MD  History: C/O: Worsening paresthesia and hypoesthesia in right foot. PAD v. iatrogenic/post-procedure complication with some ischemic exacerbation in territory of right superficial peroneal nerve. PMHX: PAD s/p stents in right SFA, anticoagulant long-term use, arthritis, Hepatitis B, HTN, liver fibrosis, NAFLD, HLD, cervical DDD.    SUMMARY    Nerve conduction studies were performed in the right and left lower extremity. The right peroneal motor study recording the extensor digitorum brevis showed borderline amplitude, normal distal latency and normal conduction velocity. No conduction block or focal slowing was present across the fibular neck.   The right peroneal motor study recording the tibialis anterior showed normal amplitude, normal distal latency and normal conduction velocity. No conduction block or focal slowing was present across the fibular neck. The right tibial motor study recording the abductor hallucis brevis showed a normal amplitude, normal distal latency and normal conduction velocity.     Right sural sensory response showed a normal amplitude and conduction velocity. Right superficial peroneal sensory response showed a normal amplitude and conduction velocity.    The left peroneal motor study recording the extensor digitorum brevis showed a normal amplitude, normal distal latency and normal conduction velocity. No conduction block or focal slowing was present across the fibular neck. The left tibial motor study recording the abductor hallucis brevis showed a normal amplitude, normal distal latency and normal conduction  velocity.     Left sural sensory response showed a normal amplitude and conduction velocity. Left superficial peroneal sensory response showed a normal amplitude and conduction velocity.       IMPRESSION       This is a normal study.     There is no electrophysiologic evidence of plexopathy, or peripheral neuropathy of either the right or left lower extremity.    ---------------------------------             Celina Prater M.D., F.A.A.N.      Diplomate, American Board of Psychiatry and Neurology  Diplomate, American Board of Clinical Neurophysiology  Fellow, American Academy of Neurology             SENSORY NCS      Nerve / Sites Rec. Site Peak NP Amp PP Amp Dist Michael d Lat.2     ms µV µV cm m/s ms   L Superficial peroneal      Lat Leg Ankle 3.33 24.1 28.2 10 39.0 3.33      Ref.  4.60  4.0  40.0    L Sural - Lat Mall      Calf Lat Mall 3.98 9.5 14.2 14 43.9 3.98      Ref.  4.60  4.0  40.0    R Superficial peroneal      Lat Leg Ankle 3.50 19.7 17.6 10 39.3 3.50      Ref.  4.60  4.0  40.0    R Sural - Lat Mall      Calf Lat Mall 4.15 10.8 12.1 14 43.9 4.15      Ref.  4.60  4.0  40.0        MOTOR NCS      Nerve / Sites Rec. Site Lat Amp Dist Michael     ms mV cm m/s   L Peroneal - EDB      Ankle EDB 3.27 7.6 8       Ref.  6.00 2.5        FibHead EDB 11.50 7.2 36 43.7      Ref.     40.0      Knee EDB 13.94 6.6 10 41.0   L Tibial - AH      Ankle AH 4.10 7.7 8       Ref.  6.00 4.0        Knee AH 13.04 4.6 39 43.6      Ref.     40.0   R Peroneal - EDB      Ankle EDB 4.58 1.3 8       Ref.  6.00 2.5        FibHead EDB 12.52 1.6 33 41.6      Ref.     40.0      Knee EDB 15.27 1.2 10 36.4   R Peroneal - Tib Ant      Fib Head Tib Ant 2.08 10.6 8       Ref.  4.50 3.0        Knee Tib Ant 4.21 9.4 10 47.1   R Tibial - AH      Ankle AH 4.88 13.0 8       Ref.  6.00 4.0        Knee AH 13.15 10.9 39 47.2      Ref.     40.0                   ---------------------------------             Celina Prater M.D., F.A.A.N.      Diplomate, American Board of  Psychiatry and Neurology  Diplomate, American Board of Clinical Neurophysiology  Fellow, American Academy of Neurology

## 2022-12-01 ENCOUNTER — SPECIALTY PHARMACY (OUTPATIENT)
Dept: PHARMACY | Facility: CLINIC | Age: 57
End: 2022-12-01
Payer: COMMERCIAL

## 2022-12-01 NOTE — PROGRESS NOTES
Cardiology Clinic Note  Reason for Visit: follow up for PAD    HPI:     Mr Oneil is a 52yo M with HTN, PAD s/p stent to bilateral SFA, HLD, chronic hepatitis B, who presents for follow up for PAD.    He reports that he has been doing well. He continues to have R>L heaviness with activities such as mowing his yard. He also has R knee OA, which was worsened by walking/running a 4 mile race in early June. He continues to walk 1 mile every day, but he does experience this heaviness with walking. Symptoms resolve with rest. No rest pain or red flag symptoms. No bleeding on plavix.     Medical: HTN, PAD s/p stent to bilateral SFA, HLD, chronic hepatitis B on Viread since 2/25/14    ROS:    Constitution: Negative for fever or chills. Negative for weight loss or gain.   HENT: Negative for sore throat or headaches. Negative for rhinorrhea.  Eyes: Negative for blurred or double vision.   Cardiovascular: See above  Pulmonary: Negative for SOB. Negative for cough.   Gastrointestinal: Negative for abdominal pain. Negative for nausea/ vomiting. Negative for diarrhea.   : Negative for dysuria.   Skin: Negative for rashes.  Neurological: Negative for focal weakness or sensory changes.  Psychological: Negative for depression or anxiety.  PMH:     Past Medical History:   Diagnosis Date    Anticoagulant long-term use     Dermatitis     Hepatitis B     Hypertension     NAFLD (nonalcoholic fatty liver disease)      Past Surgical History:   Procedure Laterality Date    AORTOGRAM, WITH RUNOFF N/A 8/13/2013    [facility] CATH LAB    AORTOGRAM, WITH RUNOFF Bilateral 6/14/2013    [facility] CATH LAB    CARPAL TUNNEL RELEASE      COLONOSCOPY      COLONOSCOPY N/A 2/10/2014    Performed by Brent Bowden [facility] at [facility] ENDO (4TH FLR)    INJECTION, JOINT Left 5/23/2013    [facility] PAIN MGT    PTA/stenting of distal right SFA        "SHOULDER SURGERY      stents       Allergies:   Review of patient's allergies indicates:  No Known Allergies  Medications:     Current Outpatient Medications on File Prior to Visit   Medication Sig Dispense Refill    atorvastatin (LIPITOR) 20 MG tablet Take 1 tablet (20 mg total) by mouth once daily. 90 tablet 3    clopidogrel (PLAVIX) 75 mg tablet Take 1 tablet (75 mg total) by mouth once daily. 30 tablet 3    diclofenac sodium (VOLTAREN) 1 % Gel Apply 2 grams topically 3 (three) times daily as needed. 2 Tube 6    econazole nitrate 1 % cream       fluticasone (FLONASE) 50 mcg/actuation nasal spray 2 sprays (100 mcg total) by Each Nare route once daily. 1 Bottle 5    nystatin-triamcinolone (MYCOLOG II) cream Apply topically 2 (two) times daily. 30 g 0    tenofovir alafenamide (VEMLIDY) 25 mg Tab Take 1 tablet (25 mg total) by mouth once daily. 30 tablet 11    [DISCONTINUED] tenofovir (VIREAD) 300 mg Tab Take 1 tablet (300 mg total) by mouth once daily. TAKE 1 TABLET BY MOUTH    ONCE DAILY 30 tablet 0     No current facility-administered medications on file prior to visit.      Social History:     Social History     Tobacco Use    Smoking status: Never Smoker    Smokeless tobacco: Never Used   Substance Use Topics    Alcohol use: No     Alcohol/week: 7.2 oz     Types: 12 Cans of beer per week     Comment: social drinker     Family History:     Family History   Problem Relation Age of Onset    Diabetes Mother     Hypertension Mother     Cancer Father         lung    Diabetes Sister     Cancer Sister         breast    Hypertension Sister     Cancer Brother         prostate    Cancer Paternal Uncle     Diabetes Paternal Uncle     Stroke Sister     Liver disease Neg Hx      Physical Exam:   BP (!) 142/70 (BP Location: Left arm, Patient Position: Sitting, BP Method: Medium (Automatic))   Pulse 77   Ht 5' 9" (1.753 m)   Wt 89.5 kg (197 lb 5 oz)   BMI 29.14 kg/m²      Constitutional: No apparent " distress, conversant  HEENT: Sclera anicteric, extraocular movements intact  Neck: No jugular venous distension, no carotid bruits  CV: Regular rate and rhythm, 1/6 systolic murmur at LLSB, normal S1/S2  Pulm: Clear to auscultation bilaterally, no wheezes, rales, or ronchi  GI: Abdomen soft, nontender, nondistended, normoactive bowel sounds  Extremities: No lower extremity edema, 1+ pulse in bilateral PTs, 1+ popliteal pulses bilaterally, 2+ radial pulses bilaterally  Skin: No ecchymosis, erythema, or ulcers  Psych: Alert and oriented to person place location, appropriate affect  Neuro: No focal deficits    Labs:     Lab Results   Component Value Date     06/05/2019    K 4.1 06/05/2019     06/05/2019    CO2 27 06/05/2019    BUN 13 06/05/2019    CREATININE 0.9 06/05/2019    ANIONGAP 7 (L) 06/05/2019     Lab Results   Component Value Date    AST 33 06/05/2019    ALT 45 (H) 06/05/2019    ALKPHOS 65 06/05/2019    BILITOT 0.6 06/05/2019    BILIDIR 0.3 01/15/2014    ALBUMIN 3.8 06/05/2019     Lab Results   Component Value Date    CALCIUM 9.3 06/05/2019     No results found for: BNP, BNPTRIAGEBLO Lab Results   Component Value Date    WBC 5.93 06/05/2019    HGB 15.4 06/05/2019    HCT 47.3 06/05/2019     06/05/2019    GRAN 2.5 06/05/2019    GRAN 42.0 06/05/2019     Lab Results   Component Value Date    INR 0.9 06/05/2019     Lab Results   Component Value Date    CHOL 160 05/18/2019    CHOL 154 11/16/2017    HDL 34 (L) 05/18/2019    LDLCALC 109.4 05/18/2019    TRIG 83 05/18/2019     Lab Results   Component Value Date    HGBA1C 6.4 (H) 07/12/2011     Lab Results   Component Value Date    TSH 0.910 07/12/2011          Imaging:     Echo  None    Stress testing  [facility]A 7/25/14    1 - Normal left ventricular systolic function (EF 60-65%).     2 - Normal left ventricular diastolic function.     3 - Normal right ventricular systolic function.   No evidence of stress induced myocardial ischemia. Sensitivity is  impaired due to failure to reach target heart rate.     Cath  [facility] 6/14/13  B. ANGIOGRAPHIC RESULTS:  DIAGNOSTIC:     - Abdominal Aorta:             The supra renal abdominal aorta has luminal irregularities.             The infra renal abdominal aorta has luminal irregularities.     - Left Renal Artery:             The left renal is normal.     - Right Renal Artery:             The right renal is normal.     - Left Common Iliac Artery:             The left ISRRAEL has luminal irregularities.     - Right Common Iliac Artery:             The right ISRRAEL has luminal irregularities.     - Left Internal Iliac Artery:             The left IIA has luminal irregularities.     - Right Internal Iliac Artery:             The right IIA has luminal irregularities.     - Left External Iliac Artery:             The left EIA has luminal irregularities.     - Right External Iliac Artery:             The right EIA has luminal irregularities.     - Left Common Femoral Artery:             The left CFA has luminal irregularities.     - Right Common Femoral Artery:             The right CFA has luminal irregularities.     - Left Superficial Femoral Artery:             The left SFA has luminal irregularities has a 90% stenosis.     - Right Superficial Femoral Artery:             The distal right SFA has luminal irregularities has a 95% stenosis.     - Left Profunda Femoral Artery:             The left PFA has luminal irregularities.     - Right Profunda Femoral Artery:             The right PFA has luminal irregularities.     - Left Popliteal Artery:             The left POP has a 30% stenosis.     - Right Popliteal Artery:             The right POP has a 20% stenosis.     - Left Tibioperoneal Trunk:             The left TIBTRUNK is normal.     - Right Tibioperoneal Trunk:             The right TIBTRUNK is normal.     - Left Anterior Tibial Artery:             The left AT has a 90% stenosis.     - Right Anterior Tibial Artery:             The  right AT has luminal irregularities.     - Left Peroneal Artery:             The left peroneal has luminal irregularities.     - Right Peroneal Artery:             The right peroneal has a 50% stenosis. The remaining portion of the vessel has luminal irregularities.     - Left Posterior Tibial Artery:             The left PT has luminal irregularities.     - Right Posterior Tibial Artery:             The right PT has luminal irregularities.  INTERVENTION:       Distal Right SFA:              The following items were used: [place] 5.0 X 40, 135cm, [place] 6.0 X 40 and 135cm.    [facility] 13  DIAGNOSTIC:  - Left Superficial Femoral Artery: The mid left SFA has a 90% stenosis.  - Left Profunda Femoral Artery: The left PFA has luminal irregularities.    INTERVENTION:       Mid Left SFA:  The lesion was successfully intervened. Post-stenosis of 0%. The vessel was accessed natively.  The following items were used: [place] 5.0 X 40, 75cm, [place] 6.0 X 40 and 75cm.     Other  Carotid US 16  There is 20 - 39% right Internal Carotid stenosis.  There is 0 - 19% left Internal Carotid stenosis.    KARMA 16  1. There is > 50 % right mid SFA stenosis just proximal to the mid SFA stent.  2. Normal KARMA. L - 0.97, R 0.91  3. SFAm stent velocity origin 118 cm/sec, proximal 151 cm/sec, mid 129 cm/sec, distal 156 cm/sec. The right KARMA is .91.     KARMA 7/22/15  1.  Patent right mid SFA stent.  2.  No hemodynamically significant left lower extremity arterial disease.  3.  SFAm stent velocity origin 111 cm/sec, proximal 171 cm/sec, mid 130 cm/sec, distal 135 cm/sec. The right KARMA is .92     EK14  Normal sinus rhythm  Normal ECG    Assessment:     Peripheral artery disease s/p PCI  Hypertension  Hyperlipidemia  Venous insufficiency  Hepatits B     Plan:     Peripheral artery disease s/p PCI  - encouraged regular aerobic exercise  - obtain arterial US of lower extremities  - on plavix 75mg daily  - increase to atorvastatin  40mg daily (slowly up-titrating due to chronic hepatitis)   - lipid panel 5/18/19 with   - check lipids and LFTs in 6 weeks     Venous insufficiency  - encouraged leg elevation while sitting, regular ambulation, and low salt diet    Hypertension   - BP has been elevated at most recent clinic visits but was normal at last PCP visit  - discussed low sodium diet  - discussed starting medications vs obtaining BP cuff and sending values for my review in 10 days; he will keep daily BP log     Hyperlipidemia   - on atorvastatin, as above  - discussed heart healthy diet and regular aerobic exercise    Hepatits B - on antiviral therapy of tenofovir    Signed:  Shayne Yeager MD  Cardiology    7/25/2019 5:25 PM    Follow-up:     Future Appointments   Date Time Provider Department Center   7/25/2019  3:00 PM Claudia Melgar PA-C [facility] ORTHO [facility]   7/25/2019  4:30 PM Juan Yeager III, MD [facility] CARDIO [place]   7/31/2019  3:40 PM Yves Castellon MD [facility] IM [place] PCW   12/4/2019  7:00 AM [facility] US4 [facility] ULSOUND [facility]   12/4/2019  9:50 AM LABORATORY, [facility] LAB [facility]   12/11/2019  3:40 PM Aracelis Frye NP [facility] HEPAT [place]        Pediatric Neurosurgery  Pediatric Neurosurgery  [address] [place] [address]  Phone: [phone]  Fax: [phone]

## 2022-12-16 ENCOUNTER — TELEPHONE (OUTPATIENT)
Dept: INTERNAL MEDICINE | Facility: CLINIC | Age: 57
End: 2022-12-16
Payer: COMMERCIAL

## 2022-12-16 DIAGNOSIS — R20.0 NUMBNESS OF RIGHT FOOT: ICD-10-CM

## 2022-12-16 DIAGNOSIS — R20.2 PARESTHESIA OF RIGHT FOOT: Primary | ICD-10-CM

## 2022-12-16 NOTE — TELEPHONE ENCOUNTER
Called and spoke to pt. Pt states he is still experiencing pain in right calf and  foot. Pt states he had the nerve conduction test done on 11/23, but no one has relayed the results. Pt states the technician that performed the study told him that he had decreased blood flow in RLE that is causing the pain. Pt asking if PCP can look at the results and get back to him.

## 2022-12-16 NOTE — TELEPHONE ENCOUNTER
----- Message from Brianna Mccain sent at 12/16/2022 11:37 AM CST -----  Contact: [phone]  Pt is calling he states he did the test and is needing the appt with dr mckeon now the neuro test he took in regards to an artery in his right leg please give return call

## 2022-12-17 NOTE — TELEPHONE ENCOUNTER
Please ask patient to schedule follow-up appointments with his cardiologist concerning the blood vessel and also neurologist who ordered is EMG nerve conduction study.  Please let us know if he has any scheduling difficulty.  If his symptoms are acutely worsening, please schedule appointment with us here, thank you

## 2022-12-19 NOTE — TELEPHONE ENCOUNTER
Called and spoke to the pt. An MRI has been ordered by Neurology. The pt was not aware. I informed him that he should reach back out to Neurology and Cardiology. The pt expressed understanding.

## 2023-01-09 ENCOUNTER — SPECIALTY PHARMACY (OUTPATIENT)
Dept: PHARMACY | Facility: CLINIC | Age: 58
End: 2023-01-09
Payer: COMMERCIAL

## 2023-01-09 NOTE — TELEPHONE ENCOUNTER
Specialty Pharmacy - Refill Coordination    Specialty Medication Orders Linked to Encounter      Flowsheet Row Most Recent Value   Medication #1 tenofovir alafenamide (VEMLIDY) 25 mg Tab (Order#[id number], Rx#[id number])          Refill Questions - Documented Responses      Flowsheet Row Most Recent Value   Patient Availability and HIPAA Verification    Does patient want to proceed with activity? Unable to Reach   HIPAA/medical authority confirmed? No          We have had multiple attempts to the patient and have been unsuccessful to reach the patient. We will stop reaching out to the patient but in the event that the patient needs the med and contacts us, we will communicate and begin dispensing for the patient. At your next visit with the patient, please review the importance of being in contact with our specialty pharmacy as a part of our care team.    Interventions added this encounter   Closed: [facility] Provider Intervention - Drug therapy adherence: tenofovir alafenamide (VEMLIDY) 25 mg Tab     Abner Blank, PharmD  [facility]  [address]  [facility] [address]  Phone: [phone]  Fax: [phone]

## 2023-01-19 ENCOUNTER — SPECIALTY PHARMACY (OUTPATIENT)
Dept: PHARMACY | Facility: CLINIC | Age: 58
End: 2023-01-19
Payer: COMMERCIAL

## 2023-01-19 NOTE — TELEPHONE ENCOUNTER
Specialty Pharmacy - Refill Coordination    Specialty Medication Orders Linked to Encounter      Flowsheet Row Most Recent Value   Medication #1 tenofovir alafenamide (VEMLIDY) 25 mg Tab (Order#[id number], Rx#[id number])            Refill Questions - Documented Responses      Flowsheet Row Most Recent Value   Patient Availability and HIPAA Verification    Does patient want to proceed with activity? Yes   HIPAA/medical authority confirmed? Yes   Relationship to patient of person spoken to? Self   Refill Screening Questions    Changes to allergies? No   Changes to medications? No   New conditions since last clinic visit? No   Unplanned office visit, urgent care, ED, or hospital admission in the last 4 weeks? No   How does patient/caregiver feel medication is working? Excellent   Financial problems or insurance changes? No   How many doses of your specialty medications were missed in the last 4 weeks? 0   Would patient like to speak to a pharmacist? No   When does the patient need to receive the medication? 01/24/23   Refill Delivery Questions    How will the patient receive the medication? Pickup   When does the patient need to receive the medication? 01/24/23   Expected Copay ($) 0   Is the patient able to afford the medication copay? Yes   Payment Method zero copay   Days supply of Refill 30   Supplies needed? No supplies needed   Refill activity completed? Yes   Refill activity plan Refill scheduled   Shipment/Pickup Date: 01/23/23            Current Outpatient Medications   Medication Sig    amLODIPine (NORVASC) 5 MG tablet Take 1 tablet (5 mg total) by mouth once daily.    atorvastatin (LIPITOR) 80 MG tablet Take 1 tablet (80 mg total) by mouth once daily.    cilostazoL (PLETAL) 50 MG Tab Take 1 tablet (50 mg total) by mouth 2 (two) times daily.    econazole nitrate 1 % cream     famotidine (PEPCID) 20 MG tablet Take 1 tablet (20 mg total) by mouth once daily.    fexofenadine (ALLEGRA) 180 MG tablet Take 1 tablet  (180 mg total) by mouth once daily.    fluticasone (FLONASE) 50 mcg/actuation nasal spray 2 sprays (100 mcg total) by Each Nare route once daily. (Patient taking differently: 2 sprays by Each Nostril route as needed.)    gabapentin (NEURONTIN) 300 MG capsule Take 1 capsule (300 mg total) by mouth every evening.    hydroCHLOROthiazide (HYDRODIURIL) 25 MG tablet Take 1 tablet (25 mg total) by mouth once daily.    rivaroxaban (XARELTO) 2.5 mg Tab Take 1 tablet (2.5 mg total) by mouth 2 (two) times daily with meals.    sildenafiL (VIAGRA) 100 MG tablet Take 1 tablet (100 mg total) by mouth daily as needed for Erectile Dysfunction.    tenofovir alafenamide (VEMLIDY) 25 mg Tab Take 1 tablet (25 mg total) by mouth once daily.    triamcinolone acetonide 0.1% (KENALOG) 0.1 % cream Apply topically 2 (two) times daily as needed. Neck and body, not groin   Last reviewed on 11/7/2022  9:17 AM by Audrey Chavez RN    Review of patient's allergies indicates:  No Known Allergies Last reviewed on  12/16/2022 12:16 PM by Mary Jane Levy      Tasks added this encounter   No tasks added.   Tasks due within next 3 months   12/27/2022 - Refill Call (Auto Added)     [facility]  [address] [place] [id number]  Phone: [phone]  Fax: [phone]

## 2023-02-27 ENCOUNTER — SPECIALTY PHARMACY (OUTPATIENT)
Dept: PHARMACY | Facility: CLINIC | Age: 58
End: 2023-02-27
Payer: COMMERCIAL

## 2023-02-27 NOTE — TELEPHONE ENCOUNTER
Specialty Pharmacy - Refill Coordination    Specialty Medication Orders Linked to Encounter      Flowsheet Row Most Recent Value   Medication #1 tenofovir alafenamide (VEMLIDY) 25 mg Tab (Order#[id number], Rx#[id number])          Refill Questions - Documented Responses      Flowsheet Row Most Recent Value   Patient Availability and HIPAA Verification    Does patient want to proceed with activity? Unable to Reach   HIPAA/medical authority confirmed? No          We have had multiple attempts to the patient and have been unsuccessful to reach the patient. We will stop reaching out to the patient but in the event that the patient needs the med and contacts us, we will communicate and begin dispensing for the patient. At your next visit with the patient, please review the importance of being in contact with our specialty pharmacy as a part of our care team.    Interventions added this encounter   Closed: [facility] Provider Intervention - Drug therapy adherence: tenofovir alafenamide (VEMLIDY) 25 mg Tab     Abner Blank, PharmD  [facility]  [address]  [facility] [address]  Phone: [phone]  Fax: [phone]

## 2023-03-05 PROBLEM — K76.9 LIVER LESION: Status: ACTIVE | Noted: 2019-12-12

## 2023-03-05 NOTE — PROGRESS NOTES
Hepatology Note    PATIENT: Ramiro Oneil  MRN: [id number]  DATE: 3/7/2023    Provider: Hepatologist - Dr Tapia  Urgency of review: non-urgent  Referring provider: No ref. provider found    Diagnosis:   1. Liver lesion    2. Chronic hepatitis B    3. NAFLD (nonalcoholic fatty liver disease)    4. Hepatic fibrosis, early fibrosis          Chief complaint:   Chief Complaint   Patient presents with    Hepatitis B         Subjective:    Initial History: Ramiro Oneil is a 57 y.o. male who was following to Hepatology Clinic for Hepatitis B.     3/7/23  Patient does not have any new complains from liver standpoint. Liver enzymes are stable.    9/7/22  Patient is new to me and previously managed by Esthela Leger, NP    Is drinking beer intermittently    He denies recent hematemesis, coffee ground emesis, melena, hematochezia, jaundice, confusion, LE edema, abdominal distension.     Patient is on Vemlidy   HBV undetectable. Normal AFP. US 8/22--No liver lesion  Per records  Patient was switched from Viread on (2/25/1r) to Vemlidy in 11/2018. Also + NAFLD. Risk factors for NAFLD include alcohol use, overweight, HTN, HLD    Previous serologic w/u negative for Juan's,  hemochromatosis, autoimmune etiology, and viral hepatitis C     Liver fibrosis staging:  - Fibrosure 7/2014 - F1-F2, only mild fibrosis  - Elastography scan 1/2016 = F0-F1  - Fibroscan 11/2018 - F2, S3  -- Fibroscan 7/2020 noted F2 (kPA 7.1), S3 ()  -- fibroscan 8/2021 noted no fibrosis, S3 steatosis (kPA 5.6, )    Patient is on antocaogulation for DVT    Prior Relevant History:    He  denies hepatotoxic medication    Review of systems:  A review of 12+ systems was conducted with pertinent positive and negative findings documented in HPI with all other systems reviewed and negative.      PFSH:  Past medical, family, and social history reviewed as documented in chart with pertinent positive medical, family, and social history detailed in  HPI.    Past Medical History:   Past Medical History:   Diagnosis Date    Anticoagulant long-term use     Arthritis     Dermatitis     Hepatitis B     History of surgery on arm 07/2020    Hypertension     Liver fibrosis 12/12/2019    F2 on fibroscan 11/2018    NAFLD (nonalcoholic fatty liver disease)        Past Surgical HIstory:   Past Surgical History:   Procedure Laterality Date    CARPAL TUNNEL RELEASE      COLONOSCOPY      COLONOSCOPY N/A 11/7/2022    Procedure: COLONOSCOPY;  Surgeon: Brent Bowden MD;  Location: [facility] ENDO (4TH FLR);  Service: Endoscopy;  Laterality: N/A;  Do not cancel this order  ok to hold Pletal and Xarelto 2 days per Dr Mccoy      vaccinated-GT  instr mailed    PERCUTANEOUS TRANSLUMINAL ANGIOPLASTY (PTA) OF PERIPHERAL VESSEL N/A 2/4/2022    Procedure: PTA, PERIPHERAL VESSEL;  Surgeon: Rodger Blake MD;  Location: [facility] CATH LAB;  Service: Cardiology;  Laterality: N/A;    PTA/stenting of distal right SFA       right hand surgery      SHOULDER SURGERY      stents         Family History:   Family History   Problem Relation Age of Onset    Diabetes Mother     Hypertension Mother     Cancer Father         lung    Diabetes Sister     Cancer Sister         breast    Hypertension Sister     Cancer Brother         prostate    Cancer Paternal Uncle     Diabetes Paternal Uncle     Stroke Sister     Liver disease Neg Hx     Cirrhosis Neg Hx      He has no known family history of liver disease    Social History:  reports that he has never smoked. He has never used smokeless tobacco. He reports that he does not currently use alcohol. He reports that he does not use drugs.    He has no significant history of Alcohol     He denies history of IV drug use/Tatto  He  denies high-risk sexual contacts, no raw seafood, no sick contacts      Allergies:  Review of patient's allergies indicates:  No Known Allergies    Medications:  Current Outpatient Medications   Medication Sig Dispense Refill     amLODIPine (NORVASC) 5 MG tablet Take 1 tablet (5 mg total) by mouth once daily. 90 tablet 1    atorvastatin (LIPITOR) 80 MG tablet Take 1 tablet (80 mg total) by mouth once daily. 90 tablet 3    cilostazoL (PLETAL) 50 MG Tab Take 1 tablet (50 mg total) by mouth 2 (two) times daily. 60 tablet 11    econazole nitrate 1 % cream       famotidine (PEPCID) 20 MG tablet Take 1 tablet (20 mg total) by mouth once daily. 30 tablet 11    fexofenadine (ALLEGRA) 180 MG tablet Take 1 tablet (180 mg total) by mouth once daily. 30 tablet 1    fluticasone (FLONASE) 50 mcg/actuation nasal spray 2 sprays (100 mcg total) by Each Nare route once daily. (Patient taking differently: 2 sprays by Each Nostril route as needed.) 1 Bottle 5    gabapentin (NEURONTIN) 300 MG capsule Take 1 capsule (300 mg total) by mouth every evening. 30 capsule 11    hydroCHLOROthiazide (HYDRODIURIL) 25 MG tablet Take 1 tablet (25 mg total) by mouth once daily. 90 tablet 1    rivaroxaban (XARELTO) 2.5 mg Tab Take 1 tablet (2.5 mg total) by mouth 2 (two) times daily with meals. 60 tablet 11    tenofovir alafenamide (VEMLIDY) 25 mg Tab Take 1 tablet (25 mg total) by mouth once daily. 30 tablet 11    triamcinolone acetonide 0.1% (KENALOG) 0.1 % cream Apply topically 2 (two) times daily as needed. Neck and body, not groin 80 g 1    sildenafiL (VIAGRA) 100 MG tablet Take 1 tablet (100 mg total) by mouth daily as needed for Erectile Dysfunction. 20 tablet 5     No current facility-administered medications for this visit.       Review of Systems   Constitutional:  Negative for fatigue, fever and unexpected weight change.   HENT:  Negative for ear pain, nosebleeds and trouble swallowing.    Eyes:  Negative for discharge and redness.   Respiratory:  Negative for cough and shortness of breath.    Cardiovascular:  Negative for palpitations and leg swelling.   Gastrointestinal:  Negative for abdominal distention, abdominal pain, diarrhea and vomiting.   Endocrine:  "Negative for cold intolerance and polyuria.   Genitourinary:  Negative for flank pain and hematuria.   Musculoskeletal:  Negative for back pain.   Skin:  Negative for pallor.   Neurological:  Negative for seizures and headaches.   Hematological:  Does not bruise/bleed easily.   Psychiatric/Behavioral:  Negative for confusion and hallucinations.          Objective:      Vitals:   Vitals:    03/07/23 1439   Weight: 85.5 kg (188 lb 7.9 oz)   Height: 5' 9" (1.753 m)         Physical Exam  Constitutional:       Appearance: Normal appearance.   HENT:      Head: Normocephalic and atraumatic.      Right Ear: External ear normal.      Left Ear: External ear normal.      Mouth/Throat:      Mouth: Mucous membranes are moist.   Eyes:      Extraocular Movements: Extraocular movements intact.      Pupils: Pupils are equal, round, and reactive to light.   Cardiovascular:      Rate and Rhythm: Normal rate and regular rhythm.      Pulses: Normal pulses.      Heart sounds: Normal heart sounds.   Pulmonary:      Effort: Pulmonary effort is normal.      Breath sounds: Normal breath sounds.   Abdominal:      General: Bowel sounds are normal. There is no distension.      Palpations: Abdomen is soft. There is no mass.      Tenderness: There is no abdominal tenderness.   Musculoskeletal:         General: Normal range of motion.      Cervical back: Normal range of motion and neck supple.   Neurological:      General: No focal deficit present.      Mental Status: He is alert and oriented to person, place, and time.       Laboratory Data:  Lab Visit on 03/07/2023   Component Date Value Ref Range Status    Prothrombin Time 03/07/2023 11.5  9.0 - 12.5 sec Final    INR 03/07/2023 1.1  0.8 - 1.2 Final    Comment: Coumadin Therapy:  2.0 - 3.0 for INR for all indicators except mechanical heart valves  and antiphospholipid syndromes which should use 2.5 - 3.5.            I personally reviewed imaging studies and outside records..    Assessment:     "   1. Liver lesion    2. Chronic hepatitis B    3. NAFLD (nonalcoholic fatty liver disease)    4. Hepatic fibrosis, early fibrosis                   Plan:     Ramiro was seen today for hepatitis b.    Diagnoses and all orders for this visit:    Liver lesion  -     US Abdomen Limited; Standing    Chronic hepatitis B  -     FibroScan (Vibration Controlled Transient Elastography); Future; Expected date: 03/05/2024  -     AFP Tumor Marker; Future; Expected date: 03/07/2023  -     Hepatitis B Surface Antigen; Future; Expected date: 03/07/2023  -     Hepatitis Delta Virus; Future; Expected date: 03/07/2023    NAFLD (nonalcoholic fatty liver disease)    Hepatic fibrosis, early fibrosis            Chronic hepatitis B, E Ab pos   -- On Vemlidy 25 mg daily . Reinforced importance of taking daily without any missed doses   -- transaminases essentially WNL  -- HCC screening Q 6 months with AFP and abd.    --Fibroscan 1-2 year     3. Fatty liver  -- risk factors for FLD include alcohol use, overweight, HTN, HLD     4. Alcohol use  - recommended alcohol abstinence given chronic Hep B       Return to clinic in 6 months.    I have sent communication to the referring physician and/or primary care provider.      Time Statement  A total of 45 minutes were spent   Time Includes preparing to see patient, reviewing  diagnostic studies and records, direct face-to-face visit, completing orders, medications , reconciliation, prescription management, and care coordination    We discussed in depth the nature of the patient's disease, the management plan in details. I have provided the patient with an opportunity to ask questions and have all questions answered to his satisfaction.       NAFLD  There is no FDA approved therapy for fatty liver disease. Therefore, these things are important:  Limit alcohol consumption, none until further notice   2 Weight loss goal of 30 lbs, referral for [facility] if interested. Also, if interested  in a dietician visit to create a weight loss plan, contact the dietician team at [facility] at [email or web address] to schedule a visit to you can call [facility] in [place]: [phone] and the  will transfer the call to one of the dieticians to schedule an appointment. Or you can also call [phone] to schedule. They do offer video visits   3. Low carb/sugar, high fiber and protein diet.Try to limit your carb intake to LESS than 30-45 grams of carbs with a meal or LESS than 5-10 grams with any snack (total of any snack foods eaten during that time). Use MyFitness Pal adam to add up your carbs through the day. Do NOT drink any beverages with calories or carbs (this can lead to high blood sugar and weight gain). Also, some of our patients have been very successful with weight loss using the pre made/planned meal planning services that limit calories and portion size (one example is Sensible Meals but there are many out there)  4. Exercise, 5 days per week, 30 minutes per day, as tolerated  5. Recommend good cholesterol, blood pressure, blood sugar levels .     In some people, fatty liver can progress to steatohepatitis (inflamatory fatty liver) and possibly to cirrhosis, putting one at increased risk for liver cancer, liver failure, and death. Therefore, the lifestyle changes are very important to decrease this risk.      Website with information about fatty liver and inflammation related to fatty liver (AMBROSE) = www.nashtruth.com  AND www.NASHactually.com         Patient was seen in the liver transplant department at [facility] Brad Tapia MD  Transplant Hepatologist and Gastroenterologist  Hepatology and Liver Transplant  [facility]  [facility]

## 2023-03-07 ENCOUNTER — HOSPITAL ENCOUNTER (OUTPATIENT)
Dept: RADIOLOGY | Facility: HOSPITAL | Age: 58
Discharge: HOME | End: 2023-03-07
Attending: INTERNAL MEDICINE
Payer: COMMERCIAL

## 2023-03-07 ENCOUNTER — OFFICE VISIT (OUTPATIENT)
Dept: HEPATOLOGY | Facility: CLINIC | Age: 58
End: 2023-03-07
Payer: COMMERCIAL

## 2023-03-07 VITALS — BODY MASS INDEX: 27.92 KG/M2 | WEIGHT: 188.5 LBS | HEIGHT: 69 IN

## 2023-03-07 DIAGNOSIS — K76.0 NAFLD (NONALCOHOLIC FATTY LIVER DISEASE): ICD-10-CM

## 2023-03-07 DIAGNOSIS — B18.1 CHRONIC HEPATITIS B: ICD-10-CM

## 2023-03-07 DIAGNOSIS — K74.01 HEPATIC FIBROSIS, EARLY FIBROSIS: ICD-10-CM

## 2023-03-07 DIAGNOSIS — K76.9 LIVER LESION: Primary | ICD-10-CM

## 2023-03-07 PROCEDURE — 99214 OFFICE O/P EST MOD 30 MIN: CPT | Mod: S$GLB,,, | Performed by: INTERNAL MEDICINE

## 2023-03-07 PROCEDURE — 1159F MED LIST DOCD IN RCRD: CPT | Mod: CPTII,S$GLB,, | Performed by: INTERNAL MEDICINE

## 2023-03-07 PROCEDURE — 99999 PR PBB SHADOW E&M-EST. PATIENT-LVL IV: CPT | Mod: PBBFAC,,, | Performed by: INTERNAL MEDICINE

## 2023-03-07 PROCEDURE — 99214 PR OFFICE/OUTPT VISIT, EST, LEVL IV, 30-39 MIN: ICD-10-PCS | Mod: S$GLB,,, | Performed by: INTERNAL MEDICINE

## 2023-03-07 PROCEDURE — 99999 PR PBB SHADOW E&M-EST. PATIENT-LVL IV: ICD-10-PCS | Mod: PBBFAC,,, | Performed by: INTERNAL MEDICINE

## 2023-03-07 PROCEDURE — 1159F PR MEDICATION LIST DOCUMENTED IN MEDICAL RECORD: ICD-10-PCS | Mod: CPTII,S$GLB,, | Performed by: INTERNAL MEDICINE

## 2023-03-07 PROCEDURE — 3008F BODY MASS INDEX DOCD: CPT | Mod: CPTII,S$GLB,, | Performed by: INTERNAL MEDICINE

## 2023-03-07 PROCEDURE — 3008F PR BODY MASS INDEX (BMI) DOCUMENTED: ICD-10-PCS | Mod: CPTII,S$GLB,, | Performed by: INTERNAL MEDICINE

## 2023-03-07 PROCEDURE — 76705 ECHO EXAM OF ABDOMEN: CPT | Mod: 26,,, | Performed by: RADIOLOGY

## 2023-03-07 PROCEDURE — 76705 US ABDOMEN LIMITED: ICD-10-PCS | Mod: 26,,, | Performed by: RADIOLOGY

## 2023-03-07 PROCEDURE — 76705 ECHO EXAM OF ABDOMEN: CPT | Mod: TC

## 2023-03-09 ENCOUNTER — TELEPHONE (OUTPATIENT)
Dept: HEPATOLOGY | Facility: CLINIC | Age: 58
End: 2023-03-09
Payer: COMMERCIAL

## 2023-03-09 NOTE — TELEPHONE ENCOUNTER
Returned patient's call and informed him that his ultrasound was normal and to continue with surveillance every 6 months. Patient voiced understanding.

## 2023-03-09 NOTE — TELEPHONE ENCOUNTER
----- Message from Cesilia Rico sent at 3/9/2023  2:03 PM CST -----  Type:  Patient Returning Call    Who Called:pt  Who Left Message for Patient:nurse  Does the patient know what this is regarding?:US results  Would the patient rather a call back or a response via [email or web address]? call  Best Call Back Number:[phone]  Additional Information: .    Thank you

## 2023-03-30 NOTE — TELEPHONE ENCOUNTER
Specialty Pharmacy - Refill Coordination    Specialty Medication Orders Linked to Encounter      Flowsheet Row Most Recent Value   Medication #1 tenofovir alafenamide (VEMLIDY) 25 mg Tab (Order#[id number], Rx#[id number])            Refill Questions - Documented Responses      Flowsheet Row Most Recent Value   Patient Availability and HIPAA Verification    Does patient want to proceed with activity? Yes   HIPAA/medical authority confirmed? Yes   Relationship to patient of person spoken to? Self   Refill Screening Questions    Changes to allergies? No   Changes to medications? No   New conditions since last clinic visit? No   Unplanned office visit, urgent care, ED, or hospital admission in the last 4 weeks? No   How does patient/caregiver feel medication is working? Good   Financial problems or insurance changes? No   How many doses of your specialty medications were missed in the last 4 weeks? 0   Would patient like to speak to a pharmacist? No   When does the patient need to receive the medication? 12/04/22   Refill Delivery Questions    How will the patient receive the medication? Pickup   When does the patient need to receive the medication? 12/04/22   Expected Copay ($) 0   Is the patient able to afford the medication copay? Yes   Payment Method zero copay   Days supply of Refill 30   Supplies needed? No supplies needed   Refill activity completed? Yes   Refill activity plan Refill scheduled   Shipment/Pickup Date: 12/02/22            Current Outpatient Medications   Medication Sig    amLODIPine (NORVASC) 5 MG tablet Take 1 tablet (5 mg total) by mouth once daily.    atorvastatin (LIPITOR) 80 MG tablet Take 1 tablet (80 mg total) by mouth once daily.    cilostazoL (PLETAL) 50 MG Tab Take 1 tablet (50 mg total) by mouth 2 (two) times daily.    econazole nitrate 1 % cream     famotidine (PEPCID) 20 MG tablet Take 1 tablet (20 mg total) by mouth once daily.    fexofenadine (ALLEGRA) 180 MG tablet Take 1 tablet (180  mg total) by mouth once daily.    fluticasone (FLONASE) 50 mcg/actuation nasal spray 2 sprays (100 mcg total) by Each Nare route once daily. (Patient taking differently: 2 sprays by Each Nostril route as needed.)    gabapentin (NEURONTIN) 300 MG capsule Take 1 capsule (300 mg total) by mouth every evening.    hydroCHLOROthiazide (HYDRODIURIL) 25 MG tablet Take 1 tablet (25 mg total) by mouth once daily.    rivaroxaban (XARELTO) 2.5 mg Tab Take 1 tablet (2.5 mg total) by mouth 2 (two) times daily with meals.    sildenafiL (VIAGRA) 100 MG tablet Take 1 tablet (100 mg total) by mouth daily as needed for Erectile Dysfunction.    tenofovir alafenamide (VEMLIDY) 25 mg Tab Take 1 tablet (25 mg total) by mouth once daily.    triamcinolone acetonide 0.1% (KENALOG) 0.1 % cream Apply topically 2 (two) times daily as needed. Neck and body, not groin   Last reviewed on 11/7/2022  9:17 AM by Audrey Chavez RN    Review of patient's allergies indicates:  No Known Allergies Last reviewed on  11/28/2022 10:19 AM by Celina Prater      Tasks added this encounter   12/27/2022 - Refill Call (Auto Added)  12/3/2022 - Pickup Reminder   Tasks due within next 3 months   No tasks due.     Abner Blank, PharmD  [facility]  [address]  [facility] [address]  Phone: [phone]  Fax: [phone]         Never

## 2023-04-12 ENCOUNTER — SPECIALTY PHARMACY (OUTPATIENT)
Dept: PHARMACY | Facility: CLINIC | Age: 58
End: 2023-04-12
Payer: COMMERCIAL

## 2023-04-12 NOTE — TELEPHONE ENCOUNTER
Specialty Pharmacy - Refill Coordination    Although we have not been in contact with Mr. Oneil since 1/2023, he denies missed doses. We reviewed adherence management. Patient expressed understanding. He states he has unused vemlidy on hand which is why he has not been in touch. He states he has 3 tablets on hand right now with no additional doses.     Specialty Medication Orders Linked to Encounter      Flowsheet Row Most Recent Value   Medication #1 tenofovir alafenamide (VEMLIDY) 25 mg Tab (Order#[id number], Rx#[id number])            Refill Questions - Documented Responses      Flowsheet Row Most Recent Value   Patient Availability and HIPAA Verification    Does patient want to proceed with activity? Yes   HIPAA/medical authority confirmed? Yes   Relationship to patient of person spoken to? Self   Refill Screening Questions    Changes to allergies? No   Changes to medications? No   New conditions since last clinic visit? No   Unplanned office visit, urgent care, ED, or hospital admission in the last 4 weeks? No   How does patient/caregiver feel medication is working? Good   Financial problems or insurance changes? No   How many doses of your specialty medications were missed in the last 4 weeks? 0   Would patient like to speak to a pharmacist? No   When does the patient need to receive the medication? 04/15/23   Refill Delivery Questions    How will the patient receive the medication? [facility]   When does the patient need to receive the medication? 04/15/23   Shipping Address Home   Address in [facility] confirmed and updated if neccessary? Yes   Expected Copay ($) 0   Is the patient able to afford the medication copay? Yes   Payment Method zero copay   Days supply of Refill 30   Supplies needed? No supplies needed   Refill activity completed? Yes   Refill activity plan Refill scheduled   Shipment/Pickup Date: 04/12/23            Current Outpatient Medications   Medication Sig    amLODIPine (NORVASC) 5 MG  tablet Take 1 tablet (5 mg total) by mouth once daily.    atorvastatin (LIPITOR) 80 MG tablet Take 1 tablet (80 mg total) by mouth once daily.    cilostazoL (PLETAL) 50 MG Tab Take 1 tablet (50 mg total) by mouth 2 (two) times daily.    econazole nitrate 1 % cream     famotidine (PEPCID) 20 MG tablet Take 1 tablet (20 mg total) by mouth once daily.    fexofenadine (ALLEGRA) 180 MG tablet Take 1 tablet (180 mg total) by mouth once daily.    fluticasone (FLONASE) 50 mcg/actuation nasal spray 2 sprays (100 mcg total) by Each Nare route once daily. (Patient taking differently: 2 sprays by Each Nostril route as needed.)    gabapentin (NEURONTIN) 300 MG capsule Take 1 capsule (300 mg total) by mouth every evening.    hydroCHLOROthiazide (HYDRODIURIL) 25 MG tablet Take 1 tablet (25 mg total) by mouth once daily.    rivaroxaban (XARELTO) 2.5 mg Tab Take 1 tablet (2.5 mg total) by mouth 2 (two) times daily with meals.    sildenafiL (VIAGRA) 100 MG tablet Take 1 tablet (100 mg total) by mouth daily as needed for Erectile Dysfunction.    tenofovir alafenamide (VEMLIDY) 25 mg Tab Take 1 tablet (25 mg total) by mouth once daily.    triamcinolone acetonide 0.1% (KENALOG) 0.1 % cream Apply topically 2 (two) times daily as needed. Neck and body, not groin   Last reviewed on 3/7/2023  2:41 PM by Evin Al MA    Review of patient's allergies indicates:  No Known Allergies Last reviewed on  3/7/2023 2:39 PM by Evin Al      Tasks added this encounter   No tasks added.   Tasks due within next 3 months   2/16/2023 - Refill Call (Auto Added)     ANA TOTH, PharmD  [facility]  [address] [place] [id number]  Phone: [phone]  Fax: [phone]

## 2023-05-02 ENCOUNTER — OFFICE VISIT (OUTPATIENT)
Dept: UROLOGY | Facility: CLINIC | Age: 58
End: 2023-05-02
Payer: COMMERCIAL

## 2023-05-02 ENCOUNTER — LAB VISIT (OUTPATIENT)
Dept: LAB | Facility: HOSPITAL | Age: 58
End: 2023-05-02
Attending: NURSE PRACTITIONER
Payer: COMMERCIAL

## 2023-05-02 VITALS
WEIGHT: 188.5 LBS | TEMPERATURE: 97 F | DIASTOLIC BLOOD PRESSURE: 80 MMHG | SYSTOLIC BLOOD PRESSURE: 139 MMHG | BODY MASS INDEX: 27.92 KG/M2 | HEIGHT: 69 IN | HEART RATE: 73 BPM

## 2023-05-02 DIAGNOSIS — R97.20 RISING PSA LEVEL: Primary | ICD-10-CM

## 2023-05-02 DIAGNOSIS — N52.01 ERECTILE DYSFUNCTION DUE TO ARTERIAL INSUFFICIENCY: ICD-10-CM

## 2023-05-02 DIAGNOSIS — R97.20 RISING PSA LEVEL: ICD-10-CM

## 2023-05-02 LAB
BILIRUB SERPL-MCNC: NORMAL MG/DL
BLOOD URINE, POC: NORMAL
CLARITY, POC UA: CLEAR
COLOR, POC UA: YELLOW
COMPLEXED PSA SERPL-MCNC: 1.7 NG/ML (ref 0–4)
GLUCOSE UR QL STRIP: NORMAL
KETONES UR QL STRIP: NORMAL
LEUKOCYTE ESTERASE URINE, POC: NORMAL
NITRITE, POC UA: NORMAL
PH, POC UA: 5.5
PROTEIN, POC: NORMAL
SPECIFIC GRAVITY, POC UA: 1.03
UROBILINOGEN, POC UA: 0.2

## 2023-05-02 PROCEDURE — 3075F PR MOST RECENT SYSTOLIC BLOOD PRESS GE 130-139MM HG: ICD-10-PCS | Mod: CPTII,S$GLB,, | Performed by: NURSE PRACTITIONER

## 2023-05-02 PROCEDURE — 99999 PR PBB SHADOW E&M-EST. PATIENT-LVL III: ICD-10-PCS | Mod: PBBFAC,,, | Performed by: NURSE PRACTITIONER

## 2023-05-02 PROCEDURE — 3075F SYST BP GE 130 - 139MM HG: CPT | Mod: CPTII,S$GLB,, | Performed by: NURSE PRACTITIONER

## 2023-05-02 PROCEDURE — 3008F PR BODY MASS INDEX (BMI) DOCUMENTED: ICD-10-PCS | Mod: CPTII,S$GLB,, | Performed by: NURSE PRACTITIONER

## 2023-05-02 PROCEDURE — 3079F DIAST BP 80-89 MM HG: CPT | Mod: CPTII,S$GLB,, | Performed by: NURSE PRACTITIONER

## 2023-05-02 PROCEDURE — 81002 POCT URINE DIPSTICK WITHOUT MICROSCOPE: ICD-10-PCS | Mod: S$GLB,,, | Performed by: NURSE PRACTITIONER

## 2023-05-02 PROCEDURE — 81002 URINALYSIS NONAUTO W/O SCOPE: CPT | Mod: S$GLB,,, | Performed by: NURSE PRACTITIONER

## 2023-05-02 PROCEDURE — 99214 PR OFFICE/OUTPT VISIT, EST, LEVL IV, 30-39 MIN: ICD-10-PCS | Mod: S$GLB,,, | Performed by: NURSE PRACTITIONER

## 2023-05-02 PROCEDURE — 3079F PR MOST RECENT DIASTOLIC BLOOD PRESSURE 80-89 MM HG: ICD-10-PCS | Mod: CPTII,S$GLB,, | Performed by: NURSE PRACTITIONER

## 2023-05-02 PROCEDURE — 84153 ASSAY OF PSA TOTAL: CPT | Performed by: NURSE PRACTITIONER

## 2023-05-02 PROCEDURE — 3008F BODY MASS INDEX DOCD: CPT | Mod: CPTII,S$GLB,, | Performed by: NURSE PRACTITIONER

## 2023-05-02 PROCEDURE — 99999 PR PBB SHADOW E&M-EST. PATIENT-LVL III: CPT | Mod: PBBFAC,,, | Performed by: NURSE PRACTITIONER

## 2023-05-02 PROCEDURE — 99214 OFFICE O/P EST MOD 30 MIN: CPT | Mod: S$GLB,,, | Performed by: NURSE PRACTITIONER

## 2023-05-02 PROCEDURE — 36415 COLL VENOUS BLD VENIPUNCTURE: CPT | Performed by: NURSE PRACTITIONER

## 2023-05-02 NOTE — PROGRESS NOTES
Chief Complaint:   Erectile dysfunction  Rising PSA    HPI:   Patient is a 57-year-old male that is following up on erectile dysfunction and slightly rising PSA.  Patient was to have repeat PSA, will be sent today.  Patient states the Viagra does not allow him to maintain a rigid enough erection and is requesting other treatment options.  Denies adverse side effects to Viagra.  No BPH symptoms for BPH meds.  10/22/2022  Patient is a 57-year-old male that is presenting per primary care physician.  Patient had an increase in PSA slightly.  PSA increased from 1.4 to 2.1.  No BPH meds.  Urine in clinic is negative.  PVR is low, 18 mL.  No previous prostate biopsies.  Reports father had prostate cancer.No abd/pelvic pain and no exac/rel factors.  No hematuria.  No urolithiasis.  No urinary bother.  No  history.  Patient states that he would like to talk about options for erectile dysfunction.  Unable to maintain an erection.    Allergies:  Patient has no known allergies.    Medications:  has a current medication list which includes the following prescription(s): amlodipine, atorvastatin, cilostazol, econazole nitrate, famotidine, fexofenadine, fluticasone propionate, gabapentin, hydrochlorothiazide, rivaroxaban, sildenafil, vemlidy, triamcinolone acetonide 0.1%, and [DISCONTINUED] tenofovir disoproxil fumarate.    Review of Systems:  General: No fever, chills, fatigability, or weight loss.  Skin: No rashes, itching, or changes in color or texture of skin.  Chest: Denies LOPEZ, cyanosis, wheezing, cough, and sputum production.  Abdomen: Appetite fine. No weight loss. Denies diarrhea, abdominal pain, hematemesis, or blood in stool.  Musculoskeletal: No joint stiffness or swelling. Denies back pain.  : As above.  All other review of systems negative.    PMH:   has a past medical history of Anticoagulant long-term use, Arthritis, Dermatitis, Hepatitis B, History of surgery on arm (07/2020), Hypertension, Liver fibrosis  (12/12/2019), and NAFLD (nonalcoholic fatty liver disease).    PSH:   has a past surgical history that includes Shoulder surgery; Colonoscopy; PTA/stenting of distal right SFA ; stents; Carpal tunnel release; right hand surgery; Percutaneous transluminal angioplasty (PTA) of peripheral vessel (N/A, 2/4/2022); and Colonoscopy (N/A, 11/7/2022).    FamHx: family history includes Cancer in his brother, father, paternal uncle, and sister; Diabetes in his mother, paternal uncle, and sister; Hypertension in his mother and sister; Stroke in his sister.    SocHx:  reports that he has never smoked. He has never used smokeless tobacco. He reports that he does not currently use alcohol. He reports that he does not use drugs.      Physical Exam:  Vitals:    05/02/23 0750   BP: 139/80   Pulse: 73   Temp: 97.4 °F (36.3 °C)     General: A&Ox3, no apparent distress, no deformities  Neck: No masses, normal thyroid  Lungs: normal inspiration, no use of accessory muscles  Heart: normal pulse, no arrhythmias  Abdomen: Soft, NT, ND, no masses, no hernias, no hepatosplenomegaly  Lymphatic: Neck and groin nodes negative  Skin: The skin is warm and dry. No jaundice.  :10/2022 Test desc kim, no abnormalities of epididymus. Penis with normal penile and scrotal skin. Meatus normal. Normal rectal tone, no hemorrhoids. Prost 35 gm no nodules or masses appreciated. SV not palpable. Perineum and anus normal  Labs/Studies:    Latest Reference Range & Units 12/16/17 08:27 12/03/18 14:32 11/29/19 16:39 01/02/21 08:49 08/29/22 10:36   PSA, Screen 0.00 - 4.00 ng/mL 1.1 0.72 1.3 1.4 2.1     Impression/Plan:   1.Rising PSA  Patient is aware that current PSA is normal.  PSA has risen slightly, therefore, will reassess labs today.  If still in normal range, will reassess annually.    2.ED  Patient was scheduled with vacuum erection device clinic.

## 2023-05-03 ENCOUNTER — TELEPHONE (OUTPATIENT)
Dept: UROLOGY | Facility: CLINIC | Age: 58
End: 2023-05-03
Payer: COMMERCIAL

## 2023-05-03 ENCOUNTER — OFFICE VISIT (OUTPATIENT)
Dept: CARDIOLOGY | Facility: CLINIC | Age: 58
End: 2023-05-03
Payer: COMMERCIAL

## 2023-05-03 VITALS
DIASTOLIC BLOOD PRESSURE: 74 MMHG | HEART RATE: 77 BPM | SYSTOLIC BLOOD PRESSURE: 138 MMHG | BODY MASS INDEX: 28.63 KG/M2 | OXYGEN SATURATION: 97 % | WEIGHT: 193.31 LBS | HEIGHT: 69 IN

## 2023-05-03 DIAGNOSIS — I77.9 PAOD (PERIPHERAL ARTERIAL OCCLUSIVE DISEASE): Primary | ICD-10-CM

## 2023-05-03 DIAGNOSIS — Z12.5 PROSTATE CANCER SCREENING: Primary | ICD-10-CM

## 2023-05-03 DIAGNOSIS — R20.2 PARESTHESIA OF RIGHT FOOT: ICD-10-CM

## 2023-05-03 DIAGNOSIS — B18.1 CHRONIC HEPATITIS B: ICD-10-CM

## 2023-05-03 DIAGNOSIS — R29.3 POSTURE IMBALANCE: ICD-10-CM

## 2023-05-03 DIAGNOSIS — E78.5 HYPERLIPIDEMIA, UNSPECIFIED HYPERLIPIDEMIA TYPE: ICD-10-CM

## 2023-05-03 DIAGNOSIS — I10 HYPERTENSION, ESSENTIAL: ICD-10-CM

## 2023-05-03 DIAGNOSIS — K76.0 NAFLD (NONALCOHOLIC FATTY LIVER DISEASE): ICD-10-CM

## 2023-05-03 PROCEDURE — 3078F DIAST BP <80 MM HG: CPT | Mod: CPTII,S$GLB,, | Performed by: INTERNAL MEDICINE

## 2023-05-03 PROCEDURE — 3078F PR MOST RECENT DIASTOLIC BLOOD PRESSURE < 80 MM HG: ICD-10-PCS | Mod: CPTII,S$GLB,, | Performed by: INTERNAL MEDICINE

## 2023-05-03 PROCEDURE — 3075F PR MOST RECENT SYSTOLIC BLOOD PRESS GE 130-139MM HG: ICD-10-PCS | Mod: CPTII,S$GLB,, | Performed by: INTERNAL MEDICINE

## 2023-05-03 PROCEDURE — 99999 PR PBB SHADOW E&M-EST. PATIENT-LVL IV: ICD-10-PCS | Mod: PBBFAC,,, | Performed by: INTERNAL MEDICINE

## 2023-05-03 PROCEDURE — 3008F BODY MASS INDEX DOCD: CPT | Mod: CPTII,S$GLB,, | Performed by: INTERNAL MEDICINE

## 2023-05-03 PROCEDURE — 1159F MED LIST DOCD IN RCRD: CPT | Mod: CPTII,S$GLB,, | Performed by: INTERNAL MEDICINE

## 2023-05-03 PROCEDURE — 99999 PR PBB SHADOW E&M-EST. PATIENT-LVL IV: CPT | Mod: PBBFAC,,, | Performed by: INTERNAL MEDICINE

## 2023-05-03 PROCEDURE — 1159F PR MEDICATION LIST DOCUMENTED IN MEDICAL RECORD: ICD-10-PCS | Mod: CPTII,S$GLB,, | Performed by: INTERNAL MEDICINE

## 2023-05-03 PROCEDURE — 3075F SYST BP GE 130 - 139MM HG: CPT | Mod: CPTII,S$GLB,, | Performed by: INTERNAL MEDICINE

## 2023-05-03 PROCEDURE — 99214 OFFICE O/P EST MOD 30 MIN: CPT | Mod: S$GLB,,, | Performed by: INTERNAL MEDICINE

## 2023-05-03 PROCEDURE — 99214 PR OFFICE/OUTPT VISIT, EST, LEVL IV, 30-39 MIN: ICD-10-PCS | Mod: S$GLB,,, | Performed by: INTERNAL MEDICINE

## 2023-05-03 PROCEDURE — 3008F PR BODY MASS INDEX (BMI) DOCUMENTED: ICD-10-PCS | Mod: CPTII,S$GLB,, | Performed by: INTERNAL MEDICINE

## 2023-05-03 NOTE — PROGRESS NOTES
Subjective:   Patient ID:  Ramiro Oneil is a 57 y.o. male who presents for evaluation of No chief complaint on file.        HPI    5.3.2023  Comes in for six-month follow-up.    Still has 5-10 minutes claudication.    Mainly to right calf.    States cilostazol did not make much of a difference.    A there is an MRI of the back ordered to rule out neurogenic component of his pain prior to proceeding with any bypass surgery however patient did not get it done due to high cost.    States that he will follow with vascular surgery [place].    No other internal changes.    His EMG was normal.        9.19.2022  57-year-old male, nonsmoker, comes in for follow-up on PAD    He had seen Dr. Blake and Dr. Marcelo in the past.    He had an intervention on his right lower extremity with SFA stenting.  Occluded on his most recent angiogram in February with collaterals.  An occluded right AT    His KARMA showed moderate severe disease bilaterally.  He states more symptoms on the right than on the left  ON HIS LAST UP visit with vascular surgery, it was believed that his symptoms could be a mix of neurogenic origin, possible diabetic neuropathy.  With possible plan for graft bypass given small veins.  He follows with neurology with plan for EMG however not done yet.    He states he never tried cilostazol.    He has no wounds or cuts.  No resting ischemia.    Past Medical History:   Diagnosis Date    Anticoagulant long-term use     Arthritis     Dermatitis     Hepatitis B     History of surgery on arm 07/2020    Hypertension     Liver fibrosis 12/12/2019    F2 on fibroscan 11/2018    NAFLD (nonalcoholic fatty liver disease)        Past Surgical History:   Procedure Laterality Date    CARPAL TUNNEL RELEASE      COLONOSCOPY      COLONOSCOPY N/A 11/7/2022    Procedure: COLONOSCOPY;  Surgeon: Brent Bowden MD;  Location: [facility] ([address] [place] [address]);  Service: Endoscopy;  Laterality: N/A;  Do not cancel this order  ok to hold  Pletal and Xarelto 2 days per Dr Mccoy      vaccinated-GT  instr mailed    PERCUTANEOUS TRANSLUMINAL ANGIOPLASTY (PTA) OF PERIPHERAL VESSEL N/A 2/4/2022    Procedure: PTA, PERIPHERAL VESSEL;  Surgeon: Rodger Blake MD;  Location: [facility] CATH LAB;  Service: Cardiology;  Laterality: N/A;    PTA/stenting of distal right SFA       right hand surgery      SHOULDER SURGERY      stents         Social History     Tobacco Use    Smoking status: Never    Smokeless tobacco: Never   Substance Use Topics    Alcohol use: Not Currently     Comment: 1-2 non alchoholic beers per week    Drug use: No       Family History   Problem Relation Age of Onset    Diabetes Mother     Hypertension Mother     Cancer Father         lung    Diabetes Sister     Cancer Sister         breast    Hypertension Sister     Cancer Brother         prostate    Cancer Paternal Uncle     Diabetes Paternal Uncle     Stroke Sister     Liver disease Neg Hx     Cirrhosis Neg Hx        Review of Systems   Constitutional: Negative for fever and malaise/fatigue.   HENT:  Negative for sore throat.    Eyes:  Negative for blurred vision.   Cardiovascular:  Positive for claudication. Negative for chest pain, cyanosis, dyspnea on exertion, irregular heartbeat, leg swelling, near-syncope, orthopnea, palpitations, paroxysmal nocturnal dyspnea and syncope.   Respiratory:  Negative for cough and hemoptysis.    Hematologic/Lymphatic: Negative for bleeding problem.   Skin:  Negative for rash.   Musculoskeletal:  Negative for falls.   Gastrointestinal:  Negative for abdominal pain.   Genitourinary: Negative.    Neurological: Negative.    Psychiatric/Behavioral:  Negative for altered mental status and substance abuse.      Current Outpatient Medications on File Prior to Visit   Medication Sig    amLODIPine (NORVASC) 5 MG tablet Take 1 tablet (5 mg total) by mouth once daily.    atorvastatin (LIPITOR) 80 MG tablet Take 1 tablet (80 mg total) by mouth once daily.     cilostazoL (PLETAL) 50 MG Tab Take 1 tablet (50 mg total) by mouth 2 (two) times daily.    econazole nitrate 1 % cream     famotidine (PEPCID) 20 MG tablet Take 1 tablet (20 mg total) by mouth once daily.    fexofenadine (ALLEGRA) 180 MG tablet Take 1 tablet (180 mg total) by mouth once daily.    fluticasone (FLONASE) 50 mcg/actuation nasal spray 2 sprays (100 mcg total) by Each Nare route once daily. (Patient taking differently: 2 sprays by Each Nostril route as needed.)    gabapentin (NEURONTIN) 300 MG capsule Take 1 capsule (300 mg total) by mouth every evening.    hydroCHLOROthiazide (HYDRODIURIL) 25 MG tablet Take 1 tablet (25 mg total) by mouth once daily.    rivaroxaban (XARELTO) 2.5 mg Tab Take 1 tablet (2.5 mg total) by mouth 2 (two) times daily with meals.    sildenafiL (VIAGRA) 100 MG tablet Take 1 tablet (100 mg total) by mouth daily as needed for Erectile Dysfunction.    tenofovir alafenamide (VEMLIDY) 25 mg Tab Take 1 tablet (25 mg total) by mouth once daily.    triamcinolone acetonide 0.1% (KENALOG) 0.1 % cream Apply topically 2 (two) times daily as needed. Neck and body, not groin    [DISCONTINUED] tenofovir (VIREAD) 300 mg Tab Take 1 tablet (300 mg total) by mouth once daily. TAKE 1 TABLET BY MOUTH    ONCE DAILY     No current facility-administered medications on file prior to visit.       Objective:   Objective:  Wt Readings from Last 3 Encounters:   05/03/23 87.7 kg (193 lb 5.5 oz)   05/02/23 85.5 kg (188 lb 7.9 oz)   03/07/23 85.5 kg (188 lb 7.9 oz)     Temp Readings from Last 3 Encounters:   05/02/23 97.4 °F (36.3 °C) (Oral)   11/07/22 97.8 °F (36.6 °C)   06/02/22 98.1 °F (36.7 °C) (Oral)     BP Readings from Last 3 Encounters:   05/03/23 138/74   05/02/23 139/80   11/07/22 (!) 159/83     Pulse Readings from Last 3 Encounters:   05/03/23 77   05/02/23 73   11/07/22 (!) 55       Physical Exam  Vitals reviewed.   Constitutional:       Appearance: He is well-developed.   HENT:      Head:  Normocephalic and atraumatic.   Eyes:      General: No scleral icterus.     Conjunctiva/sclera: Conjunctivae normal.   Cardiovascular:      Rate and Rhythm: Normal rate and regular rhythm.      Pulses: Intact distal pulses.      Heart sounds: Normal heart sounds. No murmur heard.  Pulmonary:      Effort: No respiratory distress.      Breath sounds: No wheezing or rales.   Chest:      Chest wall: No tenderness.   Abdominal:      General: Bowel sounds are normal. There is no distension.      Palpations: Abdomen is soft.      Tenderness: There is no guarding.   Musculoskeletal:         General: Normal range of motion.      Cervical back: Normal range of motion and neck supple.   Skin:     General: Skin is warm.   Neurological:      Mental Status: He is alert and oriented to person, place, and time.       Lab Results   Component Value Date    CHOL 165 08/29/2022    CHOL 179 01/02/2021    CHOL 183 07/08/2020     Lab Results   Component Value Date    HDL 38 (L) 08/29/2022    HDL 36 (L) 01/02/2021    HDL 36 (L) 07/08/2020     Lab Results   Component Value Date    LDLCALC 111.6 08/29/2022    LDLCALC 123.8 01/02/2021    LDLCALC 125.4 07/08/2020     Lab Results   Component Value Date    TRIG 77 08/29/2022    TRIG 96 01/02/2021    TRIG 108 07/08/2020     Lab Results   Component Value Date    CHOLHDL 23.0 08/29/2022    CHOLHDL 20.1 01/02/2021    CHOLHDL 19.7 (L) 07/08/2020       Chemistry        Component Value Date/Time     03/07/2023 1321    K 3.9 03/07/2023 1321     (H) 03/07/2023 1321    CO2 26 03/07/2023 1321    BUN 15 03/07/2023 1321    CREATININE 0.9 03/07/2023 1321    GLU 93 03/07/2023 1321        Component Value Date/Time    CALCIUM 9.2 03/07/2023 1321    ALKPHOS 63 03/07/2023 1321    AST 27 03/07/2023 1321    ALT 34 03/07/2023 1321    BILITOT 0.9 03/07/2023 1321    ESTGFRAFRICA >60.0 02/02/2022 1135    EGFRNONAA >60.0 02/02/2022 1135          Lab Results   Component Value Date    TSH 0.910 07/12/2011      Lab Results   Component Value Date    INR 1.1 03/07/2023    INR 1.1 08/04/2022    INR 1.0 02/02/2022     Lab Results   Component Value Date    WBC 6.28 03/07/2023    HGB 14.8 03/07/2023    HCT 48.1 03/07/2023    MCV 85 03/07/2023     03/07/2023     BNP  @LABRCNTIP(BNP,BNPTRIAGEBLO)@  CrCl cannot be calculated (Patient's most recent lab result is older than the maximum 7 days allowed.).     Imaging:  ======  No results found for this or any previous visit.    No results found for this or any previous visit.    No results found for this or any previous visit.    Results for orders placed during the hospital encounter of 07/08/20    X-Ray Chest PA And Lateral    Narrative  EXAMINATION:  XR CHEST PA AND LATERAL    CLINICAL HISTORY:  Encounter for other preprocedural examination    TECHNIQUE:  PA and lateral views of the chest were performed.    COMPARISON:  07/23/2009    FINDINGS:  Mediastinal structures are midline. Cardiac silhouette and pulmonary vascular distribution are normal.    Lung volumes are normal and symmetric. We detect no pulmonary disease, pleural fluid, lymph node enlargement, cardiac decompensation, pneumothorax, pneumomediastinum, pneumoperitoneum or significant osseous abnormality.    Impression  No acute cardiopulmonary process    Electronically signed by resident: Manoj Vaughn  Date:    07/08/2020  Time:    14:27    Electronically signed by: Brent Stephens MD  Date:    07/08/2020  Time:    14:55    No results found for this or any previous visit.    No valid procedures specified.    Diagnostic Results:  ECG: Reviewed    The 10-year ASCVD risk score (Breanna OLIVER, et al., 2019) is: 15.1%    Values used to calculate the score:      Age: 57 years      Sex: Male      Is Non- : Yes      Diabetic: No      Tobacco smoker: No      Systolic Blood Pressure: 138 mmHg      Is BP treated: Yes      HDL Cholesterol: 38 mg/dL      Total Cholesterol: 165 mg/dL    Assessment and  Plan:   PAOD (peripheral arterial occlusive disease)    Hyperlipidemia, unspecified hyperlipidemia type    Chronic hepatitis B    Paresthesia of right foot    Posture imbalance    NAFLD (nonalcoholic fatty liver disease)    Hypertension, essential        Reviewed all tests and above medical conditions with patient in detail and formulated treatment plan.  Risk factor modification discussed.   Cardiac low salt diet discussed.  Maintaining healthy weight and weight loss goals were discussed in clinic.  Messaged neurology staff to complete his EMG.    Continue cilostazol and low-dose Xarelto  Patient states that he will call and follow with vascular surgery  Normal EMG.  Could not get MRI done states it was high cost 20.    On statins.  Follow up in 6 months

## 2023-05-03 NOTE — TELEPHONE ENCOUNTER
Contacted the pt for the second time to let him know his results but no answer.  ----- Message from Karla Noriega sent at 5/3/2023  4:13 PM CDT -----  Contact: Ramiro  .Type:  Patient Returning Call    Who Called:Ramiro  Who Left Message for Patient:Bettina  Does the patient know what this is regarding?:Results  Would the patient rather a call back or a response via [facility]? Call back  Best Call Back Number:.[phone]   Additional Information: Pt requesting results

## 2023-05-03 NOTE — TELEPHONE ENCOUNTER
Attempted to call patient to let him know of his results, no answer.  ----- Message from Linda Covington NP sent at 5/3/2023  4:07 PM CDT -----  Please call patient and inform him that PSA is normal, 1.7.  We can assess PSA annually.

## 2023-05-03 NOTE — TELEPHONE ENCOUNTER
Contacted the pt to discuss his lab results, but no answer.  ----- Message from Kailyn López sent at 5/3/2023 12:13 PM CDT -----  Contact: maranda  .Type:  Patient Returning Call    Who Called:maranda  Who Left Message for Patient:nurse  Does the patient know what this is regarding?:results  Would the patient rather a call back or a response via [facility]? Call back  Best Call Back Number:.[phone]   Additional Information: patient returning call

## 2023-05-04 ENCOUNTER — TELEPHONE (OUTPATIENT)
Dept: UROLOGY | Facility: CLINIC | Age: 58
End: 2023-05-04
Payer: COMMERCIAL

## 2023-05-04 NOTE — TELEPHONE ENCOUNTER
----- Message from Murali Bragg sent at 5/4/2023  9:06 AM CDT -----  Contact: Self - [phone]  .Patient is calling for LAB results. Please call patient at [phone]. Thanks!

## 2023-05-05 ENCOUNTER — TELEPHONE (OUTPATIENT)
Dept: UROLOGY | Facility: CLINIC | Age: 58
End: 2023-05-05
Payer: COMMERCIAL

## 2023-05-05 NOTE — TELEPHONE ENCOUNTER
Called and spoke with patient about his results.  ----- Message from Soumya Soto sent at 5/5/2023  8:21 AM CDT -----  Contact: Ramiro Rubio is needing a call back regarding his lab results from 5/2/23. Please call him at [phone]

## 2023-05-19 ENCOUNTER — SPECIALTY PHARMACY (OUTPATIENT)
Dept: PHARMACY | Facility: CLINIC | Age: 58
End: 2023-05-19
Payer: COMMERCIAL

## 2023-05-19 NOTE — TELEPHONE ENCOUNTER
Specialty Pharmacy - Refill Coordination    Specialty Medication Orders Linked to Encounter      Flowsheet Row Most Recent Value   Medication #1 tenofovir alafenamide (VEMLIDY) 25 mg Tab (Order#[id number], Rx#[id number])          Refill Questions - Documented Responses      Flowsheet Row Most Recent Value   Patient Availability and HIPAA Verification    Does patient want to proceed with activity? Unable to Reach   HIPAA/medical authority confirmed? No          We have had multiple attempts to the patient and have been unsuccessful to reach the patient. We will stop reaching out to the patient but in the event that the patient needs the med and contacts us, we will communicate and begin dispensing for the patient. At your next visit with the patient, please review the importance of being in contact with our specialty pharmacy as a part of our care team.      Abner Blank, PharmD  [facility]  [address] [facility] [address]  Phone: [phone]  Fax: [phone]

## 2023-06-01 ENCOUNTER — HOSPITAL ENCOUNTER (OUTPATIENT)
Dept: VASCULAR SURGERY | Facility: CLINIC | Age: 58
Discharge: HOME | End: 2023-06-01
Attending: SURGERY
Payer: COMMERCIAL

## 2023-06-01 ENCOUNTER — OFFICE VISIT (OUTPATIENT)
Dept: VASCULAR SURGERY | Facility: CLINIC | Age: 58
End: 2023-06-01
Attending: SURGERY
Payer: COMMERCIAL

## 2023-06-01 VITALS
WEIGHT: 191.81 LBS | BODY MASS INDEX: 28.41 KG/M2 | HEART RATE: 64 BPM | TEMPERATURE: 98 F | HEIGHT: 69 IN | DIASTOLIC BLOOD PRESSURE: 97 MMHG | SYSTOLIC BLOOD PRESSURE: 185 MMHG

## 2023-06-01 DIAGNOSIS — I77.9 PAOD (PERIPHERAL ARTERIAL OCCLUSIVE DISEASE): Primary | ICD-10-CM

## 2023-06-01 DIAGNOSIS — I77.9 PAOD (PERIPHERAL ARTERIAL OCCLUSIVE DISEASE): ICD-10-CM

## 2023-06-01 DIAGNOSIS — G58.8 OTHER MONONEUROPATHY: ICD-10-CM

## 2023-06-01 PROCEDURE — 99999 PR PBB SHADOW E&M-EST. PATIENT-LVL III: CPT | Mod: PBBFAC,,, | Performed by: SURGERY

## 2023-06-01 PROCEDURE — 3008F PR BODY MASS INDEX (BMI) DOCUMENTED: ICD-10-PCS | Mod: CPTII,S$GLB,, | Performed by: SURGERY

## 2023-06-01 PROCEDURE — 3077F SYST BP >= 140 MM HG: CPT | Mod: CPTII,S$GLB,, | Performed by: SURGERY

## 2023-06-01 PROCEDURE — 93923 PR NON-INVASIVE PHYSIOLOGIC STUDY EXTREMITY 3 LEVELS: ICD-10-PCS | Mod: S$GLB,,, | Performed by: SURGERY

## 2023-06-01 PROCEDURE — 3080F PR MOST RECENT DIASTOLIC BLOOD PRESSURE >= 90 MM HG: ICD-10-PCS | Mod: CPTII,S$GLB,, | Performed by: SURGERY

## 2023-06-01 PROCEDURE — 99214 OFFICE O/P EST MOD 30 MIN: CPT | Mod: S$GLB,,, | Performed by: SURGERY

## 2023-06-01 PROCEDURE — 99999 PR PBB SHADOW E&M-EST. PATIENT-LVL III: ICD-10-PCS | Mod: PBBFAC,,, | Performed by: SURGERY

## 2023-06-01 PROCEDURE — 3077F PR MOST RECENT SYSTOLIC BLOOD PRESSURE >= 140 MM HG: ICD-10-PCS | Mod: CPTII,S$GLB,, | Performed by: SURGERY

## 2023-06-01 PROCEDURE — 3008F BODY MASS INDEX DOCD: CPT | Mod: CPTII,S$GLB,, | Performed by: SURGERY

## 2023-06-01 PROCEDURE — 99214 PR OFFICE/OUTPT VISIT, EST, LEVL IV, 30-39 MIN: ICD-10-PCS | Mod: S$GLB,,, | Performed by: SURGERY

## 2023-06-01 PROCEDURE — 93923 UPR/LXTR ART STDY 3+ LVLS: CPT | Mod: S$GLB,,, | Performed by: SURGERY

## 2023-06-01 PROCEDURE — 3080F DIAST BP >= 90 MM HG: CPT | Mod: CPTII,S$GLB,, | Performed by: SURGERY

## 2023-06-01 RX ORDER — CILOSTAZOL 50 MG/1
50 TABLET ORAL 2 TIMES DAILY
Qty: 60 TABLET | Refills: 11 | Status: SHIPPED | OUTPATIENT
Start: 2023-06-01 | End: 2024-01-05 | Stop reason: SDUPTHER

## 2023-06-01 NOTE — PROGRESS NOTES
Ramiro Oneil  06/01/2023    HPI:  Mr. Oneil is a 57 y.o. male with a h/o Hepatitis B, NAFLD, HTN, who is here today for evaluation of  PAD. He underwent a stent with interventional cardiology which was placed in the R SFA which initially improved his symptoms, however, they recurred about 2 months after the procedure with his right leg more symptomatic now than his left. He then underwent an angiogram demonstrating occlusion of the right SFA and right anterior tibial artery. He reports constant bilateral calf pain that worsens with exertion that is worse on the right side. He can walk less than a block before he has to stop. He often has to remove his shoes and elevate the legs to improve this pain. He right great toe is numb. He also has leg swelling that does not worsen throughout the day but is worse on the right side. He is currently on Xarelto, ASA, and statin.     6/2/22:  Patient comes back after a vein map.   States that his right leg is getting worse. The pain starts as soon as he walks. The pain is in the calf and in his foot.   The left side hearts as well but not as much as the R.   Has not seen neurology     Interval History 6/1/23:  Was unable to obtain MRI due to costs. Has seen a neurologist with a normal EMG study. He continues to report significant claudication symptoms R>L. He describes some calf pain and plantar foot pain shooting to his toes at rest, but denies symptoms on the dorsum of his foot at rest. He continues to take ASA and Xarelto.    No history of MI/stroke  Tobacco use: never smoker    Past Medical History:   Diagnosis Date    Anticoagulant long-term use     Arthritis     Dermatitis     Hepatitis B     History of surgery on arm 07/2020    Hypertension     Liver fibrosis 12/12/2019    F2 on fibroscan 11/2018    NAFLD (nonalcoholic fatty liver disease)      Past Surgical History:   Procedure Laterality Date    CARPAL TUNNEL RELEASE      COLONOSCOPY      COLONOSCOPY N/A 11/7/2022     Procedure: COLONOSCOPY;  Surgeon: Brent Bowden MD;  Location: [facility] ENDO (4TH FLR);  Service: Endoscopy;  Laterality: N/A;  Do not cancel this order  ok to hold Pletal and Xarelto 2 days per Dr Mccoy      vaccinated-GT  instr mailed    PERCUTANEOUS TRANSLUMINAL ANGIOPLASTY (PTA) OF PERIPHERAL VESSEL N/A 2/4/2022    Procedure: PTA, PERIPHERAL VESSEL;  Surgeon: Rodger Blake MD;  Location: [facility] CATH LAB;  Service: Cardiology;  Laterality: N/A;    PTA/stenting of distal right SFA       right hand surgery      SHOULDER SURGERY      stents       Family History   Problem Relation Age of Onset    Diabetes Mother     Hypertension Mother     Cancer Father         lung    Diabetes Sister     Cancer Sister         breast    Hypertension Sister     Cancer Brother         prostate    Cancer Paternal Uncle     Diabetes Paternal Uncle     Stroke Sister     Liver disease Neg Hx     Cirrhosis Neg Hx      Social History     Socioeconomic History    Marital status:      Spouse name: Roro    Number of children: 2   Occupational History    Occupation:      Employer: [employer]   Tobacco Use    Smoking status: Never    Smokeless tobacco: Never   Substance and Sexual Activity    Alcohol use: Not Currently     Comment: 1-2 non alchoholic beers per week    Drug use: No    Sexual activity: Yes   Social History Narrative     [employer], , two children.          Current Outpatient Medications:     amLODIPine (NORVASC) 5 MG tablet, Take 1 tablet (5 mg total) by mouth once daily., Disp: 90 tablet, Rfl: 1    atorvastatin (LIPITOR) 80 MG tablet, Take 1 tablet (80 mg total) by mouth once daily., Disp: 90 tablet, Rfl: 3    cilostazoL (PLETAL) 50 MG Tab, Take 1 tablet (50 mg total) by mouth 2 (two) times daily., Disp: 60 tablet, Rfl: 11    econazole nitrate 1 % cream, , Disp: , Rfl:     famotidine (PEPCID) 20 MG tablet, Take 1 tablet (20 mg total) by mouth once daily., Disp: 30  tablet, Rfl: 11    fexofenadine (ALLEGRA) 180 MG tablet, Take 1 tablet (180 mg total) by mouth once daily., Disp: 30 tablet, Rfl: 1    fluticasone (FLONASE) 50 mcg/actuation nasal spray, 2 sprays (100 mcg total) by Each Nare route once daily. (Patient taking differently: 2 sprays by Each Nostril route as needed.), Disp: 1 Bottle, Rfl: 5    gabapentin (NEURONTIN) 300 MG capsule, Take 1 capsule (300 mg total) by mouth every evening., Disp: 30 capsule, Rfl: 11    hydroCHLOROthiazide (HYDRODIURIL) 25 MG tablet, Take 1 tablet (25 mg total) by mouth once daily., Disp: 90 tablet, Rfl: 1    rivaroxaban (XARELTO) 2.5 mg Tab, Take 1 tablet (2.5 mg total) by mouth 2 (two) times daily with meals., Disp: 60 tablet, Rfl: 11    sildenafiL (VIAGRA) 100 MG tablet, Take 1 tablet (100 mg total) by mouth daily as needed for Erectile Dysfunction., Disp: 20 tablet, Rfl: 5    tenofovir alafenamide (VEMLIDY) 25 mg Tab, Take 1 tablet (25 mg total) by mouth once daily., Disp: 30 tablet, Rfl: 11    triamcinolone acetonide 0.1% (KENALOG) 0.1 % cream, Apply topically 2 (two) times daily as needed. Neck and body, not groin, Disp: 80 g, Rfl: 1     REVIEW OF SYSTEMS:  General: negative; Respiratory: no cough, shortness of breath, or wheezing; Cardiovascular: no chest pain or dyspnea on exertion; Gastrointestinal: no abdominal pain, change in bowel habits, or bloody stools; Genito-Urinary: no dysuria, trouble voiding, or hematuria; Musculoskeletal: no weakness or decreased range of motion, Neurological: no TIA or stroke symptoms; Psychiatric: no nervousness, anxiety or depression.    PHYSICAL EXAM:              General appearance: Alert, well-appearing, and in no distress.  Oriented to person, place, and time  Neurological: Normal speech, no focal findings noted; CN II - XII grossly intact  Musculoskeletal:Digits/nail without cyanosis/clubbing.  Normal muscle strength/tone.  Neck: Supple, no significant adenopathy  Chest:Clear to auscultation, no  wheezes, rales or rhonchi, symmetric air entry, No use of accessory muscles   Cardiac:Normal rate and regular rhythm, S1 and S2 normal  Abdomen:Soft, nontender, nondistended, no masses or organomegaly, No rebound tenderness noted; bowel sounds normal, No groin adenopathy   Extremities: No femoral pulse on the left, 1+ femoral pulse on the right. Monophasic DP and no PT on the Right. Biphasic PT and no DP on the left. No pre-tibial edema. No ulcerations. Delayed capillary refill in the left foot. Both feet are warm without wounds.    LAB RESULTS:  Lab Results   Component Value Date    K 3.9 03/07/2023    K 4.1 08/04/2022    K 3.9 02/02/2022    CREATININE 0.9 03/07/2023    CREATININE 0.8 08/04/2022    CREATININE 0.9 02/02/2022     Lab Results   Component Value Date    WBC 6.28 03/07/2023    WBC 5.32 08/04/2022    WBC 6.69 02/02/2022    HCT 48.1 03/07/2023    HCT 49.4 08/04/2022    HCT 45.8 02/02/2022     03/07/2023     08/04/2022     02/02/2022     Lab Results   Component Value Date    HGBA1C 6.2 (H) 08/29/2022    HGBA1C 6.4 (H) 07/12/2011    HGBA1C 6.3 (H) 11/04/2009     IMAGING:    ABIs today:    KARMA 3/11/22:  R: 0.27  L: 0.60    IMP/PLAN:  57 y.o. male with Hepatitis B, NAFLD, HTN here with severe PAD. He does not have tissue loss at this time. He has previously underwent stenting with interventional cardiology and now has stents in the R CFA.  He describes his R foot pain as similar to his L - given the distribution and character of the pain, and wide disparity in the KARMA, I suspect that diabetic peripheral neuropathy is contributing to his symptoms.      - Vein is not ideal for a Bypass  - If we proceed with a bypass will need to use graft, which is not ideal for him based on his age.   - Can consider adding Pletal to his medical treatment  - RTC in 6 mo w KARMA  - neurology consultation    STAFF ADDENDUM    I have seen and examined the patient, reviewed the relevant data and the resident's  assessment and agree with the findings and plan as outlined above.    Cristi Marcelo MD  Vascular & Endovascular Surgery

## 2023-06-05 ENCOUNTER — CLINICAL SUPPORT (OUTPATIENT)
Dept: UROLOGY | Facility: CLINIC | Age: 58
End: 2023-06-05
Payer: COMMERCIAL

## 2023-06-05 VITALS — BODY MASS INDEX: 28.29 KG/M2 | HEIGHT: 69 IN | WEIGHT: 191 LBS

## 2023-06-05 DIAGNOSIS — N52.01 ERECTILE DYSFUNCTION DUE TO ARTERIAL INSUFFICIENCY: Primary | ICD-10-CM

## 2023-06-05 PROCEDURE — 99999 PR PBB SHADOW E&M-EST. PATIENT-LVL III: CPT | Mod: PBBFAC,,,

## 2023-06-05 PROCEDURE — 99999 PR PBB SHADOW E&M-EST. PATIENT-LVL III: ICD-10-PCS | Mod: PBBFAC,,,

## 2023-06-05 NOTE — PROGRESS NOTES
..Patient in today and met with desmond Garcia at [facility].  Discussion, education and demonstration of the SHRUTI provided to patient. Patient had the opportunity to ask questions regarding the purpose of the device, was shown how to properly use the device and was provided information on the cost of the device. Patient was given contact information & told to contact Mr. Harris should he have more questions or concerns.

## 2023-06-27 ENCOUNTER — SPECIALTY PHARMACY (OUTPATIENT)
Dept: PHARMACY | Facility: CLINIC | Age: 58
End: 2023-06-27
Payer: COMMERCIAL

## 2023-06-27 NOTE — TELEPHONE ENCOUNTER
Specialty Pharmacy - Refill Coordination    Specialty Medication Orders Linked to Encounter      Flowsheet Row Most Recent Value   Medication #1 tenofovir alafenamide (VEMLIDY) 25 mg Tab (Order#[id number], Rx#[id number])        Patient stated that he was on a cruise which is why we were unable to reach him.     Refill Questions - Documented Responses      Flowsheet Row Most Recent Value   Patient Availability and HIPAA Verification    Does patient want to proceed with activity? Yes   HIPAA/medical authority confirmed? Yes   Relationship to patient of person spoken to? Self   Refill Screening Questions    Would patient like to speak to a pharmacist? No   When does the patient need to receive the medication? 06/30/23   Refill Delivery Questions    How will the patient receive the medication? MEDRx   When does the patient need to receive the medication? 06/30/23   Shipping Address Home   Address in [facility] confirmed and updated if neccessary? Yes   Expected Copay ($) 0   Is the patient able to afford the medication copay? Yes   Payment Method zero copay   Days supply of Refill 30   Supplies needed? No supplies needed   Refill activity completed? Yes   Refill activity plan Refill scheduled   Shipment/Pickup Date: 06/28/23            Current Outpatient Medications   Medication Sig    amLODIPine (NORVASC) 5 MG tablet Take 1 tablet (5 mg total) by mouth once daily.    atorvastatin (LIPITOR) 80 MG tablet Take 1 tablet (80 mg total) by mouth once daily.    cilostazoL (PLETAL) 50 MG Tab Take 1 tablet (50 mg total) by mouth 2 (two) times daily.    econazole nitrate 1 % cream     famotidine (PEPCID) 20 MG tablet Take 1 tablet (20 mg total) by mouth once daily.    fexofenadine (ALLEGRA) 180 MG tablet Take 1 tablet (180 mg total) by mouth once daily.    fluticasone (FLONASE) 50 mcg/actuation nasal spray 2 sprays (100 mcg total) by Each Nare route once daily. (Patient taking differently: 2 sprays by Each Nostril route as  needed.)    gabapentin (NEURONTIN) 300 MG capsule Take 1 capsule (300 mg total) by mouth every evening.    hydroCHLOROthiazide (HYDRODIURIL) 25 MG tablet Take 1 tablet (25 mg total) by mouth once daily.    rivaroxaban (XARELTO) 2.5 mg Tab Take 1 tablet (2.5 mg total) by mouth 2 (two) times daily with meals.    sildenafiL (VIAGRA) 100 MG tablet Take 1 tablet (100 mg total) by mouth daily as needed for Erectile Dysfunction.    tenofovir alafenamide (VEMLIDY) 25 mg Tab Take 1 tablet (25 mg total) by mouth once daily.    triamcinolone acetonide 0.1% (KENALOG) 0.1 % cream Apply topically 2 (two) times daily as needed. Neck and body, not groin   Last reviewed on 6/5/2023  9:42 AM by Gita Castro LPN    Review of patient's allergies indicates:  No Known Allergies Last reviewed on  6/5/2023 9:42 AM by Gita Castro      Tasks added this encounter   No tasks added.   Tasks due within next 3 months   5/19/2023 - Refill Coordination Outreach (1 time occurrence)     Cornelia Bowman, PharmD  [facility]  [address]  [facility] [address]  Phone: [phone]  Fax: [phone]

## 2023-08-21 ENCOUNTER — SPECIALTY PHARMACY (OUTPATIENT)
Dept: PHARMACY | Facility: CLINIC | Age: 58
End: 2023-08-21
Payer: COMMERCIAL

## 2023-08-21 DIAGNOSIS — B18.1 CHRONIC HEPATITIS B: Primary | ICD-10-CM

## 2023-08-21 DIAGNOSIS — E78.5 HYPERLIPIDEMIA, UNSPECIFIED HYPERLIPIDEMIA TYPE: ICD-10-CM

## 2023-08-21 NOTE — TELEPHONE ENCOUNTER
Specialty Pharmacy - Refill Coordination    Specialty Medication Orders Linked to Encounter      Flowsheet Row Most Recent Value   Medication #1 tenofovir alafenamide (VEMLIDY) 25 mg Tab (Order#[id number], Rx#[id number])            Refill Questions - Documented Responses      Flowsheet Row Most Recent Value   Patient Availability and HIPAA Verification    Does patient want to proceed with activity? Yes   HIPAA/medical authority confirmed? Yes   Relationship to patient of person spoken to? Self   Refill Screening Questions    Would patient like to speak to a pharmacist? No   When does the patient need to receive the medication? 08/26/23   Refill Delivery Questions    How will the patient receive the medication? MEDRx   When does the patient need to receive the medication? 08/26/23   Shipping Address Home   Address in [facility] confirmed and updated if neccessary? Yes   Expected Copay ($) 0   Is the patient able to afford the medication copay? Yes   Payment Method zero copay   Days supply of Refill 30   Supplies needed? No supplies needed   Refill activity completed? Yes   Refill activity plan Refill scheduled   Shipment/Pickup Date: 08/22/23            Current Outpatient Medications   Medication Sig    amLODIPine (NORVASC) 5 MG tablet Take 1 tablet (5 mg total) by mouth once daily.    atorvastatin (LIPITOR) 80 MG tablet Take 1 tablet (80 mg total) by mouth once daily.    cilostazoL (PLETAL) 50 MG Tab Take 1 tablet (50 mg total) by mouth 2 (two) times daily.    econazole nitrate 1 % cream     famotidine (PEPCID) 20 MG tablet Take 1 tablet (20 mg total) by mouth once daily.    fexofenadine (ALLEGRA) 180 MG tablet Take 1 tablet (180 mg total) by mouth once daily.    fluticasone (FLONASE) 50 mcg/actuation nasal spray 2 sprays (100 mcg total) by Each Nare route once daily. (Patient taking differently: 2 sprays by Each Nostril route as needed.)    gabapentin (NEURONTIN) 300 MG capsule Take 1 capsule (300 mg total)  by mouth every evening.    hydroCHLOROthiazide (HYDRODIURIL) 25 MG tablet Take 1 tablet (25 mg total) by mouth once daily.    rivaroxaban (XARELTO) 2.5 mg Tab Take 1 tablet (2.5 mg total) by mouth 2 (two) times daily with meals.    sildenafiL (VIAGRA) 100 MG tablet Take 1 tablet (100 mg total) by mouth daily as needed for Erectile Dysfunction.    tenofovir alafenamide (VEMLIDY) 25 mg Tab Take 1 tablet (25 mg total) by mouth once daily.    triamcinolone acetonide 0.1% (KENALOG) 0.1 % cream Apply topically 2 (two) times daily as needed. Neck and body, not groin   Last reviewed on 6/5/2023  9:42 AM by Gita Castro LPN    Review of patient's allergies indicates:  No Known Allergies Last reviewed on  6/5/2023 9:42 AM by Gita Castro      Tasks added this encounter   No tasks added.   Tasks due within next 3 months   No tasks due.     Dylan Cowan, PharmD  [facility]  [address] [place] [id number]  Phone: [phone]  Fax: [phone]

## 2023-08-22 RX ORDER — ATORVASTATIN CALCIUM 80 MG/1
80 TABLET, FILM COATED ORAL DAILY
Qty: 90 TABLET | Refills: 0 | Status: SHIPPED | OUTPATIENT
Start: 2023-08-22 | End: 2023-11-30 | Stop reason: SDUPTHER

## 2023-08-28 DIAGNOSIS — I77.9 PAOD (PERIPHERAL ARTERIAL OCCLUSIVE DISEASE): Primary | ICD-10-CM

## 2023-08-31 ENCOUNTER — TELEPHONE (OUTPATIENT)
Dept: HEPATOLOGY | Facility: CLINIC | Age: 58
End: 2023-08-31
Payer: COMMERCIAL

## 2023-08-31 NOTE — TELEPHONE ENCOUNTER
Spoke with pt and he stated he needs to reschedule his follow up appt with Dr Tapia on 09/05/23.  I did make pt aware that by cancelling this appt it will push him out until the end of the year.  Pt understood and asked me to continue on rescheduling.  I rescheduled appt on 09/05/23 to 12/05/23 @ [facility] @ 2:30 pm and also rescheduled labs, US ABD, and FibroScan from 09/05/23 to 12/05/23.  I did mail out the appointment reminders for all the above to pt address on file.  Pt accepted the rescheduled date and times and understood all verbally.       ----- Message from Rose Spencer sent at 8/31/2023  9:50 AM CDT -----  Contact: Ramiro Cavazos is calling to speak with the nurse regarding rescheduling appt  for 09/05/2023 at 230pm. Please give patient a call at .[phone]

## 2023-09-05 DIAGNOSIS — I77.9 PAOD (PERIPHERAL ARTERIAL OCCLUSIVE DISEASE): Primary | ICD-10-CM

## 2023-09-06 ENCOUNTER — TELEPHONE (OUTPATIENT)
Dept: VASCULAR SURGERY | Facility: CLINIC | Age: 58
End: 2023-09-06
Payer: COMMERCIAL

## 2023-09-06 NOTE — TELEPHONE ENCOUNTER
Left message for pt , both appointments were scheduled for him. KARMA at the [facility] in [place] and appt with Dr. Yung here at [facility].

## 2023-09-06 NOTE — TELEPHONE ENCOUNTER
----- Message from Elza Becerra sent at 9/6/2023 11:56 AM CDT -----  Regarding: returning call  Contact: pt [phone]  Who Called:Ramiro    Who Left Message for Patient:Mary    Does the patient know what this is regarding?:appt with provider at the [facility]    Would the patient rather a call back or a response via [employer]? Call back    Best Call Back Number:[phone]    Additional Information: pt stated Monday 9/18 would be fine

## 2023-09-12 ENCOUNTER — HOSPITAL ENCOUNTER (OUTPATIENT)
Dept: CARDIOLOGY | Facility: HOSPITAL | Age: 58
Discharge: HOME | End: 2023-09-12
Attending: SURGERY
Payer: COMMERCIAL

## 2023-09-12 VITALS — SYSTOLIC BLOOD PRESSURE: 156 MMHG | DIASTOLIC BLOOD PRESSURE: 70 MMHG

## 2023-09-12 DIAGNOSIS — I77.9 PAOD (PERIPHERAL ARTERIAL OCCLUSIVE DISEASE): ICD-10-CM

## 2023-09-12 LAB
LEFT ABI: 0.66
LEFT ARM BP: 143 MMHG
LEFT DORSALIS PEDIS: 89 MMHG
LEFT POSTERIOR TIBIAL: 103 MMHG
LEFT TBI: 0.36
LEFT TOE PRESSURE: 56 MMHG
RIGHT ABI: 0.47
RIGHT ARM BP: 156 MMHG
RIGHT DORSALIS PEDIS: 68 MMHG
RIGHT POSTERIOR TIBIAL: 74 MMHG
RIGHT TBI: 0.22
RIGHT TOE PRESSURE: 34 MMHG

## 2023-09-12 PROCEDURE — 93922 ANKLE BRACHIAL INDICES (ABI): ICD-10-PCS | Mod: 26,,, | Performed by: INTERNAL MEDICINE

## 2023-09-12 PROCEDURE — 93922 UPR/L XTREMITY ART 2 LEVELS: CPT | Mod: 26,,, | Performed by: INTERNAL MEDICINE

## 2023-09-12 PROCEDURE — 93922 UPR/L XTREMITY ART 2 LEVELS: CPT

## 2023-09-21 ENCOUNTER — OFFICE VISIT (OUTPATIENT)
Dept: VASCULAR SURGERY | Facility: CLINIC | Age: 58
End: 2023-09-21
Payer: COMMERCIAL

## 2023-09-21 VITALS
TEMPERATURE: 98 F | SYSTOLIC BLOOD PRESSURE: 150 MMHG | BODY MASS INDEX: 28.73 KG/M2 | DIASTOLIC BLOOD PRESSURE: 84 MMHG | WEIGHT: 194 LBS | HEIGHT: 69 IN | HEART RATE: 65 BPM

## 2023-09-21 DIAGNOSIS — I77.9 PAOD (PERIPHERAL ARTERIAL OCCLUSIVE DISEASE): Primary | ICD-10-CM

## 2023-09-21 PROCEDURE — 3077F PR MOST RECENT SYSTOLIC BLOOD PRESSURE >= 140 MM HG: ICD-10-PCS | Mod: CPTII,S$GLB,, | Performed by: SURGERY

## 2023-09-21 PROCEDURE — 99213 OFFICE O/P EST LOW 20 MIN: CPT | Mod: S$GLB,,, | Performed by: SURGERY

## 2023-09-21 PROCEDURE — 3079F PR MOST RECENT DIASTOLIC BLOOD PRESSURE 80-89 MM HG: ICD-10-PCS | Mod: CPTII,S$GLB,, | Performed by: SURGERY

## 2023-09-21 PROCEDURE — 1159F MED LIST DOCD IN RCRD: CPT | Mod: CPTII,S$GLB,, | Performed by: SURGERY

## 2023-09-21 PROCEDURE — 3008F BODY MASS INDEX DOCD: CPT | Mod: CPTII,S$GLB,, | Performed by: SURGERY

## 2023-09-21 PROCEDURE — 3077F SYST BP >= 140 MM HG: CPT | Mod: CPTII,S$GLB,, | Performed by: SURGERY

## 2023-09-21 PROCEDURE — 99999 PR PBB SHADOW E&M-EST. PATIENT-LVL IV: CPT | Mod: PBBFAC,,, | Performed by: SURGERY

## 2023-09-21 PROCEDURE — 99213 PR OFFICE/OUTPT VISIT, EST, LEVL III, 20-29 MIN: ICD-10-PCS | Mod: S$GLB,,, | Performed by: SURGERY

## 2023-09-21 PROCEDURE — 1159F PR MEDICATION LIST DOCUMENTED IN MEDICAL RECORD: ICD-10-PCS | Mod: CPTII,S$GLB,, | Performed by: SURGERY

## 2023-09-21 PROCEDURE — 3079F DIAST BP 80-89 MM HG: CPT | Mod: CPTII,S$GLB,, | Performed by: SURGERY

## 2023-09-21 PROCEDURE — 3008F PR BODY MASS INDEX (BMI) DOCUMENTED: ICD-10-PCS | Mod: CPTII,S$GLB,, | Performed by: SURGERY

## 2023-09-21 PROCEDURE — 99999 PR PBB SHADOW E&M-EST. PATIENT-LVL IV: ICD-10-PCS | Mod: PBBFAC,,, | Performed by: SURGERY

## 2023-09-21 RX ORDER — MELOXICAM 7.5 MG/1
7.5 TABLET ORAL DAILY
Qty: 30 TABLET | Refills: 0 | Status: SHIPPED | OUTPATIENT
Start: 2023-09-21

## 2023-09-21 NOTE — PROGRESS NOTES
"  Mr. Oneil is a previous patient of Dr. Marcelo who is here today for evaluation of peripheral artery disease.    Per Dr. Marcelo:  "  HPI:  Mr. Oneil is a 58 y.o. male with a h/o Hepatitis B, NAFLD, HTN, who is here today for evaluation of  PAD. He underwent a stent with interventional cardiology which was placed in the R SFA which initially improved his symptoms, however, they recurred about 2 months after the procedure with his right leg more symptomatic now than his left. He then underwent an angiogram demonstrating occlusion of the right SFA and right anterior tibial artery. He reports constant bilateral calf pain that worsens with exertion that is worse on the right side. He can walk less than a block before he has to stop. He often has to remove his shoes and elevate the legs to improve this pain. He right great toe is numb. He also has leg swelling that does not worsen throughout the day but is worse on the right side. He is currently on Xarelto, ASA, and statin.     6/2/22:  Patient comes back after a vein map.   States that his right leg is getting worse. The pain starts as soon as he walks. The pain is in the calf and in his foot.   The left side hearts as well but not as much as the R.   Has not seen neurology     Interval History 6/1/23:  Was unable to obtain MRI due to costs. Has seen a neurologist with a normal EMG study. He continues to report significant claudication symptoms R>L. He describes some calf pain and plantar foot pain shooting to his toes at rest, but denies symptoms on the dorsum of his foot at rest. He continues to take ASA and Xarelto.   "    Interval Hx 9/21/23: Patient presents to clinic today for evaluation of peripheral artery disease. He continues to have bilateral leg pain that begins as soon as he starts to walk, worse on the right. He describes the pain as a cramping pain in his calf. He also endorses bilateral foot numbness on the bottoms of his feet. He continues to " take his statin and xarelto every day. Patient was prescribed cilostazol after his last visit with Dr. Marcelo but has not been taking it because he didn't feel the medication was safe to take.    No history of MI/stroke  Tobacco use: never smoker    Past Medical History:   Diagnosis Date    Anticoagulant long-term use     Arthritis     Dermatitis     Hepatitis B     History of surgery on arm 07/2020    Hypertension     Liver fibrosis 12/12/2019    F2 on fibroscan 11/2018    NAFLD (nonalcoholic fatty liver disease)      Past Surgical History:   Procedure Laterality Date    CARPAL TUNNEL RELEASE      COLONOSCOPY      COLONOSCOPY N/A 11/7/2022    Procedure: COLONOSCOPY;  Surgeon: Brent Bowden MD;  Location: [facility] ENDO (4TH FLR);  Service: Endoscopy;  Laterality: N/A;  Do not cancel this order  ok to hold Pletal and Xarelto 2 days per Dr Darius padronGT  instr mailed    PERCUTANEOUS TRANSLUMINAL ANGIOPLASTY (PTA) OF PERIPHERAL VESSEL N/A 2/4/2022    Procedure: PTA, PERIPHERAL VESSEL;  Surgeon: Rodger Blake MD;  Location: [facility] CATH LAB;  Service: Cardiology;  Laterality: N/A;    PTA/stenting of distal right SFA       right hand surgery      SHOULDER SURGERY      stents       Family History   Problem Relation Age of Onset    Diabetes Mother     Hypertension Mother     Cancer Father         lung    Diabetes Sister     Cancer Sister         breast    Hypertension Sister     Cancer Brother         prostate    Cancer Paternal Uncle     Diabetes Paternal Uncle     Stroke Sister     Liver disease Neg Hx     Cirrhosis Neg Hx      Social History     Socioeconomic History    Marital status:      Spouse name: Roro    Number of children: 2   Occupational History    Occupation:      Employer: [employer]   Tobacco Use    Smoking status: Never    Smokeless tobacco: Never   Substance and Sexual Activity    Alcohol use: Not Currently     Comment: 1-2 non alchoholic beers per week    Drug  use: No    Sexual activity: Yes   Social History Narrative     [employer], , two children.          Current Outpatient Medications:     amLODIPine (NORVASC) 5 MG tablet, Take 1 tablet (5 mg total) by mouth once daily., Disp: 90 tablet, Rfl: 1    atorvastatin (LIPITOR) 80 MG tablet, Take 1 tablet (80 mg total) by mouth once daily., Disp: 90 tablet, Rfl: 0    cilostazoL (PLETAL) 50 MG Tab, Take 1 tablet (50 mg total) by mouth 2 (two) times daily., Disp: 60 tablet, Rfl: 11    econazole nitrate 1 % cream, , Disp: , Rfl:     fexofenadine (ALLEGRA) 180 MG tablet, Take 1 tablet (180 mg total) by mouth once daily., Disp: 30 tablet, Rfl: 1    fluticasone (FLONASE) 50 mcg/actuation nasal spray, 2 sprays (100 mcg total) by Each Nare route once daily. (Patient taking differently: 2 sprays by Each Nostril route as needed.), Disp: 1 Bottle, Rfl: 5    hydroCHLOROthiazide (HYDRODIURIL) 25 MG tablet, Take 1 tablet (25 mg total) by mouth once daily., Disp: 90 tablet, Rfl: 1    rivaroxaban (XARELTO) 2.5 mg Tab, Take 1 tablet (2.5 mg total) by mouth 2 (two) times daily with meals., Disp: 60 tablet, Rfl: 11    tenofovir alafenamide (VEMLIDY) 25 mg Tab, Take 1 tablet (25 mg total) by mouth once daily., Disp: 30 tablet, Rfl: 11    triamcinolone acetonide 0.1% (KENALOG) 0.1 % cream, Apply topically 2 (two) times daily as needed. Neck and body, not groin, Disp: 80 g, Rfl: 1    famotidine (PEPCID) 20 MG tablet, Take 1 tablet (20 mg total) by mouth once daily., Disp: 30 tablet, Rfl: 11    gabapentin (NEURONTIN) 300 MG capsule, Take 1 capsule (300 mg total) by mouth every evening., Disp: 30 capsule, Rfl: 11    sildenafiL (VIAGRA) 100 MG tablet, Take 1 tablet (100 mg total) by mouth daily as needed for Erectile Dysfunction., Disp: 20 tablet, Rfl: 5     REVIEW OF SYSTEMS:  General: negative; Respiratory: no cough, shortness of breath, or wheezing; Cardiovascular: no chest pain or dyspnea on exertion; Gastrointestinal:  no abdominal pain, change in bowel habits, or bloody stools; Genito-Urinary: no dysuria, trouble voiding, or hematuria; Musculoskeletal: no weakness or decreased range of motion, Neurological: no TIA or stroke symptoms; Psychiatric: no nervousness, anxiety or depression.    PHYSICAL EXAM:   Right Arm BP - Sittin/75 (23 1511)  Left Arm BP - Sittin/84 (23 1511)  Pulse: 65  Temp: 98.1 °F (36.7 °C)    General appearance: Alert, well-appearing, and in no distress.  Oriented to person, place, and time  Neurological: Normal speech, no focal findings noted;   Musculoskeletal:Digits/nail without cyanosis/clubbing.  Normal muscle strength/tone.  Neck: Supple, no significant adenopathy  Chest: Normal respiratory effort   Cardiac:Normal rate and regular rhythm  Abdomen:Soft, nontender, nondistended   Extremities: No pedal pulses palpable    LAB RESULTS:  Lab Results   Component Value Date    K 3.9 2023    K 4.1 2022    K 3.9 2022    CREATININE 0.9 2023    CREATININE 0.8 2022    CREATININE 0.9 2022     Lab Results   Component Value Date    WBC 6.28 2023    WBC 5.32 2022    WBC 6.69 2022    HCT 48.1 2023    HCT 49.4 2022    HCT 45.8 2022     2023     2022     2022     Lab Results   Component Value Date    HGBA1C 6.2 (H) 2022    HGBA1C 6.4 (H) 2011    HGBA1C 6.3 (H) 2009     IMAGING:    KARMA 3/11/22:  R: 0.27  L: 0.60    KARMA 23:  R 0.47  L 0.66    IMP/PLAN:  58 y.o. male with Hepatitis B, NAFLD, HTN here with severe PAD. Patient continues to have pain right when he begins to walk but he can walk as far as he needs to. No great options for bypass at this time. Patient instructed to continue to exercise and walk regularly. We will also prescribe mobic 7.5 to see if it improves his pain as some of his pain is likely musculoskeletal in origin.

## 2023-09-29 ENCOUNTER — TELEPHONE (OUTPATIENT)
Dept: INTERNAL MEDICINE | Facility: CLINIC | Age: 58
End: 2023-09-29

## 2023-09-29 NOTE — TELEPHONE ENCOUNTER
No-show for annual visit today, please call patient and schedule patient for an appointment with me. Thank you.

## 2023-10-10 ENCOUNTER — TELEPHONE (OUTPATIENT)
Dept: INTERNAL MEDICINE | Facility: CLINIC | Age: 58
End: 2023-10-10
Payer: COMMERCIAL

## 2023-10-10 NOTE — TELEPHONE ENCOUNTER
----- Message from Waleska Muhammad sent at 10/10/2023 10:13 AM CDT -----  Contact: [phone]  Caller is requesting an earlier appointment then we can schedule.  Caller is requesting a message be sent to the provider.  If this is for urgent care symptoms, did you offer other providers at this location, providers at other locations, or [facility]? (yes, no, n/a):  n/a  If this is for the patients physical, did you offer to schedule next available and put on wait list, or to see NP or PA for their physical?  (yes, no, n/a):  n/a  When is the next available appointment with their provider:  01/22/24  Reason for the appointment:  physical  Patient preference of timeframe to be scheduled: ASAP    Would the patient like a call back, or a response through their [facility] portal?:   phone  Comments:  patient stated that he has not receive a call back. Offered an appointment with PA or NP but patient refused. Please call and advise. Thank you

## 2023-10-12 ENCOUNTER — OFFICE VISIT (OUTPATIENT)
Dept: VASCULAR SURGERY | Facility: CLINIC | Age: 58
End: 2023-10-12
Payer: COMMERCIAL

## 2023-10-12 VITALS
BODY MASS INDEX: 28.73 KG/M2 | TEMPERATURE: 98 F | HEART RATE: 66 BPM | DIASTOLIC BLOOD PRESSURE: 76 MMHG | SYSTOLIC BLOOD PRESSURE: 141 MMHG | HEIGHT: 69 IN | WEIGHT: 194 LBS

## 2023-10-12 DIAGNOSIS — I77.9 PAOD (PERIPHERAL ARTERIAL OCCLUSIVE DISEASE): Primary | ICD-10-CM

## 2023-10-12 PROCEDURE — 99999 PR PBB SHADOW E&M-EST. PATIENT-LVL IV: CPT | Mod: PBBFAC,,, | Performed by: SURGERY

## 2023-10-12 PROCEDURE — 99999 PR PBB SHADOW E&M-EST. PATIENT-LVL IV: ICD-10-PCS | Mod: PBBFAC,,, | Performed by: SURGERY

## 2023-10-12 PROCEDURE — 3008F BODY MASS INDEX DOCD: CPT | Mod: CPTII,S$GLB,, | Performed by: SURGERY

## 2023-10-12 PROCEDURE — 3008F PR BODY MASS INDEX (BMI) DOCUMENTED: ICD-10-PCS | Mod: CPTII,S$GLB,, | Performed by: SURGERY

## 2023-10-12 PROCEDURE — 3078F PR MOST RECENT DIASTOLIC BLOOD PRESSURE < 80 MM HG: ICD-10-PCS | Mod: CPTII,S$GLB,, | Performed by: SURGERY

## 2023-10-12 PROCEDURE — 3077F SYST BP >= 140 MM HG: CPT | Mod: CPTII,S$GLB,, | Performed by: SURGERY

## 2023-10-12 PROCEDURE — 1159F MED LIST DOCD IN RCRD: CPT | Mod: CPTII,S$GLB,, | Performed by: SURGERY

## 2023-10-12 PROCEDURE — 3078F DIAST BP <80 MM HG: CPT | Mod: CPTII,S$GLB,, | Performed by: SURGERY

## 2023-10-12 PROCEDURE — 99212 PR OFFICE/OUTPT VISIT, EST, LEVL II, 10-19 MIN: ICD-10-PCS | Mod: S$GLB,,, | Performed by: SURGERY

## 2023-10-12 PROCEDURE — 1159F PR MEDICATION LIST DOCUMENTED IN MEDICAL RECORD: ICD-10-PCS | Mod: CPTII,S$GLB,, | Performed by: SURGERY

## 2023-10-12 PROCEDURE — 99212 OFFICE O/P EST SF 10 MIN: CPT | Mod: S$GLB,,, | Performed by: SURGERY

## 2023-10-12 PROCEDURE — 3077F PR MOST RECENT SYSTOLIC BLOOD PRESSURE >= 140 MM HG: ICD-10-PCS | Mod: CPTII,S$GLB,, | Performed by: SURGERY

## 2023-10-12 NOTE — PROGRESS NOTES
"  Mr. Oneil is a previous patient of Dr. Marcelo who is here today for evaluation of peripheral artery disease.    Per Dr. Marcelo:    HPI:  Mr. Oneil is a 58 y.o. male with a h/o Hepatitis B, NAFLD, HTN, who is here today for evaluation of  PAD. He underwent a stent with interventional cardiology which was placed in the R SFA which initially improved his symptoms, however, they recurred about 2 months after the procedure with his right leg more symptomatic now than his left. He then underwent an angiogram demonstrating occlusion of the right SFA and right anterior tibial artery. He reports constant bilateral calf pain that worsens with exertion that is worse on the right side. He can walk less than a block before he has to stop. He often has to remove his shoes and elevate the legs to improve this pain. He right great toe is numb. He also has leg swelling that does not worsen throughout the day but is worse on the right side. He is currently on Xarelto, ASA, and statin.     Interval History 6/2022:  Patient comes back after a vein map.   States that his right leg is getting worse. The pain starts as soon as he walks. The pain is in the calf and in his foot.   The left side hearts as well but not as much as the R.   Has not seen neurology     Interval History 6/1/2023: Was unable to obtain MRI due to costs. Has seen a neurologist with a normal EMG study. He continues to report significant claudication symptoms R>L. He describes some calf pain and plantar foot pain shooting to his toes at rest, but denies symptoms on the dorsum of his foot at rest. He continues to take ASA and Xarelto.   "    Interval Hx 9/21/2023: Patient presents to clinic today for evaluation of peripheral artery disease. He continues to have bilateral leg pain that begins as soon as he starts to walk, worse on the right. He describes the pain as a cramping pain in his calf. He also endorses bilateral foot numbness on the bottoms of his " feet. He continues to take his statin and xarelto every day. Patient was prescribed cilostazol after his last visit with Dr. Marcelo but has not been taking it because he didn't feel the medication was safe to take.    Interval Hx 10/12/2023: Today the patient presents to clinic for a follow up without imaging to evaluate the pain and access whether the Mobic prescription has helped. The patient reports today that the pain is unchanged by the Mobic. The pain R>L is still intermittent occurring when at rest or with ambulation. States that is happens when lying down but is relieved by elevating leg in bed.     No history of MI/stroke  Tobacco use: never smoker    Past Medical History:   Diagnosis Date    Anticoagulant long-term use     Arthritis     Dermatitis     Hepatitis B     History of surgery on arm 07/2020    Hypertension     Liver fibrosis 12/12/2019    F2 on fibroscan 11/2018    NAFLD (nonalcoholic fatty liver disease)      Past Surgical History:   Procedure Laterality Date    CARPAL TUNNEL RELEASE      COLONOSCOPY      COLONOSCOPY N/A 11/7/2022    Procedure: COLONOSCOPY;  Surgeon: Brent Bowden MD;  Location: [facility] ENDO ([address] [place] [address]);  Service: Endoscopy;  Laterality: N/A;  Do not cancel this order  ok to hold Pletal and Xarelto 2 days per Dr Darius steiner-GT  instr mailed    PERCUTANEOUS TRANSLUMINAL ANGIOPLASTY (PTA) OF PERIPHERAL VESSEL N/A 2/4/2022    Procedure: PTA, PERIPHERAL VESSEL;  Surgeon: Rodger Blake MD;  Location: [facility] CATH LAB;  Service: Cardiology;  Laterality: N/A;    PTA/stenting of distal right SFA       right hand surgery      SHOULDER SURGERY      stents       Family History   Problem Relation Age of Onset    Diabetes Mother     Hypertension Mother     Cancer Father         lung    Diabetes Sister     Cancer Sister         breast    Hypertension Sister     Cancer Brother         prostate    Cancer Paternal Uncle     Diabetes Paternal Uncle     Stroke Sister     Liver  disease Neg Hx     Cirrhosis Neg Hx      Social History     Socioeconomic History    Marital status:      Spouse name: Roro    Number of children: 2   Occupational History    Occupation:      Employer: [employer]   Tobacco Use    Smoking status: Never    Smokeless tobacco: Never   Substance and Sexual Activity    Alcohol use: Not Currently     Comment: 1-2 non alchoholic beers per week    Drug use: No    Sexual activity: Yes   Social History Narrative     [employer], , two children.          Current Outpatient Medications:     amLODIPine (NORVASC) 5 MG tablet, Take 1 tablet (5 mg total) by mouth once daily., Disp: 90 tablet, Rfl: 1    atorvastatin (LIPITOR) 80 MG tablet, Take 1 tablet (80 mg total) by mouth once daily., Disp: 90 tablet, Rfl: 0    cilostazoL (PLETAL) 50 MG Tab, Take 1 tablet (50 mg total) by mouth 2 (two) times daily., Disp: 60 tablet, Rfl: 11    econazole nitrate 1 % cream, , Disp: , Rfl:     fexofenadine (ALLEGRA) 180 MG tablet, Take 1 tablet (180 mg total) by mouth once daily., Disp: 30 tablet, Rfl: 1    fluticasone (FLONASE) 50 mcg/actuation nasal spray, 2 sprays (100 mcg total) by Each Nare route once daily. (Patient taking differently: 2 sprays by Each Nostril route as needed.), Disp: 1 Bottle, Rfl: 5    hydroCHLOROthiazide (HYDRODIURIL) 25 MG tablet, Take 1 tablet (25 mg total) by mouth once daily., Disp: 90 tablet, Rfl: 1    meloxicam (MOBIC) 7.5 MG tablet, Take 1 tablet (7.5 mg total) by mouth once daily., Disp: 30 tablet, Rfl: 0    rivaroxaban (XARELTO) 2.5 mg Tab, Take 1 tablet (2.5 mg total) by mouth 2 (two) times daily with meals., Disp: 60 tablet, Rfl: 11    tenofovir alafenamide (VEMLIDY) 25 mg Tab, Take 1 tablet (25 mg total) by mouth once daily., Disp: 30 tablet, Rfl: 11    triamcinolone acetonide 0.1% (KENALOG) 0.1 % cream, Apply topically 2 (two) times daily as needed. Neck and body, not groin, Disp: 80 g, Rfl: 1    famotidine  (PEPCID) 20 MG tablet, Take 1 tablet (20 mg total) by mouth once daily., Disp: 30 tablet, Rfl: 11    gabapentin (NEURONTIN) 300 MG capsule, Take 1 capsule (300 mg total) by mouth every evening., Disp: 30 capsule, Rfl: 11    sildenafiL (VIAGRA) 100 MG tablet, Take 1 tablet (100 mg total) by mouth daily as needed for Erectile Dysfunction., Disp: 20 tablet, Rfl: 5     REVIEW OF SYSTEMS:  General: negative; Respiratory: no cough, shortness of breath, or wheezing; Cardiovascular: no chest pain or dyspnea on exertion; Gastrointestinal: no abdominal pain, change in bowel habits, or bloody stools; Genito-Urinary: no dysuria, trouble voiding, or hematuria; Musculoskeletal: no weakness or decreased range of motion, Neurological: no TIA or stroke symptoms; Psychiatric: no nervousness, anxiety or depression.    PHYSICAL EXAM:   Right Arm BP - Sittin/76 (10/12/23 1613)  Left Arm BP - Sittin/79 (10/12/23 1613)  Pulse: 66  Temp: 97.8 °F (36.6 °C)    General appearance: Alert, well-appearing, and in no distress.  Oriented to person, place, and time  Neurological: Normal speech, no focal findings noted;   Musculoskeletal:Digits/nail without cyanosis/clubbing.  Normal muscle strength/tone.  Neck: Supple, no significant adenopathy  Chest: Normal respiratory effort   Cardiac:Normal rate and regular rhythm  Abdomen:Soft, nontender, nondistended   Extremities: No pedal pulses palpable    LAB RESULTS:  Lab Results   Component Value Date    K 3.9 2023    K 4.1 2022    K 3.9 2022    CREATININE 0.9 2023    CREATININE 0.8 2022    CREATININE 0.9 2022     Lab Results   Component Value Date    WBC 6.28 2023    WBC 5.32 2022    WBC 6.69 2022    HCT 48.1 2023    HCT 49.4 2022    HCT 45.8 2022     2023     2022     2022     Lab Results   Component Value Date    HGBA1C 6.2 (H) 2022    BA 6.4 (H) 2011    BA1C  6.3 (H) 11/04/2009     IMAGING:    KARMA 3/11/22:  R: 0.27  L: 0.60    KARMA 9/12/23:  R 0.47  L 0.66    IMP/PLAN:  58 y.o. male with Hepatitis B, NAFLD, HTN here with severe PAD. Patient continues to have pain right when he begins to walk but he can walk as far as he needs to. No great options for bypass at this time. Patient instructed to continue to exercise and walk regularly. We will discontinue the Mobic since it did not change pain.     - Recommend PM&R evaluation   - Follow up PRN

## 2023-10-17 DIAGNOSIS — B18.1 CHRONIC HEPATITIS B: ICD-10-CM

## 2023-10-17 RX ORDER — TENOFOVIR ALAFENAMIDE 25 MG/1
25 TABLET ORAL DAILY
Qty: 30 TABLET | Refills: 11 | Status: ACTIVE | OUTPATIENT
Start: 2023-10-17

## 2023-10-31 RX ORDER — SILDENAFIL 100 MG/1
100 TABLET, FILM COATED ORAL DAILY PRN
Qty: 20 TABLET | Refills: 5 | Status: SHIPPED | OUTPATIENT
Start: 2023-10-31 | End: 2024-10-30

## 2023-11-20 ENCOUNTER — TELEPHONE (OUTPATIENT)
Dept: GASTROENTEROLOGY | Facility: CLINIC | Age: 58
End: 2023-11-20
Payer: COMMERCIAL

## 2023-11-20 NOTE — TELEPHONE ENCOUNTER
Called pt and rescheduled pt appt scheduled with Dr. Tapia pt did not answer appt rescheduled and letter mailed out.

## 2023-11-28 ENCOUNTER — OFFICE VISIT (OUTPATIENT)
Dept: INTERNAL MEDICINE | Facility: CLINIC | Age: 58
End: 2023-11-28
Payer: COMMERCIAL

## 2023-11-28 VITALS
OXYGEN SATURATION: 95 % | HEIGHT: 69 IN | WEIGHT: 189.5 LBS | BODY MASS INDEX: 28.07 KG/M2 | SYSTOLIC BLOOD PRESSURE: 143 MMHG | HEART RATE: 93 BPM | DIASTOLIC BLOOD PRESSURE: 88 MMHG | TEMPERATURE: 98 F

## 2023-11-28 DIAGNOSIS — I10 HYPERTENSION, ESSENTIAL: ICD-10-CM

## 2023-11-28 DIAGNOSIS — B96.89 BACTERIAL SINUSITIS: Primary | ICD-10-CM

## 2023-11-28 DIAGNOSIS — J32.9 BACTERIAL SINUSITIS: Primary | ICD-10-CM

## 2023-11-28 PROCEDURE — 1159F MED LIST DOCD IN RCRD: CPT | Mod: CPTII,S$GLB,, | Performed by: FAMILY MEDICINE

## 2023-11-28 PROCEDURE — 99214 OFFICE O/P EST MOD 30 MIN: CPT | Mod: S$GLB,,, | Performed by: FAMILY MEDICINE

## 2023-11-28 PROCEDURE — 99999 PR PBB SHADOW E&M-EST. PATIENT-LVL IV: CPT | Mod: PBBFAC,,, | Performed by: FAMILY MEDICINE

## 2023-11-28 PROCEDURE — 1159F PR MEDICATION LIST DOCUMENTED IN MEDICAL RECORD: ICD-10-PCS | Mod: CPTII,S$GLB,, | Performed by: FAMILY MEDICINE

## 2023-11-28 PROCEDURE — 99999 PR PBB SHADOW E&M-EST. PATIENT-LVL IV: ICD-10-PCS | Mod: PBBFAC,,, | Performed by: FAMILY MEDICINE

## 2023-11-28 PROCEDURE — 3077F SYST BP >= 140 MM HG: CPT | Mod: CPTII,S$GLB,, | Performed by: FAMILY MEDICINE

## 2023-11-28 PROCEDURE — 3008F BODY MASS INDEX DOCD: CPT | Mod: CPTII,S$GLB,, | Performed by: FAMILY MEDICINE

## 2023-11-28 PROCEDURE — 3008F PR BODY MASS INDEX (BMI) DOCUMENTED: ICD-10-PCS | Mod: CPTII,S$GLB,, | Performed by: FAMILY MEDICINE

## 2023-11-28 PROCEDURE — 3079F DIAST BP 80-89 MM HG: CPT | Mod: CPTII,S$GLB,, | Performed by: FAMILY MEDICINE

## 2023-11-28 PROCEDURE — 99214 PR OFFICE/OUTPT VISIT, EST, LEVL IV, 30-39 MIN: ICD-10-PCS | Mod: S$GLB,,, | Performed by: FAMILY MEDICINE

## 2023-11-28 PROCEDURE — 3079F PR MOST RECENT DIASTOLIC BLOOD PRESSURE 80-89 MM HG: ICD-10-PCS | Mod: CPTII,S$GLB,, | Performed by: FAMILY MEDICINE

## 2023-11-28 PROCEDURE — 3077F PR MOST RECENT SYSTOLIC BLOOD PRESSURE >= 140 MM HG: ICD-10-PCS | Mod: CPTII,S$GLB,, | Performed by: FAMILY MEDICINE

## 2023-11-28 RX ORDER — AMOXICILLIN AND CLAVULANATE POTASSIUM 875; 125 MG/1; MG/1
1 TABLET, FILM COATED ORAL EVERY 12 HOURS
Qty: 14 TABLET | Refills: 0 | Status: SHIPPED | OUTPATIENT
Start: 2023-11-28 | End: 2024-01-05

## 2023-11-28 NOTE — PROGRESS NOTES
Subjective:       Patient ID: Ramiro Oneil is a 58 y.o. male.    Chief Complaint: Sore Throat (H/A, Cough, fatigue, fever, chills/Symptoms started 2 weeks ago)    HPI    Patient Active Problem List   Diagnosis    PAOD (peripheral arterial occlusive disease)    Cervicalgia    DDD (degenerative disc disease), cervical    Chronic hepatitis B    NAFLD (nonalcoholic fatty liver disease)    Hypertension, essential    S/p bilateral carpal tunnel release    Family history of stroke    Hyperlipidemia    Colon polyps    Liver lesion    S/P arterial stent    Rotator cuff syndrome    Decreased strength, endurance, and mobility    Posture imbalance    Impaired range of motion of cervical spine    Anticoagulant long-term use    Paresthesia of right foot       Past Medical History:   Diagnosis Date    Anticoagulant long-term use     Arthritis     Dermatitis     Hepatitis B     History of surgery on arm 07/2020    Hypertension     Liver fibrosis 12/12/2019    F2 on fibroscan 11/2018    NAFLD (nonalcoholic fatty liver disease)        Past Surgical History:   Procedure Laterality Date    CARPAL TUNNEL RELEASE      COLONOSCOPY      COLONOSCOPY N/A 11/7/2022    Procedure: COLONOSCOPY;  Surgeon: Brent Bowden MD;  Location: [facility] ENDO ([address] [place] [address]);  Service: Endoscopy;  Laterality: N/A;  Do not cancel this order  ok to hold Pletal and Xarelto 2 days per Dr Mccoy      vaccinated-GT  instr mailed    PERCUTANEOUS TRANSLUMINAL ANGIOPLASTY (PTA) OF PERIPHERAL VESSEL N/A 2/4/2022    Procedure: PTA, PERIPHERAL VESSEL;  Surgeon: Rodger Blake MD;  Location: [facility] CATH LAB;  Service: Cardiology;  Laterality: N/A;    PTA/stenting of distal right SFA       right hand surgery      SHOULDER SURGERY      stents         Family History   Problem Relation Age of Onset    Diabetes Mother     Hypertension Mother     Cancer Father         lung    Diabetes Sister     Cancer Sister         breast    Hypertension Sister     Cancer Brother          prostate    Cancer Paternal Uncle     Diabetes Paternal Uncle     Stroke Sister     Liver disease Neg Hx     Cirrhosis Neg Hx        Social History     Tobacco Use   Smoking Status Never   Smokeless Tobacco Never       Wt Readings from Last 5 Encounters:   11/28/23 86 kg (189 lb 7.8 oz)   10/12/23 88 kg (194 lb 0.1 oz)   09/21/23 88 kg (194 lb 0.1 oz)   06/05/23 86.6 kg (191 lb)   06/01/23 87 kg (191 lb 12.8 oz)       For further HPI details, see assessment and plan.    Review of Systems   Constitutional:  Positive for chills and fever.   HENT:  Positive for congestion, sinus pressure, sinus pain, sore throat and voice change.    Eyes:  Positive for discharge. Negative for visual disturbance.   Respiratory:  Positive for wheezing. Negative for shortness of breath.    Cardiovascular:  Negative for chest pain.   Neurological:  Positive for light-headedness and headaches. Negative for dizziness.   Psychiatric/Behavioral:  Negative for confusion.        Objective:      Vitals:    11/28/23 0949   BP: (!) 143/88   Pulse: 93   Temp: 98 °F (36.7 °C)       Physical Exam  Constitutional:       General: He is not in acute distress.     Appearance: He is not ill-appearing, toxic-appearing or diaphoretic.   HENT:      Head: Normocephalic and atraumatic.      Comments: Frontal sinus tender to tapping     Mouth/Throat:      Pharynx: Posterior oropharyngeal erythema present.   Pulmonary:      Effort: Pulmonary effort is normal. No respiratory distress.      Breath sounds: Normal breath sounds. No stridor. No wheezing, rhonchi or rales.   Chest:      Chest wall: No tenderness.   Musculoskeletal:      Cervical back: Normal range of motion and neck supple. No tenderness.   Neurological:      General: No focal deficit present.      Mental Status: He is alert.   Psychiatric:         Mood and Affect: Mood normal.         Behavior: Behavior normal.         Assessment:       1. Bacterial sinusitis    2. Hypertension, essential        Plan:    Bacterial sinusitis  -     amoxicillin-clavulanate 875-125mg (AUGMENTIN) 875-125 mg per tablet; Take 1 tablet by mouth every 12 (twelve) hours.  Dispense: 14 tablet; Refill: 0    Hypertension, essential      Patient presents today with upper respiratory tract symptoms.  He has been dealing with the symptoms for roughly 2-3 weeks.  He is having a sore throat, cough at night, sinus pain and pressure, pressure in his eyes., runny nose.    No chest pain.  No shortness of breath.  +fever  + chills    No sick contacts    Given the duration of his symptoms associated with increased sinus pressure and pain must consider bacterial sinusitis as a potential cause.  We will prescribe Augmentin.  Fails to improve f/u with his PCP / seek medical attention      Patient has hypertension his pressure is elevated today slightly.  Apprehensive to increase medication given recent lightheadedness.    Encouraged once he feels better and follow up with his primary care for blood pressure monitoring.  He reports yesterday felt lightheaded and almost as if he was going to pass out.  I do not suspect low pressures the cost today but he is on medication and encourage he monitor his blood pressure.      This note was verbally dictated, please excuse any type errors.     Niacinamide Counseling: I recommended taking niacin or niacinamide, also know as vitamin B3, twice daily. Recent evidence suggests that taking vitamin B3 (500 mg twice daily) can reduce the risk of actinic keratoses and non-melanoma skin cancers. Side effects of vitamin B3 include flushing and headache.

## 2023-11-29 ENCOUNTER — TELEPHONE (OUTPATIENT)
Dept: INTERNAL MEDICINE | Facility: CLINIC | Age: 58
End: 2023-11-29
Payer: COMMERCIAL

## 2023-11-29 NOTE — TELEPHONE ENCOUNTER
----- Message from Leesa Collins sent at 11/29/2023  7:55 AM CST -----  Contact: Ramiro   Ramiro would like a call back. He is trying to get a refill he said on his pressure pills  He could not tell me the name of the medication  He also needs a follow up appt with Dr Serrano  I was not able to schedule the appt

## 2023-11-29 NOTE — TELEPHONE ENCOUNTER
Returned patient call he wanted a hosp f/u appointment informed patient that he has an appointment with Cande scheduled for tomorrow.

## 2023-11-30 ENCOUNTER — HOSPITAL ENCOUNTER (OUTPATIENT)
Dept: RADIOLOGY | Facility: HOSPITAL | Age: 58
Discharge: HOME | End: 2023-11-30
Attending: FAMILY MEDICINE
Payer: COMMERCIAL

## 2023-11-30 ENCOUNTER — OFFICE VISIT (OUTPATIENT)
Dept: INTERNAL MEDICINE | Facility: CLINIC | Age: 58
End: 2023-11-30
Attending: FAMILY MEDICINE
Payer: COMMERCIAL

## 2023-11-30 VITALS
SYSTOLIC BLOOD PRESSURE: 160 MMHG | OXYGEN SATURATION: 97 % | BODY MASS INDEX: 28.54 KG/M2 | HEIGHT: 69 IN | WEIGHT: 192.69 LBS | HEART RATE: 78 BPM | DIASTOLIC BLOOD PRESSURE: 85 MMHG

## 2023-11-30 DIAGNOSIS — M54.50 CHRONIC LOW BACK PAIN, UNSPECIFIED BACK PAIN LATERALITY, UNSPECIFIED WHETHER SCIATICA PRESENT: ICD-10-CM

## 2023-11-30 DIAGNOSIS — J06.9 UPPER RESPIRATORY TRACT INFECTION, UNSPECIFIED TYPE: ICD-10-CM

## 2023-11-30 DIAGNOSIS — E78.5 HYPERLIPIDEMIA, UNSPECIFIED HYPERLIPIDEMIA TYPE: ICD-10-CM

## 2023-11-30 DIAGNOSIS — R73.03 PREDIABETES: ICD-10-CM

## 2023-11-30 DIAGNOSIS — Z79.01 ANTICOAGULANT LONG-TERM USE: ICD-10-CM

## 2023-11-30 DIAGNOSIS — B18.1 CHRONIC HEPATITIS B: ICD-10-CM

## 2023-11-30 DIAGNOSIS — I10 HYPERTENSION, ESSENTIAL: ICD-10-CM

## 2023-11-30 DIAGNOSIS — Z00.00 ANNUAL PHYSICAL EXAM: Primary | ICD-10-CM

## 2023-11-30 DIAGNOSIS — Z74.09 DECREASED STRENGTH, ENDURANCE, AND MOBILITY: ICD-10-CM

## 2023-11-30 DIAGNOSIS — M79.602 LEFT ARM PAIN: ICD-10-CM

## 2023-11-30 DIAGNOSIS — I77.9 PAOD (PERIPHERAL ARTERIAL OCCLUSIVE DISEASE): ICD-10-CM

## 2023-11-30 DIAGNOSIS — R68.89 DECREASED STRENGTH, ENDURANCE, AND MOBILITY: ICD-10-CM

## 2023-11-30 DIAGNOSIS — G89.29 CHRONIC LOW BACK PAIN, UNSPECIFIED BACK PAIN LATERALITY, UNSPECIFIED WHETHER SCIATICA PRESENT: ICD-10-CM

## 2023-11-30 DIAGNOSIS — R53.1 DECREASED STRENGTH, ENDURANCE, AND MOBILITY: ICD-10-CM

## 2023-11-30 DIAGNOSIS — K76.0 NAFLD (NONALCOHOLIC FATTY LIVER DISEASE): ICD-10-CM

## 2023-11-30 PROCEDURE — 1159F PR MEDICATION LIST DOCUMENTED IN MEDICAL RECORD: ICD-10-PCS | Mod: CPTII,S$GLB,, | Performed by: FAMILY MEDICINE

## 2023-11-30 PROCEDURE — 99999 PR PBB SHADOW E&M-EST. PATIENT-LVL V: CPT | Mod: PBBFAC,,, | Performed by: FAMILY MEDICINE

## 2023-11-30 PROCEDURE — 1159F MED LIST DOCD IN RCRD: CPT | Mod: CPTII,S$GLB,, | Performed by: FAMILY MEDICINE

## 2023-11-30 PROCEDURE — 71046 XR CHEST PA AND LATERAL: ICD-10-PCS | Mod: 26,,, | Performed by: RADIOLOGY

## 2023-11-30 PROCEDURE — 3077F PR MOST RECENT SYSTOLIC BLOOD PRESSURE >= 140 MM HG: ICD-10-PCS | Mod: CPTII,S$GLB,, | Performed by: FAMILY MEDICINE

## 2023-11-30 PROCEDURE — 3079F PR MOST RECENT DIASTOLIC BLOOD PRESSURE 80-89 MM HG: ICD-10-PCS | Mod: CPTII,S$GLB,, | Performed by: FAMILY MEDICINE

## 2023-11-30 PROCEDURE — 3008F PR BODY MASS INDEX (BMI) DOCUMENTED: ICD-10-PCS | Mod: CPTII,S$GLB,, | Performed by: FAMILY MEDICINE

## 2023-11-30 PROCEDURE — 71046 X-RAY EXAM CHEST 2 VIEWS: CPT | Mod: TC

## 2023-11-30 PROCEDURE — 71046 X-RAY EXAM CHEST 2 VIEWS: CPT | Mod: 26,,, | Performed by: RADIOLOGY

## 2023-11-30 PROCEDURE — 99999 PR PBB SHADOW E&M-EST. PATIENT-LVL V: ICD-10-PCS | Mod: PBBFAC,,, | Performed by: FAMILY MEDICINE

## 2023-11-30 PROCEDURE — 3008F BODY MASS INDEX DOCD: CPT | Mod: CPTII,S$GLB,, | Performed by: FAMILY MEDICINE

## 2023-11-30 PROCEDURE — 99396 PR PREVENTIVE VISIT,EST,40-64: ICD-10-PCS | Mod: S$GLB,,, | Performed by: FAMILY MEDICINE

## 2023-11-30 PROCEDURE — 3077F SYST BP >= 140 MM HG: CPT | Mod: CPTII,S$GLB,, | Performed by: FAMILY MEDICINE

## 2023-11-30 PROCEDURE — 1160F RVW MEDS BY RX/DR IN RCRD: CPT | Mod: CPTII,S$GLB,, | Performed by: FAMILY MEDICINE

## 2023-11-30 PROCEDURE — 1160F PR REVIEW ALL MEDS BY PRESCRIBER/CLIN PHARMACIST DOCUMENTED: ICD-10-PCS | Mod: CPTII,S$GLB,, | Performed by: FAMILY MEDICINE

## 2023-11-30 PROCEDURE — 99396 PREV VISIT EST AGE 40-64: CPT | Mod: S$GLB,,, | Performed by: FAMILY MEDICINE

## 2023-11-30 PROCEDURE — 3079F DIAST BP 80-89 MM HG: CPT | Mod: CPTII,S$GLB,, | Performed by: FAMILY MEDICINE

## 2023-11-30 RX ORDER — ATORVASTATIN CALCIUM 80 MG/1
80 TABLET, FILM COATED ORAL DAILY
Qty: 90 TABLET | Refills: 3 | Status: SHIPPED | OUTPATIENT
Start: 2023-11-30 | End: 2024-01-05 | Stop reason: SDUPTHER

## 2023-11-30 RX ORDER — AMLODIPINE BESYLATE 5 MG/1
5 TABLET ORAL DAILY
Qty: 90 TABLET | Refills: 1 | Status: SHIPPED | OUTPATIENT
Start: 2023-11-30 | End: 2024-01-05

## 2023-11-30 RX ORDER — HYDROCHLOROTHIAZIDE 25 MG/1
25 TABLET ORAL DAILY
Qty: 90 TABLET | Refills: 1 | Status: SHIPPED | OUTPATIENT
Start: 2023-11-30

## 2023-11-30 NOTE — PROGRESS NOTES
Subjective:       Patient ID: Ramiro Oneil is a 58 y.o. male.    Chief Complaint: Annual Exam    Established patient for an annual wellness check/physical exam and also chronic disease management. Specific complaints - see dictation, M*model entries and please see ROS.  Past, Surgical, Family, Social Histories; Medications, Allergies reviewed and reconciled.  Health maintenance file reviewed and addressed items due. Recent applicable lab, imaging and cardiovascular results reviewed.  Problem list items reviewed and modified or added entries (in the overview section) may not be transcribed into this encounter note due to note writer format.    Follows in cardiology and vasc with continued claudication. Back pain and mobility pain.         Review of Systems   Constitutional:  Negative for appetite change, chills, diaphoresis, fatigue and fever.   HENT:  Positive for congestion and postnasal drip. Negative for rhinorrhea, sore throat and trouble swallowing.    Eyes:  Negative for visual disturbance.   Respiratory:  Positive for cough. Negative for choking, chest tightness, shortness of breath and wheezing.    Cardiovascular:  Negative for chest pain and leg swelling.   Gastrointestinal:  Negative for abdominal distention, abdominal pain, diarrhea, nausea and vomiting.   Genitourinary:  Negative for difficulty urinating and hematuria.   Musculoskeletal:  Positive for arthralgias, back pain and myalgias.   Skin:  Negative for rash.   Neurological:  Negative for weakness, light-headedness and headaches.   Psychiatric/Behavioral:  Negative for confusion and dysphoric mood.        Objective:      Physical Exam  Vitals and nursing note reviewed.   Constitutional:       Appearance: He is well-developed. He is not diaphoretic.   Eyes:      General: No scleral icterus.  Neck:      Thyroid: No thyromegaly.      Vascular: No carotid bruit or JVD.      Trachea: No tracheal deviation.   Cardiovascular:      Rate and Rhythm:  Normal rate and regular rhythm.      Heart sounds: Normal heart sounds. No murmur heard.     No friction rub. No gallop.   Pulmonary:      Effort: Pulmonary effort is normal. No respiratory distress.      Breath sounds: Wheezing present. No rales.      Comments: Faint wheeze  Abdominal:      General: There is no distension.      Palpations: Abdomen is soft. There is no mass.      Tenderness: There is no abdominal tenderness. There is no guarding or rebound.   Musculoskeletal:      Cervical back: Normal range of motion and neck supple.   Lymphadenopathy:      Cervical: No cervical adenopathy.   Skin:     General: Skin is warm and dry.      Findings: No erythema or rash.   Neurological:      Mental Status: He is alert and oriented to person, place, and time.      Cranial Nerves: No cranial nerve deficit.      Motor: No tremor.      Coordination: Coordination normal.      Gait: Gait normal.   Psychiatric:         Behavior: Behavior normal.         Thought Content: Thought content normal.         Judgment: Judgment normal.         Assessment:       1. Annual physical exam    2. Hypertension, essential    3. Hyperlipidemia, unspecified hyperlipidemia type    4. PAOD (peripheral arterial occlusive disease)    5. NAFLD (nonalcoholic fatty liver disease)    6. Chronic hepatitis B    7. Decreased strength, endurance, and mobility    8. Anticoagulant long-term use    9. Prediabetes    10. Chronic low back pain, unspecified back pain laterality, unspecified whether sciatica present    11. Upper respiratory tract infection, unspecified type    12. Left arm pain        Plan:     Medication List with Changes/Refills   Current Medications    AMOXICILLIN-CLAVULANATE 875-125MG (AUGMENTIN) 875-125 MG PER TABLET    Take 1 tablet by mouth every 12 (twelve) hours.    CILOSTAZOL (PLETAL) 50 MG TAB    Take 1 tablet (50 mg total) by mouth 2 (two) times daily.    ECONAZOLE NITRATE 1 % CREAM        FAMOTIDINE (PEPCID) 20 MG TABLET    Take 1  tablet (20 mg total) by mouth once daily.    FEXOFENADINE (ALLEGRA) 180 MG TABLET    Take 1 tablet (180 mg total) by mouth once daily.    FLUTICASONE (FLONASE) 50 MCG/ACTUATION NASAL SPRAY    2 sprays (100 mcg total) by Each Nare route once daily.    GABAPENTIN (NEURONTIN) 300 MG CAPSULE    Take 1 capsule (300 mg total) by mouth every evening.    MELOXICAM (MOBIC) 7.5 MG TABLET    Take 1 tablet (7.5 mg total) by mouth once daily.    RIVAROXABAN (XARELTO) 2.5 MG TAB    Take 1 tablet (2.5 mg total) by mouth 2 (two) times daily with meals.    SILDENAFIL (VIAGRA) 100 MG TABLET    Take 1 tablet (100 mg total) by mouth daily as needed for Erectile Dysfunction.    TENOFOVIR ALAFENAMIDE (VEMLIDY) 25 MG TAB    Take 1 tablet (25 mg total) by mouth once daily.    TRIAMCINOLONE ACETONIDE 0.1% (KENALOG) 0.1 % CREAM    Apply topically 2 (two) times daily as needed. Neck and body, not groin   Changed and/or Refilled Medications    Modified Medication Previous Medication    AMLODIPINE (NORVASC) 5 MG TABLET amLODIPine (NORVASC) 5 MG tablet       Take 1 tablet (5 mg total) by mouth once daily.    Take 1 tablet (5 mg total) by mouth once daily.    ATORVASTATIN (LIPITOR) 80 MG TABLET atorvastatin (LIPITOR) 80 MG tablet       Take 1 tablet (80 mg total) by mouth once daily.    Take 1 tablet (80 mg total) by mouth once daily.    HYDROCHLOROTHIAZIDE (HYDRODIURIL) 25 MG TABLET hydroCHLOROthiazide (HYDRODIURIL) 25 MG tablet       Take 1 tablet (25 mg total) by mouth once daily.    Take 1 tablet (25 mg total) by mouth once daily.     1. Annual physical exam  -     Comprehensive Metabolic Panel; Future; Expected date: 11/30/2023  -     Lipid Panel; Future; Expected date: 11/30/2023  -     Hemoglobin A1C; Future; Expected date: 11/30/2023    2. Hypertension, essential  Assessment & Plan:  Didn't take meds today - recheck in 1 month w/ APRN    Orders:  -     amLODIPine (NORVASC) 5 MG tablet; Take 1 tablet (5 mg total) by mouth once daily.   Dispense: 90 tablet; Refill: 1  -     hydroCHLOROthiazide (HYDRODIURIL) 25 MG tablet; Take 1 tablet (25 mg total) by mouth once daily.  Dispense: 90 tablet; Refill: 1    3. Hyperlipidemia, unspecified hyperlipidemia type  -     Lipid Panel; Future; Expected date: 11/30/2023  -     atorvastatin (LIPITOR) 80 MG tablet; Take 1 tablet (80 mg total) by mouth once daily.  Dispense: 90 tablet; Refill: 3    4. PAOD (peripheral arterial occlusive disease)  Overview:  -PAD - s/p stent of L SFA (8/13/13) & R SFA (6/14/13); [S/p PTA/stenting of distal right SFA ([facility] trial) 6/14/13]  -10/29/2021 - The Dist R SFA lesion was 100% stenosed with 0% stenosis post-intervention. The Mid L SFA to Dist L SFA lesion was 100% stenosed. The Ost R VIKA to Dist R VIKA lesion was 100% stenosed. The PTA was successful  -2/4/2022 - Prox R SFA to Prox R Popliteal lesion was 100% stenosed. Ost R VIKA to Dist R VIKA lesion was 100% stenosed.  -surgery has felt to be poor bypass candidate    -followed in cardiology and vasc surgery      Orders:  -     Ambulatory referral/consult to Cardiology; Future; Expected date: 12/07/2023    5. NAFLD (nonalcoholic fatty liver disease)    6. Chronic hepatitis B  Overview:  Followed in hepatology, on tenovir      7. Decreased strength, endurance, and mobility  -     Ambulatory referral/consult to Physical Medicine Rehab; Future; Expected date: 12/07/2023    8. Anticoagulant long-term use  Overview:  -PAOD - stent, per cardiology    Orders:  -     Ambulatory referral/consult to Cardiology; Future; Expected date: 12/07/2023    9. Prediabetes  -     Hemoglobin A1C; Future; Expected date: 11/30/2023    10. Chronic low back pain, unspecified back pain laterality, unspecified whether sciatica present  -     Ambulatory referral/consult to Physical Medicine Rehab; Future; Expected date: 12/07/2023    11. Upper respiratory tract infection, unspecified type  Comments:  UC this week, on Abx  Orders:  -     X-Ray Chest PA  And Lateral; Future; Expected date: 11/30/2023    12. Left arm pain  -     Ambulatory referral/consult to Sports Medicine; Future; Expected date: 12/07/2023      See meds, orders, follow up, routing and instructions sections of encounter and AVS. Discussed with patient and provided on AVS.    Discussed diet and exercise as therapeutic modalities for metabolic and other conditions. Provided patient information, which are included as links on the AVS for detailed information.    Lab Results   Component Value Date     03/07/2023    K 3.9 03/07/2023     (H) 03/07/2023    BUN 15 03/07/2023    CREATININE 0.9 03/07/2023    GLU 93 03/07/2023    HGBA1C 6.2 (H) 08/29/2022    AST 27 03/07/2023    ALT 34 03/07/2023    ALBUMIN 3.8 03/07/2023    PROT 7.5 03/07/2023    BILITOT 0.9 03/07/2023    CHOL 165 08/29/2022    CHOL 154 11/16/2017    HDL 38 (L) 08/29/2022    LDLCALC 111.6 08/29/2022    TRIG 77 08/29/2022    WBC 6.28 03/07/2023    HGB 14.8 03/07/2023    HCT 48.1 03/07/2023     03/07/2023    PSA 1.7 05/02/2023    TSH 0.910 07/12/2011    URICACID 6.0 03/20/2013

## 2023-12-05 ENCOUNTER — PROCEDURE VISIT (OUTPATIENT)
Dept: HEPATOLOGY | Facility: CLINIC | Age: 58
End: 2023-12-05
Payer: COMMERCIAL

## 2023-12-05 ENCOUNTER — HOSPITAL ENCOUNTER (OUTPATIENT)
Dept: RADIOLOGY | Facility: HOSPITAL | Age: 58
Discharge: HOME | End: 2023-12-05
Attending: INTERNAL MEDICINE
Payer: COMMERCIAL

## 2023-12-05 VITALS — HEIGHT: 69 IN | BODY MASS INDEX: 28.41 KG/M2 | WEIGHT: 191.81 LBS

## 2023-12-05 DIAGNOSIS — B18.1 CHRONIC HEPATITIS B: ICD-10-CM

## 2023-12-05 DIAGNOSIS — K76.9 LIVER LESION: ICD-10-CM

## 2023-12-05 PROCEDURE — 76981 USE PARENCHYMA: CPT | Mod: S$GLB,,, | Performed by: NURSE PRACTITIONER

## 2023-12-05 PROCEDURE — 76981 FIBROSCAN (VIBRATION CONTROLLED TRANSIENT ELASTOGRAPHY): ICD-10-PCS | Mod: S$GLB,,, | Performed by: NURSE PRACTITIONER

## 2023-12-05 PROCEDURE — 76705 US ABDOMEN LIMITED: ICD-10-PCS | Mod: 26,,, | Performed by: RADIOLOGY

## 2023-12-05 PROCEDURE — 76705 ECHO EXAM OF ABDOMEN: CPT | Mod: 26,,, | Performed by: RADIOLOGY

## 2023-12-05 PROCEDURE — 76705 ECHO EXAM OF ABDOMEN: CPT | Mod: TC

## 2023-12-05 NOTE — PROCEDURES
Fibroscan Procedure     Name: Ramiro Oneil  Date of Procedure : 2023   :: YONATAN Hutchinson  Diagnosis: NAFLD    Probe: M    Fibroscan readin.6 KPa    Fibrosis:F 0-1     CAP readin dB/m    Steatosis: :S3       Interpretation:   Severe steatosis without fibrosis

## 2023-12-05 NOTE — PROCEDURES
FibroScan (Vibration Controlled Transient Elastography)    Date/Time: 12/5/2023 2:00 PM    Performed by: Katie Stapleton RN  Authorized by: Dipesh Tapia MD    Probe:     Universal Protocol: Patient's identity, procedure and site were verified, confirmatory pause was performed.  Discussed procedure including risks and potential complications.  Questions answered.  Patient verbalizes understanding and wishes to proceed with VCTE.     Procedure: After providing explanations of the procedure, patient was placed in the supine position with right arm in maximum abduction to allow optimal exposure of right lateral abdomen.  Patient was briefly assessed, Testing was performed in the mid-axillary location, 50Hz Shear Wave pulses were applied and the resulting Shear Wave and Propagation Speed detected with a 3.5 MHz ultrasonic signal, using the FibroScan probe, Skin to liver capsule distance and liver parenchyma were accessed during the entire examination with the FibroScan probe, Patient was instructed to breathe normally and to abstain from sudden movements during the procedure, allowing for random measurements of liver stiffness. At least 10 Shear Waves were produced, Individual measurements of each Shear Wave were calculated.  Patient tolerated the procedure well with no complications.  Meets discharge criteria as was dismissed.  Rates pain 0 out of 10.  Patient will follow up with ordering provider to review results.

## 2024-01-01 ENCOUNTER — TELEPHONE (OUTPATIENT)
Dept: INTERNAL MEDICINE | Facility: CLINIC | Age: 59
End: 2024-01-01

## 2024-01-01 DIAGNOSIS — R94.5 ABNORMAL RESULTS OF LIVER FUNCTION STUDIES: Primary | ICD-10-CM

## 2024-01-05 ENCOUNTER — OFFICE VISIT (OUTPATIENT)
Dept: CARDIOLOGY | Facility: CLINIC | Age: 59
End: 2024-01-05
Attending: FAMILY MEDICINE
Payer: COMMERCIAL

## 2024-01-05 ENCOUNTER — OFFICE VISIT (OUTPATIENT)
Dept: INTERNAL MEDICINE | Facility: CLINIC | Age: 59
End: 2024-01-05
Payer: COMMERCIAL

## 2024-01-05 VITALS
WEIGHT: 193.81 LBS | BODY MASS INDEX: 28.71 KG/M2 | OXYGEN SATURATION: 98 % | DIASTOLIC BLOOD PRESSURE: 80 MMHG | SYSTOLIC BLOOD PRESSURE: 130 MMHG | HEART RATE: 62 BPM | HEIGHT: 69 IN

## 2024-01-05 VITALS
SYSTOLIC BLOOD PRESSURE: 170 MMHG | DIASTOLIC BLOOD PRESSURE: 80 MMHG | WEIGHT: 195.75 LBS | HEIGHT: 69 IN | BODY MASS INDEX: 28.99 KG/M2 | OXYGEN SATURATION: 97 % | HEART RATE: 78 BPM

## 2024-01-05 DIAGNOSIS — E78.5 HYPERLIPIDEMIA, UNSPECIFIED HYPERLIPIDEMIA TYPE: ICD-10-CM

## 2024-01-05 DIAGNOSIS — E66.3 OVERWEIGHT (BMI 25.0-29.9): ICD-10-CM

## 2024-01-05 DIAGNOSIS — I77.9 PAOD (PERIPHERAL ARTERIAL OCCLUSIVE DISEASE): Primary | ICD-10-CM

## 2024-01-05 DIAGNOSIS — I10 HYPERTENSION, ESSENTIAL: Primary | ICD-10-CM

## 2024-01-05 DIAGNOSIS — I10 HYPERTENSION, ESSENTIAL: ICD-10-CM

## 2024-01-05 DIAGNOSIS — Z95.9 S/P ARTERIAL STENT: ICD-10-CM

## 2024-01-05 DIAGNOSIS — E78.2 MIXED HYPERLIPIDEMIA: ICD-10-CM

## 2024-01-05 PROCEDURE — 3008F BODY MASS INDEX DOCD: CPT | Mod: CPTII,S$GLB,, | Performed by: STUDENT IN AN ORGANIZED HEALTH CARE EDUCATION/TRAINING PROGRAM

## 2024-01-05 PROCEDURE — 1160F RVW MEDS BY RX/DR IN RCRD: CPT | Mod: CPTII,S$GLB,, | Performed by: STUDENT IN AN ORGANIZED HEALTH CARE EDUCATION/TRAINING PROGRAM

## 2024-01-05 PROCEDURE — 99999 PR PBB SHADOW E&M-EST. PATIENT-LVL V: CPT | Mod: PBBFAC,,, | Performed by: STUDENT IN AN ORGANIZED HEALTH CARE EDUCATION/TRAINING PROGRAM

## 2024-01-05 PROCEDURE — 3077F SYST BP >= 140 MM HG: CPT | Mod: CPTII,S$GLB,, | Performed by: NURSE PRACTITIONER

## 2024-01-05 PROCEDURE — 99999 PR PBB SHADOW E&M-EST. PATIENT-LVL V: CPT | Mod: PBBFAC,,, | Performed by: NURSE PRACTITIONER

## 2024-01-05 PROCEDURE — 99214 OFFICE O/P EST MOD 30 MIN: CPT | Mod: S$GLB,,, | Performed by: STUDENT IN AN ORGANIZED HEALTH CARE EDUCATION/TRAINING PROGRAM

## 2024-01-05 PROCEDURE — 1159F MED LIST DOCD IN RCRD: CPT | Mod: CPTII,S$GLB,, | Performed by: STUDENT IN AN ORGANIZED HEALTH CARE EDUCATION/TRAINING PROGRAM

## 2024-01-05 PROCEDURE — 3075F SYST BP GE 130 - 139MM HG: CPT | Mod: CPTII,S$GLB,, | Performed by: STUDENT IN AN ORGANIZED HEALTH CARE EDUCATION/TRAINING PROGRAM

## 2024-01-05 PROCEDURE — 1160F RVW MEDS BY RX/DR IN RCRD: CPT | Mod: CPTII,S$GLB,, | Performed by: NURSE PRACTITIONER

## 2024-01-05 PROCEDURE — 3008F BODY MASS INDEX DOCD: CPT | Mod: CPTII,S$GLB,, | Performed by: NURSE PRACTITIONER

## 2024-01-05 PROCEDURE — 3079F DIAST BP 80-89 MM HG: CPT | Mod: CPTII,S$GLB,, | Performed by: NURSE PRACTITIONER

## 2024-01-05 PROCEDURE — 3079F DIAST BP 80-89 MM HG: CPT | Mod: CPTII,S$GLB,, | Performed by: STUDENT IN AN ORGANIZED HEALTH CARE EDUCATION/TRAINING PROGRAM

## 2024-01-05 PROCEDURE — 1159F MED LIST DOCD IN RCRD: CPT | Mod: CPTII,S$GLB,, | Performed by: NURSE PRACTITIONER

## 2024-01-05 PROCEDURE — 99214 OFFICE O/P EST MOD 30 MIN: CPT | Mod: S$GLB,,, | Performed by: NURSE PRACTITIONER

## 2024-01-05 RX ORDER — CILOSTAZOL 50 MG/1
50 TABLET ORAL 2 TIMES DAILY
Qty: 60 TABLET | Refills: 11 | Status: SHIPPED | OUTPATIENT
Start: 2024-01-05 | End: 2025-01-04

## 2024-01-05 RX ORDER — AMLODIPINE BESYLATE 10 MG/1
10 TABLET ORAL DAILY
Qty: 90 TABLET | Refills: 3 | Status: SHIPPED | OUTPATIENT
Start: 2024-01-05

## 2024-01-05 RX ORDER — ATORVASTATIN CALCIUM 80 MG/1
80 TABLET, FILM COATED ORAL DAILY
Qty: 90 TABLET | Refills: 3 | Status: SHIPPED | OUTPATIENT
Start: 2024-01-05

## 2024-01-05 NOTE — PROGRESS NOTES
Cardiology Clinic Note    Subjective:   Patient ID:  Ramiro Oneil is a 58 y.o. male who presents for follow-up of her peripheral arterial disease     On 10/29/2021 patient had peripheral angiogram and PTA. Distal Right SFA  was crossed in antegrade manner. Atherectomy performed using Jetstream device with distal filter protection. This was followed by balloon angioplasty and finally DCB was used with good final result.  He was then referred by Dr. Blake after 2/2023 peripheral angiogram for vascular surgery evaluation prior to any further endovascular intervention. Seen by vascular surgery and no great options for bypass. Patient was then seen on the [facility] but has not returned to see Dr. Blake. Patient reports he has not had refills for xarelto for over a year somehow. He has been compliant with ASA 81mg and atorvastatin 80mg daily.  with goal <70 or ideally <55. He was prescribed cilostazol but due to concern for side effects he never tried it.     He now presents today with worsening right lower extremity calf claudication. He is able to walk on flat ground for 2-3 blocks before stopping but with minimal incline he is unable to walk more than 50 ft without calf pain and must hold onto the wall to ambulate.  After his procedure he noted improvement in his leg pain, however 2 months after the procedure the right leg pain reoccurred.  He has not had a recent ultrasound but known severe SFA and popliteal disease on the right. ABIs with vascular surgery 9/2023.    He denies any ulcers, rest pain, nonhealing wounds.     02/04/2022  The Prox R SFA to Prox R Popliteal lesion was 100% stenosed.   The Ost R VIKA to Dist R VIKA lesion was 100% stenosed.  Discussed case with Dr. Marcelo who will evaluate pateint for Fem/below knee bypass which has a better long term patency than repeat endovascular therapy.    11/30/2021  Right popliteal severe stenosis. Patent SFA stent. Occluded anterior tibial and  posterior tibial.  Left SFA, AT, DP total occlusion.  Reduced KARMA's consistent with bilateral moderate to severe PAD.    Review of Systems   Constitutional: Negative for chills and fever.   HENT:  Negative for nosebleeds.    Eyes:  Negative for redness.   Cardiovascular:  Positive for claudication. Negative for chest pain, dyspnea on exertion, leg swelling, near-syncope, orthopnea, palpitations, paroxysmal nocturnal dyspnea and syncope.   Respiratory:  Negative for cough, shortness of breath and sputum production.    Hematologic/Lymphatic: Negative for bleeding problem.   Musculoskeletal:  Negative for joint swelling and muscle weakness.   Gastrointestinal:  Negative for hematemesis, melena, nausea and vomiting.   Genitourinary:  Negative for hematuria.   Neurological:  Negative for dizziness and weakness.     History:     Past Medical History:   Diagnosis Date    Anticoagulant long-term use     Arthritis     Dermatitis     Hepatitis B     History of surgery on arm 07/2020    Hypertension     Liver fibrosis 12/12/2019    F2 on fibroscan 11/2018    NAFLD (nonalcoholic fatty liver disease)      Past Surgical History:   Procedure Laterality Date    CARPAL TUNNEL RELEASE      COLONOSCOPY      COLONOSCOPY N/A 11/7/2022    Procedure: COLONOSCOPY;  Surgeon: Brent Bowden MD;  Location: [facility] ENDO ([address] [place] [address]);  Service: Endoscopy;  Laterality: N/A;  Do not cancel this order  ok to hold Pletal and Xarelto 2 days per Dr Darius steiner-GT  instr mailed    PERCUTANEOUS TRANSLUMINAL ANGIOPLASTY (PTA) OF PERIPHERAL VESSEL N/A 2/4/2022    Procedure: PTA, PERIPHERAL VESSEL;  Surgeon: Rodger Blake MD;  Location: [facility] CATH LAB;  Service: Cardiology;  Laterality: N/A;    PTA/stenting of distal right SFA       right hand surgery      SHOULDER SURGERY      stents       Social History     Socioeconomic History    Marital status:      Spouse name: Roro    Number of children: 2   Occupational History     Occupation:      Employer: [employer]   Tobacco Use    Smoking status: Never    Smokeless tobacco: Never   Substance and Sexual Activity    Alcohol use: Not Currently     Comment: 1-2 non alchoholic beers per week    Drug use: No    Sexual activity: Yes   Social History Narrative     [employer], , two children.        Family History   Problem Relation Age of Onset    Diabetes Mother     Hypertension Mother     Cancer Father         lung    Diabetes Sister     Cancer Sister         breast    Hypertension Sister     Cancer Brother         prostate    Cancer Paternal Uncle     Diabetes Paternal Uncle     Stroke Sister     Liver disease Neg Hx     Cirrhosis Neg Hx       Review of patient's allergies indicates:  No Known Allergies  has a current medication list which includes the following prescription(s): amlodipine, econazole nitrate, fexofenadine, fluticasone propionate, hydrochlorothiazide, meloxicam, sildenafil, vemlidy, triamcinolone acetonide 0.1%, atorvastatin, cilostazol, evolocumab, famotidine, gabapentin, rivaroxaban, and [DISCONTINUED] tenofovir disoproxil fumarate.     Meds:   Review of patient's allergies indicates:  No Known Allergies    Current Outpatient Medications:     amLODIPine (NORVASC) 10 MG tablet, Take 1 tablet (10 mg total) by mouth once daily., Disp: 90 tablet, Rfl: 3    econazole nitrate 1 % cream, , Disp: , Rfl:     fexofenadine (ALLEGRA) 180 MG tablet, Take 1 tablet (180 mg total) by mouth once daily., Disp: 30 tablet, Rfl: 1    fluticasone (FLONASE) 50 mcg/actuation nasal spray, 2 sprays (100 mcg total) by Each Nare route once daily. (Patient taking differently: 2 sprays by Each Nostril route as needed.), Disp: 1 Bottle, Rfl: 5    hydroCHLOROthiazide (HYDRODIURIL) 25 MG tablet, Take 1 tablet (25 mg total) by mouth once daily., Disp: 90 tablet, Rfl: 1    meloxicam (MOBIC) 7.5 MG tablet, Take 1 tablet (7.5 mg total) by mouth once daily., Disp: 30  "tablet, Rfl: 0    sildenafiL (VIAGRA) 100 MG tablet, Take 1 tablet (100 mg total) by mouth daily as needed for Erectile Dysfunction., Disp: 20 tablet, Rfl: 5    tenofovir alafenamide (VEMLIDY) 25 mg Tab, Take 1 tablet (25 mg total) by mouth once daily., Disp: 30 tablet, Rfl: 11    triamcinolone acetonide 0.1% (KENALOG) 0.1 % cream, Apply topically 2 (two) times daily as needed. Neck and body, not groin, Disp: 80 g, Rfl: 1    atorvastatin (LIPITOR) 80 MG tablet, Take 1 tablet (80 mg total) by mouth once daily., Disp: 90 tablet, Rfl: 3    cilostazoL (PLETAL) 50 MG Tab, Take 1 tablet (50 mg total) by mouth 2 (two) times daily., Disp: 60 tablet, Rfl: 11    evolocumab (REPATHA SURECLICK) 140 mg/mL PnIj, Inject 1 mL (140 mg total) into the skin every 14 (fourteen) days., Disp: 2 mL, Rfl: 11    famotidine (PEPCID) 20 MG tablet, Take 1 tablet (20 mg total) by mouth once daily., Disp: 30 tablet, Rfl: 11    gabapentin (NEURONTIN) 300 MG capsule, Take 1 capsule (300 mg total) by mouth every evening., Disp: 30 capsule, Rfl: 11    rivaroxaban (XARELTO) 2.5 mg Tab, Take 1 tablet (2.5 mg total) by mouth 2 (two) times daily with meals., Disp: 60 tablet, Rfl: 11    Objective:   /80   Pulse 62   Ht 5' 9" (1.753 m)   Wt 87.9 kg (193 lb 12.6 oz)   SpO2 98%   BMI 28.62 kg/m²   Physical Exam  Constitutional:       Appearance: Normal appearance.   HENT:      Head: Normocephalic and atraumatic.   Eyes:      Extraocular Movements: Extraocular movements intact.   Cardiovascular:      Rate and Rhythm: Normal rate and regular rhythm.      Heart sounds: Normal heart sounds, S1 normal and S2 normal. No murmur heard.  Pulmonary:      Effort: Pulmonary effort is normal.      Breath sounds: Normal breath sounds. No wheezing, rhonchi or rales.   Abdominal:      General: Bowel sounds are normal. There is no distension.      Palpations: Abdomen is soft.      Tenderness: There is no abdominal tenderness.   Musculoskeletal:      Right lower " "leg: No edema.      Left lower leg: No edema.   Skin:     General: Skin is warm and dry.      Findings: No lesion or rash.   Neurological:      Mental Status: He is alert and oriented to person, place, and time.   Psychiatric:         Mood and Affect: Mood normal.         Behavior: Behavior normal.       Labs:     Lab Results   Component Value Date     11/30/2023    K 4.2 11/30/2023     11/30/2023    CO2 26 11/30/2023    BUN 13 11/30/2023    CREATININE 0.9 11/30/2023    ANIONGAP 11 11/30/2023     Lab Results   Component Value Date    HGBA1C 6.2 (H) 11/30/2023     No results found for: "BNP", "BNPTRIAGEBLO" Lab Results   Component Value Date    WBC 6.28 03/07/2023    HGB 14.8 03/07/2023    HCT 48.1 03/07/2023     03/07/2023    GRAN 3.0 03/07/2023    GRAN 47.2 03/07/2023     Lab Results   Component Value Date    CHOL 158 11/30/2023    CHOL 154 11/16/2017    HDL 24 (L) 11/30/2023    LDLCALC 118.4 11/30/2023    TRIG 78 11/30/2023        Assessment/Plan:       Ramiro was seen today for peripheral arterial disease.    Diagnoses and all orders for this visit:    PAOD (peripheral arterial occlusive disease)  -     Ambulatory referral/consult to Cardiology  -     evolocumab (REPATHA SURECLICK) 140 mg/mL PnIj; Inject 1 mL (140 mg total) into the skin every 14 (fourteen) days.  -     Ambulatory referral/consult to Interventional Cardiology; Future  -     Ambulatory referral/consult to Vascular Medicine; Future  -     cilostazoL (PLETAL) 50 MG Tab; Take 1 tablet (50 mg total) by mouth 2 (two) times daily.    S/P arterial stent  -     evolocumab (REPATHA SURECLICK) 140 mg/mL PnIj; Inject 1 mL (140 mg total) into the skin every 14 (fourteen) days.  -     Ambulatory referral/consult to Interventional Cardiology; Future  -     Ambulatory referral/consult to Vascular Medicine; Future    Hypertension, essential    Mixed hyperlipidemia  -     evolocumab (REPATHA SURECLICK) 140 mg/mL PnIj; Inject 1 mL (140 mg total) " into the skin every 14 (fourteen) days.  -     Ambulatory referral/consult to Interventional Cardiology; Future  -     Ambulatory referral/consult to Vascular Medicine; Future  -     atorvastatin (LIPITOR) 80 MG tablet; Take 1 tablet (80 mg total) by mouth once daily.    Hyperlipidemia, unspecified hyperlipidemia type    Other orders  -     rivaroxaban (XARELTO) 2.5 mg Tab; Take 1 tablet (2.5 mg total) by mouth 2 (two) times daily with meals.       #PAD (peripheral artery disease)   -Known severe peripheral arterial disease and had prior intervention in 10/2021 and 2/2022 to his SFA   -Referred for vascular evaluation for bypass and no good options  -Worsening R calf claudication.  -On ASA, lipitor 80mg  -Give new refill for xarelto 2.5mg BID that he has been out of >1 year  -Recommended trying cilostazol  -Continue exercise program  -Referral to Dr. Blake for re-evaluation of endovascular therapy  -Referral to Dr. Bunn for PAD clinic   -Start repatha for LDL goal <55    #Hypertension  -Continue amlodipine 10mg, HCTZ 25mg    #Hyperlipidemia  -above goal given sever PAD with prior intervention  -compliant with lipitor 80mg, repatha sent to specialty pharmacy  -repeat lipid panel 6-8 weeks    Patient seen and discussed with Dr. Beckford. Unless there are intervening problems, patient should return for re-evaluation in 3 months.    Signed  Costa Baldwin MD  Cardiovascular Disease  [facility]

## 2024-01-05 NOTE — PROGRESS NOTES
Subjective     Patient ID: Ramiro Oneil is a 58 y.o. male.    Chief Complaint: Follow-up    Pt of Dr. Castellon, here for 1 month f/u on BP recheck    Seen for annual by PCP on 11-30-23. BP was 160/85. Plan of care was the following:  Hypertension, essential  Assessment & Plan:  Didn't take meds today - recheck in 1 month w/ APRN     Orders:  -     amLODIPine (NORVASC) 5 MG tablet; Take 1 tablet (5 mg total) by mouth once daily.  Dispense: 90 tablet; Refill: 1  -     hydroCHLOROthiazide (HYDRODIURIL) 25 MG tablet; Take 1 tablet (25 mg total) by mouth once daily.  Dispense: 90 tablet; Refill: 1    Has been taking meds consistently since last visit. Has had rotator cuff pain left shoulder, has appt today. Has been watching his salt intake.       Review of Systems   Constitutional:  Negative for activity change, appetite change and unexpected weight change.   HENT:  Negative for hearing loss and voice change.    Eyes:  Negative for visual disturbance.   Respiratory:  Negative for apnea, cough, chest tightness and shortness of breath.    Cardiovascular:  Negative for chest pain, palpitations and leg swelling.   Gastrointestinal:  Negative for abdominal distention, abdominal pain, blood in stool, constipation, diarrhea, nausea and vomiting.   Endocrine: Negative for cold intolerance, heat intolerance, polydipsia, polyphagia and polyuria.   Genitourinary:  Negative for difficulty urinating, dysuria and penile pain.   Musculoskeletal:  Positive for arthralgias. Negative for myalgias.   Integumentary:  Negative for color change, pallor, rash and wound.   Allergic/Immunologic: Negative for environmental allergies, food allergies and frequent infections.   Neurological:  Negative for dizziness, weakness, light-headedness, numbness and headaches.   Hematological:  Negative for adenopathy. Does not bruise/bleed easily.   Psychiatric/Behavioral:  Negative for agitation, behavioral problems, sleep disturbance and suicidal ideas.              Review of patient's allergies indicates:  No Known Allergies      Current Outpatient Medications:     atorvastatin (LIPITOR) 80 MG tablet, Take 1 tablet (80 mg total) by mouth once daily., Disp: 90 tablet, Rfl: 3    cilostazoL (PLETAL) 50 MG Tab, Take 1 tablet (50 mg total) by mouth 2 (two) times daily., Disp: 60 tablet, Rfl: 11    econazole nitrate 1 % cream, , Disp: , Rfl:     fexofenadine (ALLEGRA) 180 MG tablet, Take 1 tablet (180 mg total) by mouth once daily., Disp: 30 tablet, Rfl: 1    fluticasone (FLONASE) 50 mcg/actuation nasal spray, 2 sprays (100 mcg total) by Each Nare route once daily. (Patient taking differently: 2 sprays by Each Nostril route as needed.), Disp: 1 Bottle, Rfl: 5    hydroCHLOROthiazide (HYDRODIURIL) 25 MG tablet, Take 1 tablet (25 mg total) by mouth once daily., Disp: 90 tablet, Rfl: 1    meloxicam (MOBIC) 7.5 MG tablet, Take 1 tablet (7.5 mg total) by mouth once daily., Disp: 30 tablet, Rfl: 0    rivaroxaban (XARELTO) 2.5 mg Tab, Take 1 tablet (2.5 mg total) by mouth 2 (two) times daily with meals., Disp: 60 tablet, Rfl: 11    sildenafiL (VIAGRA) 100 MG tablet, Take 1 tablet (100 mg total) by mouth daily as needed for Erectile Dysfunction., Disp: 20 tablet, Rfl: 5    tenofovir alafenamide (VEMLIDY) 25 mg Tab, Take 1 tablet (25 mg total) by mouth once daily., Disp: 30 tablet, Rfl: 11    triamcinolone acetonide 0.1% (KENALOG) 0.1 % cream, Apply topically 2 (two) times daily as needed. Neck and body, not groin, Disp: 80 g, Rfl: 1    famotidine (PEPCID) 20 MG tablet, Take 1 tablet (20 mg total) by mouth once daily., Disp: 30 tablet, Rfl: 11    gabapentin (NEURONTIN) 300 MG capsule, Take 1 capsule (300 mg total) by mouth every evening., Disp: 30 capsule, Rfl: 11  On Amlodipine 5mg daily now    Patient Active Problem List   Diagnosis    PAOD (peripheral arterial occlusive disease)    Cervicalgia    DDD (degenerative disc disease), cervical    Chronic hepatitis B    NAFLD  (nonalcoholic fatty liver disease)    Hypertension, essential    S/p bilateral carpal tunnel release    Family history of stroke    Hyperlipidemia    Colon polyps    Liver lesion    S/P arterial stent    Rotator cuff syndrome    Decreased strength, endurance, and mobility    Posture imbalance    Impaired range of motion of cervical spine    Anticoagulant long-term use    Paresthesia of right foot       Past Medical History:   Diagnosis Date    Anticoagulant long-term use     Arthritis     Dermatitis     Hepatitis B     History of surgery on arm 07/2020    Hypertension     Liver fibrosis 12/12/2019    F2 on fibroscan 11/2018    NAFLD (nonalcoholic fatty liver disease)        Past Surgical History:   Procedure Laterality Date    CARPAL TUNNEL RELEASE      COLONOSCOPY      COLONOSCOPY N/A 11/7/2022    Procedure: COLONOSCOPY;  Surgeon: Brent Bowden MD;  Location: [facility] ENDO ([address] [place] [address]);  Service: Endoscopy;  Laterality: N/A;  Do not cancel this order  ok to hold Pletal and Xarelto 2 days per Dr Darius steiner-GT  instr mailed    PERCUTANEOUS TRANSLUMINAL ANGIOPLASTY (PTA) OF PERIPHERAL VESSEL N/A 2/4/2022    Procedure: PTA, PERIPHERAL VESSEL;  Surgeon: Rodger Blake MD;  Location: [facility] CATH LAB;  Service: Cardiology;  Laterality: N/A;    PTA/stenting of distal right SFA       right hand surgery      SHOULDER SURGERY      stents         Social History     Socioeconomic History    Marital status:      Spouse name: Roro    Number of children: 2   Occupational History    Occupation:      Employer: [employer]   Tobacco Use    Smoking status: Never    Smokeless tobacco: Never   Substance and Sexual Activity    Alcohol use: Not Currently     Comment: 1-2 non alchoholic beers per week    Drug use: No    Sexual activity: Yes   Social History Narrative     [employer], , two children.          Family History   Problem Relation Age of Onset    Diabetes Mother   "   Hypertension Mother     Cancer Father         lung    Diabetes Sister     Cancer Sister         breast    Hypertension Sister     Cancer Brother         prostate    Cancer Paternal Uncle     Diabetes Paternal Uncle     Stroke Sister     Liver disease Neg Hx     Cirrhosis Neg Hx        Objective     Vitals:    01/05/24 0934 01/05/24 0947   BP: (!) 182/80 (!) 170/80   Pulse: 78    SpO2: 97%    Weight: 88.8 kg (195 lb 12.3 oz)    Height: 5' 9" (1.753 m)    PainSc:   8        Body mass index is 28.91 kg/m².    Physical Exam  Vitals and nursing note reviewed.   Constitutional:       Appearance: Normal appearance.   HENT:      Head: Normocephalic.      Nose: Nose normal.      Mouth/Throat:      Mouth: Mucous membranes are moist.   Eyes:      Conjunctiva/sclera: Conjunctivae normal.   Cardiovascular:      Rate and Rhythm: Normal rate and regular rhythm.      Pulses: Normal pulses.      Heart sounds: Normal heart sounds. No murmur heard.     No gallop.   Pulmonary:      Effort: Pulmonary effort is normal.      Breath sounds: Normal breath sounds.   Musculoskeletal:         General: Normal range of motion.      Cervical back: Normal range of motion.   Skin:     General: Skin is warm and dry.   Neurological:      General: No focal deficit present.      Mental Status: He is alert and oriented to person, place, and time.   Psychiatric:         Mood and Affect: Mood normal.         Behavior: Behavior normal.         Thought Content: Thought content normal.         Judgment: Judgment normal.            Assessment and Plan     1. Hypertension, essential  -     amLODIPine (NORVASC) 10 MG tablet; Take 1 tablet (10 mg total) by mouth once daily.  Dispense: 90 tablet; Refill: 3    Increase amlodipine 5mg to 10mg daily. Take 2 of the 5 mg tabs now, take an extra dose today, when you run out, start the new higher dose 10mg daily sent to your pharmacy    Take medications as prescribed.    Monitor BP at home, goal BP < or = 140/80, " call office if consistently above this range.    Follow low salt DASH diet and exercise.    BMI reviewed.    Go to ED if Headaches, blurred vision, chest pain, or SOB occurs along with elevated readings > or = 160/90.    Self care instructions provided in AVS    2. BMI 28.0-28.9,adult  BMI reviewed    3. Overweight (BMI 25.0-29.9)  BMI reviewed.    Diet and exercise to lose weight.             Follow up if symptoms worsen or fail to improve.

## 2024-01-05 NOTE — PATIENT INSTRUCTIONS
Increase amlodipine 5mg to 10mg daily. Take 2 of the 5 mg tabs now, take an extra dose today, when you run out, start the new higher dose 10mg daily sent to your pharmacy    Take medications as prescribed.    Monitor BP at home, goal BP < or = 140/80, call office if consistently above this range.    Follow low salt DASH diet and exercise.    BMI reviewed.    Go to ED if Headaches, blurred vision, chest pain, or SOB occurs along with elevated readings > or = 160/90.

## 2024-01-08 NOTE — PROGRESS NOTES
I have reviewed the patient's chart and the fellow's clinic note, as well as discussed the case with the fellow. I have personally spoken with and examined the patient in the clinic. I agree with the findings, assessment, and plan.      Angelo Beckford MD  [facility]  .

## 2024-01-16 ENCOUNTER — OFFICE VISIT (OUTPATIENT)
Dept: SPORTS MEDICINE | Facility: CLINIC | Age: 59
End: 2024-01-16
Attending: FAMILY MEDICINE
Payer: COMMERCIAL

## 2024-01-16 ENCOUNTER — HOSPITAL ENCOUNTER (OUTPATIENT)
Dept: RADIOLOGY | Facility: HOSPITAL | Age: 59
Discharge: HOME | End: 2024-01-16
Attending: ORTHOPAEDIC SURGERY
Payer: COMMERCIAL

## 2024-01-16 ENCOUNTER — OFFICE VISIT (OUTPATIENT)
Dept: CARDIOLOGY | Facility: CLINIC | Age: 59
End: 2024-01-16
Payer: COMMERCIAL

## 2024-01-16 VITALS
SYSTOLIC BLOOD PRESSURE: 159 MMHG | HEART RATE: 65 BPM | DIASTOLIC BLOOD PRESSURE: 89 MMHG | BODY MASS INDEX: 29.18 KG/M2 | HEIGHT: 69 IN | WEIGHT: 197 LBS

## 2024-01-16 VITALS
HEART RATE: 64 BPM | SYSTOLIC BLOOD PRESSURE: 164 MMHG | DIASTOLIC BLOOD PRESSURE: 82 MMHG | WEIGHT: 197.31 LBS | BODY MASS INDEX: 29.22 KG/M2 | HEIGHT: 69 IN

## 2024-01-16 DIAGNOSIS — I77.9 PAOD (PERIPHERAL ARTERIAL OCCLUSIVE DISEASE): Primary | ICD-10-CM

## 2024-01-16 DIAGNOSIS — M79.602 LEFT ARM PAIN: ICD-10-CM

## 2024-01-16 DIAGNOSIS — Z95.9 S/P ARTERIAL STENT: ICD-10-CM

## 2024-01-16 DIAGNOSIS — M25.512 CHRONIC LEFT SHOULDER PAIN: Primary | ICD-10-CM

## 2024-01-16 DIAGNOSIS — M50.30 DDD (DEGENERATIVE DISC DISEASE), CERVICAL: ICD-10-CM

## 2024-01-16 DIAGNOSIS — M54.16 LUMBAR RADICULOPATHY, CHRONIC: ICD-10-CM

## 2024-01-16 DIAGNOSIS — E78.2 MIXED HYPERLIPIDEMIA: ICD-10-CM

## 2024-01-16 DIAGNOSIS — Z79.01 ANTICOAGULANT LONG-TERM USE: ICD-10-CM

## 2024-01-16 DIAGNOSIS — I10 HYPERTENSION, ESSENTIAL: ICD-10-CM

## 2024-01-16 DIAGNOSIS — G89.29 CHRONIC LEFT SHOULDER PAIN: Primary | ICD-10-CM

## 2024-01-16 PROCEDURE — 3079F DIAST BP 80-89 MM HG: CPT | Mod: CPTII,S$GLB,, | Performed by: ORTHOPAEDIC SURGERY

## 2024-01-16 PROCEDURE — 99205 OFFICE O/P NEW HI 60 MIN: CPT | Mod: S$GLB,,, | Performed by: INTERNAL MEDICINE

## 2024-01-16 PROCEDURE — 3079F DIAST BP 80-89 MM HG: CPT | Mod: CPTII,S$GLB,, | Performed by: INTERNAL MEDICINE

## 2024-01-16 PROCEDURE — 73030 X-RAY EXAM OF SHOULDER: CPT | Mod: 26,LT,, | Performed by: RADIOLOGY

## 2024-01-16 PROCEDURE — 73030 X-RAY EXAM OF SHOULDER: CPT | Mod: TC,LT

## 2024-01-16 PROCEDURE — 1159F MED LIST DOCD IN RCRD: CPT | Mod: CPTII,S$GLB,, | Performed by: ORTHOPAEDIC SURGERY

## 2024-01-16 PROCEDURE — 20610 DRAIN/INJ JOINT/BURSA W/O US: CPT | Mod: LT,S$GLB,, | Performed by: ORTHOPAEDIC SURGERY

## 2024-01-16 PROCEDURE — 99999 PR PBB SHADOW E&M-EST. PATIENT-LVL IV: CPT | Mod: PBBFAC,,, | Performed by: ORTHOPAEDIC SURGERY

## 2024-01-16 PROCEDURE — 3008F BODY MASS INDEX DOCD: CPT | Mod: CPTII,S$GLB,, | Performed by: ORTHOPAEDIC SURGERY

## 2024-01-16 PROCEDURE — 3077F SYST BP >= 140 MM HG: CPT | Mod: CPTII,S$GLB,, | Performed by: INTERNAL MEDICINE

## 2024-01-16 PROCEDURE — 3077F SYST BP >= 140 MM HG: CPT | Mod: CPTII,S$GLB,, | Performed by: ORTHOPAEDIC SURGERY

## 2024-01-16 PROCEDURE — 99203 OFFICE O/P NEW LOW 30 MIN: CPT | Mod: 25,S$GLB,, | Performed by: ORTHOPAEDIC SURGERY

## 2024-01-16 PROCEDURE — 99999 PR PBB SHADOW E&M-EST. PATIENT-LVL V: CPT | Mod: PBBFAC,,, | Performed by: INTERNAL MEDICINE

## 2024-01-16 PROCEDURE — 3008F BODY MASS INDEX DOCD: CPT | Mod: CPTII,S$GLB,, | Performed by: INTERNAL MEDICINE

## 2024-01-16 RX ORDER — TRIAMCINOLONE ACETONIDE 40 MG/ML
60 INJECTION, SUSPENSION INTRA-ARTICULAR; INTRAMUSCULAR
Status: DISCONTINUED | OUTPATIENT
Start: 2024-01-16 | End: 2024-01-16 | Stop reason: HOSPADM

## 2024-01-16 RX ADMIN — TRIAMCINOLONE ACETONIDE 60 MG: 40 INJECTION, SUSPENSION INTRA-ARTICULAR; INTRAMUSCULAR at 09:01

## 2024-01-16 NOTE — PROCEDURES
Large Joint Aspiration/Injection: L subacromial bursa    Date/Time: 1/16/2024 9:30 AM    Performed by: Herb Rich MD  Authorized by: Herb Rich MD    Consent Done?:  Yes (Verbal)  Indications:  Pain  Site marked: the procedure site was marked    Timeout: prior to procedure the correct patient, procedure, and site was verified    Prep: patient was prepped and draped in usual sterile fashion      Details:  Needle Size:  22 G  Ultrasonic Guidance for needle placement?: No    Approach:  Posterior  Location:  Shoulder  Site:  L subacromial bursa  Medications:  60 mg triamcinolone acetonide 40 mg/mL  Patient tolerance:  Patient tolerated the procedure well with no immediate complications

## 2024-01-16 NOTE — PROGRESS NOTES
CC: LEFT shoulder pain    58 y.o. Male with a  history of atraumatic left knee pain.  Patient works as a supervisor in the dairy business. He reports approximately 1 month of left shoulder pain.He denies any fall or trauma.  He has a history of b/l Rotator Cuff Repair in the  remote past by Dr. Rich. He was seen 4 years ago for his bilateral shoulder pain and sent for PT. He states at the time his shoulder pain improved and he never returned. However, over the last month his left shoulder has begun to be painful. It is painful at rest as well as with activity. He has difficulty with over head activities as well as noticed decreased range of motion. He has tried some tylenol with minimal relief. He has also tried to continue some of his HEP he was given prior, that have helped some.     He reports that the pain and weakness is worse with overhead activity. It also bothers him at night.    Is affecting ADLs.  Pain is 7/10 at it's worst.      Past Medical History:   Diagnosis Date    Anticoagulant long-term use     Arthritis     Dermatitis     Hepatitis B     History of surgery on arm 07/2020    Hypertension     Liver fibrosis 12/12/2019    F2 on fibroscan 11/2018    NAFLD (nonalcoholic fatty liver disease)        Past Surgical History:   Procedure Laterality Date    CARPAL TUNNEL RELEASE      COLONOSCOPY      COLONOSCOPY N/A 11/7/2022    Procedure: COLONOSCOPY;  Surgeon: Brent Bowden MD;  Location: [facility] ENDO ([address] [place] [address]);  Service: Endoscopy;  Laterality: N/A;  Do not cancel this order  ok to hold Pletal and Xarelto 2 days per Dr Darius steiner-GT  instr mailed    PERCUTANEOUS TRANSLUMINAL ANGIOPLASTY (PTA) OF PERIPHERAL VESSEL N/A 2/4/2022    Procedure: PTA, PERIPHERAL VESSEL;  Surgeon: Rodger Blake MD;  Location: [facility] CATH LAB;  Service: Cardiology;  Laterality: N/A;    PTA/stenting of distal right SFA       right hand surgery      SHOULDER SURGERY      stents         Family History    Problem Relation Age of Onset    Diabetes Mother     Hypertension Mother     Cancer Father         lung    Diabetes Sister     Cancer Sister         breast    Hypertension Sister     Cancer Brother         prostate    Cancer Paternal Uncle     Diabetes Paternal Uncle     Stroke Sister     Liver disease Neg Hx     Cirrhosis Neg Hx          Current Outpatient Medications:     amLODIPine (NORVASC) 10 MG tablet, Take 1 tablet (10 mg total) by mouth once daily., Disp: 90 tablet, Rfl: 3    atorvastatin (LIPITOR) 80 MG tablet, Take 1 tablet (80 mg total) by mouth once daily., Disp: 90 tablet, Rfl: 3    cilostazoL (PLETAL) 50 MG Tab, Take 1 tablet (50 mg total) by mouth 2 (two) times daily., Disp: 60 tablet, Rfl: 11    evolocumab (REPATHA SURECLICK) 140 mg/mL PnIj, Inject 1 mL (140 mg total) into the skin every 14 (fourteen) days., Disp: 2 mL, Rfl: 11    fexofenadine (ALLEGRA) 180 MG tablet, Take 1 tablet (180 mg total) by mouth once daily., Disp: 30 tablet, Rfl: 1    hydroCHLOROthiazide (HYDRODIURIL) 25 MG tablet, Take 1 tablet (25 mg total) by mouth once daily., Disp: 90 tablet, Rfl: 1    meloxicam (MOBIC) 7.5 MG tablet, Take 1 tablet (7.5 mg total) by mouth once daily., Disp: 30 tablet, Rfl: 0    rivaroxaban (XARELTO) 2.5 mg Tab, Take 1 tablet (2.5 mg total) by mouth 2 (two) times daily with meals., Disp: 60 tablet, Rfl: 11    sildenafiL (VIAGRA) 100 MG tablet, Take 1 tablet (100 mg total) by mouth daily as needed for Erectile Dysfunction., Disp: 20 tablet, Rfl: 5    tenofovir alafenamide (VEMLIDY) 25 mg Tab, Take 1 tablet (25 mg total) by mouth once daily., Disp: 30 tablet, Rfl: 11    econazole nitrate 1 % cream, as needed., Disp: , Rfl:     famotidine (PEPCID) 20 MG tablet, Take 1 tablet (20 mg total) by mouth once daily. (Patient not taking: Reported on 1/16/2024), Disp: 30 tablet, Rfl: 11    fluticasone (FLONASE) 50 mcg/actuation nasal spray, 2 sprays (100 mcg total) by Each Nare route once daily. (Patient not  "taking: Reported on 1/16/2024), Disp: 1 Bottle, Rfl: 5    gabapentin (NEURONTIN) 300 MG capsule, Take 1 capsule (300 mg total) by mouth every evening. (Patient not taking: Reported on 1/16/2024), Disp: 30 capsule, Rfl: 11    triamcinolone acetonide 0.1% (KENALOG) 0.1 % cream, Apply topically 2 (two) times daily as needed. Neck and body, not groin (Patient not taking: Reported on 1/16/2024), Disp: 80 g, Rfl: 1    Review of patient's allergies indicates:  No Known Allergies       REVIEW OF SYSTEMS:  Constitution: Negative. Negative for chills, fever and night sweats.   HENT: Negative for congestion and headaches.    Eyes: Negative for blurred vision, left vision loss and right vision loss.   Cardiovascular: Negative for chest pain and syncope.   Respiratory: Negative for cough and shortness of breath.    Endocrine: Negative for polydipsia, polyphagia and polyuria.   Hematologic/Lymphatic: Negative for bleeding problem. Does not bruise/bleed easily.   Skin: Negative for dry skin, itching and rash.   Musculoskeletal: Negative for falls.  Positive for left shoulder pain and muscle weakness.   Gastrointestinal: Negative for abdominal pain and bowel incontinence.   Genitourinary: Negative for bladder incontinence and nocturia.   Neurological: Negative for disturbances in coordination, loss of balance and seizures.   Psychiatric/Behavioral: Negative for depression. The patient does not have insomnia.    Allergic/Immunologic: Negative for hives and persistent infections.      PHYSICAL EXAMINATION:  Vitals:  BP (!) 159/89   Pulse 65   Ht 5' 9" (1.753 m)   Wt 89.4 kg (197 lb)   BMI 29.09 kg/m²    General: The patient is alert and oriented x 3.  Mood is pleasant.  Observation of ears, eyes and nose reveal no gross abnormalities.  No labored breathing observed.  Gait is coordinated. Patient can toe walk and heel walk without difficulty.      LEFT Shoulder / Upper Extremity Exam    OBSERVATION:  "    Swelling  none  Deformity  none   Discoloration  none   Scapular winging none   Scars   none  Atrophy  none    TENDERNESS / CREPITUS (T/C):          T/C      T/C   Clavicle   -/-  SUPRAspinatus    -/-     AC Jt.    -/-  INFRAspinatus  -/-    SC Jt.    -/-  Deltoid    -/-      G. Tuberosity  -/-  LH BICEP groove  +/-   Acromion:  -/-  Midline Neck   -/-     Scapular Spine -/-  Trapezium   -/-   SMA Scapula  -/-  GH jt. line - post  +/-     Scapulothoracic  -/-         ROM: (* = with pain)  Right shoulder   Left shoulder        AROM (PROM)   AROM (PROM)   FE    165° (165°)     150° (150°)     ER at 0°    45°  (45°)    15°  (15°)   ER at 90° ABD  90°  (90°)    90°  (90°)   IR at 90°  ABD   NA  (40°)     NA  (40°)      IR (spine level)   L5     Sacrum    STRENGTH: (* = with pain) Right shoulder   Left shoulder    SCAPTION   5/5    4+/5*    IR    5/5    5/5   ER    5/5    4+/5*   BICEPS   5/5    5/5   Deltoid    5/5    5/5     SIGNS:  Painful side       NEER   +    OCARLITOSS  neg    SHEEHAN   +    SPEEDS  neg     DROP ARM   -   BELLY PRESS neg   Superior escape none    LIFT-OFF  neg   X-Body ADD    neg    MOVING VALGUS neg        STABILITY TESTING    Right shoulder   Left shoulder    Translation     Anterior  up face     up face    Posterior  up face    up face    Sulcus   < 10mm    < 10 mm     Signs   Apprehension   neg      neg       Relocation   no change     no change      Jerk test  neg     neg    EXTREMITY NEURO-VASCULAR EXAM:    Sensation grossly intact to light touch all dermatomal regions.    DTR 2+ Biceps, Triceps, BR and Negative Krystles sign   Grossly intact motor function at Elbow, Wrist and Hand   Distal pulses radial and ulnar 2+, brisk cap refill, symmetric.      NECK:  Painless FROM and spinous processes non-tender. Negative Spurlings sign.      OTHER FINDINGS:   scapular dyskinesia    XRAYS:  Xrays including AP, Outlet and Axillary Lateral of Left shoulder are ordered / images reviewed by  me:   No fracture dislocation or other pathology   Acromion type one   Proximal migration of humeral head: None   GH arthritis: Moderate glenohumeral arthritis noticed, with osteophyte formation of the inferior margin of the humeral head as well as anteriorly and posteriorly off the glenoid.           ASSESSMENT:   Left shoulder pain, possible: Left Shoulder Glenohumeral arthritis s/p B/l RCR in the remote past  1. Left arm pain        PLAN:      1. CSI Left Shoulder  2. PT  3. Mobic  2. Follow up in clinic in 3 months     All questions were answered, patient will contact us for questions or concerns in the interim.

## 2024-01-16 NOTE — PATIENT INSTRUCTIONS
Assessment/Plan:  Ramiro Oneil is a 58 y.o. male with a past medical history of PAD (s/p PTA Right Femoral Artery 10/12/2021) , HTN, HLD, Cervical Disk Degeneration, NAFLD (Hep B), never smoker presents for an initial appointment.     PAOD (peripheral arterial occlusive disease): Continue Aspirin 81 mg daily, cilostazole 50 mg Twice a day  and Xarelto 2.5 mg Twice a day. Start PAD Rehab program - supervised exercise program at [place], [place]. Encouraged to walk 30 minutes a day, 5 days in a week. Get MRI [facility] and follow up with Neurologist.     2. HTN: Continue amlodipine 10 mg daily,     3. HLD:  on 11/30/2023. Continue Atorvastatin 80 mg daily at night before bedtime, Repatha 140 mg every 2 weeks.    4. Overweight: Refer to Bariatric Medicine. Encouraged diet, exercise and lifestyle modification for weight loss.     Follow up in 3 months

## 2024-01-16 NOTE — PROGRESS NOTES
[facility]    CC:   Chief Complaint   Patient presents with    PAOD (peripheral arterial occlusive disease)    S/P arterial stent    Mixed hyperlipidemia       Patient ID: Ramiro Oneil is a 58 y.o. male with a past medical history of PAD (s/p PTA Right Femoral Artery 10/12/2021) , HTN, HLD, Cervical Disk Degeneration, NAFLD (Hep B), never smoker presents for an initial appointment. Pertinent history/events are as follows:    Patient kindly referred by Dr. Costa Baldwin for for evaluation of Peripheral Artery Disease    HPI  Today  presents with pain in both legs more in right than left. He reports he feels pain with the first step in the morning but he walks inspite of the pain having an insight the pushing for walking is going to help him. His also reports of back pain; he has history of Cervical Disk Degeneration, scheduled to see the neurologist.     In the past he had PTA Right Femoral Artery 10/12/2021 and LE angiogram showed Dist R SFA lesion was 100% stenosed with 0% stenosis post-intervention, Mid L SFA to Dist L SFA lesion was 100% stenosed, Ost R VIKA to Dist R VIKA lesion was 100%. LE Angiogram 2/4/2022 showed Prox R SFA to Prox R Popliteal lesion was 100% stenosed, Ost R VIKA to Dist R VIKA lesion was 100% stenosed. Vascular Surgeon on 10/12/2023 mentioned not a candidate for bypass, recommended to continue to exercise and walk regularly. Ankle brachial Index 9/12/203 revealed Right 0.47, Left 0.66    He reported he did not have refills for xarelto for a year, but he has been compliant with ASA 81mg and atorvastatin 80mg daily.  on 11/30/2023. Also, he did not take cilostazol with the concern of side effects. But after the cardiology visit on 1/5/2024 he has started xarelto and cilostazol and reports no issues with that. He denies any ulcers, rest pain, or tissue loss.        Past Medical History:   Diagnosis Date    Anticoagulant long-term use     Arthritis     Dermatitis      Hepatitis B     History of surgery on arm 07/2020    Hypertension     Liver fibrosis 12/12/2019    F2 on fibroscan 11/2018    NAFLD (nonalcoholic fatty liver disease)      Past Surgical History:   Procedure Laterality Date    CARPAL TUNNEL RELEASE      COLONOSCOPY      COLONOSCOPY N/A 11/7/2022    Procedure: COLONOSCOPY;  Surgeon: Brent Bowden MD;  Location: [facility] ENDO (4TH FLR);  Service: Endoscopy;  Laterality: N/A;  Do not cancel this order  ok to hold Pletal and Xarelto 2 days per Dr Mccoy      vaccinated-GT  instr mailed    PERCUTANEOUS TRANSLUMINAL ANGIOPLASTY (PTA) OF PERIPHERAL VESSEL N/A 2/4/2022    Procedure: PTA, PERIPHERAL VESSEL;  Surgeon: Rodger Blake MD;  Location: [facility] CATH LAB;  Service: Cardiology;  Laterality: N/A;    PTA/stenting of distal right SFA       right hand surgery      SHOULDER SURGERY      stents       Social History     Socioeconomic History    Marital status:      Spouse name: Roro    Number of children: 2   Occupational History    Occupation:      Employer: [employer]   Tobacco Use    Smoking status: Never    Smokeless tobacco: Never   Substance and Sexual Activity    Alcohol use: Not Currently     Comment: 1-2 non alchoholic beers per week    Drug use: No    Sexual activity: Yes   Social History Narrative     [employer], , two children.        Family History   Problem Relation Age of Onset    Diabetes Mother     Hypertension Mother     Cancer Father         lung    Diabetes Sister     Cancer Sister         breast    Hypertension Sister     Cancer Brother         prostate    Cancer Paternal Uncle     Diabetes Paternal Uncle     Stroke Sister     Liver disease Neg Hx     Cirrhosis Neg Hx        Review of patient's allergies indicates:  No Known Allergies    Medication List with Changes/Refills   Current Medications    AMLODIPINE (NORVASC) 10 MG TABLET    Take 1 tablet (10 mg total) by mouth once daily.    ATORVASTATIN  (LIPITOR) 80 MG TABLET    Take 1 tablet (80 mg total) by mouth once daily.    CILOSTAZOL (PLETAL) 50 MG TAB    Take 1 tablet (50 mg total) by mouth 2 (two) times daily.    ECONAZOLE NITRATE 1 % CREAM    as needed.    EVOLOCUMAB (REPATHA SURECLICK) 140 MG/ML PNIJ    Inject 1 mL (140 mg total) into the skin every 14 (fourteen) days.    FAMOTIDINE (PEPCID) 20 MG TABLET    Take 1 tablet (20 mg total) by mouth once daily.    FEXOFENADINE (ALLEGRA) 180 MG TABLET    Take 1 tablet (180 mg total) by mouth once daily.    FLUTICASONE (FLONASE) 50 MCG/ACTUATION NASAL SPRAY    2 sprays (100 mcg total) by Each Nare route once daily.    GABAPENTIN (NEURONTIN) 300 MG CAPSULE    Take 1 capsule (300 mg total) by mouth every evening.    HYDROCHLOROTHIAZIDE (HYDRODIURIL) 25 MG TABLET    Take 1 tablet (25 mg total) by mouth once daily.    MELOXICAM (MOBIC) 7.5 MG TABLET    Take 1 tablet (7.5 mg total) by mouth once daily.    RIVAROXABAN (XARELTO) 2.5 MG TAB    Take 1 tablet (2.5 mg total) by mouth 2 (two) times daily with meals.    SILDENAFIL (VIAGRA) 100 MG TABLET    Take 1 tablet (100 mg total) by mouth daily as needed for Erectile Dysfunction.    TENOFOVIR ALAFENAMIDE (VEMLIDY) 25 MG TAB    Take 1 tablet (25 mg total) by mouth once daily.    TRIAMCINOLONE ACETONIDE 0.1% (KENALOG) 0.1 % CREAM    Apply topically 2 (two) times daily as needed. Neck and body, not groin       Review of Systems  Constitution: Denies chills, fever, and sweats.  HENT: Denies headaches or blurry vision.  Cardiovascular: Denies chest pain or irregular heart beat.  Respiratory: Denies cough or shortness of breath.  Gastrointestinal: Denies abdominal pain, nausea, or vomiting.  Musculoskeletal: Denies muscle cramps.  Neurological: Denies dizziness or focal weakness.  Psychiatric/Behavioral: Normal mental status.  Hematologic/Lymphatic: Denies bleeding problem or easy bruising/bleeding.  Skin: Denies rash or suspicious lesions    Physical Examination  BP (!)  "164/82   Pulse 64   Ht 5' 9" (1.753 m)   Wt 89.5 kg (197 lb 5 oz)   BMI 29.14 kg/m²     Constitutional: No acute distress, conversant  HEENT: Sclera anicteric, Pupils equal, round and reactive to light, extraocular motions intact, Oropharynx clear  Neck: No JVD, no carotid bruits  Cardiovascular: regular rate and rhythm, no murmur, rubs or gallops, normal S1/S2  Pulmonary: Clear to auscultation bilaterally  Abdominal: Abdomen soft, nontender, nondistended, positive bowel sounds  Extremities: No lower extremity edema,   Pulses:  Carotid pulses are 2+ on the right side, and 2+ on the left side.  Radial pulses are 2+ on the right side, and 2+ on the left side.   Femoral pulses are 2+ on the right side, and 2+ on the left side.  Popliteal pulses are 2+ on the right side, and 2+ on the left side.   Dorsalis pedis pulses are 2+ on the right side, and 2+ on the left side.   Posterior tibial pulses are 2+ on the right side, and 2+ on the left side.    Skin: No ecchymosis, erythema, or ulcers  Psych: Alert and oriented x 3, appropriate affect  Neuro: CNII-XII intact, no focal deficits    Labs:  Most Recent Data  CBC:   Lab Results   Component Value Date    WBC 6.28 03/07/2023    HGB 14.8 03/07/2023    HCT 48.1 03/07/2023     03/07/2023    MCV 85 03/07/2023    RDW 14.5 03/07/2023     BMP:   Lab Results   Component Value Date     11/30/2023    K 4.2 11/30/2023     11/30/2023    CO2 26 11/30/2023    BUN 13 11/30/2023    CREATININE 0.9 11/30/2023     11/30/2023    CALCIUM 9.2 11/30/2023     LFTS;   Lab Results   Component Value Date    PROT 8.1 11/30/2023    ALBUMIN 3.5 11/30/2023    BILITOT 0.7 11/30/2023    AST 50 (H) 11/30/2023    ALKPHOS 61 11/30/2023    ALT 61 (H) 11/30/2023    GGT 20 07/08/2014     COAGS:   Lab Results   Component Value Date    INR 1.1 03/07/2023     FLP:   Lab Results   Component Value Date    CHOL 158 11/30/2023    HDL 24 (L) 11/30/2023    LDLCALC 118.4 11/30/2023    TRIG 78 " "11/30/2023    CHOLHDL 15.2 (L) 11/30/2023     CARDIAC: No results found for: "TROPONINI", "CKTOTAL", "CKMB", "BNP"    Imaging:    LE Angiogram, Thrombectomy, and PTA Right Femoral Artery 10/12/2021  The Dist R SFA lesion was 100% stenosed with 0% stenosis post-intervention.  The Mid L SFA to Dist L SFA lesion was 100% stenosed.  The Ost R VIKA to Dist R VIKA lesion was 100% stenosed.  The PTA was successful.    LE Angiogram 2/4/2022  The Prox R SFA to Prox R Popliteal lesion was 100% stenosed.  The Ost R VIKA to Dist R VIKA lesion was 100% stenosed.    Ankle brachial Index 9/12/203  Right 0.47, Left 0.66    I have personally reviewed these images and echo data    Assessment/Plan:  Ramiro Oneil is a 58 y.o. male with a past medical history of PAD (s/p PTA Right Femoral Artery 10/12/2021) , HTN, HLD, Cervical Disk Degeneration, NAFLD (Hep B), never smoker presents for an initial appointment.     PAOD (peripheral arterial occlusive disease): Continue Aspirin 81 mg daily, cilostazole 50 mg Twice a day  and Xarelto 2.5 mg Twice a day. Start PAD Rehab program - supervised exercise program at [place], [place]. Encouraged to walk 30 minutes a day, 5 days in a week. Get MRI [facility] and follow up with Neurologist.     2. HTN: Continue amlodipine 10 mg daily,     3. HLD:  on 11/30/2023. Continue Atorvastatin 80 mg daily at night before bedtime, Repatha 140 mg every 2 weeks.    4. Overweight: Refer to Bariatric Medicine. Encouraged diet, exercise and lifestyle modification for weight loss.     Follow up in 3 months    Total duration of face to face visit time 30 minutes.  Total time spent counseling greater than fifty percent of total visit time.  Counseling included discussion regarding imaging findings, diagnosis, possibilities, treatment options, risks and benefits.  The patient had many questions regarding the options and long-term effects.    Patient seen and discussed with Dr. Bunn. Further recommendations per the " attending addendum.    Lewis Rosales MD  PGY IV - Vascular Medicine Fellow   [facility]

## 2024-01-19 RX ORDER — NYSTATIN AND TRIAMCINOLONE ACETONIDE 100000; 1 [USP'U]/G; MG/G
CREAM TOPICAL 2 TIMES DAILY
Qty: 30 G | Refills: 0 | Status: SHIPPED | OUTPATIENT
Start: 2024-01-19

## 2024-01-22 ENCOUNTER — TELEPHONE (OUTPATIENT)
Dept: SURGERY | Facility: CLINIC | Age: 59
End: 2024-01-22
Payer: COMMERCIAL

## 2024-01-27 RX ORDER — ASPIRIN 81 MG/1
81 TABLET ORAL DAILY
Qty: 30 TABLET | Refills: 11 | OUTPATIENT
Start: 2024-01-27 | End: 2025-01-26

## 2024-02-10 DIAGNOSIS — R20.2 PARESTHESIA OF RIGHT FOOT: ICD-10-CM

## 2024-02-10 NOTE — TELEPHONE ENCOUNTER
No care due was identified.  [facility] Embedded Care Due Messages. Reference number: [id number].   2/10/2024 2:39:50 PM CST

## 2024-02-11 NOTE — TELEPHONE ENCOUNTER
Refill Routing Note   Medication(s) are not appropriate for processing by [facility] for the following reason(s):        No active prescription written by provider    [facility] action(s):  Defer        Medication Therapy Plan: Last ordered for patient on 4/21/2020      Appointments  past 12m or future 3m with PCP    Date Provider   Last Visit   11/30/2023 Yves Castellon MD   Next Visit   Visit date not found Yves Castellon MD   ED visits in past 90 days: 0        Note composed:2:53 PM 02/11/2024

## 2024-02-12 RX ORDER — TRIAMCINOLONE ACETONIDE 1 MG/G
CREAM TOPICAL 2 TIMES DAILY PRN
Qty: 80 G | Refills: 0 | Status: SHIPPED | OUTPATIENT
Start: 2024-02-12

## 2024-02-12 RX ORDER — GABAPENTIN 300 MG/1
300 CAPSULE ORAL NIGHTLY
Qty: 30 CAPSULE | Refills: 11 | Status: SHIPPED | OUTPATIENT
Start: 2024-02-12 | End: 2025-02-11

## 2024-03-11 ENCOUNTER — OFFICE VISIT (OUTPATIENT)
Dept: HEPATOLOGY | Facility: CLINIC | Age: 59
End: 2024-03-11
Payer: COMMERCIAL

## 2024-03-11 ENCOUNTER — TELEPHONE (OUTPATIENT)
Dept: HEPATOLOGY | Facility: CLINIC | Age: 59
End: 2024-03-11
Payer: COMMERCIAL

## 2024-03-11 ENCOUNTER — LAB VISIT (OUTPATIENT)
Dept: LAB | Facility: HOSPITAL | Age: 59
End: 2024-03-11
Attending: INTERNAL MEDICINE
Payer: COMMERCIAL

## 2024-03-11 VITALS
DIASTOLIC BLOOD PRESSURE: 94 MMHG | HEIGHT: 69 IN | BODY MASS INDEX: 28.01 KG/M2 | HEART RATE: 82 BPM | WEIGHT: 189.13 LBS | SYSTOLIC BLOOD PRESSURE: 138 MMHG

## 2024-03-11 DIAGNOSIS — K74.00 LIVER FIBROSIS: ICD-10-CM

## 2024-03-11 DIAGNOSIS — B18.1 CHRONIC VIRAL HEPATITIS B WITHOUT DELTA AGENT AND WITHOUT COMA: Primary | ICD-10-CM

## 2024-03-11 DIAGNOSIS — B18.1 CHRONIC VIRAL HEPATITIS B WITHOUT DELTA AGENT AND WITHOUT COMA: ICD-10-CM

## 2024-03-11 LAB
AFP SERPL-MCNC: 3.1 NG/ML (ref 0–8.4)
ALBUMIN SERPL BCP-MCNC: 3.8 G/DL (ref 3.5–5.2)
ALP SERPL-CCNC: 63 U/L (ref 55–135)
ALT SERPL W/O P-5'-P-CCNC: 37 U/L (ref 10–44)
ANION GAP SERPL CALC-SCNC: 5 MMOL/L (ref 8–16)
AST SERPL-CCNC: 27 U/L (ref 10–40)
BASOPHILS # BLD AUTO: 0.1 K/UL (ref 0–0.2)
BASOPHILS NFR BLD: 1.5 % (ref 0–1.9)
BILIRUB SERPL-MCNC: 0.6 MG/DL (ref 0.1–1)
BUN SERPL-MCNC: 15 MG/DL (ref 6–20)
CALCIUM SERPL-MCNC: 9.5 MG/DL (ref 8.7–10.5)
CHLORIDE SERPL-SCNC: 107 MMOL/L (ref 95–110)
CO2 SERPL-SCNC: 27 MMOL/L (ref 23–29)
CREAT SERPL-MCNC: 0.9 MG/DL (ref 0.5–1.4)
DIFFERENTIAL METHOD BLD: ABNORMAL
EOSINOPHIL # BLD AUTO: 0.1 K/UL (ref 0–0.5)
EOSINOPHIL NFR BLD: 1.1 % (ref 0–8)
ERYTHROCYTE [DISTWIDTH] IN BLOOD BY AUTOMATED COUNT: 14.3 % (ref 11.5–14.5)
EST. GFR  (NO RACE VARIABLE): >60 ML/MIN/1.73 M^2
GLUCOSE SERPL-MCNC: 104 MG/DL (ref 70–110)
HCT VFR BLD AUTO: 49.5 % (ref 40–54)
HGB BLD-MCNC: 15.9 G/DL (ref 14–18)
IMM GRANULOCYTES # BLD AUTO: 0.01 K/UL (ref 0–0.04)
IMM GRANULOCYTES NFR BLD AUTO: 0.2 % (ref 0–0.5)
INR PPP: 1 (ref 0.8–1.2)
LYMPHOCYTES # BLD AUTO: 2.8 K/UL (ref 1–4.8)
LYMPHOCYTES NFR BLD: 43.5 % (ref 18–48)
MCH RBC QN AUTO: 26.7 PG (ref 27–31)
MCHC RBC AUTO-ENTMCNC: 32.1 G/DL (ref 32–36)
MCV RBC AUTO: 83 FL (ref 82–98)
MONOCYTES # BLD AUTO: 0.5 K/UL (ref 0.3–1)
MONOCYTES NFR BLD: 7 % (ref 4–15)
NEUTROPHILS # BLD AUTO: 3 K/UL (ref 1.8–7.7)
NEUTROPHILS NFR BLD: 46.7 % (ref 38–73)
NRBC BLD-RTO: 0 /100 WBC
PLATELET # BLD AUTO: 295 K/UL (ref 150–450)
PMV BLD AUTO: 9.9 FL (ref 9.2–12.9)
POTASSIUM SERPL-SCNC: 3.9 MMOL/L (ref 3.5–5.1)
PROT SERPL-MCNC: 7.9 G/DL (ref 6–8.4)
PROTHROMBIN TIME: 11 SEC (ref 9–12.5)
RBC # BLD AUTO: 5.95 M/UL (ref 4.6–6.2)
SODIUM SERPL-SCNC: 139 MMOL/L (ref 136–145)
WBC # BLD AUTO: 6.46 K/UL (ref 3.9–12.7)

## 2024-03-11 PROCEDURE — 99999 PR PBB SHADOW E&M-EST. PATIENT-LVL V: CPT | Mod: PBBFAC,,, | Performed by: INTERNAL MEDICINE

## 2024-03-11 PROCEDURE — 1159F MED LIST DOCD IN RCRD: CPT | Mod: CPTII,S$GLB,, | Performed by: INTERNAL MEDICINE

## 2024-03-11 PROCEDURE — 82105 ALPHA-FETOPROTEIN SERUM: CPT | Performed by: INTERNAL MEDICINE

## 2024-03-11 PROCEDURE — 36415 COLL VENOUS BLD VENIPUNCTURE: CPT | Performed by: INTERNAL MEDICINE

## 2024-03-11 PROCEDURE — 1160F RVW MEDS BY RX/DR IN RCRD: CPT | Mod: CPTII,S$GLB,, | Performed by: INTERNAL MEDICINE

## 2024-03-11 PROCEDURE — 99214 OFFICE O/P EST MOD 30 MIN: CPT | Mod: S$GLB,,, | Performed by: INTERNAL MEDICINE

## 2024-03-11 PROCEDURE — 3080F DIAST BP >= 90 MM HG: CPT | Mod: CPTII,S$GLB,, | Performed by: INTERNAL MEDICINE

## 2024-03-11 PROCEDURE — 87517 HEPATITIS B DNA QUANT: CPT | Performed by: INTERNAL MEDICINE

## 2024-03-11 PROCEDURE — 80053 COMPREHEN METABOLIC PANEL: CPT | Performed by: INTERNAL MEDICINE

## 2024-03-11 PROCEDURE — 3075F SYST BP GE 130 - 139MM HG: CPT | Mod: CPTII,S$GLB,, | Performed by: INTERNAL MEDICINE

## 2024-03-11 PROCEDURE — 85025 COMPLETE CBC W/AUTO DIFF WBC: CPT | Performed by: INTERNAL MEDICINE

## 2024-03-11 PROCEDURE — 85610 PROTHROMBIN TIME: CPT | Performed by: INTERNAL MEDICINE

## 2024-03-11 PROCEDURE — 3008F BODY MASS INDEX DOCD: CPT | Mod: CPTII,S$GLB,, | Performed by: INTERNAL MEDICINE

## 2024-03-11 NOTE — PROGRESS NOTES
Subjective:     Ramiro Oneil is here for follow up of HBV    Patient has been seen by my colleagues more recently.    HPI  Since Ramiro Oneil's last visit he denies any significant liver related issues.  He reports he has been compliant with his hepatitis-B treatment.  He has been losing weight.  Overall he has been feeling well.    No evidence of liver decompensation: no ascites, confusion or GI bleeding.      Review of Systems    Objective:     Physical Exam  Vitals reviewed.   Constitutional:       General: He is not in acute distress.     Appearance: He is well-developed.   HENT:      Head: Normocephalic and atraumatic.      Mouth/Throat:      Pharynx: No oropharyngeal exudate.   Eyes:      General: No scleral icterus.        Right eye: No discharge.         Left eye: No discharge.      Conjunctiva/sclera: Conjunctivae normal.      Pupils: Pupils are equal, round, and reactive to light.   Pulmonary:      Effort: Pulmonary effort is normal. No respiratory distress.      Breath sounds: Normal breath sounds. No wheezing.   Abdominal:      General: There is no distension.      Palpations: Abdomen is soft.      Tenderness: There is no abdominal tenderness.   Musculoskeletal:      Right lower leg: No edema.      Left lower leg: No edema.   Neurological:      Mental Status: He is alert and oriented to person, place, and time.   Psychiatric:         Behavior: Behavior normal.         Computed MELD 3.0 unavailable. Necessary lab results were not found in the last year.  Computed MELD-Na unavailable. Necessary lab results were not found in the last year.      WBC   Date Value Ref Range Status   03/07/2023 6.28 3.90 - 12.70 K/uL Final     Hemoglobin   Date Value Ref Range Status   03/07/2023 14.8 14.0 - 18.0 g/dL Final     Hematocrit   Date Value Ref Range Status   03/07/2023 48.1 40.0 - 54.0 % Final     Platelets   Date Value Ref Range Status   03/07/2023 262 150 - 450 K/uL Final     BUN   Date Value Ref Range  Status   11/30/2023 13 6 - 20 mg/dL Final     Creatinine   Date Value Ref Range Status   11/30/2023 0.9 0.5 - 1.4 mg/dL Final     Glucose   Date Value Ref Range Status   11/30/2023 105 70 - 110 mg/dL Final     Calcium   Date Value Ref Range Status   11/30/2023 9.2 8.7 - 10.5 mg/dL Final     Sodium   Date Value Ref Range Status   11/30/2023 142 136 - 145 mmol/L Final     Potassium   Date Value Ref Range Status   11/30/2023 4.2 3.5 - 5.1 mmol/L Final     Chloride   Date Value Ref Range Status   11/30/2023 105 95 - 110 mmol/L Final     AST   Date Value Ref Range Status   11/30/2023 50 (H) 10 - 40 U/L Final     ALT   Date Value Ref Range Status   11/30/2023 61 (H) 10 - 44 U/L Final     Alkaline Phosphatase   Date Value Ref Range Status   11/30/2023 61 55 - 135 U/L Final     Total Bilirubin   Date Value Ref Range Status   11/30/2023 0.7 0.1 - 1.0 mg/dL Final     Comment:     For infants and newborns, interpretation of results should be based  on gestational age, weight and in agreement with clinical  observations.    Premature Infant recommended reference ranges:  Up to 24 hours.............<8.0 mg/dL  Up to 48 hours............<12.0 mg/dL  3-5 days..................<15.0 mg/dL  6-29 days.................<15.0 mg/dL       Albumin   Date Value Ref Range Status   11/30/2023 3.5 3.5 - 5.2 g/dL Final     INR   Date Value Ref Range Status   03/07/2023 1.1 0.8 - 1.2 Final     Comment:     Coumadin Therapy:  2.0 - 3.0 for INR for all indicators except mechanical heart valves  and antiphospholipid syndromes which should use 2.5 - 3.5.           Assessment/Plan:     1. Chronic viral hepatitis B without delta agent and without coma    2. Liver fibrosis      Ramiro Oneil is a 58 y.o. male withHBV    Chronic viral hepatitis B without delta agent and without coma-need updated labs to can firm suppression  -continue on treatment  -     CT Abdomen With Without Contrast; Future; Expected date: 03/11/2024  -     HEPATITIS B VIRAL DNA,  QUANTITATIVE; Standing  -     AFP Tumor Marker; Standing  -     US Abdomen Limited; Future; Expected date: 03/11/2024    Liver fibrosis-discrepancy and imaging that suggests possible cirrhosis but FibroScan suggested no fibrosis.  There was also a question of previously a liver lesion by do not see any evidence on recent imaging.  Would like to get a CT scan to further evaluate the previous abnormal imaging and look for cirrhosis and or liver lesion  -after this CT scan suspect we can go back to ultrasound imaging in 6 months  -     CT Abdomen With Without Contrast; Future; Expected date: 03/11/2024  -     HEPATITIS B VIRAL DNA, QUANTITATIVE; Standing  -     Comprehensive Metabolic Panel; Standing  -     CBC Auto Differential; Standing  -     AFP Tumor Marker; Standing  -     Protime-INR; Standing  -     US Abdomen Limited; Future; Expected date: 03/11/2024    Return to clinic in 6 months with preclinic labs and imaging      Amisha Balderas MD

## 2024-03-11 NOTE — TELEPHONE ENCOUNTER
----- Message from Ye Goss sent at 3/11/2024  7:52 AM CDT -----  Contact: [phone]  Patient is requesting a call in regards to seeing if he has any test for his appt today. Please call pt back at [phone]. Thanks KB

## 2024-03-13 DIAGNOSIS — B18.1 CHRONIC VIRAL HEPATITIS B WITHOUT DELTA AGENT AND WITHOUT COMA: Primary | ICD-10-CM

## 2024-03-13 LAB
HEPATITIS B VIRUS DNA: NORMAL
HEPATITIS B VIRUS PCR, QUANT: NOT DETECTED IU/ML

## 2024-03-18 ENCOUNTER — LAB VISIT (OUTPATIENT)
Dept: LAB | Facility: HOSPITAL | Age: 59
End: 2024-03-18
Attending: INTERNAL MEDICINE
Payer: COMMERCIAL

## 2024-03-18 DIAGNOSIS — B18.1 CHRONIC VIRAL HEPATITIS B WITHOUT DELTA AGENT AND WITHOUT COMA: ICD-10-CM

## 2024-03-18 LAB
HBV SURFACE AB SER-ACNC: <3 MIU/ML
HBV SURFACE AB SER-ACNC: NORMAL M[IU]/ML

## 2024-03-18 PROCEDURE — 87340 HEPATITIS B SURFACE AG IA: CPT | Performed by: INTERNAL MEDICINE

## 2024-03-18 PROCEDURE — 86706 HEP B SURFACE ANTIBODY: CPT | Performed by: INTERNAL MEDICINE

## 2024-03-18 PROCEDURE — 87341 HEP B SURFACE AG NEUTRLZJ IA: CPT | Performed by: INTERNAL MEDICINE

## 2024-03-18 PROCEDURE — 36415 COLL VENOUS BLD VENIPUNCTURE: CPT | Performed by: INTERNAL MEDICINE

## 2024-03-19 LAB
HBV SURFACE AG SERPL QL IA: REACTIVE
HBV SURFACE AG SERPL QL NT: ABNORMAL

## 2024-08-14 ENCOUNTER — TELEPHONE (OUTPATIENT)
Dept: INTERNAL MEDICINE | Facility: CLINIC | Age: 59
End: 2024-08-14
Payer: COMMERCIAL

## 2024-08-14 ENCOUNTER — OFFICE VISIT (OUTPATIENT)
Dept: INTERNAL MEDICINE | Facility: CLINIC | Age: 59
End: 2024-08-14
Payer: COMMERCIAL

## 2024-08-14 VITALS
RESPIRATION RATE: 18 BRPM | DIASTOLIC BLOOD PRESSURE: 80 MMHG | OXYGEN SATURATION: 97 % | WEIGHT: 184.5 LBS | SYSTOLIC BLOOD PRESSURE: 132 MMHG | HEART RATE: 67 BPM | HEIGHT: 69 IN | BODY MASS INDEX: 27.33 KG/M2 | TEMPERATURE: 98 F

## 2024-08-14 DIAGNOSIS — R09.82 POSTNASAL DRIP: ICD-10-CM

## 2024-08-14 DIAGNOSIS — J01.40 ACUTE NON-RECURRENT PANSINUSITIS: Primary | ICD-10-CM

## 2024-08-14 PROCEDURE — 3079F DIAST BP 80-89 MM HG: CPT | Mod: CPTII,S$GLB,, | Performed by: NURSE PRACTITIONER

## 2024-08-14 PROCEDURE — 3008F BODY MASS INDEX DOCD: CPT | Mod: CPTII,S$GLB,, | Performed by: NURSE PRACTITIONER

## 2024-08-14 PROCEDURE — 3075F SYST BP GE 130 - 139MM HG: CPT | Mod: CPTII,S$GLB,, | Performed by: NURSE PRACTITIONER

## 2024-08-14 PROCEDURE — 99999 PR PBB SHADOW E&M-EST. PATIENT-LVL V: CPT | Mod: PBBFAC,,, | Performed by: NURSE PRACTITIONER

## 2024-08-14 PROCEDURE — 1159F MED LIST DOCD IN RCRD: CPT | Mod: CPTII,S$GLB,, | Performed by: NURSE PRACTITIONER

## 2024-08-14 PROCEDURE — 1160F RVW MEDS BY RX/DR IN RCRD: CPT | Mod: CPTII,S$GLB,, | Performed by: NURSE PRACTITIONER

## 2024-08-14 PROCEDURE — 99214 OFFICE O/P EST MOD 30 MIN: CPT | Mod: S$GLB,,, | Performed by: NURSE PRACTITIONER

## 2024-08-14 RX ORDER — AZELASTINE 1 MG/ML
1 SPRAY, METERED NASAL 2 TIMES DAILY
Qty: 30 ML | Refills: 0 | Status: SHIPPED | OUTPATIENT
Start: 2024-08-14

## 2024-08-14 RX ORDER — LEVOCETIRIZINE DIHYDROCHLORIDE 5 MG/1
5 TABLET, FILM COATED ORAL NIGHTLY
Qty: 7 TABLET | Refills: 0 | Status: SHIPPED | OUTPATIENT
Start: 2024-08-14 | End: 2024-08-21

## 2024-08-14 NOTE — PROGRESS NOTES
"Subjective     Patient ID: Ramiro Oneil is a 58 y.o. male.    Chief Complaint: light headed     Pt of Dr. Castellon, here for, "light headed and sore throat".    Reports ongoing since last Wednesday. Reports Headaches, burning eyes. Thinks it may be his allergies as he took some otc allergy medication (DayQuil) with relief but the lightheadedness has not stopped.    See ROS    Review of Systems   Constitutional:  Positive for chills. Negative for activity change, fatigue, fever, night sweats and unexpected weight change.   HENT:  Positive for nasal congestion, ear pain, postnasal drip, rhinorrhea, sinus pressure/congestion, sneezing and sore throat.    Eyes:  Positive for visual disturbance.   Respiratory:  Negative for chest tightness and shortness of breath.    Cardiovascular:  Negative for chest pain.   Gastrointestinal:  Negative for abdominal pain, constipation, diarrhea, nausea and vomiting.   Genitourinary:  Negative for dysuria.   Allergic/Immunologic: Negative for environmental allergies, food allergies and frequent infections.   Neurological:  Positive for light-headedness and headaches. Negative for weakness.   Hematological:  Negative for adenopathy. Does not bruise/bleed easily.   Psychiatric/Behavioral:  Negative for suicidal ideas.             Review of patient's allergies indicates:  No Known Allergies      Current Outpatient Medications:     amLODIPine (NORVASC) 10 MG tablet, Take 1 tablet (10 mg total) by mouth once daily., Disp: 90 tablet, Rfl: 3    atorvastatin (LIPITOR) 80 MG tablet, Take 1 tablet (80 mg total) by mouth once daily., Disp: 90 tablet, Rfl: 3    cilostazoL (PLETAL) 50 MG Tab, Take 1 tablet (50 mg total) by mouth 2 (two) times daily., Disp: 60 tablet, Rfl: 11    econazole nitrate 1 % cream, as needed., Disp: , Rfl:     evolocumab (REPATHA SURECLICK) 140 mg/mL PnIj, Inject 1 mL (140 mg total) into the skin every 14 (fourteen) days., Disp: 2 mL, Rfl: 11    fexofenadine (ALLEGRA) 180 " MG tablet, Take 1 tablet (180 mg total) by mouth once daily., Disp: 30 tablet, Rfl: 1    gabapentin (NEURONTIN) 300 MG capsule, Take 1 capsule (300 mg total) by mouth every evening., Disp: 30 capsule, Rfl: 11    hydroCHLOROthiazide (HYDRODIURIL) 25 MG tablet, Take 1 tablet (25 mg total) by mouth once daily., Disp: 90 tablet, Rfl: 1    meloxicam (MOBIC) 7.5 MG tablet, Take 1 tablet (7.5 mg total) by mouth once daily., Disp: 30 tablet, Rfl: 0    nystatin-triamcinolone (MYCOLOG II) cream, Apply topically 2 (two) times daily. Groin area, Disp: 30 g, Rfl: 0    rivaroxaban (XARELTO) 2.5 mg Tab, Take 1 tablet (2.5 mg total) by mouth 2 (two) times daily with meals., Disp: 60 tablet, Rfl: 11    sildenafiL (VIAGRA) 100 MG tablet, Take 1 tablet (100 mg total) by mouth daily as needed for Erectile Dysfunction., Disp: 20 tablet, Rfl: 5    tenofovir alafenamide (VEMLIDY) 25 mg Tab, Take 1 tablet (25 mg total) by mouth once daily., Disp: 30 tablet, Rfl: 11    triamcinolone acetonide 0.1% (KENALOG) 0.1 % cream, Apply topically 2 (two) times daily as needed. Neck and body, not groin, Disp: 80 g, Rfl: 0    Patient Active Problem List   Diagnosis    PAOD (peripheral arterial occlusive disease)    Cervicalgia    DDD (degenerative disc disease), cervical    Chronic hepatitis B    NAFLD (nonalcoholic fatty liver disease)    Hypertension, essential    S/p bilateral carpal tunnel release    Family history of stroke    Hyperlipidemia    Colon polyps    Liver lesion    S/P arterial stent    Rotator cuff syndrome    Decreased strength, endurance, and mobility    Posture imbalance    Impaired range of motion of cervical spine    Anticoagulant long-term use    Paresthesia of right foot    BMI 29.0-29.9,adult       Past Medical History:   Diagnosis Date    Anticoagulant long-term use     Arthritis     Dermatitis     Hepatitis B     History of surgery on arm 07/2020    Hypertension     Liver fibrosis 12/12/2019    F2 on fibroscan 11/2018    NAFLD  "(nonalcoholic fatty liver disease)        Past Surgical History:   Procedure Laterality Date    CARPAL TUNNEL RELEASE      COLONOSCOPY      COLONOSCOPY N/A 11/7/2022    Procedure: COLONOSCOPY;  Surgeon: Brent Bowden MD;  [facility]  Service: Endoscopy;  Laterality: N/A;  Do not cancel this order  ok to hold Pletal and Xarelto 2 days per Dr Mccoy      vaccinated-GT  instr mailed    PERCUTANEOUS TRANSLUMINAL ANGIOPLASTY (PTA) OF PERIPHERAL VESSEL N/A 2/4/2022    Procedure: PTA, PERIPHERAL VESSEL;  Surgeon: Rodger Blake MD;  Location: [facility] CATH LAB;  Service: Cardiology;  Laterality: N/A;    PTA/stenting of distal right SFA       right hand surgery      SHOULDER SURGERY      stents         Social History     Socioeconomic History    Marital status:      Spouse name: Roro    Number of children: 2   Occupational History    Occupation:      Employer: [employer]   Tobacco Use    Smoking status: Never    Smokeless tobacco: Never   Substance and Sexual Activity    Alcohol use: Not Currently     Comment: 1-2 non alchoholic beers per week    Drug use: No    Sexual activity: Yes   Social History Narrative     [employer], , two children.          Family History   Problem Relation Name Age of Onset    Diabetes Mother      Hypertension Mother      Cancer Father          lung    Diabetes Sister 2     Cancer Sister 2         breast    Hypertension Sister 2     Cancer Brother 3         prostate    Cancer Paternal Uncle      Diabetes Paternal Uncle      Stroke Sister 2     Liver disease Neg Hx      Cirrhosis Neg Hx         Objective     Vitals:    08/14/24 1059   BP: 132/80   Pulse: 67   Resp: 18   Temp: 98.1 °F (36.7 °C)   TempSrc: Oral   SpO2: 97%   Weight: 83.7 kg (184 lb 8.4 oz)   Height: 5' 9" (1.753 m)   PainSc: 0-No pain       Body mass index is 27.25 kg/m².    Physical Exam  Vitals and nursing note reviewed.   Constitutional:       " Appearance: Normal appearance.   HENT:      Head: Normocephalic.      Right Ear: Tympanic membrane, ear canal and external ear normal. There is no impacted cerumen.      Left Ear: Tympanic membrane, ear canal and external ear normal. There is no impacted cerumen.      Nose: Mucosal edema, congestion and rhinorrhea present.      Right Sinus: Frontal sinus tenderness present.      Left Sinus: Frontal sinus tenderness present.      Mouth/Throat:      Mouth: Mucous membranes are moist.      Pharynx: Oropharynx is clear. No oropharyngeal exudate or posterior oropharyngeal erythema.      Comments: Clear PND noted on exam      Eyes:      Conjunctiva/sclera: Conjunctivae normal.   Cardiovascular:      Rate and Rhythm: Normal rate and regular rhythm.      Pulses: Normal pulses.      Heart sounds: Normal heart sounds. No murmur heard.     No gallop.   Pulmonary:      Effort: Pulmonary effort is normal.      Breath sounds: Normal breath sounds.   Musculoskeletal:         General: Normal range of motion.      Cervical back: Normal range of motion and neck supple.   Skin:     General: Skin is warm and dry.   Neurological:      General: No focal deficit present.      Mental Status: He is alert and oriented to person, place, and time.   Psychiatric:         Mood and Affect: Mood normal.         Behavior: Behavior normal.         Thought Content: Thought content normal.         Judgment: Judgment normal.            Assessment and Plan     1. Acute non-recurrent pansinusitis  -     levocetirizine (XYZAL) 5 MG tablet; Take 1 tablet (5 mg total) by mouth every evening. for 7 days  Dispense: 7 tablet; Refill: 0  -     azelastine (ASTELIN) 137 mcg (0.1 %) nasal spray; 1 spray (137 mcg total) by Nasal route 2 (two) times daily.  Dispense: 30 mL; Refill: 0    2. BMI 27.0-27.9,adult  BMI reviewed    3. Postnasal drip  -     levocetirizine (XYZAL) 5 MG tablet; Take 1 tablet (5 mg total) by mouth every evening. for 7 days  Dispense: 7  tablet; Refill: 0  -     azelastine (ASTELIN) 137 mcg (0.1 %) nasal spray; 1 spray (137 mcg total) by Nasal route 2 (two) times daily.  Dispense: 30 mL; Refill: 0    Meds as prescribed    Rest and Fluids, Tylenol or Motrin otc as needed for pain and or fever    Warm liquids to thin mucus    Allergen avoidance discussed, humidified air recommended    Warm salt water gargles as needed for throat pain    Nasal spray, taught how to correctly use         Follow up if symptoms worsen or fail to improve.

## 2024-08-14 NOTE — TELEPHONE ENCOUNTER
1. Caller Name: Jane @ [facility]           Call Back Number: [phone]    2. Message: Confirmation of Nucala shipment. Will ship on Mon and deliver on Tues. For administration @ 7 am on Fri 11/15/17    3. Patient approves office to leave a detailed voicemail/MyChart message: N\A       Called pt to confirm that he's running behind no answer

## 2024-08-14 NOTE — TELEPHONE ENCOUNTER
----- Message from Luc Singh MA sent at 8/14/2024  9:24 AM CDT -----  The patient called to say there is a lot of traffic and he will possibly be about 10-15min late to his appointment. Ramiro at [phone]

## 2024-08-28 ENCOUNTER — TELEPHONE (OUTPATIENT)
Dept: PHARMACY | Facility: CLINIC | Age: 59
End: 2024-08-28
Payer: COMMERCIAL

## 2024-08-28 NOTE — TELEPHONE ENCOUNTER
[facility]/Population Health Chart Review & Patient Outreach Details For Medication Adherence Project    Reason for Outreach Encounter: 3rd Party payor non-compliance report ([employer], [employer], [employer], etc)    2.  Patient Outreach Method: Reviewed patient chart     3.   Medication in question:   Hyperlipidemia Medications               atorvastatin (LIPITOR) 80 MG tablet Take 1 tablet (80 mg total) by mouth once daily.    evolocumab (REPATHA SURECLICK) 140 mg/mL PnIj Inject 1 mL (140 mg total) into the skin every 14 (fourteen) days.                LAST FILLED: 8/14/24 for 90 day supply    4.  Reviewed and or Updates Made To: Patient Chart    5. Outreach Outcomes and/or actions taken: Patient filled medication and is on track to be adherent    Additional Notes:

## 2024-08-29 ENCOUNTER — TELEPHONE (OUTPATIENT)
Dept: HEPATOLOGY | Facility: CLINIC | Age: 59
End: 2024-08-29
Payer: COMMERCIAL

## 2024-08-29 NOTE — TELEPHONE ENCOUNTER
----- Message from Melony Chavez sent at 8/29/2024 12:16 PM CDT -----  Type:  Patient Return Call  Requested    Who Called:Ramiro  Does the patient know what this is regarding?:Time for Liver Scan and need results on labs  Would the patient rather a call back or a response via [facility]? Callback  Best Call Back Number:[phone]  Additional Information: Time to order Liver Scan

## 2024-09-16 ENCOUNTER — HOSPITAL ENCOUNTER (OUTPATIENT)
Dept: RADIOLOGY | Facility: HOSPITAL | Age: 59
Discharge: HOME | End: 2024-09-16
Attending: INTERNAL MEDICINE
Payer: COMMERCIAL

## 2024-09-16 DIAGNOSIS — K74.00 LIVER FIBROSIS: ICD-10-CM

## 2024-09-16 DIAGNOSIS — B18.1 CHRONIC VIRAL HEPATITIS B WITHOUT DELTA AGENT AND WITHOUT COMA: ICD-10-CM

## 2024-09-16 PROCEDURE — 76705 ECHO EXAM OF ABDOMEN: CPT | Mod: TC

## 2024-09-16 PROCEDURE — 76705 ECHO EXAM OF ABDOMEN: CPT | Mod: 26,,, | Performed by: STUDENT IN AN ORGANIZED HEALTH CARE EDUCATION/TRAINING PROGRAM

## 2024-09-20 ENCOUNTER — TELEPHONE (OUTPATIENT)
Dept: HEPATOLOGY | Facility: CLINIC | Age: 59
End: 2024-09-20
Payer: COMMERCIAL

## 2024-09-20 NOTE — TELEPHONE ENCOUNTER
----- Message from Cynthia Natarajan sent at 9/20/2024  1:52 PM CDT -----  Type:  Test Results    Who Called: Ramiro  Name of Test (Lab/Mammo/Etc): [facility] [[id number]]  Date of Test: 9/16  Ordering Provider: Amisha Balderas   Where the test was performed:  [facility] [place], [facility]  Would the patient rather a call back or a response via [facility]? Call back  Best Call Back Number: [phone]  Additional Information:       Type:  Test Results    Who Called: Ramiro  Name of Test (Lab/Mammo/Etc): US ABD [[id number]]  Date of Test: 9/16  Ordering Provider: Amisha Balderas   Where the test was performed:  [facility] [place], [facility]  Would the patient rather a call back or a response via [facility]? Call back  Best Call Back Number: [phone]  Additional Information:

## 2024-10-08 ENCOUNTER — TELEPHONE (OUTPATIENT)
Dept: INTERNAL MEDICINE | Facility: CLINIC | Age: 59
End: 2024-10-08

## 2024-10-08 NOTE — TELEPHONE ENCOUNTER
----- Message from Romelpidio sent at 10/8/2024  1:34 PM CDT -----  Contact: Self [phone]  Would like to receive medical advice.    Would they like a call back or a response via [facility]:  call back    Additional information:  Calling to speak with the office about getting recommend a doctor to see at the [place]. The pt denied me scheduling there and wants a recommendation.

## 2024-10-14 ENCOUNTER — TELEPHONE (OUTPATIENT)
Dept: INTERNAL MEDICINE | Facility: CLINIC | Age: 59
End: 2024-10-14
Payer: COMMERCIAL

## 2024-10-14 ENCOUNTER — TELEPHONE (OUTPATIENT)
Dept: INTERNAL MEDICINE | Facility: CLINIC | Age: 59
End: 2024-10-14

## 2024-10-14 DIAGNOSIS — L98.9 SKIN LESION: Primary | ICD-10-CM

## 2024-10-14 NOTE — TELEPHONE ENCOUNTER
----- Message from Funmi sent at 10/12/2024 11:41 AM CDT -----  Contact: [phone] Patient  Patient is returning a phone call.    Who left a message for the patient: Nii Saucedo LPN    Does patient know what this is regarding:    Calling to speak with the office about getting recommend a doctor to see at the [place]. The pt denied me scheduling there and wants a recommendation. Pt is also asking about a referral to derm. Pt did not give any other information, just said he would get with Dr Castellon.     Would you like a call back, or a response through your [facility] portal?:   call back     Comments:

## 2024-10-14 NOTE — TELEPHONE ENCOUNTER
Message left on pt vm to call office back for assistance. Several messages left for pt with no response.

## 2024-10-23 DIAGNOSIS — B18.1 CHRONIC HEPATITIS B: ICD-10-CM

## 2024-10-24 RX ORDER — TENOFOVIR ALAFENAMIDE 25 MG/1
25 TABLET ORAL DAILY
Qty: 30 TABLET | Refills: 11 | Status: ACTIVE | OUTPATIENT
Start: 2024-10-24

## 2024-11-15 RX ORDER — TRIAMCINOLONE ACETONIDE 1 MG/G
CREAM TOPICAL 2 TIMES DAILY PRN
Qty: 80 G | Refills: 0 | Status: SHIPPED | OUTPATIENT
Start: 2024-11-15

## 2024-11-15 NOTE — TELEPHONE ENCOUNTER
No care due was identified.  [facility] Embedded Care Due Messages. Reference number: [id number].   11/15/2024 9:55:19 AM CST

## 2024-11-15 NOTE — TELEPHONE ENCOUNTER
Refill Routing Note   Medication(s) are not appropriate for processing by [facility] for the following reason(s):        Due for refill >6 months ago    [facility] action(s):  Defer               Appointments  past 12m or future 3m with PCP    Date Provider   Last Visit   11/30/2023 Yves Castellon MD   Next Visit   Visit date not found Yves Castellon MD   ED visits in past 90 days: 0        Note composed:3:53 PM 11/15/2024

## 2025-01-09 DIAGNOSIS — E78.2 MIXED HYPERLIPIDEMIA: ICD-10-CM

## 2025-01-09 DIAGNOSIS — I77.9 PAOD (PERIPHERAL ARTERIAL OCCLUSIVE DISEASE): ICD-10-CM

## 2025-01-09 DIAGNOSIS — Z95.9 S/P ARTERIAL STENT: ICD-10-CM

## 2025-01-09 RX ORDER — SILDENAFIL 100 MG/1
100 TABLET, FILM COATED ORAL DAILY PRN
Qty: 20 TABLET | Refills: 5 | OUTPATIENT
Start: 2025-01-09 | End: 2026-01-09

## 2025-01-09 RX ORDER — EVOLOCUMAB 140 MG/ML
140 INJECTION, SOLUTION SUBCUTANEOUS
Qty: 2 ML | Refills: 11 | Status: ACTIVE | OUTPATIENT
Start: 2025-01-09 | End: 2026-01-09

## 2025-01-09 NOTE — TELEPHONE ENCOUNTER
Refill Routing Note   Medication(s) are not appropriate for processing by [facility] for the following reason(s):        Outside of protocol  Non-participating provider    [facility] action(s):  Route               Appointments  past 12m or future 3m with PCP    Date Provider   Last Visit   1/5/2024 Costa Baldwin MD   Next Visit   Visit date not found Costa Baldwin MD   ED visits in past 90 days: 0        Note composed:10:33 AM 01/09/2025

## 2025-01-13 ENCOUNTER — TELEPHONE (OUTPATIENT)
Dept: UROLOGY | Facility: CLINIC | Age: 60
End: 2025-01-13
Payer: COMMERCIAL

## 2025-01-13 NOTE — TELEPHONE ENCOUNTER
----- Message from Radha sent at 1/10/2025  1:59 PM CST -----  Contact: Patient, [phone]  Requesting an RX refill or new RX.    Is this a refill or new RX: Refill    RX name and strength (copy/paste from chart):  sildenafiL (VIAGRA) 100 MG tablet    Is this a 30 day or 90 day RX:     Pharmacy name and phone # (copy/paste from chart):    [facility]  [address] [place] [id number]  Phone: [phone] Fax: [phone]       The doctors have asked that we provide their patients with the following 2 reminders -- prescription refills can take up to 72 hours, and a friendly reminder that in the future you can use your [facility] account to request refills: Yes

## 2025-01-15 ENCOUNTER — OFFICE VISIT (OUTPATIENT)
Dept: UROLOGY | Facility: CLINIC | Age: 60
End: 2025-01-15
Payer: COMMERCIAL

## 2025-01-15 ENCOUNTER — LAB VISIT (OUTPATIENT)
Dept: LAB | Facility: HOSPITAL | Age: 60
End: 2025-01-15
Attending: NURSE PRACTITIONER
Payer: COMMERCIAL

## 2025-01-15 VITALS
SYSTOLIC BLOOD PRESSURE: 149 MMHG | RESPIRATION RATE: 14 BRPM | HEIGHT: 65 IN | DIASTOLIC BLOOD PRESSURE: 79 MMHG | WEIGHT: 185.06 LBS | TEMPERATURE: 97 F | HEART RATE: 72 BPM | BODY MASS INDEX: 30.83 KG/M2

## 2025-01-15 DIAGNOSIS — Z12.5 PROSTATE CANCER SCREENING: Primary | ICD-10-CM

## 2025-01-15 DIAGNOSIS — N52.01 ERECTILE DYSFUNCTION DUE TO ARTERIAL INSUFFICIENCY: ICD-10-CM

## 2025-01-15 DIAGNOSIS — Z12.5 PROSTATE CANCER SCREENING: ICD-10-CM

## 2025-01-15 PROBLEM — R29.3 POSTURE IMBALANCE: Status: RESOLVED | Noted: 2021-12-06 | Resolved: 2025-01-15

## 2025-01-15 LAB — COMPLEXED PSA SERPL-MCNC: 2.4 NG/ML (ref 0–4)

## 2025-01-15 PROCEDURE — 84153 ASSAY OF PSA TOTAL: CPT | Performed by: NURSE PRACTITIONER

## 2025-01-15 PROCEDURE — 3078F DIAST BP <80 MM HG: CPT | Mod: CPTII,S$GLB,, | Performed by: NURSE PRACTITIONER

## 2025-01-15 PROCEDURE — 3077F SYST BP >= 140 MM HG: CPT | Mod: CPTII,S$GLB,, | Performed by: NURSE PRACTITIONER

## 2025-01-15 PROCEDURE — 1159F MED LIST DOCD IN RCRD: CPT | Mod: CPTII,S$GLB,, | Performed by: NURSE PRACTITIONER

## 2025-01-15 PROCEDURE — 99214 OFFICE O/P EST MOD 30 MIN: CPT | Mod: S$GLB,,, | Performed by: NURSE PRACTITIONER

## 2025-01-15 PROCEDURE — 99999 PR PBB SHADOW E&M-EST. PATIENT-LVL IV: CPT | Mod: PBBFAC,,, | Performed by: NURSE PRACTITIONER

## 2025-01-15 PROCEDURE — 36415 COLL VENOUS BLD VENIPUNCTURE: CPT | Performed by: NURSE PRACTITIONER

## 2025-01-15 PROCEDURE — 3008F BODY MASS INDEX DOCD: CPT | Mod: CPTII,S$GLB,, | Performed by: NURSE PRACTITIONER

## 2025-01-15 RX ORDER — SILDENAFIL 100 MG/1
100 TABLET, FILM COATED ORAL DAILY PRN
Qty: 20 TABLET | Refills: 5 | Status: SHIPPED | OUTPATIENT
Start: 2025-01-15 | End: 2026-01-15

## 2025-01-15 NOTE — PROGRESS NOTES
Chief Complaint:   Prostate cancer screening  Erectile dysfunction    HPI:   Patient is presenting for an annual exam.  Is due for PSA.  States that nocturia is once nightly and daytime stream is strong, no BPH meds.  Urine in clinic is negative and PVR is 2 mL.  Is taking sildenafil 100 mg p.r.n. with good results without adverse side effects.  05/02/2023  Patient is a 57-year-old male that is following up on erectile dysfunction and slightly rising PSA.  Patient was to have repeat PSA, will be sent today.  Patient states the Viagra does not allow him to maintain a rigid enough erection and is requesting other treatment options.  Denies adverse side effects to Viagra.  No BPH symptoms for BPH meds.  10/22/2022  Patient is a 57-year-old male that is presenting per primary care physician.  Patient had an increase in PSA slightly.  PSA increased from 1.4 to 2.1.  No BPH meds.  Urine in clinic is negative.  PVR is low, 18 mL.  No previous prostate biopsies.  Reports father had prostate cancer.No abd/pelvic pain and no exac/rel factors.  No hematuria.  No urolithiasis.  No urinary bother.  No  history.  Patient states that he would like to talk about options for erectile dysfunction.  Unable to maintain an erection.      Allergies:  Patient has no known allergies.    Medications:  has a current medication list which includes the following prescription(s): amlodipine, atorvastatin, azelastine, cilostazol, econazole nitrate, repatha sureclick, fexofenadine, gabapentin, hydrochlorothiazide, levocetirizine, meloxicam, nystatin-triamcinolone, rivaroxaban, sildenafil, vemlidy, triamcinolone acetonide 0.1%, and [DISCONTINUED] tenofovir disoproxil fumarate.    Review of Systems:  General: No fever, chills, fatigability, or weight loss.  Skin: No rashes, itching, or changes in color or texture of skin.  Chest: Denies LOPEZ, cyanosis, wheezing, cough, and sputum production.  Abdomen: Appetite fine. No weight loss. Denies  diarrhea, abdominal pain, hematemesis, or blood in stool.  Musculoskeletal: No joint stiffness or swelling. Denies back pain.  : As above.  All other review of systems negative.    PMH:   has a past medical history of Anticoagulant long-term use, Arthritis, Dermatitis, Hepatitis B, History of surgery on arm (07/2020), Hypertension, Liver fibrosis (12/12/2019), and NAFLD (nonalcoholic fatty liver disease).    PSH:   has a past surgical history that includes Shoulder surgery; Colonoscopy; PTA/stenting of distal right SFA ; stents; Carpal tunnel release; right hand surgery; Percutaneous transluminal angioplasty (PTA) of peripheral vessel (N/A, 2/4/2022); and Colonoscopy (N/A, 11/7/2022).    FamHx: family history includes Cancer in his brother, father, paternal uncle, and sister; Diabetes in his mother, paternal uncle, and sister; Hypertension in his mother and sister; Stroke in his sister.    SocHx:  reports that he has never smoked. He has never used smokeless tobacco. He reports that he does not currently use alcohol. He reports that he does not use drugs.      Physical Exam:  General: A&Ox3, no apparent distress, no deformities  Neck: No masses, normal thyroid  Lungs: normal inspiration, no use of accessory muscles  Heart: normal pulse, no arrhythmias  Abdomen: Soft, NT, ND, no masses, no hernias, no hepatosplenomegaly  Lymphatic: Neck and groin nodes negative  Skin: The skin is warm and dry. No jaundice.  Ext: No c/c/e.  : 2023  Test desc kim, no abnormalities of epididymus. Penis  with normal penile and scrotal skin. Meatus normal. Normal rectal tone, no hemorrhoids. Prost 35 gm no nodules or masses appreciated. SV not palpable. Perineum and anus normal.    Labs/Studies:    Latest Reference Range & Units 08/29/22 10:36 05/02/23 08:27   Prostate Specific Antigen 0.00 - 4.00 ng/mL 2.1 1.7       Impression/Plan:   1. Prostate cancer screening  Sent for PSA, does not use the patient portal, we will call him with  results and needed follow-up.  If normal patient to return to clinic in 12 months for re-evaluation.    2. Erectile dysfunction  Refills sent to his pharmacy.

## 2025-01-16 ENCOUNTER — TELEPHONE (OUTPATIENT)
Dept: UROLOGY | Facility: CLINIC | Age: 60
End: 2025-01-16
Payer: COMMERCIAL

## 2025-01-16 DIAGNOSIS — Z12.5 PROSTATE CANCER SCREENING: Primary | ICD-10-CM

## 2025-01-16 NOTE — TELEPHONE ENCOUNTER
Outgoing call placed to patient's wife, informed of lab results. Verbalized understanding, no further questions.      ----- Message from Linda Covington NP sent at 1/16/2025  8:57 AM CST -----  Please call patient inform him that PSA is normal, 2.4.  We can repeat this lab in 12 months.

## 2025-01-17 ENCOUNTER — TELEPHONE (OUTPATIENT)
Dept: UROLOGY | Facility: CLINIC | Age: 60
End: 2025-01-17
Payer: COMMERCIAL

## 2025-01-17 NOTE — TELEPHONE ENCOUNTER
Outgoing  call returned to patient in regards to lab results. Results released to patient, verbalized understanding. No further questions at this time.        ----- Message from Juan Antonio sent at 1/17/2025  8:21 AM CST -----  Contact: Ramiro  Type:  Patient Returning Call    Who Called:Ramiro   Who Left Message for Patient:Dominique Saucedo MA  Does the patient know what this is regarding?:Yes   Would the patient rather a call back or a response via [facility]? Call Back   Best Call Back Number:[phone]  Additional Information:

## 2025-02-17 ENCOUNTER — TELEPHONE (OUTPATIENT)
Dept: CARDIOLOGY | Facility: CLINIC | Age: 60
End: 2025-02-17
Payer: COMMERCIAL

## 2025-02-17 DIAGNOSIS — M79.604 LEG PAIN, BILATERAL: Primary | ICD-10-CM

## 2025-02-17 DIAGNOSIS — M79.605 LEG PAIN, BILATERAL: Primary | ICD-10-CM

## 2025-02-17 DIAGNOSIS — I73.9 PAD (PERIPHERAL ARTERY DISEASE): ICD-10-CM

## 2025-02-17 NOTE — TELEPHONE ENCOUNTER
----- Message from Brody sent at 2/17/2025  3:29 PM CST -----  Patient is calling in regards to having an ultrasound done on his leg before his visit. Stated he is having pain in his leg. He can be reached at [phone].Thank you

## 2025-02-19 ENCOUNTER — HOSPITAL ENCOUNTER (OUTPATIENT)
Dept: CARDIOLOGY | Facility: HOSPITAL | Age: 60
Discharge: HOME | End: 2025-02-19
Attending: INTERNAL MEDICINE
Payer: COMMERCIAL

## 2025-02-19 DIAGNOSIS — M79.605 LEG PAIN, BILATERAL: ICD-10-CM

## 2025-02-19 DIAGNOSIS — M79.604 LEG PAIN, BILATERAL: ICD-10-CM

## 2025-02-19 PROCEDURE — 93970 EXTREMITY STUDY: CPT

## 2025-02-20 ENCOUNTER — HOSPITAL ENCOUNTER (OUTPATIENT)
Dept: CARDIOLOGY | Facility: HOSPITAL | Age: 60
Discharge: HOME | End: 2025-02-20
Attending: INTERNAL MEDICINE
Payer: COMMERCIAL

## 2025-02-20 DIAGNOSIS — M79.605 LEG PAIN, BILATERAL: ICD-10-CM

## 2025-02-20 DIAGNOSIS — I73.9 PAD (PERIPHERAL ARTERY DISEASE): ICD-10-CM

## 2025-02-20 DIAGNOSIS — M79.604 LEG PAIN, BILATERAL: ICD-10-CM

## 2025-02-20 LAB
LEFT ANT TIBIAL SYS PSV: 6 CM/S
LEFT CFA PSV: 81 CM/S
LEFT EXTERNAL ILIAC PSV: 76 CM/S
LEFT PERONEAL SYS PSV: 18 CM/S
LEFT POPLITEAL PSV: 82 CM/S
LEFT POST TIBIAL SYS PSV: 26 CM/S
LEFT PROFUNDA SYS PSV: 96 CM/S
LEFT SUPER FEMORAL DIST SYS PSV: 23 CM/S
LEFT SUPER FEMORAL MID SYS PSV: 0 CM/S
LEFT SUPER FEMORAL OSTIAL SYS PSV: 81 CM/S
LEFT SUPER FEMORAL PROX SYS PSV: 0 CM/S
LEFT TIB/PER TRUNK SYS PSV: 32 CM/S
OHS CV LEFT LOWER EXTREMITY ABI (NO CALC): 0.71
OHS CV RIGHT ABI LOWER EXTREMITY (NO CALC): 0.56
RIGHT ANT TIBIAL SYS PSV: 0 CM/S
RIGHT CFA PSV: 137 CM/S
RIGHT EXTERNAL ILLIAC PSV: 116 CM/S
RIGHT PERONEAL SYS PSV: 9 CM/S
RIGHT POPLITEAL PSV: 27 CM/S
RIGHT POST TIBIAL SYS PSV: 20 CM/S
RIGHT PROFUNDA SYS PSV: 91 CM/S
RIGHT SUPER FEMORAL DIST SYS PSV: 0 CM/S
RIGHT SUPER FEMORAL MID SYS PSV: 0 CM/S
RIGHT SUPER FEMORAL OSTIAL SYS PSV: 0 CM/S
RIGHT SUPER FEMORAL PROX SYS PSV: 0 CM/S
RIGHT TIB/PER TRUNK SYS PSV: 22 CM/S

## 2025-02-20 PROCEDURE — 93925 LOWER EXTREMITY STUDY: CPT

## 2025-03-01 ENCOUNTER — TELEPHONE (OUTPATIENT)
Dept: CARDIOLOGY | Facility: CLINIC | Age: 60
End: 2025-03-01
Payer: COMMERCIAL

## 2025-03-01 NOTE — TELEPHONE ENCOUNTER
----- Message from Abdi Bunn MD PhD sent at 2/28/2025  6:41 PM CST -----  Regarding: RE: Results  Called patient and left detail voicemail message.  ----- Message -----  From: Dawood Gutierrez MA  Sent: 2/24/2025  11:58 AM CST  To: Abdi Bunn MD PhD  Subject: FW: Results                                        ----- Message -----  From: Jaye Cortés  Sent: 2/24/2025  11:56 AM CST  To: [facility] Staff  Subject: Results                                          Patient calling to get his results fron ultrasound. Please call back @ [phone]. Thank you Jaye

## 2025-03-03 ENCOUNTER — OFFICE VISIT (OUTPATIENT)
Dept: CARDIOLOGY | Facility: CLINIC | Age: 60
End: 2025-03-03
Payer: COMMERCIAL

## 2025-03-03 VITALS
HEART RATE: 67 BPM | SYSTOLIC BLOOD PRESSURE: 178 MMHG | HEIGHT: 69 IN | BODY MASS INDEX: 27.24 KG/M2 | WEIGHT: 183.88 LBS | DIASTOLIC BLOOD PRESSURE: 97 MMHG

## 2025-03-03 DIAGNOSIS — I77.9 PAOD (PERIPHERAL ARTERIAL OCCLUSIVE DISEASE): Primary | ICD-10-CM

## 2025-03-03 DIAGNOSIS — Z79.01 ANTICOAGULANT LONG-TERM USE: ICD-10-CM

## 2025-03-03 DIAGNOSIS — I10 HYPERTENSION, ESSENTIAL: ICD-10-CM

## 2025-03-03 DIAGNOSIS — I70.213 INTERMITTENT CLAUDICATION OF BOTH LOWER EXTREMITIES DUE TO ATHEROSCLEROSIS: ICD-10-CM

## 2025-03-03 DIAGNOSIS — Z95.9 S/P ARTERIAL STENT: ICD-10-CM

## 2025-03-03 DIAGNOSIS — E78.2 MIXED HYPERLIPIDEMIA: ICD-10-CM

## 2025-03-03 PROCEDURE — 99212 OFFICE O/P EST SF 10 MIN: CPT | Mod: S$GLB,,, | Performed by: INTERNAL MEDICINE

## 2025-03-03 PROCEDURE — 99999 PR PBB SHADOW E&M-EST. PATIENT-LVL IV: CPT | Mod: PBBFAC,,, | Performed by: INTERNAL MEDICINE

## 2025-03-03 PROCEDURE — 1159F MED LIST DOCD IN RCRD: CPT | Mod: CPTII,S$GLB,, | Performed by: INTERNAL MEDICINE

## 2025-03-03 PROCEDURE — 3077F SYST BP >= 140 MM HG: CPT | Mod: CPTII,S$GLB,, | Performed by: INTERNAL MEDICINE

## 2025-03-03 PROCEDURE — 3008F BODY MASS INDEX DOCD: CPT | Mod: CPTII,S$GLB,, | Performed by: INTERNAL MEDICINE

## 2025-03-03 PROCEDURE — 3080F DIAST BP >= 90 MM HG: CPT | Mod: CPTII,S$GLB,, | Performed by: INTERNAL MEDICINE

## 2025-03-03 RX ORDER — CILOSTAZOL 100 MG/1
100 TABLET ORAL 2 TIMES DAILY
Qty: 180 TABLET | Refills: 3 | Status: SHIPPED | OUTPATIENT
Start: 2025-03-03 | End: 2026-03-03

## 2025-03-03 NOTE — PROGRESS NOTES
[facility]      Chief Complaint   Patient presents with    PAOD (peripheral arterial occlusive disease)       Patient ID: Ramiro Oneil is a 59 y.o. male with PAD (s/p PTA Right Femoral Artery 10/12/2021) , HTN, HLD, Cervical Disk Degeneration, NAFLD (Hep B), never smoker presents for a scheduled appointment. Pertinent history/events are as follows:    Patient kindly referred by Dr. Costa Baldwin for for evaluation of Peripheral Artery Disease.    -At our initial clinic visit on 1/16/2024, Mr. Oneil reported pain in both legs more in right than left. He reported  he feels pain with the first step in the morning but he walks in spite of the pain having an insight the pushing for walking is going to help him. His also reports of back pain; he has history of Cervical Disk Degeneration, scheduled to see the neurologist.   In the past he had PTA Right Femoral Artery 10/12/2021 and LE angiogram showed Dist R SFA lesion was 100% stenosed with 0% stenosis post-intervention, Mid L SFA to Dist L SFA lesion was 100% stenosed, Ost R VIKA to Dist R VIKA lesion was 100%. LE Angiogram 2/4/2022 showed Prox R SFA to Prox R Popliteal lesion was 100% stenosed, Ost R VIKA to Dist R VIKA lesion was 100% stenosed. Vascular Surgeon on 10/12/2023 mentioned not a candidate for bypass, recommended to continue to exercise and walk regularly. Ankle brachial Index 9/12/203 revealed Right 0.47, Left 0.66.  He reported he did not have refills for xarelto for a year, but he has been compliant with ASA 81mg and atorvastatin 80mg daily.  on 11/30/2023. Also, he did not take cilostazol with the concern of side effects. But after the cardiology visit on 1/5/2024 he has started xarelto and cilostazol and reports no issues with that. He denies any ulcers, rest pain, or tissue loss.   Plan:  PAOD (peripheral arterial occlusive disease): Continue Aspirin 81 mg daily, cilostazol 50 mg twice a day  and Xarelto 2.5 mg Twice a day.  Start PAD Rehab program - supervised exercise program at [place], [place]. Encouraged to walk 30 minutes a day, 5 days in a week. Get MRI [place] [facility] and follow up with Neurologist.   HTN: Continue amlodipine 10 mg daily,   HLD:  on 11/30/2023. Continue Atorvastatin 80 mg daily at night before bedtime, Repatha 140 mg every 2 weeks.  Overweight: Refer to Bariatric Medicine. Encouraged diet, exercise and lifestyle modification for weight loss.     HPI:  Mr. Oneil reports continued pain in legs when walking as reported at clinic visit on 1/16/2024. He has no rest pain or LE wound/ulcer. She has not yet started PAD rehab program in [place]. States he's taking all medications as prescribed.   BLE on 2/19/2025 revealed Right resting KARMA 0.56, is suggestive of severe right lower extremity arterial disease.  Right SFA is occluded. Right anterior tibial artery is occluded. Arterial vessels distal to the right SFA display monophasic waveforms with low peak systolic velocities. Left resting KARMA 0.71, is suggestive of moderate left lower extremity arterial disease. Left SFA is occluded in the proximal/mid segment with reconstitution in the distal segment. Left anterior tibial artery with near occlusion. BLE Venous Ultrasound on 2/19/2025 revealed no DVT.     Past Medical History:   Diagnosis Date    Anticoagulant long-term use     Arthritis     Dermatitis     Hepatitis B     History of surgery on arm 07/2020    Hypertension     Liver fibrosis 12/12/2019    F2 on fibroscan 11/2018    NAFLD (nonalcoholic fatty liver disease)      Past Surgical History:   Procedure Laterality Date    CARPAL TUNNEL RELEASE      COLONOSCOPY      COLONOSCOPY N/A 11/7/2022    Procedure: COLONOSCOPY;  Surgeon: Brent Bowden MD;  Location: [facility] ([address] [place] [address]);  Service: Endoscopy;  Laterality: N/A;  Do not cancel this order  ok to hold Pletal and Xarelto 2 days per Dr Darius steiner-GT  instr mailed    PERCUTANEOUS TRANSLUMINAL  ANGIOPLASTY (PTA) OF PERIPHERAL VESSEL N/A 2/4/2022    Procedure: PTA, PERIPHERAL VESSEL;  Surgeon: Rodger Blake MD;  Location: [facility] CATH LAB;  Service: Cardiology;  Laterality: N/A;    PTA/stenting of distal right SFA       right hand surgery      SHOULDER SURGERY      stents       Social History     Socioeconomic History    Marital status:      Spouse name: Roro    Number of children: 2   Occupational History    Occupation:      Employer: [employer]   Tobacco Use    Smoking status: Never    Smokeless tobacco: Never   Substance and Sexual Activity    Alcohol use: Not Currently     Comment: 1-2 non alchoholic beers per week    Drug use: No    Sexual activity: Yes   Social History Narrative     [employer], , two children.        Family History   Problem Relation Name Age of Onset    Diabetes Mother      Hypertension Mother      Cancer Father          lung    Diabetes Sister 2     Cancer Sister 2         breast    Hypertension Sister 2     Cancer Brother 3         prostate    Cancer Paternal Uncle      Diabetes Paternal Uncle      Stroke Sister 2     Liver disease Neg Hx      Cirrhosis Neg Hx         Review of patient's allergies indicates:  No Known Allergies    Medication List with Changes/Refills   Current Medications    AMLODIPINE (NORVASC) 10 MG TABLET    Take 1 tablet (10 mg total) by mouth once daily.    ATORVASTATIN (LIPITOR) 80 MG TABLET    Take 1 tablet (80 mg total) by mouth once daily.    AZELASTINE (ASTELIN) 137 MCG (0.1 %) NASAL SPRAY    1 spray (137 mcg total) by Nasal route 2 (two) times daily.    CILOSTAZOL (PLETAL) 50 MG TAB    Take 1 tablet (50 mg total) by mouth 2 (two) times daily.    ECONAZOLE NITRATE 1 % CREAM    as needed.    EVOLOCUMAB (REPATHA SURECLICK) 140 MG/ML PNIJ    Inject 1 mL (140 mg total) into the skin every 14 (fourteen) days.    FEXOFENADINE (ALLEGRA) 180 MG TABLET    Take 1 tablet (180 mg total) by mouth once daily.     "GABAPENTIN (NEURONTIN) 300 MG CAPSULE    Take 1 capsule (300 mg total) by mouth every evening.    HYDROCHLOROTHIAZIDE (HYDRODIURIL) 25 MG TABLET    Take 1 tablet (25 mg total) by mouth once daily.    LEVOCETIRIZINE (XYZAL) 5 MG TABLET    Take 1 tablet (5 mg total) by mouth every evening. for 7 days    MELOXICAM (MOBIC) 7.5 MG TABLET    Take 1 tablet (7.5 mg total) by mouth once daily.    NYSTATIN-TRIAMCINOLONE (MYCOLOG II) CREAM    Apply topically 2 (two) times daily. Groin area    RIVAROXABAN (XARELTO) 2.5 MG TAB    Take 1 tablet (2.5 mg total) by mouth 2 (two) times daily with meals.    SILDENAFIL (VIAGRA) 100 MG TABLET    Take 1 tablet (100 mg total) by mouth daily as needed for Erectile Dysfunction.    TENOFOVIR ALAFENAMIDE (VEMLIDY) 25 MG TAB    Take 1 tablet (25 mg total) by mouth once daily.    TRIAMCINOLONE ACETONIDE 0.1% (KENALOG) 0.1 % CREAM    Apply topically 2 (two) times daily as needed. Neck and body, not groin       Review of Systems  Constitution: Denies chills, fever, and sweats.  HENT: Denies headaches or blurry vision.  Cardiovascular: Denies chest pain or irregular heart beat.  Respiratory: Denies cough or shortness of breath.  Gastrointestinal: Denies abdominal pain, nausea, or vomiting.  Musculoskeletal: Denies muscle cramps.  Neurological: Denies dizziness or focal weakness.  Psychiatric/Behavioral: Normal mental status.  Hematologic/Lymphatic: Denies bleeding problem or easy bruising/bleeding.  Skin: Denies rash or suspicious lesions    Physical Examination  BP (!) 178/97 (BP Location: Right arm, Patient Position: Sitting)   Pulse 67   Ht 5' 9" (1.753 m)   Wt 83.4 kg (183 lb 13.8 oz)   BMI 27.15 kg/m²     Constitutional: No acute distress, conversant  HEENT: Sclera anicteric, Pupils equal, round and reactive to light, extraocular motions intact, Oropharynx clear  Neck: No JVD, no carotid bruits  Cardiovascular: regular rate and rhythm, no murmur, rubs or gallops, normal S1/S2  Pulmonary: " "Clear to auscultation bilaterally  Abdominal: Abdomen soft, nontender, nondistended, positive bowel sounds  Extremities: No lower extremity edema,   Pulses:  Carotid pulses are 2+ on the right side, and 2+ on the left side.  Radial pulses are 2+ on the right side, and 2+ on the left side.   Femoral pulses are 2+ on the right side, and 2+ on the left side.  Popliteal pulses are 2+ on the right side, and 2+ on the left side.   Dorsalis pedis pulses are 2+ on the right side, and 2+ on the left side.   Posterior tibial pulses are 2+ on the right side, and 2+ on the left side.    Skin: No ecchymosis, erythema, or ulcers  Psych: Alert and oriented x 3, appropriate affect  Neuro: CNII-XII intact, no focal deficits    Labs:  Most Recent Data  CBC:   Lab Results   Component Value Date    WBC 6.98 09/16/2024    HGB 15.1 09/16/2024    HCT 47.5 09/16/2024     09/16/2024    MCV 84 09/16/2024    RDW 14.7 (H) 09/16/2024     BMP:   Lab Results   Component Value Date     09/16/2024    K 4.3 09/16/2024     09/16/2024    CO2 25 09/16/2024    BUN 15 09/16/2024    CREATININE 1.0 09/16/2024     09/16/2024    CALCIUM 9.3 09/16/2024     LFTS;   Lab Results   Component Value Date    PROT 7.2 09/16/2024    ALBUMIN 3.6 09/16/2024    BILITOT 0.6 09/16/2024    AST 20 09/16/2024    ALKPHOS 59 09/16/2024    ALT 28 09/16/2024    GGT 20 07/08/2014     COAGS:   Lab Results   Component Value Date    INR 1.1 09/16/2024     FLP:   Lab Results   Component Value Date    CHOL 158 11/30/2023    HDL 24 (L) 11/30/2023    LDLCALC 118.4 11/30/2023    TRIG 78 11/30/2023    CHOLHDL 15.2 (L) 11/30/2023     CARDIAC: No results found for: "TROPONINI", "CKTOTAL", "CKMB", "BNP"    Imaging:    BLE 2/19/2025:     Right resting KARMA 0.56, is suggestive of severe right lower extremity arterial disease.    Right SFA is occluded.    Right anterior tibial artery is occluded.    Arterial vessels distal to the right SFA display monophasic waveforms " with low peak systolic velocities.    Left resting KARMA 0.71, is suggestive of moderate left lower extremity arterial disease.    Left SFA is occluded in the proximal/mid segment with reconstitution in the distal segment.    Left anterior tibial artery with near occlusion.    BLE Venous Ultrasound 2/19/2025:    There is no evidence of a right lower extremity DVT.    There is no evidence of a left lower extremity DVT.    LE Angiogram, Thrombectomy, and PTA Right Femoral Artery 10/12/2021  The Dist R SFA lesion was 100% stenosed with 0% stenosis post-intervention.  The Mid L SFA to Dist L SFA lesion was 100% stenosed.  The Ost R VKIA to Dist R VIKA lesion was 100% stenosed.  The PTA was successful.    LE Angiogram 2/4/2022  The Prox R SFA to Prox R Popliteal lesion was 100% stenosed.  The Ost R VIKA to Dist R VIKA lesion was 100% stenosed.    Ankle brachial Index 9/12/203  Right 0.47, Left 0.66    I have personally reviewed these images and echo data    Assessment/Plan:  Ramiro Oneil is a 59 y.o. male with PAD (s/p PTA Right Femoral Artery 10/12/2021) , HTN, HLD, Cervical Disk Degeneration, NAFLD (Hep B), never smoker presents for a scheduled appointment.     PAOD (peripheral arterial occlusive disease)- Mr. Oneil has known severe PAD. He has been evaluated by Vascular Surgery and IC and deemed to have no surgical or endovascular revascularization options. Will continue to optimize medical management of PAD. Continue Increase cilostazol to 100 mg bid. Continue Aspirin 81 mg daily, and Xarelto 2.5 mg Twice a day. Start PAD Rehab program - supervised exercise program at [place], [place]. Encouraged to walk 30 minutes a day, 5 days in a week.     2. HTN: Continue amlodipine 10 mg daily,     3. HLD- Continue Atorvastatin 80 mg daily at night before bedtime, Repatha 140 mg every 2 weeks.    4. Overweigh- Encourage diet, exercise and lifestyle modification for weight loss.     Follow up in 4 months with lipids prior    Total  duration of face to face visit time 20 minutes.  Total time spent counseling greater than fifty percent of total visit time.  Counseling included discussion regarding imaging findings, diagnosis, possibilities, treatment options, risks and benefits.  The patient had many questions regarding the options and long-term effects.    Abdi Bunn MD, PhD  Interventional Cardiology

## 2025-03-03 NOTE — PATIENT INSTRUCTIONS
Assessment/Plan:  Ramiro Oneil is a 59 y.o. male with PAD (s/p PTA Right Femoral Artery 10/12/2021) , HTN, HLD, Cervical Disk Degeneration, NAFLD (Hep B), never smoker presents for a scheduled appointment.     PAOD (peripheral arterial occlusive disease)- Mr. Oneil has known severe PAD. He has been evaluated by Vascular Surgery and IC and deemed to have no surgical or endovascular revascularization options. Will continue to optimize medical management of PAD. Continue Increase cilostazol to 100 mg bid. Continue Aspirin 81 mg daily, and Xarelto 2.5 mg Twice a day. Start PAD Rehab program - supervised exercise program at [place], [place]. Encouraged to walk 30 minutes a day, 5 days in a week.     2. HTN: Continue amlodipine 10 mg daily,     3. HLD- Continue Atorvastatin 80 mg daily at night before bedtime, Repatha 140 mg every 2 weeks.    4. Overweigh- Encourage diet, exercise and lifestyle modification for weight loss.     Follow up in 4 months with lipids prior

## 2025-05-21 ENCOUNTER — TELEPHONE (OUTPATIENT)
Dept: INTERNAL MEDICINE | Facility: CLINIC | Age: 60
End: 2025-05-21
Payer: COMMERCIAL

## 2025-05-21 NOTE — TELEPHONE ENCOUNTER
----- Message from Josselyn sent at 5/21/2025 10:44 AM CDT -----  Contact: Pt  [phone]  [email or web address] Patient is calling for Medical Advice regarding: Right knee pain How long has patient had these symptoms:3 days Pharmacy name and phone#:[facility] Phone: [phone]: [phone] wants a call back or thru [facility]:Callback Comments:Please advise patient replies from provider may take up to 48 hours.

## 2025-05-26 ENCOUNTER — OFFICE VISIT (OUTPATIENT)
Dept: ORTHOPEDICS | Facility: CLINIC | Age: 60
End: 2025-05-26
Payer: COMMERCIAL

## 2025-05-26 ENCOUNTER — HOSPITAL ENCOUNTER (OUTPATIENT)
Dept: RADIOLOGY | Facility: HOSPITAL | Age: 60
Discharge: HOME | End: 2025-05-26
Attending: NURSE PRACTITIONER
Payer: COMMERCIAL

## 2025-05-26 DIAGNOSIS — M25.562 ACUTE PAIN OF LEFT KNEE: Primary | ICD-10-CM

## 2025-05-26 DIAGNOSIS — M17.12 PRIMARY OSTEOARTHRITIS OF LEFT KNEE: Primary | ICD-10-CM

## 2025-05-26 DIAGNOSIS — M25.562 ACUTE PAIN OF LEFT KNEE: ICD-10-CM

## 2025-05-26 PROCEDURE — 73564 X-RAY EXAM KNEE 4 OR MORE: CPT | Mod: TC,LT

## 2025-05-26 PROCEDURE — 1159F MED LIST DOCD IN RCRD: CPT | Mod: CPTII,S$GLB,, | Performed by: NURSE PRACTITIONER

## 2025-05-26 PROCEDURE — 99203 OFFICE O/P NEW LOW 30 MIN: CPT | Mod: S$GLB,,, | Performed by: NURSE PRACTITIONER

## 2025-05-26 PROCEDURE — 99999 PR PBB SHADOW E&M-EST. PATIENT-LVL III: CPT | Mod: PBBFAC,,, | Performed by: NURSE PRACTITIONER

## 2025-05-26 PROCEDURE — 73564 X-RAY EXAM KNEE 4 OR MORE: CPT | Mod: 26,LT,, | Performed by: RADIOLOGY

## 2025-05-26 NOTE — PROGRESS NOTES
SUBJECTIVE:   History of Present Illness    CHIEF COMPLAINT:  - Left knee pain    HPI:  Ramiro presents with left knee pain ongoing for the past two weeks. Pain is located on the inside of the knee and radiates down to his feet, making it difficult for him to walk. He rates the pain as 7/10 to 9/10, describing it as constant and unrelenting. Pain interferes with his sleep, causing him to toss and turn at night. He denies this type of knee pain before.    He has difficulty crossing his legs, needing to pull his affected leg when doing so. He also takes his time when climbing stairs. 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    He has not been taking any medication for the knee pain, only trying to keep the knee mobile when it gets stiff. He tried Tylenol but reports it was ineffective. He denies any falls or injuries related to this knee pain.    He also reports low back pain, for which he previously saw his primary care physician and was given pain pills. He does not mention the effectiveness of this treatment.    He denies fever, chills, and problems with the knee locking up or catching. He denies any history of diabetes or glaucoma.    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  - No specific details about physical demands or how pain affects work duties      ROS:  Constitutional: -fever, -chills, +difficulty falling asleep, +nightime pain, +difficulty staying asleep  Eyes: -visual changes  ENT: -nasal congestion, -sore throat  Respiratory: -cough, -shortness of breath  Cardiovascular: -chest pain, -palpitations  Gastrointestinal: -nausea, -vomiting  Genitourinary: -hematuria, -dysuria  Integument/Breast: -rash, -pruritus, -breast skin changes  Hematologic/Lymphatic: -easy  "bruising, -lymphadenopathy  Musculoskeletal: +arthralgia, -myalgia, +limb pain, +pain with movement, +difficulty walking, +back pain  Neurological: -seizures, -tremors  Behavioral/Psych: -hallucinations  Endocrine: -heat intolerance, -cold intolerance         Review of patient's allergies indicates:  No Known Allergies      Current Medications[1]    Past Medical History:   Diagnosis Date    Anticoagulant long-term use     Arthritis     Dermatitis     Hepatitis B     History of surgery on arm 07/2020    Hypertension     Liver fibrosis 12/12/2019    F2 on fibroscan 11/2018    NAFLD (nonalcoholic fatty liver disease)        Past Surgical History:   Procedure Laterality Date    CARPAL TUNNEL RELEASE      COLONOSCOPY      COLONOSCOPY N/A 11/7/2022    Procedure: COLONOSCOPY;  Surgeon: Brent Bowden MD;  Location: [facility] ENDO (4TH FLR);  Service: Endoscopy;  Laterality: N/A;  Do not cancel this order  ok to hold Pletal and Xarelto 2 days per Dr Darius steiner-GT  instr mailed    PERCUTANEOUS TRANSLUMINAL ANGIOPLASTY (PTA) OF PERIPHERAL VESSEL N/A 2/4/2022    Procedure: PTA, PERIPHERAL VESSEL;  Surgeon: Rodger Blake MD;  Location: [facility] CATH LAB;  Service: Cardiology;  Laterality: N/A;    PTA/stenting of distal right SFA       right hand surgery      SHOULDER SURGERY      stents         Family History   Problem Relation Name Age of Onset    Diabetes Mother      Hypertension Mother      Cancer Father          lung    Diabetes Sister 2     Cancer Sister 2         breast    Hypertension Sister 2     Cancer Brother 3         prostate    Cancer Paternal Uncle      Diabetes Paternal Uncle      Stroke Sister 2     Liver disease Neg Hx      Cirrhosis Neg Hx         OBJECTIVE:     PHYSICAL EXAM:  Vital Signs (Most Recent)  There were no vitals filed for this visit.     ,   Estimated body mass index is 27.15 kg/m² as calculated from the following:    Height as of 3/3/25: 5' 9" (1.753 m).    Weight as of 3/3/25: 83.4 " kg (183 lb 13.8 oz).   General Appearance: Well nourished, well developed, in no acute distress.  HENT: Normal cephalic, oropharynx pink and moist  Eyes: PERRLA bilaterally and EOM x 4  Respiratory: Even and unlabored  Skin: Warm and Dry.   Psychiatric: AAO x 4, Mood & affect are normal.    Physical Exam    MSK: Knee - Left: Left knee is not red. Left knee is not swollen. Left knee is not warm to touch. Extension mechanism intact. Muscle strength 4/5. Flexion to 120 degrees. No pain with varus/valgus stress. Anterior and posterior draw unremarkable.  IMAGING:  - XR Bilateral Knees: Bilateral mediotibiofemoral joint space narrowing consistent with osteoarthritis of bilateral knees.  - XR Left Knee Lateral: Spurs on the patella         All radiographs were personally reviewed by me.    ASSESSMENT/PLAN:       ICD-10-CM ICD-9-CM   1. Primary osteoarthritis of left knee  M17.12 715.16     59-year-old male presents to Orthopedic Trauma for evaluation of left knee pain for the past 2-3 weeks.  Denies any trauma.  He reports the pain mostly to the medial aspect of his knees.  Feels the pain with walking and rising from sitting position.    X-ray confirms that he has osteoarthritis of the knees bilaterally.  He is more symptomatic on the left.  I discussed treatment options with the patient.  Patient wants to remain conservative at this time.  He will try Tylenol, topical creams, and has agreed to physical therapy.  Should this treatment option fail he would consider doing a steroid injection.  Patient needs to avoid NSAIDs as he is on Xarelto long term.    Assessment & Plan    MEDICATIONS:  - Use Voltaren cream during the day for osteoarthritis pain.  - Apply Salonpas patches (lidocaine) at night before bed, remove in the morning.    REFERRALS:  - Referred to PT for knee conditioning and strengthening.    PROCEDURES:  - Discussed treatment options for knee arthritis, including steroid injection.  - Injection effectiveness  cannot be guaranteed and may take 1-2 weeks to show improvement.  - Injection cannot be repeated for 3 months if performed.    FOLLOW UP:  - Follow up after 4-6 weeks of PT if no improvement.    PATIENT INSTRUCTIONS:  - Use knee brace when up and walking around.  - Purchase knee brace from retail stores like [employer].         This note was generated with the assistance of ambient listening technology. Verbal consent was obtained by the patient and accompanying visitor(s) for the recording of patient appointment to facilitate this note. I attest to having reviewed and edited the generated note for accuracy, though some syntax or spelling errors may persist. Please contact the author of this note for any clarification.             [1]   Current Outpatient Medications   Medication Sig Dispense Refill    amLODIPine (NORVASC) 10 MG tablet Take 1 tablet (10 mg total) by mouth once daily. 90 tablet 3    atorvastatin (LIPITOR) 80 MG tablet Take 1 tablet (80 mg total) by mouth once daily. 90 tablet 3    azelastine (ASTELIN) 137 mcg (0.1 %) nasal spray 1 spray (137 mcg total) by Nasal route 2 (two) times daily. 30 mL 0    cilostazoL (PLETAL) 100 MG Tab Take 1 tablet (100 mg total) by mouth 2 (two) times daily. 180 tablet 3    econazole nitrate 1 % cream as needed.      evolocumab (REPATHA SURECLICK) 140 mg/mL PnIj Inject 1 mL (140 mg total) into the skin every 14 (fourteen) days. 2 mL 11    fexofenadine (ALLEGRA) 180 MG tablet Take 1 tablet (180 mg total) by mouth once daily. 30 tablet 1    hydroCHLOROthiazide (HYDRODIURIL) 25 MG tablet Take 1 tablet (25 mg total) by mouth once daily. 90 tablet 1    meloxicam (MOBIC) 7.5 MG tablet Take 1 tablet (7.5 mg total) by mouth once daily. 30 tablet 0    nystatin-triamcinolone (MYCOLOG II) cream Apply topically 2 (two) times daily. Groin area 30 g 0    rivaroxaban (XARELTO) 2.5 mg Tab Take 1 tablet (2.5 mg total) by mouth 2 (two) times daily with meals. 60 tablet 11     sildenafiL (VIAGRA) 100 MG tablet Take 1 tablet (100 mg total) by mouth daily as needed for Erectile Dysfunction. 20 tablet 5    tenofovir alafenamide (VEMLIDY) 25 mg Tab Take 1 tablet (25 mg total) by mouth once daily. 30 tablet 11    triamcinolone acetonide 0.1% (KENALOG) 0.1 % cream Apply topically 2 (two) times daily as needed. Neck and body, not groin 80 g 0    gabapentin (NEURONTIN) 300 MG capsule Take 1 capsule (300 mg total) by mouth every evening. 30 capsule 11    levocetirizine (XYZAL) 5 MG tablet Take 1 tablet (5 mg total) by mouth every evening. for 7 days 7 tablet 0     No current facility-administered medications for this visit.

## (undated) DEVICE — PROTECTION STATION PLUS

## (undated) DEVICE — SPIKE CONTRAST CONTROLLER

## (undated) DEVICE — TUBING HPCIL ROT M/F ADPT 10IN

## (undated) DEVICE — STOPCOCK 3-WAY

## (undated) DEVICE — Device

## (undated) DEVICE — SUT ETHILON 2-0 PSLX 30IN

## (undated) DEVICE — SHEATH INTRODUCER 6FR 11CM

## (undated) DEVICE — KIT MICROINTRO 4F .018X40X7CM

## (undated) DEVICE — SEE MEDLINE ITEM 156894

## (undated) DEVICE — GUIDEWIRE STF .035X260CM ANG

## (undated) DEVICE — CATH ULTRAVERSE 035 5X200X130

## (undated) DEVICE — CATH ANG PIGTAIL 4FR INFINITY

## (undated) DEVICE — VISE RADIFOCUS MULTI TORQUE

## (undated) DEVICE — INFLATOR ENCORE 26 BLLN INFL

## (undated) DEVICE — CATH MPA2 INFINITI 4FR 100CM

## (undated) DEVICE — SHEATH INTRODUCER 4FR 11CM

## (undated) DEVICE — VISIPAQUE 320 200ML +PK

## (undated) DEVICE — WIRE BHW 014X300

## (undated) DEVICE — KIT CUSTOM MANIFOLD

## (undated) DEVICE — GUIDEWIRE AMPLATZ .035X260

## (undated) DEVICE — SHEATH FLEXOR ANSEL 6FRX55CM

## (undated) DEVICE — SYR MED RAD 150ML

## (undated) DEVICE — CATH GUIDE LAUNCH MB1 6F 100CM

## (undated) DEVICE — SHEATH 6FR 40CM CROSSOVER

## (undated) DEVICE — GUIDEWIRE STD .035X180CM ANG

## (undated) DEVICE — CATH IMA INFINITI 4FRX100CM

## (undated) DEVICE — KIT PROBE COVER WITH GEL

## (undated) DEVICE — SHEATH FLEXOR ANSEL 1 7FRX55CM

## (undated) DEVICE — GUIDEWIRE STD .035X260CM ANG

## (undated) DEVICE — GUIDEWIRE EMERALD 150CM PTFE